# Patient Record
Sex: MALE | Race: WHITE | NOT HISPANIC OR LATINO | Employment: OTHER | URBAN - METROPOLITAN AREA
[De-identification: names, ages, dates, MRNs, and addresses within clinical notes are randomized per-mention and may not be internally consistent; named-entity substitution may affect disease eponyms.]

---

## 2017-01-08 ENCOUNTER — HOSPITAL ENCOUNTER (EMERGENCY)
Facility: HOSPITAL | Age: 62
Discharge: HOME | End: 2017-01-09
Attending: EMERGENCY MEDICINE
Payer: COMMERCIAL

## 2017-01-08 ENCOUNTER — APPOINTMENT (EMERGENCY)
Dept: CT IMAGING | Facility: HOSPITAL | Age: 62
End: 2017-01-08
Payer: COMMERCIAL

## 2017-01-08 DIAGNOSIS — R11.2 NAUSEA & VOMITING: Primary | ICD-10-CM

## 2017-01-08 LAB
ALBUMIN SERPL BCP-MCNC: 4.1 G/DL (ref 3.5–5)
ALP SERPL-CCNC: 83 U/L (ref 46–116)
ALT SERPL W P-5'-P-CCNC: 38 U/L (ref 12–78)
ANION GAP SERPL CALCULATED.3IONS-SCNC: 8 MMOL/L (ref 4–13)
AST SERPL W P-5'-P-CCNC: 23 U/L (ref 5–45)
BASOPHILS # BLD AUTO: 0.02 THOUSANDS/ΜL (ref 0–0.1)
BASOPHILS NFR BLD AUTO: 0 % (ref 0–1)
BILIRUB SERPL-MCNC: 0.6 MG/DL (ref 0.2–1)
BUN SERPL-MCNC: 20 MG/DL (ref 5–25)
CALCIUM SERPL-MCNC: 9.1 MG/DL (ref 8.3–10.1)
CHLORIDE SERPL-SCNC: 99 MMOL/L (ref 100–108)
CO2 SERPL-SCNC: 28 MMOL/L (ref 21–32)
CREAT SERPL-MCNC: 0.92 MG/DL (ref 0.6–1.3)
CRP SERPL QL: 21.8 MG/L
EOSINOPHIL # BLD AUTO: 0.07 THOUSAND/ΜL (ref 0–0.61)
EOSINOPHIL NFR BLD AUTO: 1 % (ref 0–6)
ERYTHROCYTE [DISTWIDTH] IN BLOOD BY AUTOMATED COUNT: 12.8 % (ref 11.6–15.1)
FLUAV AG SPEC QL IA: NEGATIVE
FLUBV AG SPEC QL IA: NEGATIVE
GFR SERPL CREATININE-BSD FRML MDRD: >60 ML/MIN/1.73SQ M
GLUCOSE SERPL-MCNC: 128 MG/DL (ref 65–140)
HCT VFR BLD AUTO: 44.6 % (ref 36.5–49.3)
HGB BLD-MCNC: 15.6 G/DL (ref 12–17)
LIPASE SERPL-CCNC: 174 U/L (ref 73–393)
LYMPHOCYTES # BLD AUTO: 0.73 THOUSANDS/ΜL (ref 0.6–4.47)
LYMPHOCYTES NFR BLD AUTO: 8 % (ref 14–44)
MCH RBC QN AUTO: 28.4 PG (ref 26.8–34.3)
MCHC RBC AUTO-ENTMCNC: 35 G/DL (ref 31.4–37.4)
MCV RBC AUTO: 81 FL (ref 82–98)
MONOCYTES # BLD AUTO: 0.43 THOUSAND/ΜL (ref 0.17–1.22)
MONOCYTES NFR BLD AUTO: 5 % (ref 4–12)
NEUTROPHILS # BLD AUTO: 7.9 THOUSANDS/ΜL (ref 1.85–7.62)
NEUTS SEG NFR BLD AUTO: 86 % (ref 43–75)
NRBC BLD AUTO-RTO: 0 /100 WBCS
PLATELET # BLD AUTO: 195 THOUSANDS/UL (ref 149–390)
PMV BLD AUTO: 9.7 FL (ref 8.9–12.7)
POTASSIUM SERPL-SCNC: 3.8 MMOL/L (ref 3.5–5.3)
PROT SERPL-MCNC: 8.4 G/DL (ref 6.4–8.2)
RBC # BLD AUTO: 5.5 MILLION/UL (ref 3.88–5.62)
SODIUM SERPL-SCNC: 135 MMOL/L (ref 136–145)
TROPONIN I SERPL-MCNC: <0.02 NG/ML
TSH SERPL DL<=0.05 MIU/L-ACNC: 0.61 UIU/ML (ref 0.36–3.74)
WBC # BLD AUTO: 9.18 THOUSAND/UL (ref 4.31–10.16)

## 2017-01-08 PROCEDURE — 74177 CT ABD & PELVIS W/CONTRAST: CPT

## 2017-01-08 PROCEDURE — 84484 ASSAY OF TROPONIN QUANT: CPT | Performed by: EMERGENCY MEDICINE

## 2017-01-08 PROCEDURE — 96374 THER/PROPH/DIAG INJ IV PUSH: CPT

## 2017-01-08 PROCEDURE — 86140 C-REACTIVE PROTEIN: CPT | Performed by: EMERGENCY MEDICINE

## 2017-01-08 PROCEDURE — 87798 DETECT AGENT NOS DNA AMP: CPT | Performed by: EMERGENCY MEDICINE

## 2017-01-08 PROCEDURE — 80053 COMPREHEN METABOLIC PANEL: CPT | Performed by: EMERGENCY MEDICINE

## 2017-01-08 PROCEDURE — 85025 COMPLETE CBC W/AUTO DIFF WBC: CPT | Performed by: EMERGENCY MEDICINE

## 2017-01-08 PROCEDURE — 87400 INFLUENZA A/B EACH AG IA: CPT | Performed by: EMERGENCY MEDICINE

## 2017-01-08 PROCEDURE — 71275 CT ANGIOGRAPHY CHEST: CPT

## 2017-01-08 PROCEDURE — 96375 TX/PRO/DX INJ NEW DRUG ADDON: CPT

## 2017-01-08 PROCEDURE — 84443 ASSAY THYROID STIM HORMONE: CPT | Performed by: EMERGENCY MEDICINE

## 2017-01-08 PROCEDURE — 96361 HYDRATE IV INFUSION ADD-ON: CPT

## 2017-01-08 PROCEDURE — 83690 ASSAY OF LIPASE: CPT | Performed by: EMERGENCY MEDICINE

## 2017-01-08 PROCEDURE — 36415 COLL VENOUS BLD VENIPUNCTURE: CPT | Performed by: EMERGENCY MEDICINE

## 2017-01-08 PROCEDURE — 85652 RBC SED RATE AUTOMATED: CPT | Performed by: EMERGENCY MEDICINE

## 2017-01-08 RX ORDER — KETOROLAC TROMETHAMINE 30 MG/ML
30 INJECTION, SOLUTION INTRAMUSCULAR; INTRAVENOUS ONCE
Status: COMPLETED | OUTPATIENT
Start: 2017-01-08 | End: 2017-01-08

## 2017-01-08 RX ORDER — METFORMIN HYDROCHLORIDE EXTENDED-RELEASE TABLETS 500 MG/1
500 TABLET, FILM COATED, EXTENDED RELEASE ORAL
COMMUNITY
End: 2017-01-21 | Stop reason: HOSPADM

## 2017-01-08 RX ORDER — ATORVASTATIN CALCIUM 10 MG/1
10 TABLET, FILM COATED ORAL DAILY
COMMUNITY
End: 2018-08-03 | Stop reason: SDUPTHER

## 2017-01-08 RX ORDER — AMLODIPINE BESYLATE 2.5 MG/1
2.5 TABLET ORAL DAILY
COMMUNITY
End: 2018-03-16 | Stop reason: ALTCHOICE

## 2017-01-08 RX ORDER — ONDANSETRON 2 MG/ML
INJECTION INTRAMUSCULAR; INTRAVENOUS
Status: DISCONTINUED
Start: 2017-01-08 | End: 2017-01-09 | Stop reason: HOSPADM

## 2017-01-08 RX ORDER — METOPROLOL SUCCINATE 100 MG/1
100 TABLET, EXTENDED RELEASE ORAL DAILY
COMMUNITY
End: 2018-06-20 | Stop reason: SDUPTHER

## 2017-01-08 RX ORDER — ONDANSETRON 2 MG/ML
4 INJECTION INTRAMUSCULAR; INTRAVENOUS ONCE AS NEEDED
Status: COMPLETED | OUTPATIENT
Start: 2017-01-08 | End: 2017-01-08

## 2017-01-08 RX ADMIN — ONDANSETRON 4 MG: 2 INJECTION INTRAMUSCULAR; INTRAVENOUS at 21:05

## 2017-01-08 RX ADMIN — KETOROLAC TROMETHAMINE 30 MG: 30 INJECTION, SOLUTION INTRAMUSCULAR at 22:41

## 2017-01-08 RX ADMIN — SODIUM CHLORIDE 1000 ML: 0.9 INJECTION, SOLUTION INTRAVENOUS at 22:34

## 2017-01-08 RX ADMIN — IOHEXOL 100 ML: 350 INJECTION, SOLUTION INTRAVENOUS at 23:52

## 2017-01-09 VITALS
WEIGHT: 197.4 LBS | TEMPERATURE: 100.2 F | RESPIRATION RATE: 18 BRPM | SYSTOLIC BLOOD PRESSURE: 140 MMHG | OXYGEN SATURATION: 100 % | HEART RATE: 67 BPM | DIASTOLIC BLOOD PRESSURE: 87 MMHG

## 2017-01-09 LAB
ERYTHROCYTE [SEDIMENTATION RATE] IN BLOOD: 8 MM/HOUR (ref 0–10)
FLUAV AG SPEC QL: NORMAL
FLUBV AG SPEC QL: NORMAL
RSV B RNA SPEC QL NAA+PROBE: NORMAL

## 2017-01-09 PROCEDURE — 96376 TX/PRO/DX INJ SAME DRUG ADON: CPT

## 2017-01-09 PROCEDURE — 99284 EMERGENCY DEPT VISIT MOD MDM: CPT

## 2017-01-09 RX ORDER — ONDANSETRON 4 MG/1
4 TABLET, ORALLY DISINTEGRATING ORAL EVERY 8 HOURS PRN
Qty: 20 TABLET | Refills: 0 | Status: SHIPPED | OUTPATIENT
Start: 2017-01-09 | End: 2018-03-16 | Stop reason: ALTCHOICE

## 2017-01-09 RX ORDER — ONDANSETRON 2 MG/ML
4 INJECTION INTRAMUSCULAR; INTRAVENOUS ONCE
Status: COMPLETED | OUTPATIENT
Start: 2017-01-09 | End: 2017-01-09

## 2017-01-09 RX ADMIN — ONDANSETRON 4 MG: 2 INJECTION INTRAMUSCULAR; INTRAVENOUS at 00:40

## 2017-01-16 ENCOUNTER — ALLSCRIPTS OFFICE VISIT (OUTPATIENT)
Dept: OTHER | Facility: OTHER | Age: 62
End: 2017-01-16

## 2017-01-17 ENCOUNTER — APPOINTMENT (EMERGENCY)
Dept: RADIOLOGY | Facility: HOSPITAL | Age: 62
DRG: 872 | End: 2017-01-17
Payer: COMMERCIAL

## 2017-01-17 ENCOUNTER — HOSPITAL ENCOUNTER (INPATIENT)
Facility: HOSPITAL | Age: 62
LOS: 4 days | Discharge: HOME | DRG: 872 | End: 2017-01-21
Attending: INTERNAL MEDICINE | Admitting: INTERNAL MEDICINE
Payer: COMMERCIAL

## 2017-01-17 DIAGNOSIS — R09.02 HYPOXIA: Primary | ICD-10-CM

## 2017-01-17 LAB
ALBUMIN SERPL BCP-MCNC: 3.5 G/DL (ref 3.5–5)
ALP SERPL-CCNC: 81 U/L (ref 46–116)
ALT SERPL W P-5'-P-CCNC: 29 U/L (ref 12–78)
ANION GAP SERPL CALCULATED.3IONS-SCNC: 9 MMOL/L (ref 4–13)
APTT PPP: 33 SECONDS (ref 24–36)
AST SERPL W P-5'-P-CCNC: 20 U/L (ref 5–45)
BASOPHILS # BLD AUTO: 0.02 THOUSANDS/ΜL (ref 0–0.1)
BASOPHILS NFR BLD AUTO: 0 % (ref 0–1)
BILIRUB SERPL-MCNC: 0.4 MG/DL (ref 0.2–1)
BUN SERPL-MCNC: 11 MG/DL (ref 5–25)
CALCIUM SERPL-MCNC: 8.9 MG/DL (ref 8.3–10.1)
CHLORIDE SERPL-SCNC: 98 MMOL/L (ref 100–108)
CO2 SERPL-SCNC: 29 MMOL/L (ref 21–32)
CREAT SERPL-MCNC: 0.95 MG/DL (ref 0.6–1.3)
EOSINOPHIL # BLD AUTO: 0.05 THOUSAND/ΜL (ref 0–0.61)
EOSINOPHIL NFR BLD AUTO: 1 % (ref 0–6)
ERYTHROCYTE [DISTWIDTH] IN BLOOD BY AUTOMATED COUNT: 13 % (ref 11.6–15.1)
GFR SERPL CREATININE-BSD FRML MDRD: >60 ML/MIN/1.73SQ M
GLUCOSE SERPL-MCNC: 117 MG/DL (ref 65–140)
HCT VFR BLD AUTO: 40.7 % (ref 36.5–49.3)
HGB BLD-MCNC: 14.5 G/DL (ref 12–17)
INR PPP: 1.07 (ref 0.86–1.16)
LACTATE SERPL-SCNC: 1.3 MMOL/L (ref 0.5–2)
LIPASE SERPL-CCNC: 120 U/L (ref 73–393)
LYMPHOCYTES # BLD AUTO: 1.6 THOUSANDS/ΜL (ref 0.6–4.47)
LYMPHOCYTES NFR BLD AUTO: 21 % (ref 14–44)
MCH RBC QN AUTO: 28.8 PG (ref 26.8–34.3)
MCHC RBC AUTO-ENTMCNC: 35.6 G/DL (ref 31.4–37.4)
MCV RBC AUTO: 81 FL (ref 82–98)
MONOCYTES # BLD AUTO: 0.86 THOUSAND/ΜL (ref 0.17–1.22)
MONOCYTES NFR BLD AUTO: 11 % (ref 4–12)
NEUTROPHILS # BLD AUTO: 5.17 THOUSANDS/ΜL (ref 1.85–7.62)
NEUTS SEG NFR BLD AUTO: 67 % (ref 43–75)
NRBC BLD AUTO-RTO: 0 /100 WBCS
PLATELET # BLD AUTO: 202 THOUSANDS/UL (ref 149–390)
PMV BLD AUTO: 9.2 FL (ref 8.9–12.7)
POTASSIUM SERPL-SCNC: 3.4 MMOL/L (ref 3.5–5.3)
PROT SERPL-MCNC: 8.1 G/DL (ref 6.4–8.2)
PROTHROMBIN TIME: 13.8 SECONDS (ref 12–14.3)
RBC # BLD AUTO: 5.03 MILLION/UL (ref 3.88–5.62)
SODIUM SERPL-SCNC: 136 MMOL/L (ref 136–145)
WBC # BLD AUTO: 7.73 THOUSAND/UL (ref 4.31–10.16)

## 2017-01-17 PROCEDURE — 71020 HB CHEST X-RAY 2VW FRONTAL&LATL: CPT

## 2017-01-17 PROCEDURE — 85610 PROTHROMBIN TIME: CPT | Performed by: PHYSICIAN ASSISTANT

## 2017-01-17 PROCEDURE — 85730 THROMBOPLASTIN TIME PARTIAL: CPT | Performed by: PHYSICIAN ASSISTANT

## 2017-01-17 PROCEDURE — 80053 COMPREHEN METABOLIC PANEL: CPT | Performed by: PHYSICIAN ASSISTANT

## 2017-01-17 PROCEDURE — 96361 HYDRATE IV INFUSION ADD-ON: CPT

## 2017-01-17 PROCEDURE — 85025 COMPLETE CBC W/AUTO DIFF WBC: CPT | Performed by: PHYSICIAN ASSISTANT

## 2017-01-17 PROCEDURE — 36415 COLL VENOUS BLD VENIPUNCTURE: CPT | Performed by: PHYSICIAN ASSISTANT

## 2017-01-17 PROCEDURE — 87040 BLOOD CULTURE FOR BACTERIA: CPT | Performed by: PHYSICIAN ASSISTANT

## 2017-01-17 PROCEDURE — 83605 ASSAY OF LACTIC ACID: CPT | Performed by: PHYSICIAN ASSISTANT

## 2017-01-17 PROCEDURE — 96360 HYDRATION IV INFUSION INIT: CPT

## 2017-01-17 PROCEDURE — A9270 NON-COVERED ITEM OR SERVICE: HCPCS

## 2017-01-17 PROCEDURE — 83690 ASSAY OF LIPASE: CPT | Performed by: PHYSICIAN ASSISTANT

## 2017-01-17 RX ORDER — ACETAMINOPHEN 325 MG/1
TABLET ORAL
Status: COMPLETED
Start: 2017-01-17 | End: 2017-01-17

## 2017-01-17 RX ORDER — ACETAMINOPHEN 325 MG/1
650 TABLET ORAL ONCE
Status: COMPLETED | OUTPATIENT
Start: 2017-01-17 | End: 2017-01-17

## 2017-01-17 RX ADMIN — SODIUM CHLORIDE 1000 ML: 0.9 INJECTION, SOLUTION INTRAVENOUS at 22:10

## 2017-01-17 RX ADMIN — ACETAMINOPHEN 650 MG: 325 TABLET ORAL at 20:54

## 2017-01-17 RX ADMIN — SODIUM CHLORIDE 1000 ML: 0.9 INJECTION, SOLUTION INTRAVENOUS at 20:53

## 2017-01-18 PROBLEM — R65.10 SIRS (SYSTEMIC INFLAMMATORY RESPONSE SYNDROME) (HCC): Status: ACTIVE | Noted: 2017-01-18

## 2017-01-18 PROBLEM — I10 HTN (HYPERTENSION): Chronic | Status: ACTIVE | Noted: 2017-01-18

## 2017-01-18 PROBLEM — J06.9 URI (UPPER RESPIRATORY INFECTION): Status: ACTIVE | Noted: 2017-01-18

## 2017-01-18 PROBLEM — E87.6 HYPOKALEMIA: Status: ACTIVE | Noted: 2017-01-18

## 2017-01-18 PROBLEM — E11.9 TYPE 2 DIABETES MELLITUS (HCC): Chronic | Status: ACTIVE | Noted: 2017-01-18

## 2017-01-18 LAB
ANION GAP SERPL CALCULATED.3IONS-SCNC: 10 MMOL/L (ref 4–13)
BUN SERPL-MCNC: 10 MG/DL (ref 5–25)
CALCIUM SERPL-MCNC: 8.6 MG/DL (ref 8.3–10.1)
CHLORIDE SERPL-SCNC: 103 MMOL/L (ref 100–108)
CO2 SERPL-SCNC: 27 MMOL/L (ref 21–32)
CREAT SERPL-MCNC: 0.88 MG/DL (ref 0.6–1.3)
ERYTHROCYTE [DISTWIDTH] IN BLOOD BY AUTOMATED COUNT: 12.9 % (ref 11.6–15.1)
FLUAV AG SPEC QL IA: NEGATIVE
FLUAV AG SPEC QL: ABNORMAL
FLUBV AG SPEC QL IA: NEGATIVE
FLUBV AG SPEC QL: ABNORMAL
GFR SERPL CREATININE-BSD FRML MDRD: >60 ML/MIN/1.73SQ M
GLUCOSE SERPL-MCNC: 109 MG/DL (ref 65–140)
GLUCOSE SERPL-MCNC: 115 MG/DL (ref 65–140)
GLUCOSE SERPL-MCNC: 116 MG/DL (ref 65–140)
GLUCOSE SERPL-MCNC: 129 MG/DL (ref 65–140)
GLUCOSE SERPL-MCNC: 147 MG/DL (ref 65–140)
HCT VFR BLD AUTO: 37.2 % (ref 36.5–49.3)
HGB BLD-MCNC: 12.9 G/DL (ref 12–17)
L PNEUMO1 AG UR QL IA.RAPID: NEGATIVE
MCH RBC QN AUTO: 28.4 PG (ref 26.8–34.3)
MCHC RBC AUTO-ENTMCNC: 34.7 G/DL (ref 31.4–37.4)
MCV RBC AUTO: 82 FL (ref 82–98)
PLATELET # BLD AUTO: 171 THOUSANDS/UL (ref 149–390)
PMV BLD AUTO: 9.2 FL (ref 8.9–12.7)
POTASSIUM SERPL-SCNC: 3.4 MMOL/L (ref 3.5–5.3)
RBC # BLD AUTO: 4.54 MILLION/UL (ref 3.88–5.62)
RSV B RNA SPEC QL NAA+PROBE: DETECTED
S PNEUM AG UR QL: NEGATIVE
SODIUM SERPL-SCNC: 140 MMOL/L (ref 136–145)
WBC # BLD AUTO: 7.68 THOUSAND/UL (ref 4.31–10.16)

## 2017-01-18 PROCEDURE — 87798 DETECT AGENT NOS DNA AMP: CPT | Performed by: PHYSICIAN ASSISTANT

## 2017-01-18 PROCEDURE — 82948 REAGENT STRIP/BLOOD GLUCOSE: CPT

## 2017-01-18 PROCEDURE — A9270 NON-COVERED ITEM OR SERVICE: HCPCS | Performed by: PHYSICIAN ASSISTANT

## 2017-01-18 PROCEDURE — 99285 EMERGENCY DEPT VISIT HI MDM: CPT

## 2017-01-18 PROCEDURE — A9270 NON-COVERED ITEM OR SERVICE: HCPCS

## 2017-01-18 PROCEDURE — 36415 COLL VENOUS BLD VENIPUNCTURE: CPT | Performed by: PHYSICIAN ASSISTANT

## 2017-01-18 PROCEDURE — 94760 N-INVAS EAR/PLS OXIMETRY 1: CPT

## 2017-01-18 PROCEDURE — 80048 BASIC METABOLIC PNL TOTAL CA: CPT | Performed by: PHYSICIAN ASSISTANT

## 2017-01-18 PROCEDURE — 87449 NOS EACH ORGANISM AG IA: CPT | Performed by: PHYSICIAN ASSISTANT

## 2017-01-18 PROCEDURE — 87205 SMEAR GRAM STAIN: CPT | Performed by: PHYSICIAN ASSISTANT

## 2017-01-18 PROCEDURE — 85027 COMPLETE CBC AUTOMATED: CPT | Performed by: PHYSICIAN ASSISTANT

## 2017-01-18 PROCEDURE — 87400 INFLUENZA A/B EACH AG IA: CPT | Performed by: PHYSICIAN ASSISTANT

## 2017-01-18 PROCEDURE — 87070 CULTURE OTHR SPECIMN AEROBIC: CPT | Performed by: PHYSICIAN ASSISTANT

## 2017-01-18 PROCEDURE — 87801 DETECT AGNT MULT DNA AMPLI: CPT | Performed by: INTERNAL MEDICINE

## 2017-01-18 PROCEDURE — 87070 CULTURE OTHR SPECIMN AEROBIC: CPT | Performed by: INTERNAL MEDICINE

## 2017-01-18 RX ORDER — ACETAMINOPHEN 325 MG/1
650 TABLET ORAL EVERY 6 HOURS PRN
Status: DISCONTINUED | OUTPATIENT
Start: 2017-01-18 | End: 2017-01-20

## 2017-01-18 RX ORDER — ALBUTEROL SULFATE 2.5 MG/3ML
2.5 SOLUTION RESPIRATORY (INHALATION) EVERY 6 HOURS PRN
Status: DISCONTINUED | OUTPATIENT
Start: 2017-01-18 | End: 2017-01-18

## 2017-01-18 RX ORDER — ECHINACEA PURPUREA EXTRACT 125 MG
2 TABLET ORAL
Status: DISCONTINUED | OUTPATIENT
Start: 2017-01-18 | End: 2017-01-21 | Stop reason: HOSPADM

## 2017-01-18 RX ORDER — SODIUM CHLORIDE 9 MG/ML
125 INJECTION, SOLUTION INTRAVENOUS CONTINUOUS
Status: DISCONTINUED | OUTPATIENT
Start: 2017-01-18 | End: 2017-01-19

## 2017-01-18 RX ORDER — ATORVASTATIN CALCIUM 10 MG/1
10 TABLET, FILM COATED ORAL DAILY
Status: DISCONTINUED | OUTPATIENT
Start: 2017-01-18 | End: 2017-01-21 | Stop reason: HOSPADM

## 2017-01-18 RX ORDER — CEFTRIAXONE 1 G/1
INJECTION, POWDER, FOR SOLUTION INTRAMUSCULAR; INTRAVENOUS
Status: DISPENSED
Start: 2017-01-18 | End: 2017-01-18

## 2017-01-18 RX ORDER — POTASSIUM CHLORIDE 20 MEQ/1
20 TABLET, EXTENDED RELEASE ORAL ONCE
Status: COMPLETED | OUTPATIENT
Start: 2017-01-18 | End: 2017-01-18

## 2017-01-18 RX ORDER — METOPROLOL SUCCINATE 50 MG/1
50 TABLET, EXTENDED RELEASE ORAL DAILY
Status: DISCONTINUED | OUTPATIENT
Start: 2017-01-18 | End: 2017-01-21 | Stop reason: HOSPADM

## 2017-01-18 RX ORDER — CALCIUM CARBONATE 200(500)MG
1000 TABLET,CHEWABLE ORAL DAILY PRN
Status: DISCONTINUED | OUTPATIENT
Start: 2017-01-18 | End: 2017-01-21 | Stop reason: HOSPADM

## 2017-01-18 RX ORDER — ACETAMINOPHEN 325 MG/1
TABLET ORAL
Status: COMPLETED
Start: 2017-01-18 | End: 2017-01-18

## 2017-01-18 RX ORDER — DOCUSATE SODIUM 100 MG/1
100 CAPSULE, LIQUID FILLED ORAL 2 TIMES DAILY
Status: DISCONTINUED | OUTPATIENT
Start: 2017-01-18 | End: 2017-01-19

## 2017-01-18 RX ORDER — ASPIRIN 81 MG/1
81 TABLET, CHEWABLE ORAL DAILY
Status: DISCONTINUED | OUTPATIENT
Start: 2017-01-18 | End: 2017-01-21 | Stop reason: HOSPADM

## 2017-01-18 RX ORDER — LEVALBUTEROL 1.25 MG/.5ML
1.25 SOLUTION, CONCENTRATE RESPIRATORY (INHALATION) EVERY 8 HOURS PRN
Status: DISCONTINUED | OUTPATIENT
Start: 2017-01-18 | End: 2017-01-21 | Stop reason: HOSPADM

## 2017-01-18 RX ORDER — ONDANSETRON 2 MG/ML
INJECTION INTRAMUSCULAR; INTRAVENOUS
Status: COMPLETED
Start: 2017-01-18 | End: 2017-01-18

## 2017-01-18 RX ORDER — AMLODIPINE BESYLATE 2.5 MG/1
2.5 TABLET ORAL DAILY
Status: DISCONTINUED | OUTPATIENT
Start: 2017-01-18 | End: 2017-01-21 | Stop reason: HOSPADM

## 2017-01-18 RX ORDER — ONDANSETRON 2 MG/ML
4 INJECTION INTRAMUSCULAR; INTRAVENOUS EVERY 6 HOURS PRN
Status: DISCONTINUED | OUTPATIENT
Start: 2017-01-18 | End: 2017-01-21 | Stop reason: HOSPADM

## 2017-01-18 RX ORDER — METFORMIN HYDROCHLORIDE 500 MG/1
500 TABLET, EXTENDED RELEASE ORAL
Status: DISCONTINUED | OUTPATIENT
Start: 2017-01-18 | End: 2017-01-18

## 2017-01-18 RX ADMIN — ASPIRIN 81 MG 81 MG: 81 TABLET ORAL at 08:35

## 2017-01-18 RX ADMIN — POTASSIUM CHLORIDE 20 MEQ: 1500 TABLET, EXTENDED RELEASE ORAL at 18:28

## 2017-01-18 RX ADMIN — METFORMIN HYDROCHLORIDE 500 MG: 500 TABLET, EXTENDED RELEASE ORAL at 08:36

## 2017-01-18 RX ADMIN — SODIUM CHLORIDE 125 ML/HR: 0.9 INJECTION, SOLUTION INTRAVENOUS at 20:21

## 2017-01-18 RX ADMIN — SODIUM CHLORIDE 125 ML/HR: 0.9 INJECTION, SOLUTION INTRAVENOUS at 03:05

## 2017-01-18 RX ADMIN — ENOXAPARIN SODIUM 40 MG: 40 INJECTION SUBCUTANEOUS at 09:23

## 2017-01-18 RX ADMIN — DOCUSATE SODIUM 100 MG: 100 CAPSULE, LIQUID FILLED ORAL at 08:39

## 2017-01-18 RX ADMIN — SODIUM CHLORIDE 125 ML/HR: 0.9 INJECTION, SOLUTION INTRAVENOUS at 14:51

## 2017-01-18 RX ADMIN — ACETAMINOPHEN 650 MG: 325 TABLET ORAL at 02:32

## 2017-01-18 RX ADMIN — Medication 1000 MG: at 03:04

## 2017-01-18 RX ADMIN — DOCUSATE SODIUM 100 MG: 100 CAPSULE, LIQUID FILLED ORAL at 18:28

## 2017-01-18 RX ADMIN — METOPROLOL SUCCINATE 50 MG: 50 TABLET, FILM COATED, EXTENDED RELEASE ORAL at 08:38

## 2017-01-18 RX ADMIN — AMLODIPINE BESYLATE 2.5 MG: 2.5 TABLET ORAL at 08:39

## 2017-01-18 RX ADMIN — ATORVASTATIN CALCIUM 10 MG: 10 TABLET, FILM COATED ORAL at 08:36

## 2017-01-18 RX ADMIN — ACETAMINOPHEN 650 MG: 325 TABLET ORAL at 18:28

## 2017-01-18 RX ADMIN — AZITHROMYCIN FOR INJECTION INJECTION, POWDER, LYOPHILIZED, FOR SOLUTION 500 MG: 500 INJECTION INTRAVENOUS at 05:54

## 2017-01-18 RX ADMIN — ONDANSETRON 4 MG: 2 INJECTION INTRAMUSCULAR; INTRAVENOUS at 02:32

## 2017-01-18 RX ADMIN — ACETAMINOPHEN 650 MG: 325 TABLET ORAL at 08:04

## 2017-01-18 RX ADMIN — POTASSIUM CHLORIDE 20 MEQ: 1500 TABLET, EXTENDED RELEASE ORAL at 05:57

## 2017-01-19 PROBLEM — A41.9 SEPSIS (HCC): Status: ACTIVE | Noted: 2017-01-18

## 2017-01-19 PROBLEM — J40 TRACHEOBRONCHITIS: Status: ACTIVE | Noted: 2017-01-19

## 2017-01-19 PROBLEM — J01.90 ACUTE SINUSITIS: Status: ACTIVE | Noted: 2017-01-19

## 2017-01-19 PROBLEM — B33.8 RSV (RESPIRATORY SYNCYTIAL VIRUS INFECTION): Status: ACTIVE | Noted: 2017-01-19

## 2017-01-19 LAB
GLUCOSE SERPL-MCNC: 113 MG/DL (ref 65–140)
GLUCOSE SERPL-MCNC: 202 MG/DL (ref 65–140)
GLUCOSE SERPL-MCNC: 266 MG/DL (ref 65–140)
GLUCOSE SERPL-MCNC: 95 MG/DL (ref 65–140)

## 2017-01-19 PROCEDURE — A9270 NON-COVERED ITEM OR SERVICE: HCPCS | Performed by: PHYSICIAN ASSISTANT

## 2017-01-19 PROCEDURE — 82948 REAGENT STRIP/BLOOD GLUCOSE: CPT

## 2017-01-19 PROCEDURE — 94640 AIRWAY INHALATION TREATMENT: CPT

## 2017-01-19 PROCEDURE — 94760 N-INVAS EAR/PLS OXIMETRY 1: CPT

## 2017-01-19 RX ORDER — DOCUSATE SODIUM 100 MG/1
100 CAPSULE, LIQUID FILLED ORAL 2 TIMES DAILY PRN
Status: DISCONTINUED | OUTPATIENT
Start: 2017-01-19 | End: 2017-01-21 | Stop reason: HOSPADM

## 2017-01-19 RX ORDER — SODIUM CHLORIDE 9 MG/ML
50 INJECTION, SOLUTION INTRAVENOUS CONTINUOUS
Status: DISCONTINUED | OUTPATIENT
Start: 2017-01-19 | End: 2017-01-21 | Stop reason: HOSPADM

## 2017-01-19 RX ORDER — SODIUM CHLORIDE FOR INHALATION 0.9 %
VIAL, NEBULIZER (ML) INHALATION
Status: COMPLETED
Start: 2017-01-19 | End: 2017-01-19

## 2017-01-19 RX ORDER — METHYLPREDNISOLONE SODIUM SUCCINATE 40 MG/ML
40 INJECTION, POWDER, LYOPHILIZED, FOR SOLUTION INTRAMUSCULAR; INTRAVENOUS EVERY 12 HOURS SCHEDULED
Status: DISCONTINUED | OUTPATIENT
Start: 2017-01-19 | End: 2017-01-20

## 2017-01-19 RX ORDER — ECHINACEA PURPUREA EXTRACT 125 MG
1 TABLET ORAL EVERY 4 HOURS
Status: DISCONTINUED | OUTPATIENT
Start: 2017-01-19 | End: 2017-01-21 | Stop reason: HOSPADM

## 2017-01-19 RX ORDER — LEVALBUTEROL 1.25 MG/.5ML
1.25 SOLUTION, CONCENTRATE RESPIRATORY (INHALATION)
Status: DISCONTINUED | OUTPATIENT
Start: 2017-01-19 | End: 2017-01-21 | Stop reason: HOSPADM

## 2017-01-19 RX ORDER — AZITHROMYCIN 250 MG/1
500 TABLET, FILM COATED ORAL EVERY 24 HOURS
Status: DISCONTINUED | OUTPATIENT
Start: 2017-01-20 | End: 2017-01-21 | Stop reason: HOSPADM

## 2017-01-19 RX ADMIN — Medication 1 SPRAY: at 16:14

## 2017-01-19 RX ADMIN — AMLODIPINE BESYLATE 2.5 MG: 2.5 TABLET ORAL at 08:23

## 2017-01-19 RX ADMIN — METOPROLOL SUCCINATE 50 MG: 50 TABLET, FILM COATED, EXTENDED RELEASE ORAL at 08:23

## 2017-01-19 RX ADMIN — LEVALBUTEROL HYDROCHLORIDE 1.25 MG: 1.25 SOLUTION, CONCENTRATE RESPIRATORY (INHALATION) at 16:40

## 2017-01-19 RX ADMIN — ISODIUM CHLORIDE 3 ML: 0.03 SOLUTION RESPIRATORY (INHALATION) at 21:30

## 2017-01-19 RX ADMIN — ONDANSETRON 4 MG: 2 INJECTION INTRAMUSCULAR; INTRAVENOUS at 01:50

## 2017-01-19 RX ADMIN — LEVALBUTEROL HYDROCHLORIDE 1.25 MG: 1.25 SOLUTION, CONCENTRATE RESPIRATORY (INHALATION) at 21:30

## 2017-01-19 RX ADMIN — ATORVASTATIN CALCIUM 10 MG: 10 TABLET, FILM COATED ORAL at 08:23

## 2017-01-19 RX ADMIN — ASPIRIN 81 MG 81 MG: 81 TABLET ORAL at 08:23

## 2017-01-19 RX ADMIN — ACETAMINOPHEN 650 MG: 325 TABLET ORAL at 01:48

## 2017-01-19 RX ADMIN — METHYLPREDNISOLONE SODIUM SUCCINATE 40 MG: 40 INJECTION, POWDER, FOR SOLUTION INTRAMUSCULAR; INTRAVENOUS at 21:09

## 2017-01-19 RX ADMIN — CEFTRIAXONE 1000 MG: 1 INJECTION, POWDER, FOR SOLUTION INTRAMUSCULAR; INTRAVENOUS at 12:45

## 2017-01-19 RX ADMIN — SODIUM CHLORIDE 125 ML/HR: 0.9 INJECTION, SOLUTION INTRAVENOUS at 04:28

## 2017-01-19 RX ADMIN — ISODIUM CHLORIDE 3 ML: 0.03 SOLUTION RESPIRATORY (INHALATION) at 16:40

## 2017-01-19 RX ADMIN — Medication 1 SPRAY: at 11:56

## 2017-01-19 RX ADMIN — AZITHROMYCIN MONOHYDRATE 500 MG: 500 INJECTION, POWDER, LYOPHILIZED, FOR SOLUTION INTRAVENOUS at 13:33

## 2017-01-19 RX ADMIN — SODIUM CHLORIDE 75 ML/HR: 0.9 INJECTION, SOLUTION INTRAVENOUS at 15:26

## 2017-01-19 RX ADMIN — ACETAMINOPHEN 650 MG: 325 TABLET ORAL at 07:53

## 2017-01-19 RX ADMIN — Medication 1 SPRAY: at 20:34

## 2017-01-19 RX ADMIN — ENOXAPARIN SODIUM 40 MG: 40 INJECTION SUBCUTANEOUS at 08:23

## 2017-01-19 RX ADMIN — INSULIN LISPRO 2 UNITS: 100 INJECTION, SOLUTION INTRAVENOUS; SUBCUTANEOUS at 18:06

## 2017-01-19 RX ADMIN — METHYLPREDNISOLONE SODIUM SUCCINATE 40 MG: 40 INJECTION, POWDER, FOR SOLUTION INTRAMUSCULAR; INTRAVENOUS at 12:09

## 2017-01-19 RX ADMIN — SODIUM CHLORIDE 125 ML/HR: 0.9 INJECTION, SOLUTION INTRAVENOUS at 04:31

## 2017-01-20 PROBLEM — E55.9 VITAMIN D DEFICIENCY: Status: ACTIVE | Noted: 2017-01-20

## 2017-01-20 PROBLEM — R74.01 TRANSAMINITIS: Status: ACTIVE | Noted: 2017-01-20

## 2017-01-20 LAB
25(OH)D3 SERPL-MCNC: 18.4 NG/ML (ref 30–100)
ALBUMIN SERPL BCP-MCNC: 2.8 G/DL (ref 3.5–5)
ALP SERPL-CCNC: 67 U/L (ref 46–116)
ALT SERPL W P-5'-P-CCNC: 62 U/L (ref 12–78)
ANION GAP SERPL CALCULATED.3IONS-SCNC: 11 MMOL/L (ref 4–13)
AST SERPL W P-5'-P-CCNC: 55 U/L (ref 5–45)
B PARAPERT DNA SPEC QL NAA+PROBE: NOT DETECTED
B PERT DNA SPEC QL NAA+PROBE: NOT DETECTED
BACTERIA SPT RESP CULT: NORMAL
BASOPHILS # BLD MANUAL: 0 THOUSAND/UL (ref 0–0.1)
BASOPHILS NFR MAR MANUAL: 0 % (ref 0–1)
BILIRUB SERPL-MCNC: 0.3 MG/DL (ref 0.2–1)
BUN SERPL-MCNC: 13 MG/DL (ref 5–25)
CALCIUM SERPL-MCNC: 9.1 MG/DL (ref 8.3–10.1)
CHLORIDE SERPL-SCNC: 105 MMOL/L (ref 100–108)
CO2 SERPL-SCNC: 26 MMOL/L (ref 21–32)
CREAT SERPL-MCNC: 0.76 MG/DL (ref 0.6–1.3)
EOSINOPHIL # BLD MANUAL: 0 THOUSAND/UL (ref 0–0.4)
EOSINOPHIL NFR BLD MANUAL: 0 % (ref 0–6)
ERYTHROCYTE [DISTWIDTH] IN BLOOD BY AUTOMATED COUNT: 12.8 % (ref 11.6–15.1)
EST. AVERAGE GLUCOSE BLD GHB EST-MCNC: 154 MG/DL
GFR SERPL CREATININE-BSD FRML MDRD: >60 ML/MIN/1.73SQ M
GLUCOSE SERPL-MCNC: 158 MG/DL (ref 65–140)
GLUCOSE SERPL-MCNC: 167 MG/DL (ref 65–140)
GLUCOSE SERPL-MCNC: 181 MG/DL (ref 65–140)
GLUCOSE SERPL-MCNC: 233 MG/DL (ref 65–140)
GLUCOSE SERPL-MCNC: 246 MG/DL (ref 65–140)
GRAM STN SPEC: NORMAL
HBA1C MFR BLD: 7 % (ref 4.2–6.3)
HCT VFR BLD AUTO: 35.7 % (ref 36.5–49.3)
HGB BLD-MCNC: 12.5 G/DL (ref 12–17)
LACTATE SERPL-SCNC: 1.1 MMOL/L (ref 0.5–2)
LYMPHOCYTES # BLD AUTO: 0.88 THOUSAND/UL (ref 0.6–4.47)
LYMPHOCYTES # BLD AUTO: 12 % (ref 14–44)
MAGNESIUM SERPL-MCNC: 2 MG/DL (ref 1.6–2.6)
MCH RBC QN AUTO: 28.5 PG (ref 26.8–34.3)
MCHC RBC AUTO-ENTMCNC: 35 G/DL (ref 31.4–37.4)
MCV RBC AUTO: 82 FL (ref 82–98)
MONOCYTES # BLD AUTO: 0.37 THOUSAND/UL (ref 0–1.22)
MONOCYTES NFR BLD: 5 % (ref 4–12)
NEUTROPHILS # BLD MANUAL: 6.09 THOUSAND/UL (ref 1.85–7.62)
NEUTS SEG NFR BLD AUTO: 83 % (ref 43–75)
NRBC BLD AUTO-RTO: 0 /100 WBCS
PHOSPHATE SERPL-MCNC: 4.2 MG/DL (ref 2.7–4.5)
PLATELET # BLD AUTO: 195 THOUSANDS/UL (ref 149–390)
PLATELET BLD QL SMEAR: ADEQUATE
PMV BLD AUTO: 10.2 FL (ref 8.9–12.7)
POTASSIUM SERPL-SCNC: 4.4 MMOL/L (ref 3.5–5.3)
PROT SERPL-MCNC: 7.6 G/DL (ref 6.4–8.2)
RBC # BLD AUTO: 4.38 MILLION/UL (ref 3.88–5.62)
SODIUM SERPL-SCNC: 142 MMOL/L (ref 136–145)
TOTAL CELLS COUNTED SPEC: 100
WBC # BLD AUTO: 7.34 THOUSAND/UL (ref 4.31–10.16)

## 2017-01-20 PROCEDURE — 83605 ASSAY OF LACTIC ACID: CPT | Performed by: INTERNAL MEDICINE

## 2017-01-20 PROCEDURE — A9270 NON-COVERED ITEM OR SERVICE: HCPCS | Performed by: PHYSICIAN ASSISTANT

## 2017-01-20 PROCEDURE — 85007 BL SMEAR W/DIFF WBC COUNT: CPT | Performed by: INTERNAL MEDICINE

## 2017-01-20 PROCEDURE — 80053 COMPREHEN METABOLIC PANEL: CPT | Performed by: INTERNAL MEDICINE

## 2017-01-20 PROCEDURE — 94760 N-INVAS EAR/PLS OXIMETRY 1: CPT

## 2017-01-20 PROCEDURE — 83735 ASSAY OF MAGNESIUM: CPT | Performed by: INTERNAL MEDICINE

## 2017-01-20 PROCEDURE — 94640 AIRWAY INHALATION TREATMENT: CPT

## 2017-01-20 PROCEDURE — A9270 NON-COVERED ITEM OR SERVICE: HCPCS | Performed by: INTERNAL MEDICINE

## 2017-01-20 PROCEDURE — 82948 REAGENT STRIP/BLOOD GLUCOSE: CPT

## 2017-01-20 PROCEDURE — 82306 VITAMIN D 25 HYDROXY: CPT | Performed by: INTERNAL MEDICINE

## 2017-01-20 PROCEDURE — 84100 ASSAY OF PHOSPHORUS: CPT | Performed by: INTERNAL MEDICINE

## 2017-01-20 PROCEDURE — 83036 HEMOGLOBIN GLYCOSYLATED A1C: CPT | Performed by: INTERNAL MEDICINE

## 2017-01-20 PROCEDURE — 85027 COMPLETE CBC AUTOMATED: CPT | Performed by: INTERNAL MEDICINE

## 2017-01-20 RX ORDER — SODIUM CHLORIDE FOR INHALATION 0.9 %
VIAL, NEBULIZER (ML) INHALATION
Status: COMPLETED
Start: 2017-01-20 | End: 2017-01-20

## 2017-01-20 RX ORDER — MELATONIN
2000 DAILY
Status: DISCONTINUED | OUTPATIENT
Start: 2017-01-21 | End: 2017-01-21 | Stop reason: HOSPADM

## 2017-01-20 RX ORDER — PREDNISONE 20 MG/1
40 TABLET ORAL DAILY
Status: DISCONTINUED | OUTPATIENT
Start: 2017-01-21 | End: 2017-01-21 | Stop reason: HOSPADM

## 2017-01-20 RX ADMIN — ATORVASTATIN CALCIUM 10 MG: 10 TABLET, FILM COATED ORAL at 08:04

## 2017-01-20 RX ADMIN — Medication 1 SPRAY: at 20:10

## 2017-01-20 RX ADMIN — ISODIUM CHLORIDE 3 ML: 0.03 SOLUTION RESPIRATORY (INHALATION) at 10:00

## 2017-01-20 RX ADMIN — LEVALBUTEROL HYDROCHLORIDE 1.25 MG: 1.25 SOLUTION, CONCENTRATE RESPIRATORY (INHALATION) at 22:19

## 2017-01-20 RX ADMIN — METHYLPREDNISOLONE SODIUM SUCCINATE 40 MG: 40 INJECTION, POWDER, FOR SOLUTION INTRAMUSCULAR; INTRAVENOUS at 08:04

## 2017-01-20 RX ADMIN — ISODIUM CHLORIDE 3 ML: 0.03 SOLUTION RESPIRATORY (INHALATION) at 15:36

## 2017-01-20 RX ADMIN — INSULIN LISPRO 3 UNITS: 100 INJECTION, SOLUTION INTRAVENOUS; SUBCUTANEOUS at 08:05

## 2017-01-20 RX ADMIN — INSULIN LISPRO 3 UNITS: 100 INJECTION, SOLUTION INTRAVENOUS; SUBCUTANEOUS at 16:57

## 2017-01-20 RX ADMIN — CEFTRIAXONE 1000 MG: 1 INJECTION, POWDER, FOR SOLUTION INTRAMUSCULAR; INTRAVENOUS at 11:44

## 2017-01-20 RX ADMIN — AZITHROMYCIN 500 MG: 250 TABLET, FILM COATED ORAL at 11:49

## 2017-01-20 RX ADMIN — LEVALBUTEROL HYDROCHLORIDE 1.25 MG: 1.25 SOLUTION, CONCENTRATE RESPIRATORY (INHALATION) at 15:35

## 2017-01-20 RX ADMIN — LEVALBUTEROL HYDROCHLORIDE 1.25 MG: 1.25 SOLUTION, CONCENTRATE RESPIRATORY (INHALATION) at 09:59

## 2017-01-20 RX ADMIN — Medication 1 SPRAY: at 15:24

## 2017-01-20 RX ADMIN — ASPIRIN 81 MG 81 MG: 81 TABLET ORAL at 08:04

## 2017-01-20 RX ADMIN — Medication 1 SPRAY: at 00:00

## 2017-01-20 RX ADMIN — AMLODIPINE BESYLATE 2.5 MG: 2.5 TABLET ORAL at 08:04

## 2017-01-20 RX ADMIN — Medication 1 SPRAY: at 11:45

## 2017-01-20 RX ADMIN — Medication 1 SPRAY: at 08:04

## 2017-01-20 RX ADMIN — METOPROLOL SUCCINATE 50 MG: 50 TABLET, FILM COATED, EXTENDED RELEASE ORAL at 08:03

## 2017-01-20 RX ADMIN — INSULIN LISPRO 1 UNITS: 100 INJECTION, SOLUTION INTRAVENOUS; SUBCUTANEOUS at 12:08

## 2017-01-20 RX ADMIN — SODIUM CHLORIDE 75 ML/HR: 0.9 INJECTION, SOLUTION INTRAVENOUS at 00:33

## 2017-01-20 RX ADMIN — ENOXAPARIN SODIUM 40 MG: 40 INJECTION SUBCUTANEOUS at 08:04

## 2017-01-21 VITALS
TEMPERATURE: 97.8 F | SYSTOLIC BLOOD PRESSURE: 130 MMHG | WEIGHT: 190 LBS | HEIGHT: 67 IN | DIASTOLIC BLOOD PRESSURE: 98 MMHG | BODY MASS INDEX: 29.82 KG/M2 | OXYGEN SATURATION: 96 % | RESPIRATION RATE: 18 BRPM | HEART RATE: 75 BPM

## 2017-01-21 LAB
ALBUMIN SERPL BCP-MCNC: 2.9 G/DL (ref 3.5–5)
ALP SERPL-CCNC: 64 U/L (ref 46–116)
ALT SERPL W P-5'-P-CCNC: 109 U/L (ref 12–78)
ANION GAP SERPL CALCULATED.3IONS-SCNC: 11 MMOL/L (ref 4–13)
AST SERPL W P-5'-P-CCNC: 55 U/L (ref 5–45)
BASOPHILS # BLD MANUAL: 0 THOUSAND/UL (ref 0–0.1)
BASOPHILS NFR MAR MANUAL: 0 % (ref 0–1)
BILIRUB SERPL-MCNC: 0.2 MG/DL (ref 0.2–1)
BUN SERPL-MCNC: 13 MG/DL (ref 5–25)
CALCIUM SERPL-MCNC: 8.7 MG/DL (ref 8.3–10.1)
CHLORIDE SERPL-SCNC: 105 MMOL/L (ref 100–108)
CO2 SERPL-SCNC: 26 MMOL/L (ref 21–32)
CREAT SERPL-MCNC: 0.79 MG/DL (ref 0.6–1.3)
EOSINOPHIL # BLD MANUAL: 0 THOUSAND/UL (ref 0–0.4)
EOSINOPHIL NFR BLD MANUAL: 0 % (ref 0–6)
ERYTHROCYTE [DISTWIDTH] IN BLOOD BY AUTOMATED COUNT: 12.9 % (ref 11.6–15.1)
GFR SERPL CREATININE-BSD FRML MDRD: >60 ML/MIN/1.73SQ M
GLUCOSE SERPL-MCNC: 119 MG/DL (ref 65–140)
GLUCOSE SERPL-MCNC: 131 MG/DL (ref 65–140)
GLUCOSE SERPL-MCNC: 146 MG/DL (ref 65–140)
HCT VFR BLD AUTO: 35.2 % (ref 36.5–49.3)
HGB BLD-MCNC: 12.1 G/DL (ref 12–17)
LYMPHOCYTES # BLD AUTO: 2.87 THOUSAND/UL (ref 0.6–4.47)
LYMPHOCYTES # BLD AUTO: 36 % (ref 14–44)
MCH RBC QN AUTO: 28 PG (ref 26.8–34.3)
MCHC RBC AUTO-ENTMCNC: 34.4 G/DL (ref 31.4–37.4)
MCV RBC AUTO: 82 FL (ref 82–98)
MONOCYTES # BLD AUTO: 0.24 THOUSAND/UL (ref 0–1.22)
MONOCYTES NFR BLD: 3 % (ref 4–12)
NEUTROPHILS # BLD MANUAL: 4.86 THOUSAND/UL (ref 1.85–7.62)
NEUTS SEG NFR BLD AUTO: 61 % (ref 43–75)
NRBC BLD AUTO-RTO: 0 /100 WBCS
PLATELET # BLD AUTO: 217 THOUSANDS/UL (ref 149–390)
PLATELET BLD QL SMEAR: ADEQUATE
PMV BLD AUTO: 9.4 FL (ref 8.9–12.7)
POTASSIUM SERPL-SCNC: 3.2 MMOL/L (ref 3.5–5.3)
PROT SERPL-MCNC: 7.4 G/DL (ref 6.4–8.2)
RBC # BLD AUTO: 4.32 MILLION/UL (ref 3.88–5.62)
SODIUM SERPL-SCNC: 142 MMOL/L (ref 136–145)
TOTAL CELLS COUNTED SPEC: 100
WBC # BLD AUTO: 7.97 THOUSAND/UL (ref 4.31–10.16)

## 2017-01-21 PROCEDURE — A9270 NON-COVERED ITEM OR SERVICE: HCPCS | Performed by: INTERNAL MEDICINE

## 2017-01-21 PROCEDURE — A9270 NON-COVERED ITEM OR SERVICE: HCPCS | Performed by: PHYSICIAN ASSISTANT

## 2017-01-21 PROCEDURE — 80074 ACUTE HEPATITIS PANEL: CPT | Performed by: INTERNAL MEDICINE

## 2017-01-21 PROCEDURE — 85027 COMPLETE CBC AUTOMATED: CPT | Performed by: INTERNAL MEDICINE

## 2017-01-21 PROCEDURE — 85007 BL SMEAR W/DIFF WBC COUNT: CPT | Performed by: INTERNAL MEDICINE

## 2017-01-21 PROCEDURE — 82948 REAGENT STRIP/BLOOD GLUCOSE: CPT

## 2017-01-21 PROCEDURE — 80053 COMPREHEN METABOLIC PANEL: CPT | Performed by: INTERNAL MEDICINE

## 2017-01-21 RX ORDER — POTASSIUM CHLORIDE 20 MEQ/1
20 TABLET, EXTENDED RELEASE ORAL DAILY
Qty: 3 TABLET | Refills: 0 | Status: SHIPPED | OUTPATIENT
Start: 2017-01-22 | End: 2018-03-16 | Stop reason: ALTCHOICE

## 2017-01-21 RX ORDER — CEFUROXIME AXETIL 500 MG/1
500 TABLET ORAL EVERY 12 HOURS SCHEDULED
Qty: 14 TABLET | Refills: 0 | Status: SHIPPED | OUTPATIENT
Start: 2017-01-21 | End: 2017-01-28

## 2017-01-21 RX ORDER — TRAMADOL HYDROCHLORIDE 50 MG/1
50 TABLET ORAL EVERY 6 HOURS PRN
Qty: 10 TABLET | Refills: 0 | Status: SHIPPED | OUTPATIENT
Start: 2017-01-21 | End: 2017-01-21 | Stop reason: HOSPADM

## 2017-01-21 RX ORDER — PREDNISONE 10 MG/1
TABLET ORAL
Qty: 13 TABLET | Refills: 0 | Status: SHIPPED | OUTPATIENT
Start: 2017-01-22 | End: 2018-03-16 | Stop reason: ALTCHOICE

## 2017-01-21 RX ORDER — TRAMADOL HYDROCHLORIDE 50 MG/1
50 TABLET ORAL EVERY 6 HOURS PRN
Status: DISCONTINUED | OUTPATIENT
Start: 2017-01-21 | End: 2017-01-21 | Stop reason: HOSPADM

## 2017-01-21 RX ORDER — SODIUM CHLORIDE FOR INHALATION 0.9 %
3 VIAL, NEBULIZER (ML) INHALATION
Status: DISCONTINUED | OUTPATIENT
Start: 2017-01-21 | End: 2017-01-21 | Stop reason: HOSPADM

## 2017-01-21 RX ORDER — LISINOPRIL 2.5 MG/1
2.5 TABLET ORAL DAILY
Qty: 30 TABLET | Refills: 0 | Status: SHIPPED | OUTPATIENT
Start: 2017-01-21 | End: 2018-03-16 | Stop reason: ALTCHOICE

## 2017-01-21 RX ORDER — TRAMADOL HYDROCHLORIDE 50 MG/1
50 TABLET ORAL ONCE
Status: COMPLETED | OUTPATIENT
Start: 2017-01-21 | End: 2017-01-21

## 2017-01-21 RX ORDER — OXYCODONE HYDROCHLORIDE 5 MG/1
5 TABLET ORAL EVERY 6 HOURS PRN
Qty: 5 TABLET | Refills: 0 | Status: SHIPPED | OUTPATIENT
Start: 2017-01-21 | End: 2018-03-16 | Stop reason: ALTCHOICE

## 2017-01-21 RX ORDER — FLUTICASONE PROPIONATE 50 MCG
1 SPRAY, SUSPENSION (ML) NASAL DAILY
Qty: 1 BOTTLE | Refills: 0
Start: 2017-01-21 | End: 2018-03-16 | Stop reason: ALTCHOICE

## 2017-01-21 RX ORDER — SODIUM CHLORIDE FOR INHALATION 0.9 %
VIAL, NEBULIZER (ML) INHALATION
Status: DISCONTINUED
Start: 2017-01-21 | End: 2017-01-21 | Stop reason: WASHOUT

## 2017-01-21 RX ORDER — POTASSIUM CHLORIDE 20 MEQ/1
40 TABLET, EXTENDED RELEASE ORAL
Status: DISCONTINUED | OUTPATIENT
Start: 2017-01-21 | End: 2017-01-21 | Stop reason: HOSPADM

## 2017-01-21 RX ADMIN — AZITHROMYCIN 500 MG: 250 TABLET, FILM COATED ORAL at 10:24

## 2017-01-21 RX ADMIN — VITAMIN D, TAB 1000IU (100/BT) 2000 UNITS: 25 TAB at 10:23

## 2017-01-21 RX ADMIN — TRAMADOL HYDROCHLORIDE 50 MG: 50 TABLET, COATED ORAL at 10:23

## 2017-01-21 RX ADMIN — PREDNISONE 40 MG: 20 TABLET ORAL at 10:23

## 2017-01-21 RX ADMIN — METOPROLOL SUCCINATE 50 MG: 50 TABLET, FILM COATED, EXTENDED RELEASE ORAL at 10:24

## 2017-01-21 RX ADMIN — POTASSIUM CHLORIDE 40 MEQ: 1500 TABLET, EXTENDED RELEASE ORAL at 10:23

## 2017-01-21 RX ADMIN — TRAMADOL HYDROCHLORIDE 50 MG: 50 TABLET ORAL at 10:25

## 2017-01-21 RX ADMIN — AMLODIPINE BESYLATE 2.5 MG: 2.5 TABLET ORAL at 10:23

## 2017-01-21 RX ADMIN — Medication 1 SPRAY: at 10:22

## 2017-01-21 RX ADMIN — ASPIRIN 81 MG 81 MG: 81 TABLET ORAL at 10:24

## 2017-01-21 RX ADMIN — ATORVASTATIN CALCIUM 10 MG: 10 TABLET, FILM COATED ORAL at 10:23

## 2017-01-22 LAB
BACTERIA BLD CULT: NORMAL
BACTERIA BLD CULT: NORMAL
HAV IGM SER QL: NORMAL
HBV CORE IGM SER QL: NORMAL
HBV SURFACE AG SER QL: NORMAL
HCV AB SER QL: NORMAL

## 2017-01-23 ENCOUNTER — GENERIC CONVERSION - ENCOUNTER (OUTPATIENT)
Dept: OTHER | Facility: OTHER | Age: 62
End: 2017-01-23

## 2017-02-03 LAB — B PERT NPH QL CULT: NEGATIVE

## 2017-02-07 ENCOUNTER — ALLSCRIPTS OFFICE VISIT (OUTPATIENT)
Dept: OTHER | Facility: OTHER | Age: 62
End: 2017-02-07

## 2017-02-07 ENCOUNTER — APPOINTMENT (OUTPATIENT)
Dept: LAB | Facility: CLINIC | Age: 62
End: 2017-02-07
Payer: COMMERCIAL

## 2017-02-07 ENCOUNTER — TRANSCRIBE ORDERS (OUTPATIENT)
Dept: MAMMOGRAPHY | Facility: CLINIC | Age: 62
End: 2017-02-07

## 2017-02-07 DIAGNOSIS — J06.9 VIRAL UPPER RESPIRATORY TRACT INFECTION: ICD-10-CM

## 2017-02-07 DIAGNOSIS — J21.0 RSV (ACUTE BRONCHIOLITIS DUE TO RESPIRATORY SYNCYTIAL VIRUS): ICD-10-CM

## 2017-02-07 DIAGNOSIS — R74.01 TRANSAMINITIS: ICD-10-CM

## 2017-02-07 DIAGNOSIS — J01.90 ACUTE SINUSITIS, RECURRENCE NOT SPECIFIED, UNSPECIFIED LOCATION: ICD-10-CM

## 2017-02-07 DIAGNOSIS — R11.0 NAUSEA: ICD-10-CM

## 2017-02-07 DIAGNOSIS — J21.0 RSV (ACUTE BRONCHIOLITIS DUE TO RESPIRATORY SYNCYTIAL VIRUS): Primary | ICD-10-CM

## 2017-02-07 LAB
ALBUMIN SERPL BCP-MCNC: 3.8 G/DL (ref 3.5–5)
ALP SERPL-CCNC: 91 U/L (ref 46–116)
ALT SERPL W P-5'-P-CCNC: 49 U/L (ref 12–78)
ANION GAP SERPL CALCULATED.3IONS-SCNC: 9 MMOL/L (ref 4–13)
AST SERPL W P-5'-P-CCNC: 26 U/L (ref 5–45)
BASOPHILS # BLD AUTO: 0.01 THOUSANDS/ΜL (ref 0–0.1)
BASOPHILS NFR BLD AUTO: 0 % (ref 0–1)
BILIRUB SERPL-MCNC: 0.56 MG/DL (ref 0.2–1)
BUN SERPL-MCNC: 12 MG/DL (ref 5–25)
CALCIUM SERPL-MCNC: 9.5 MG/DL (ref 8.3–10.1)
CHLORIDE SERPL-SCNC: 101 MMOL/L (ref 100–108)
CO2 SERPL-SCNC: 29 MMOL/L (ref 21–32)
CREAT SERPL-MCNC: 0.94 MG/DL (ref 0.6–1.3)
EOSINOPHIL # BLD AUTO: 0.11 THOUSAND/ΜL (ref 0–0.61)
EOSINOPHIL NFR BLD AUTO: 2 % (ref 0–6)
ERYTHROCYTE [DISTWIDTH] IN BLOOD BY AUTOMATED COUNT: 13.3 % (ref 11.6–15.1)
GFR SERPL CREATININE-BSD FRML MDRD: >60 ML/MIN/1.73SQ M
GLUCOSE SERPL-MCNC: 141 MG/DL (ref 65–140)
HCT VFR BLD AUTO: 41.8 % (ref 36.5–49.3)
HGB BLD-MCNC: 14.7 G/DL (ref 12–17)
LYMPHOCYTES # BLD AUTO: 0.81 THOUSANDS/ΜL (ref 0.6–4.47)
LYMPHOCYTES NFR BLD AUTO: 13 % (ref 14–44)
MAGNESIUM SERPL-MCNC: 2.1 MG/DL (ref 1.6–2.6)
MCH RBC QN AUTO: 29.1 PG (ref 26.8–34.3)
MCHC RBC AUTO-ENTMCNC: 35.2 G/DL (ref 31.4–37.4)
MCV RBC AUTO: 83 FL (ref 82–98)
MONOCYTES # BLD AUTO: 0.78 THOUSAND/ΜL (ref 0.17–1.22)
MONOCYTES NFR BLD AUTO: 13 % (ref 4–12)
NEUTROPHILS # BLD AUTO: 4.48 THOUSANDS/ΜL (ref 1.85–7.62)
NEUTS SEG NFR BLD AUTO: 72 % (ref 43–75)
NRBC BLD AUTO-RTO: 0 /100 WBCS
PLATELET # BLD AUTO: 179 THOUSANDS/UL (ref 149–390)
PMV BLD AUTO: 10.4 FL (ref 8.9–12.7)
POTASSIUM SERPL-SCNC: 4 MMOL/L (ref 3.5–5.3)
PROT SERPL-MCNC: 8.3 G/DL (ref 6.4–8.2)
RBC # BLD AUTO: 5.06 MILLION/UL (ref 3.88–5.62)
SODIUM SERPL-SCNC: 139 MMOL/L (ref 136–145)
WBC # BLD AUTO: 6.21 THOUSAND/UL (ref 4.31–10.16)

## 2017-02-07 PROCEDURE — 36415 COLL VENOUS BLD VENIPUNCTURE: CPT

## 2017-02-07 PROCEDURE — 85025 COMPLETE CBC W/AUTO DIFF WBC: CPT

## 2017-02-07 PROCEDURE — 83735 ASSAY OF MAGNESIUM: CPT

## 2017-02-07 PROCEDURE — 80053 COMPREHEN METABOLIC PANEL: CPT

## 2017-02-08 ENCOUNTER — GENERIC CONVERSION - ENCOUNTER (OUTPATIENT)
Dept: OTHER | Facility: OTHER | Age: 62
End: 2017-02-08

## 2017-02-10 ENCOUNTER — ALLSCRIPTS OFFICE VISIT (OUTPATIENT)
Dept: OTHER | Facility: OTHER | Age: 62
End: 2017-02-10

## 2017-03-25 ENCOUNTER — ALLSCRIPTS OFFICE VISIT (OUTPATIENT)
Dept: OTHER | Facility: OTHER | Age: 62
End: 2017-03-25

## 2017-04-13 ENCOUNTER — ALLSCRIPTS OFFICE VISIT (OUTPATIENT)
Dept: OTHER | Facility: OTHER | Age: 62
End: 2017-04-13

## 2017-04-13 DIAGNOSIS — R06.02 SHORTNESS OF BREATH: ICD-10-CM

## 2017-04-13 DIAGNOSIS — R05.9 COUGH: ICD-10-CM

## 2017-04-13 DIAGNOSIS — I25.10 ATHEROSCLEROTIC HEART DISEASE OF NATIVE CORONARY ARTERY WITHOUT ANGINA PECTORIS: ICD-10-CM

## 2017-04-20 ENCOUNTER — ALLSCRIPTS OFFICE VISIT (OUTPATIENT)
Dept: OTHER | Facility: OTHER | Age: 62
End: 2017-04-20

## 2017-04-20 ENCOUNTER — TRANSCRIBE ORDERS (OUTPATIENT)
Dept: ADMINISTRATIVE | Facility: HOSPITAL | Age: 62
End: 2017-04-20

## 2017-04-20 ENCOUNTER — APPOINTMENT (OUTPATIENT)
Dept: LAB | Facility: CLINIC | Age: 62
End: 2017-04-20
Payer: COMMERCIAL

## 2017-04-20 DIAGNOSIS — R05.9 COUGH: Primary | ICD-10-CM

## 2017-04-20 DIAGNOSIS — R05.9 COUGH: ICD-10-CM

## 2017-04-20 PROCEDURE — 36415 COLL VENOUS BLD VENIPUNCTURE: CPT

## 2017-04-20 PROCEDURE — 86003 ALLG SPEC IGE CRUDE XTRC EA: CPT

## 2017-04-20 PROCEDURE — 82785 ASSAY OF IGE: CPT

## 2017-04-25 LAB
A ALTERNATA IGE QN: 1.25 KUA/I
A FUMIGATUS IGE QN: <0.1 KUA/I
ALLERGEN COMMENT: ABNORMAL
BERMUDA GRASS IGE QN: <0.1 KUA/I
BOXELDER IGE QN: 0.43 KUA/I
C HERBARUM IGE QN: <0.1 KUA/I
CAT DANDER IGE QN: <0.1 KUA/I
CMN PIGWEED IGE QN: <0.1 KUA/I
COMMON RAGWEED IGE QN: <0.1 KUA/I
COTTONWOOD IGE QN: <0.1 KUA/I
D FARINAE IGE QN: <0.1 KUA/I
D PTERONYSS IGE QN: <0.1 KUA/I
DOG DANDER IGE QN: <0.1 KUA/I
LONDON PLANE IGE QN: <0.1 KUA/I
MOUSE URINE PROT IGE QN: <0.1 KUA/I
MT JUNIPER IGE QN: <0.1 KUA/I
MUGWORT IGE QN: <0.1 KUA/I
P NOTATUM IGE QN: <0.1 KUA/I
ROACH IGE QN: <0.1 KUA/I
SHEEP SORREL IGE QN: <0.1 KUA/I
SILVER BIRCH IGE QN: <0.1 KUA/I
TIMOTHY IGE QN: <0.1 KUA/I
TOTAL IGE SMQN RAST: 67.8 KU/L (ref 0–113)
WALNUT IGE QN: <0.1 KUA/I
WHITE ASH IGE QN: 1.93 KUA/I
WHITE ELM IGE QN: <0.1 KUA/I
WHITE MULBERRY IGE QN: <0.1 KUA/I
WHITE OAK IGE QN: <0.1 KUA/I

## 2017-05-02 ENCOUNTER — HOSPITAL ENCOUNTER (OUTPATIENT)
Dept: NON INVASIVE DIAGNOSTICS | Facility: CLINIC | Age: 62
Discharge: HOME | End: 2017-05-02
Payer: COMMERCIAL

## 2017-05-02 DIAGNOSIS — R06.02 SHORTNESS OF BREATH: ICD-10-CM

## 2017-05-02 DIAGNOSIS — I25.10 ATHEROSCLEROTIC HEART DISEASE OF NATIVE CORONARY ARTERY WITHOUT ANGINA PECTORIS: ICD-10-CM

## 2017-05-02 LAB
ARRHY DURING EX: NORMAL
CHEST PAIN STATEMENT: NORMAL
MAX DIASTOLIC BP: 78 MMHG
MAX HEART RATE: 144 BPM
MAX PREDICTED HEART RATE: 161 BPM
MAX. SYSTOLIC BP: 160 MMHG
PROTOCOL NAME: NORMAL
REASON FOR TERMINATION: NORMAL
TARGET HR FORMULA: NORMAL
TIME IN EXERCISE PHASE: 481 S

## 2017-05-02 PROCEDURE — 78452 HT MUSCLE IMAGE SPECT MULT: CPT

## 2017-05-02 PROCEDURE — 93017 CV STRESS TEST TRACING ONLY: CPT

## 2017-05-02 PROCEDURE — A9502 TC99M TETROFOSMIN: HCPCS

## 2017-05-08 ENCOUNTER — HOSPITAL ENCOUNTER (OUTPATIENT)
Dept: NON INVASIVE DIAGNOSTICS | Facility: CLINIC | Age: 62
Discharge: HOME | End: 2017-05-08
Payer: COMMERCIAL

## 2017-05-08 DIAGNOSIS — R06.02 SHORTNESS OF BREATH: ICD-10-CM

## 2017-05-08 DIAGNOSIS — I25.10 ATHEROSCLEROTIC HEART DISEASE OF NATIVE CORONARY ARTERY WITHOUT ANGINA PECTORIS: ICD-10-CM

## 2017-05-08 PROCEDURE — 93306 TTE W/DOPPLER COMPLETE: CPT

## 2017-05-15 ENCOUNTER — GENERIC CONVERSION - ENCOUNTER (OUTPATIENT)
Dept: OTHER | Facility: OTHER | Age: 62
End: 2017-05-15

## 2017-05-15 LAB
LEFT EYE DIABETIC RETINOPATHY: NORMAL
RIGHT EYE DIABETIC RETINOPATHY: NORMAL

## 2017-05-16 ENCOUNTER — ALLSCRIPTS OFFICE VISIT (OUTPATIENT)
Dept: OTHER | Facility: OTHER | Age: 62
End: 2017-05-16

## 2017-05-16 DIAGNOSIS — R94.39 OTHER NONSPECIFIC ABNORMAL CARDIOVASCULAR SYSTEM FUNCTION STUDY: ICD-10-CM

## 2017-05-17 ENCOUNTER — GENERIC CONVERSION - ENCOUNTER (OUTPATIENT)
Dept: OTHER | Facility: OTHER | Age: 62
End: 2017-05-17

## 2017-05-17 ENCOUNTER — HOSPITAL ENCOUNTER (OUTPATIENT)
Dept: PULMONOLOGY | Facility: HOSPITAL | Age: 62
Discharge: HOME | End: 2017-05-17
Attending: INTERNAL MEDICINE
Payer: COMMERCIAL

## 2017-05-17 DIAGNOSIS — R05.9 COUGH: ICD-10-CM

## 2017-05-17 PROCEDURE — 94060 EVALUATION OF WHEEZING: CPT

## 2017-05-17 PROCEDURE — 94760 N-INVAS EAR/PLS OXIMETRY 1: CPT

## 2017-05-17 PROCEDURE — 94726 PLETHYSMOGRAPHY LUNG VOLUMES: CPT

## 2017-05-17 PROCEDURE — 94729 DIFFUSING CAPACITY: CPT

## 2017-05-17 RX ORDER — ALBUTEROL SULFATE 2.5 MG/3ML
2.5 SOLUTION RESPIRATORY (INHALATION) ONCE
Status: COMPLETED | OUTPATIENT
Start: 2017-05-17 | End: 2017-05-17

## 2017-05-17 RX ADMIN — ALBUTEROL SULFATE 2.5 MG: 2.5 SOLUTION RESPIRATORY (INHALATION) at 12:05

## 2017-05-30 ENCOUNTER — ALLSCRIPTS OFFICE VISIT (OUTPATIENT)
Dept: OTHER | Facility: OTHER | Age: 62
End: 2017-05-30

## 2017-06-05 ENCOUNTER — HOSPITAL ENCOUNTER (OUTPATIENT)
Dept: INTERVENTIONAL RADIOLOGY/VASCULAR | Facility: HOSPITAL | Age: 62
Discharge: HOME | End: 2017-06-05
Attending: INTERNAL MEDICINE
Payer: COMMERCIAL

## 2017-06-05 VITALS
RESPIRATION RATE: 16 BRPM | BODY MASS INDEX: 31.18 KG/M2 | DIASTOLIC BLOOD PRESSURE: 83 MMHG | HEART RATE: 98 BPM | WEIGHT: 198.63 LBS | SYSTOLIC BLOOD PRESSURE: 133 MMHG | OXYGEN SATURATION: 96 % | HEIGHT: 67 IN | TEMPERATURE: 98.4 F

## 2017-06-05 DIAGNOSIS — R94.39 OTHER NONSPECIFIC ABNORMAL CARDIOVASCULAR SYSTEM FUNCTION STUDY: ICD-10-CM

## 2017-06-05 LAB
ANION GAP SERPL CALCULATED.3IONS-SCNC: 11 MMOL/L (ref 4–13)
ATRIAL RATE: 96 BPM
ATRIAL RATE: 98 BPM
BASOPHILS # BLD AUTO: 0.04 THOUSANDS/ΜL (ref 0–0.1)
BASOPHILS NFR BLD AUTO: 1 % (ref 0–1)
BUN SERPL-MCNC: 16 MG/DL (ref 5–25)
CALCIUM SERPL-MCNC: 9.5 MG/DL (ref 8.3–10.1)
CHLORIDE SERPL-SCNC: 102 MMOL/L (ref 100–108)
CO2 SERPL-SCNC: 25 MMOL/L (ref 21–32)
CREAT SERPL-MCNC: 0.72 MG/DL (ref 0.6–1.3)
EOSINOPHIL # BLD AUTO: 0.16 THOUSAND/ΜL (ref 0–0.61)
EOSINOPHIL NFR BLD AUTO: 3 % (ref 0–6)
ERYTHROCYTE [DISTWIDTH] IN BLOOD BY AUTOMATED COUNT: 13.3 % (ref 11.6–15.1)
GFR SERPL CREATININE-BSD FRML MDRD: >60 ML/MIN/1.73SQ M
GLUCOSE P FAST SERPL-MCNC: 187 MG/DL (ref 65–99)
GLUCOSE SERPL-MCNC: 178 MG/DL (ref 65–140)
GLUCOSE SERPL-MCNC: 187 MG/DL (ref 65–140)
HCT VFR BLD AUTO: 41.4 % (ref 36.5–49.3)
HGB BLD-MCNC: 14.8 G/DL (ref 12–17)
INR PPP: 0.97 (ref 0.86–1.16)
LYMPHOCYTES # BLD AUTO: 1.76 THOUSANDS/ΜL (ref 0.6–4.47)
LYMPHOCYTES NFR BLD AUTO: 32 % (ref 14–44)
MCH RBC QN AUTO: 29.2 PG (ref 26.8–34.3)
MCHC RBC AUTO-ENTMCNC: 35.7 G/DL (ref 31.4–37.4)
MCV RBC AUTO: 82 FL (ref 82–98)
MONOCYTES # BLD AUTO: 0.52 THOUSAND/ΜL (ref 0.17–1.22)
MONOCYTES NFR BLD AUTO: 10 % (ref 4–12)
NEUTROPHILS # BLD AUTO: 3.02 THOUSANDS/ΜL (ref 1.85–7.62)
NEUTS SEG NFR BLD AUTO: 55 % (ref 43–75)
NRBC BLD AUTO-RTO: 0 /100 WBCS
P AXIS: 61 DEGREES
P AXIS: 64 DEGREES
PLATELET # BLD AUTO: 216 THOUSANDS/UL (ref 149–390)
PMV BLD AUTO: 9.7 FL (ref 8.9–12.7)
POTASSIUM SERPL-SCNC: 3.9 MMOL/L (ref 3.5–5.3)
PR INTERVAL: 160 MS
PR INTERVAL: 164 MS
PROTHROMBIN TIME: 13 SECONDS (ref 12.1–14.4)
QRS AXIS: -66 DEGREES
QRS AXIS: -67 DEGREES
QRSD INTERVAL: 106 MS
QRSD INTERVAL: 96 MS
QT INTERVAL: 354 MS
QT INTERVAL: 368 MS
QTC INTERVAL: 451 MS
QTC INTERVAL: 464 MS
RBC # BLD AUTO: 5.07 MILLION/UL (ref 3.88–5.62)
SODIUM SERPL-SCNC: 138 MMOL/L (ref 136–145)
T WAVE AXIS: 65 DEGREES
T WAVE AXIS: 66 DEGREES
VENTRICULAR RATE: 96 BPM
VENTRICULAR RATE: 98 BPM
WBC # BLD AUTO: 5.5 THOUSAND/UL (ref 4.31–10.16)

## 2017-06-05 PROCEDURE — C1887 CATHETER, GUIDING: HCPCS | Performed by: INTERNAL MEDICINE

## 2017-06-05 PROCEDURE — 80048 BASIC METABOLIC PNL TOTAL CA: CPT | Performed by: INTERNAL MEDICINE

## 2017-06-05 PROCEDURE — C1769 GUIDE WIRE: HCPCS | Performed by: INTERNAL MEDICINE

## 2017-06-05 PROCEDURE — 99152 MOD SED SAME PHYS/QHP 5/>YRS: CPT | Performed by: INTERNAL MEDICINE

## 2017-06-05 PROCEDURE — 85610 PROTHROMBIN TIME: CPT | Performed by: INTERNAL MEDICINE

## 2017-06-05 PROCEDURE — 93458 L HRT ARTERY/VENTRICLE ANGIO: CPT | Performed by: INTERNAL MEDICINE

## 2017-06-05 PROCEDURE — C1894 INTRO/SHEATH, NON-LASER: HCPCS | Performed by: INTERNAL MEDICINE

## 2017-06-05 PROCEDURE — 82948 REAGENT STRIP/BLOOD GLUCOSE: CPT

## 2017-06-05 PROCEDURE — 99153 MOD SED SAME PHYS/QHP EA: CPT | Performed by: INTERNAL MEDICINE

## 2017-06-05 PROCEDURE — A9270 NON-COVERED ITEM OR SERVICE: HCPCS | Performed by: INTERNAL MEDICINE

## 2017-06-05 PROCEDURE — 93005 ELECTROCARDIOGRAM TRACING: CPT

## 2017-06-05 PROCEDURE — 93571 IV DOP VEL&/PRESS C FLO 1ST: CPT | Performed by: INTERNAL MEDICINE

## 2017-06-05 PROCEDURE — 85025 COMPLETE CBC W/AUTO DIFF WBC: CPT | Performed by: INTERNAL MEDICINE

## 2017-06-05 RX ORDER — LIDOCAINE HYDROCHLORIDE 10 MG/ML
INJECTION, SOLUTION INFILTRATION; PERINEURAL CODE/TRAUMA/SEDATION MEDICATION
Status: COMPLETED | OUTPATIENT
Start: 2017-06-05 | End: 2017-06-05

## 2017-06-05 RX ORDER — VERAPAMIL HCL 2.5 MG/ML
AMPUL (ML) INTRAVENOUS CODE/TRAUMA/SEDATION MEDICATION
Status: COMPLETED | OUTPATIENT
Start: 2017-06-05 | End: 2017-06-05

## 2017-06-05 RX ORDER — AMLODIPINE BESYLATE 10 MG/1
10 TABLET ORAL DAILY
Status: DISCONTINUED | OUTPATIENT
Start: 2017-06-05 | End: 2017-06-09 | Stop reason: HOSPADM

## 2017-06-05 RX ORDER — DIPHENOXYLATE HYDROCHLORIDE AND ATROPINE SULFATE 2.5; .025 MG/1; MG/1
1 TABLET ORAL DAILY
Status: ON HOLD | COMMUNITY
End: 2018-12-11 | Stop reason: ALTCHOICE

## 2017-06-05 RX ORDER — HYDROCHLOROTHIAZIDE 25 MG/1
25 TABLET ORAL DAILY
Status: DISCONTINUED | OUTPATIENT
Start: 2017-06-05 | End: 2017-06-09 | Stop reason: HOSPADM

## 2017-06-05 RX ORDER — LISINOPRIL 20 MG/1
20 TABLET ORAL DAILY
Status: DISCONTINUED | OUTPATIENT
Start: 2017-06-05 | End: 2017-06-09 | Stop reason: HOSPADM

## 2017-06-05 RX ORDER — SODIUM CHLORIDE 9 MG/ML
75 INJECTION, SOLUTION INTRAVENOUS CONTINUOUS
Status: DISPENSED | OUTPATIENT
Start: 2017-06-05 | End: 2017-06-05

## 2017-06-05 RX ORDER — SODIUM CHLORIDE 9 MG/ML
75 INJECTION, SOLUTION INTRAVENOUS CONTINUOUS
Status: DISCONTINUED | OUTPATIENT
Start: 2017-06-05 | End: 2017-06-09 | Stop reason: HOSPADM

## 2017-06-05 RX ORDER — NITROGLYCERIN 20 MG/100ML
INJECTION INTRAVENOUS CODE/TRAUMA/SEDATION MEDICATION
Status: COMPLETED | OUTPATIENT
Start: 2017-06-05 | End: 2017-06-05

## 2017-06-05 RX ORDER — MIDAZOLAM HYDROCHLORIDE 1 MG/ML
INJECTION INTRAMUSCULAR; INTRAVENOUS CODE/TRAUMA/SEDATION MEDICATION
Status: COMPLETED | OUTPATIENT
Start: 2017-06-05 | End: 2017-06-05

## 2017-06-05 RX ORDER — FENTANYL CITRATE 50 UG/ML
INJECTION, SOLUTION INTRAMUSCULAR; INTRAVENOUS CODE/TRAUMA/SEDATION MEDICATION
Status: COMPLETED | OUTPATIENT
Start: 2017-06-05 | End: 2017-06-05

## 2017-06-05 RX ORDER — RIBOFLAVIN (VITAMIN B2) 100 MG
100 TABLET ORAL DAILY
COMMUNITY
End: 2018-03-16 | Stop reason: ALTCHOICE

## 2017-06-05 RX ORDER — UBIDECARENONE 75 MG
CAPSULE ORAL DAILY
COMMUNITY
End: 2018-03-16 | Stop reason: ALTCHOICE

## 2017-06-05 RX ADMIN — LISINOPRIL 20 MG: 20 TABLET ORAL at 10:05

## 2017-06-05 RX ADMIN — NITROGLYCERIN 200 MCG: 20 INJECTION INTRAVENOUS at 08:50

## 2017-06-05 RX ADMIN — VERAPAMIL HYDROCHLORIDE 2.5 MCG: 2.5 INJECTION, SOLUTION INTRAVENOUS at 08:50

## 2017-06-05 RX ADMIN — MIDAZOLAM HYDROCHLORIDE 2 MG: 1 INJECTION, SOLUTION INTRAMUSCULAR; INTRAVENOUS at 08:46

## 2017-06-05 RX ADMIN — LIDOCAINE HYDROCHLORIDE 1 ML: 10 INJECTION, SOLUTION INFILTRATION; PERINEURAL at 08:50

## 2017-06-05 RX ADMIN — FENTANYL CITRATE 50 MCG: 50 INJECTION, SOLUTION INTRAMUSCULAR; INTRAVENOUS at 08:45

## 2017-06-05 RX ADMIN — AMLODIPINE BESYLATE 10 MG: 10 TABLET ORAL at 10:05

## 2017-06-05 RX ADMIN — BIVALIRUDIN 1.75 MG/KG/HR: 250 INJECTION, POWDER, LYOPHILIZED, FOR SOLUTION INTRAVENOUS at 09:11

## 2017-06-05 RX ADMIN — HYDROCHLOROTHIAZIDE 25 MG: 25 TABLET ORAL at 10:05

## 2017-06-05 RX ADMIN — IOHEXOL 130 ML: 350 INJECTION, SOLUTION INTRAVENOUS at 09:29

## 2017-07-18 ENCOUNTER — GENERIC CONVERSION - ENCOUNTER (OUTPATIENT)
Dept: OTHER | Facility: OTHER | Age: 62
End: 2017-07-18

## 2017-07-19 ENCOUNTER — GENERIC CONVERSION - ENCOUNTER (OUTPATIENT)
Dept: OTHER | Facility: OTHER | Age: 62
End: 2017-07-19

## 2017-08-08 ENCOUNTER — ALLSCRIPTS OFFICE VISIT (OUTPATIENT)
Dept: OTHER | Facility: OTHER | Age: 62
End: 2017-08-08

## 2017-08-22 ENCOUNTER — ALLSCRIPTS OFFICE VISIT (OUTPATIENT)
Dept: OTHER | Facility: OTHER | Age: 62
End: 2017-08-22

## 2017-08-28 ENCOUNTER — GENERIC CONVERSION - ENCOUNTER (OUTPATIENT)
Dept: OTHER | Facility: OTHER | Age: 62
End: 2017-08-28

## 2017-09-05 ENCOUNTER — ALLSCRIPTS OFFICE VISIT (OUTPATIENT)
Dept: OTHER | Facility: OTHER | Age: 62
End: 2017-09-05

## 2017-09-05 DIAGNOSIS — E11.9 TYPE 2 DIABETES MELLITUS WITHOUT COMPLICATIONS (HCC): ICD-10-CM

## 2017-09-05 DIAGNOSIS — S76.012A STRAIN OF MUSCLE, FASCIA AND TENDON OF LEFT HIP, INITIAL ENCOUNTER: ICD-10-CM

## 2017-09-05 DIAGNOSIS — E78.5 HYPERLIPIDEMIA: ICD-10-CM

## 2017-09-05 DIAGNOSIS — I10 ESSENTIAL (PRIMARY) HYPERTENSION: ICD-10-CM

## 2017-09-12 ENCOUNTER — ALLSCRIPTS OFFICE VISIT (OUTPATIENT)
Dept: OTHER | Facility: OTHER | Age: 62
End: 2017-09-12

## 2017-09-21 ENCOUNTER — GENERIC CONVERSION - ENCOUNTER (OUTPATIENT)
Dept: OTHER | Facility: OTHER | Age: 62
End: 2017-09-21

## 2017-09-21 LAB — AMBIG ABBREV LP DEFAULT (HISTORICAL): NORMAL

## 2017-09-22 ENCOUNTER — GENERIC CONVERSION - ENCOUNTER (OUTPATIENT)
Dept: OTHER | Facility: OTHER | Age: 62
End: 2017-09-22

## 2017-09-22 LAB
ALBUMIN SERPL BCP-MCNC: 4.3 G/DL (ref 3.5–5.5)
ALP SERPL-CCNC: 80 IU/L (ref 39–117)
ALT SERPL W P-5'-P-CCNC: 24 IU/L (ref 0–44)
AST SERPL W P-5'-P-CCNC: 18 IU/L (ref 0–40)
BASOPHILS # BLD AUTO: 0 X10E3/UL (ref 0–0.2)
BASOPHILS # BLD AUTO: 1 %
BILIRUB SERPL-MCNC: 0.2 MG/DL (ref 0–1.2)
BILIRUBIN DIRECT (HISTORICAL): 0.08 MG/DL (ref 0–0.4)
BUN SERPL-MCNC: 16 MG/DL (ref 6–24)
BUN/CREA RATIO (HISTORICAL): 20 (ref 9–20)
CALCIUM SERPL-MCNC: 9.4 MG/DL (ref 8.7–10.2)
CHLORIDE SERPL-SCNC: 98 MMOL/L (ref 96–106)
CHOLEST SERPL-MCNC: 128 MG/DL (ref 100–199)
CO2 SERPL-SCNC: 23 MMOL/L (ref 18–29)
CREAT SERPL-MCNC: 0.8 MG/DL (ref 0.76–1.27)
CREATININE, URINE (HISTORICAL): 115.5 MG/DL
DEPRECATED RDW RBC AUTO: 14.7 % (ref 12.3–15.4)
EGFR AFRICAN AMERICAN (HISTORICAL): 113 ML/MIN/1.73
EGFR-AMERICAN CALC (HISTORICAL): 98 ML/MIN/1.73
EOSINOPHIL # BLD AUTO: 0.2 X10E3/UL (ref 0–0.4)
EOSINOPHIL # BLD AUTO: 2 %
GLUCOSE SERPL-MCNC: 119 MG/DL (ref 65–99)
HBA1C MFR BLD HPLC: 6.4 % (ref 4.8–5.6)
HCT VFR BLD AUTO: 42.4 % (ref 37.5–51)
HDLC SERPL-MCNC: 32 MG/DL
HGB BLD-MCNC: 14.2 G/DL (ref 12.6–17.7)
IMM.GRANULOCYTES (CD4/8) (HISTORICAL): 0 %
IMM.GRANULOCYTES (CD4/8) (HISTORICAL): 0 X10E3/UL (ref 0–0.1)
LDLC SERPL CALC-MCNC: 53 MG/DL (ref 0–99)
LYMPHOCYTES # BLD AUTO: 2.3 X10E3/UL (ref 0.7–3.1)
LYMPHOCYTES # BLD AUTO: 37 %
MCH RBC QN AUTO: 28.7 PG (ref 26.6–33)
MCHC RBC AUTO-ENTMCNC: 33.5 G/DL (ref 31.5–35.7)
MCV RBC AUTO: 86 FL (ref 79–97)
MICROALBUM.,U,RANDOM (HISTORICAL): 39.2 UG/ML
MICROALBUMIN/CREATININE RATIO (HISTORICAL): 33.9 MG/G CREAT (ref 0–30)
MONOCYTES # BLD AUTO: 0.5 X10E3/UL (ref 0.1–0.9)
MONOCYTES (HISTORICAL): 9 %
NEUTROPHILS # BLD AUTO: 3.2 X10E3/UL (ref 1.4–7)
NEUTROPHILS # BLD AUTO: 51 %
PLATELET # BLD AUTO: 200 X10E3/UL (ref 150–379)
POTASSIUM SERPL-SCNC: 4.4 MMOL/L (ref 3.5–5.2)
RBC (HISTORICAL): 4.94 X10E6/UL (ref 4.14–5.8)
SODIUM SERPL-SCNC: 139 MMOL/L (ref 134–144)
TOTAL PROTEIN (HISTORICAL): 7 G/DL (ref 6–8.5)
TRIGL SERPL-MCNC: 216 MG/DL (ref 0–149)
VLDLC SERPL CALC-MCNC: 43 MG/DL (ref 5–40)
WBC # BLD AUTO: 6.2 X10E3/UL (ref 3.4–10.8)

## 2017-09-28 ENCOUNTER — GENERIC CONVERSION - ENCOUNTER (OUTPATIENT)
Dept: OTHER | Facility: OTHER | Age: 62
End: 2017-09-28

## 2017-10-12 ENCOUNTER — GENERIC CONVERSION - ENCOUNTER (OUTPATIENT)
Dept: OTHER | Facility: OTHER | Age: 62
End: 2017-10-12

## 2017-10-24 ENCOUNTER — GENERIC CONVERSION - ENCOUNTER (OUTPATIENT)
Dept: OTHER | Facility: OTHER | Age: 62
End: 2017-10-24

## 2017-10-26 NOTE — PROGRESS NOTES
Assessment  Assessed    1  Coronary artery disease (414 00) (I25 10)   2  S/P CABG (coronary artery bypass graft) (V45 81) (Z95 1)   3  Hypertension (401 9) (I10)   4  Hyperlipidemia (272 4) (E78 5)   5  Obesity (278 00) (E66 9)    Plan  Coronary artery disease    · EKG/ECG- POC; Status:Complete;   Done: 87ZNT0440   Perform: In Office; Due:46Vjx2757; Ordered; For:Coronary artery disease; Ordered By:Triny Holloway; Hypertension    · DilTIAZem HCl ER Coated Beads 180 MG Oral Capsule Extended Release 24  Hour (Cardizem CD)   Rx By: Jerry Villareal; Dispense: 30 Days ; #:30 Capsule Extended Release 24 Hour; Refill: 3;For: Hypertension; PEPIOT = N; Transmitted To: [id number]  · From  Valsartan 80 MG Oral Tablet TAKE 1 TABLET DAILY To Valsartan 160 MG  Oral Tablet TAKE 1 TABLET DAILY   Rx By: Jerry Villareal; Dispense: 30 Days ; #:30 Tablet; Refill: 5;For: Hypertension; PEPITO = N; Record    Discussion/Summary  Cardiology Discussion Summary Free Text Note Form [facility]:   EKG 4/13/17 - sinus rhythm, LAFB, T-wave inversions in inferior leads, early repolarization changesnuclear stress test May 2017 - small sized, mild intensity reversible inferior defect which could represent ischemiaechocardiogram May 2017 - normal LVEF, grade 2 diastolic dysfunction  s/p 3V CABG June 2017 [facility] - patient recovering well - getting cardiac rehab  Continue aspirin, statin, beta-blocker  - BP stable today  Diltiazem d/c and increase Valsartan to 160 mg daily  op A fib - recovered back to SR  Amiodarone course completed  Diltiazem d/fito  statin for HLP  His PCP closely monitors his blood work  to lose weight  aggressive risk factor modification and therapeutic lifestyle changes  Low salt, low calorie, low fat, low cholesterol diet and to optimize weight  I will defer the ordering and monitoring of necessary lab studies to you, but I am available and happy to review and manage any of that data at your request in the future  concepts of atherosclerosis, including signs and symptoms of cardiac disease  studies were reviewed  measures were reviewed  were entertained and answered  was advised to report any problem requiring medical attention  up with appropriate specialists and lab work as discussed  for follow up visit as scheduled or earlier, if needed  you for allowing me to participate in the care and evaluation of your patient  Should you have any questions, please feel free to contact me  Goals and Barriers: The patient has the current Goals: Cardiac rehab  None  Patient's Capacity to Self-Care: Patient is able to Self-Care  Patient Education: Educational resources provided: Low fat diet  Medication SE Review and Pt Understands Tx: Possible side effects of new medications were reviewed with the patient/guardian today  The treatment plan was reviewed with the patient/guardian  The patient/guardian understands and agrees with the treatment plan       Counseling Documentation With Imm: The patient was counseled regarding diagnostic results,-- instructions for management,-- risk factor reductions,-- patient and family education,-- importance of compliance with treatment  Chief Complaint  Chief Complaint Chronic Condition [facility]: Patient is here today for follow up of chronic conditions described in HPI  History of Present Illness  Cardiology HPI Free Text Note Form [facility]: Patient is here for follow-up  Had CABG surgery at [facility] on 06/30/2017 by Dr Jorge Rosas  Post surgery he needed a left-sided thoracentesis for hemothorax  Other than that he states he has had a good postop recovery  Patient currently is getting cardiac rehabilitation and doing well with it  Patient denies chest pain/pressure/tightness, palpitations, SOB, lightheadedness, syncope, swelling feet, orthopnea, PND  - postop his BP has been high  His medications were recently adjusted by Dr Aparicio Star  He takes his antihypertensive medications regularly   Denies lightheadedness, headache, medication side effects   - on statin which he takes regularly but his dose of atorvastatin was decreased hansel-operatively  FLP ordered by Dr Mehran Rowe - report pending  - is on diet control to loose wt      Review of Systems  Cardiology Male ROS:     Cardiac: as noted in HPI  Skin: No complaints of nonhealing sores or skin rash  Genitourinary: No complaints of recurrent urinary tract infections, frequent urination at night, difficult urination, blood in urine, kidney stones, loss of bladder control, no kidney or prostate problems, no erectile dysfunction  Psychological: No complaints of feeling depressed, anxiety, panic attacks, or difficulty concentrating  General: No complaints of trouble sleeping, lack of energy, fatigue, appetite changes, weight changes, fever, frequent infections, or night sweats  Respiratory: as noted in HPI  HEENT: No complaints of serious problems, hearing problems, nose problems, throat problems, or snoring  Gastrointestinal: No complaints of liver problems, nausea, vomiting, heartburn, constipation, bloody stools, diarrhea, problems swallowing, adbominal pain, or rectal bleeding  Hematologic: No complaints of bleeding disorders, anemia, blood clots, or excessive brusing  Neurological: No complaints of numbness, tingling, dizziness, weakness, seizures, headaches, syncope or fainting, AM fatigue, daytime sleepiness, no witnessed apnea episodes  Musculoskeletal: No complaints of arthritis, back pain, or painfull swelling  ROS Reviewed:   ROS reviewed  Active Problems  Problems    1  Abnormal stress test (794 39) (R94 39)   2  Acid reflux (530 81) (K21 9)   3  Acute sinusitis (461 9) (J01 90)   4  Allergic rhinitis (477 9) (J30 9)   5  Bug bite without infection (919 4,E906 4) (W57 XXXA)   6  Coronary artery disease (414 00) (I25 10)   7  Cough (786 2) (R05)   8  Diabetes (250 00) (E11 9)   9  Diastolic dysfunction (932 0) (I51 9)   10  Flu vaccine need (V04 81) (Z23)   11  Hyperlipidemia (272 4) (E78 5)   12  Hypertension (401 9) (I10)   13  Laceration of head (873 8) (S01 91XA)   14  Malignant melanoma of skin (172 9) (C43 9)   15  Microalbuminuria (791 0) (R80 9)   16  Morbid obesity due to excess calories (278 01) (E66 01)   17  Multiple nevi (216 9) (D22 9)   18  Obesity (278 00) (E66 9)   19  Pharyngitis, streptococcal, acute (034 0) (J02 0)   20  Pneumonia (486) (J18 9)   21  Post-nasal drip (784 91) (R09 82)   22  S/P CABG (coronary artery bypass graft) (V45 81) (Z95 1)   23  Screening for skin condition (V82 0) (Z13 89)   24  Seborrheic keratosis (702 19) (L82 1)   25  Shortness of breath on exertion (786 05) (R06 02)   26  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)   27  Transaminitis (790 4) (R74 0)    Past Medical History  Problems    1  History of obesity (V13 89) (Z86 39)   2  History of Prediabetes (790 29) (R73 09)  Active Problems And Past Medical History Reviewed: The active problems and past medical history were reviewed and updated today  Surgical History  Problems    1  History of Biopsy Skin   2  History of CABG   3  History of Hand Surgery   4  History of Hemicolectomy   5  History of Hernia Repair  Surgical History Reviewed: The surgical history was reviewed and updated today  Family History  Mother    1  Family history of CAD (coronary artery disease)   2  Family history of hyperlipidemia (V18 19) (Z83 49)   3  Family history of hypertension (V17 49) (Z82 49)   4  Family history of Type 2 diabetes, controlled, with neuropathy  Father    5  Family history of CAD (coronary artery disease)   6  Family history of hyperlipidemia (V18 19) (Z83 49)   7  Family history of hypertension (V17 49) (Z82 49)   8  Family history of Type 2 diabetes, controlled, with neuropathy  Sister    5  Family history of hypertension (V17 49) (Z82 49)  Family History Reviewed: The family history was reviewed and updated today         Social History  Problems    · Consumes alcohol (V49 89) (Z78 9)   · Never smoked   · Never smoked tobacco (V49 89) (Z78 9)   · No drug use   · Occupation  Social History Reviewed: The social history was reviewed and updated today  The social history was reviewed and is unchanged  Current Meds   1  Aspirin 81 MG TABS; Therapy: (Recorded:83Jfp0253) to Recorded   2  Atorvastatin Calcium 10 MG Oral Tablet; TAKE 1 TABLET AT BEDTIME  Requested for:   30Sjn2238; Last Rx:08Aug2017 Ordered   3  DilTIAZem HCl ER Coated Beads 180 MG Oral Capsule Extended Release 24 Hour   (Cardizem CD); TAKE ONE CAPSULE BY MOUTH EVERY DAY; Therapy: 95Ftf3443 to (Evaluate:66Ukb7353)  Requested for: 08Aug2017; Last   Rx:08Aug2017 Ordered   4  Fluticasone Propionate 50 MCG/ACT Nasal Suspension; USE 2 SPRAYS IN EACH   NOSTRIL ONCE DAILY; Therapy: 83TCJ4086 to (Last Rx:16Jan2017)  Requested for: 70MYO3252 Ordered   5  Glimepiride 1 MG Oral Tablet; 1 po bid for sugar >200; Therapy: 69UHF0333 to (Last Rx:07Apr2017)  Requested for: 14Wtq1639 Ordered   6  HydroCHLOROthiazide 25 MG Oral Tablet; TAKE 1 TABLET DAILY AS DIRECTED; Therapy: 09Doj8888 to (Evaluate:88Rqf8269)  Requested for: 62Qsx2670; Last   Rx:29Hqq8529 Ordered   7  MetFORMIN HCl  MG Oral Tablet Extended Release 24 Hour; take 4 tablet pm   with dinner as directed; Therapy: 56QAG5040 to (Last Rx:89Exb2184)  Requested for: 05Sep2017 Ordered   8  Metoprolol Succinate  MG Oral Tablet Extended Release 24 Hour; TAKE 1 TABLET   DAILY; Therapy: 39Jcc4700 to (Evaluate:10Bec7750)  Requested for: 88Sdj4853; Last   Rx:62Yfz6807 Ordered   9  Nitroglycerin 0 4 MG Sublingual Tablet Sublingual; PLACE 1 TABLET UNDER THE   TONGUE EVERY 5 MINUTES FOR UP TO 3 DOSES AS NEEDED FOR CHEST   PAIN  CALL 911 IF PAIN PERSISTS; Therapy: 75WZO7428 to (Evaluate:24Qgv9306)  Requested for: 60JFU2473; Last   Rx:15Jun2017 Ordered   10  One Touch Test Strips TEST;     Therapy: (Recorded:93Iwn8292) to Recorded   11  ProAir  (90 Base) MCG/ACT Inhalation Aerosol Solution; INHALE 2 PUFFS    EVERY 4 HOURS AS NEEDED; Therapy: 87KYK8533 to (Last Rx:54Whh1348)  Requested for: 18NOQ3012 Ordered   12  Valsartan 80 MG Oral Tablet; TAKE 1 TABLET DAILY; Therapy: 78ZBU9550 to (Vincent Villalba)  Requested for: 53Slh4874; Last    Rx:87Llm7127 Ordered  Medication List Reviewed: The medication list was reviewed and updated today  Medication List Reviewed: The medication list was reviewed and updated today  Allergies  Medication    1  No Known Drug Allergies    Vitals  Vital Signs    Recorded: 12Sep2017 08:45AM   Heart Rate 80   Systolic 718   Diastolic 84   Height 5 ft 6 in   Weight 196 lb    BMI Calculated 31 63   BSA Calculated 1 99   O2 Saturation 97     Physical Exam    Constitutional   General appearance: No acute distress, well appearing and well nourished  Eyes   Conjunctiva and Sclera examination: Conjunctiva pink, sclera anicteric  Ears, Nose, Mouth, and Throat - External inspection of ears and nose: Normal without deformities or discharge  -- Oropharynx: Clear, nares are clear, mucous membranes are moist    Neck   Neck and thyroid: Normal, supple, trachea midline, no thyromegaly  Pulmonary   Respiratory effort: No increased work of breathing or signs of respiratory distress  Auscultation of lungs: Clear to auscultation, no rales, no rhonchi, no wheezing, good air movement  Cardiovascular   Palpation of heart: Normal PMI, no thrills  Auscultation of heart: Normal rate and rhythm, normal S1 and S2, no murmurs  Carotid pulses: Normal, 2+ bilaterally  Pedal pulses: Normal, 2+ bilaterally  Examination of extremities for edema and/or varicosities: Normal     Chest -   Abnormal  -- Well healed sternal wound  Abdomen   Abdomen: Non-tender and no distention  Liver and spleen: No hepatomegaly or splenomegaly  Musculoskeletal Gait and station: Normal gait  -- Digits and nails: Normal without clubbing or cyanosis  Skin - Skin and subcutaneous tissue: Normal without rashes or lesions  Skin is warm and well perfused, normal turgor  Neurologic - Speech: Normal     Psychiatric - Orientation to person, place, and time: Normal -- Mood and affect: Normal       Results/Data  ECG Report: 09/12/2017-sinus rhythm, LAFB, nonspecific inferior T-wave changes- sinus rhythm, LAFB, T-wave inversions in inferior leads, early repolarization changes      Future Appointments    Date/Time Provider Specialty Site   02/22/2018 08:00 AM Kina Perez [facility] Pul[facility] Myrna Neighbor   09/28/2017 03:15 PM TONI Restrepo   Internal [address] [place] [address] PC     Signatures   Electronically signed by : TONI Timmons ; Sep 12 2017  9:26AM EST                       (Author)

## 2017-11-17 ENCOUNTER — GENERIC CONVERSION - ENCOUNTER (OUTPATIENT)
Dept: OTHER | Facility: OTHER | Age: 62
End: 2017-11-17

## 2017-11-24 ENCOUNTER — GENERIC CONVERSION - ENCOUNTER (OUTPATIENT)
Dept: OTHER | Facility: OTHER | Age: 62
End: 2017-11-24

## 2017-11-30 ENCOUNTER — ALLSCRIPTS OFFICE VISIT (OUTPATIENT)
Dept: OTHER | Facility: OTHER | Age: 62
End: 2017-11-30

## 2017-12-05 NOTE — PROGRESS NOTES
Assessment    1  Cough (786 2) (R05)    Discussion/Summary  Discussion Summary:   Advised to start nasal rinses and add Claritin continue flonase    Given sample of Breo and advised to call within 3 days if no better and can add steroids/antibiotics           Chief Complaint  Chief Complaint Free Text Note Form: cough and congestion      History of Present Illness  HPI: Scratchy throat for a few days prior to initiation of cough and nasal congestion  Family history of similar symptoms  Cough has increased with low grade fever but throat no longer scratchy  no sob or wheeze    Tried Flonase with some relief  Review of Systems  Complete-Male:   Constitutional: fever, feeling poorly and low grade fever, but no chills  Eyes: No complaints of eye pain, no red eyes, no discharge from eyes, no itchy eyes  ENT: nasal discharge and hoarseness  Cardiovascular: No complaints of slow heart rate, no fast heart rate, no chest pain, no palpitations, no leg claudication, no lower extremity  Respiratory: cough  Gastrointestinal: No complaints of abdominal pain, no constipation, no nausea or vomiting, no diarrhea or bloody stools  Musculoskeletal: No complaints of arthralgia, no myalgias, no joint swelling or stiffness, no limb pain or swelling  Integumentary: No complaints of skin rash or skin lesions, no itching, no skin wound, no dry skin  Neurological: No compliants of headache, no confusion, no convulsions, no numbness or tingling, no dizziness or fainting, no limb weakness, no difficulty walking  Psychiatric: Is not suicidal, no sleep disturbances, no anxiety or depression, no change in personality, no emotional problems  ROS Reviewed:   ROS reviewed  Active Problems    1  Abnormal stress test (794 39) (R94 39)   2  Acid reflux (530 81) (K21 9)   3  Acute sinusitis (461 9) (J01 90)   4  Allergic rhinitis (477 9) (J30 9)   5  Bug bite without infection (919 4,E906 4) (W57 XXXA)   6   Colon cancer screening (V76 51) (Z12 11)   7  Coronary artery disease (414 00) (I25 10)   8  Cough (786 2) (R05)   9  Diabetes (250 00) (E11 9)   10  Diastolic dysfunction (631 8) (I51 9)   11  Flu vaccine need (V04 81) (Z23)   12  Hyperlipidemia (272 4) (E78 5)   13  Hypertension (401 9) (I10)   14  Laceration of head (873 8) (S01 91XA)   15  Malignant melanoma of skin (172 9) (C43 9)   16  Microalbuminuria (791 0) (R80 9)   17  Morbid obesity due to excess calories (278 01) (E66 01)   18  Multiple nevi (216 9) (D22 9)   19  Muscle strain of left gluteal region, initial encounter (843 8) (S76 012A)   20  Obesity (278 00) (E66 9)   21  Pharyngitis, streptococcal, acute (034 0) (J02 0)   22  Pneumonia (486) (J18 9)   23  Post-nasal drip (784 91) (R09 82)   24  S/P CABG (coronary artery bypass graft) (V45 81) (Z95 1)   25  Screening for skin condition (V82 0) (Z13 89)   26  Seborrheic keratosis (702 19) (L82 1)   27  Shortness of breath on exertion (786 05) (R06 02)   28  Skin lesions (709 9) (L98 9)   29  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)   30  Transaminitis (790 4) (R74 0)    Past Medical History    1  History of obesity (V13 89) (Z86 39)   2  History of Prediabetes (790 29) (R73 09)  Active Problems And Past Medical History Reviewed: The active problems and past medical history were reviewed and updated today  Surgical History    1  History of Biopsy Skin   2  History of CABG   3  History of Hand Surgery   4  History of Hemicolectomy   5  History of Hernia Repair  Surgical History Reviewed: The surgical history was reviewed and updated today  Family History  Mother    1  Family history of CAD (coronary artery disease)   2  Family history of hyperlipidemia (V18 19) (Z83 49)   3  Family history of hypertension (V17 49) (Z82 49)   4  Family history of Type 2 diabetes, controlled, with neuropathy  Father    5  Family history of CAD (coronary artery disease)   6   Family history of hyperlipidemia (V18 19) (Z83 49)   7  Family history of hypertension (V17 49) (Z82 49)   8  Family history of Type 2 diabetes, controlled, with neuropathy  Sister    5  Family history of hypertension (V17 49) (Z82 49)  Family History Reviewed: The family history was reviewed and updated today  Social History    · Consumes alcohol (V49 89) (Z78 9)   · Never smoked   · Never smoked tobacco (V49 89) (Z78 9)   · No drug use   · Occupation  Social History Reviewed: The social history was reviewed and updated today  Current Meds   1  Aspirin 81 MG TABS; Therapy: (Recorded:07Gbv9627) to Recorded   2  Atorvastatin Calcium 10 MG Oral Tablet; TAKE 1 TABLET AT BEDTIME  Requested for: 15Kst0039;   Last Rx:67Xyd9526 Ordered   3  Fluticasone Propionate 50 MCG/ACT Nasal Suspension; use 2 sprays in each nostril once daily; Therapy: 08HDV5366 to (Evaluate:50Zqv0706)  Requested for: 15OMH4055; Last Rx:21Nov2017   Ordered   4  Glimepiride 1 MG Oral Tablet; 1 po bid for sugar >200; Therapy: 91EBL1698 to (Last Rx:07Apr2017)  Requested for: 86Eii0227 Ordered   5  HydroCHLOROthiazide 25 MG Oral Tablet; TAKE 1 TABLET DAILY AS DIRECTED; Therapy: 80Ips9585 to (Kylee Espinal)  Requested for: 91KQF3018; Last Rx:27Nov2017   Ordered   6  MetFORMIN HCl  MG Oral Tablet Extended Release 24 Hour; START WITH 1 TAB DAILY W/   DINNER, INCREASE BY 1 TABLET EACH WEEK AS TOLERATED UNTIL TAKING 4 TABS; Therapy: 03HDV8740 to (Evaluate:09Apr2018)  Requested for: 95IHI0011; Last Rx:11Oct2017   Ordered   7  Metoprolol Succinate  MG Oral Tablet Extended Release 24 Hour; TAKE 1 TABLET DAILY; Therapy: 38Ods6128 to (Evaluate:73Jfo5380)  Requested for: 57Dmd3712; Last Rx:71Aey8702   Ordered   8  Nitroglycerin 0 4 MG Sublingual Tablet Sublingual; PLACE 1 TABLET UNDER THE TONGUE EVERY 5   MINUTES FOR UP TO 3 DOSES AS NEEDED FOR CHEST PAIN  CALL 911 IF PAIN PERSISTS;    Therapy: 84NII7627 to (Evaluate:20Zic3452)  Requested for: 70VKQ6931; Last Rx: 23CJU1797   Ordered   9  One Touch Test Strips TEST; Therapy: (Recorded:90Iza1601) to Recorded   10  OneTouch Ultra Blue In [employer]; test sugar 2 times daily; Therapy: 91SJS7650 to (Last [facility])  Requested for: 86HZU3194 Ordered   11  ProAir  (90 Base) MCG/ACT Inhalation Aerosol Solution; INHALE 2 PUFFS EVERY 4 HOURS    AS NEEDED; Therapy: 12JRB3859 to (Last Rx:60Kag1284)  Requested for: 23SUO3450 Ordered   12  Valsartan 80 MG Oral Tablet; 3 po qd; Therapy: 57GDS6891 to (Last [facility])  Requested for: 22CJY4056 Ordered  Medication List Reviewed: The medication list was reviewed and updated today  Allergies    1  No Known Drug Allergies    Vitals  Vital Signs    Recorded: 08GMJ4110 11:12AM   Heart Rate 91   Systolic 674   Diastolic 78   Height 5 ft 6 in   Weight 204 lb    BMI Calculated 32 93   BSA Calculated 2 02   O2 Saturation 98     Physical Exam    Constitutional   General appearance: No acute distress, well appearing and well nourished  Eyes   Conjunctiva and lids: No swelling, erythema, or discharge  Pupils and irises: Equal, round and reactive to light  Ears, Nose, Mouth, and Throat   External inspection of ears and nose: Normal     Otoscopic examination: Tympanic membrance translucent with normal light reflex  Canals patent without erythema  Nasal mucosa, septum, and turbinates: Abnormal   swelling  Oropharynx: Abnormal   cobble stoning  Pulmonary   Respiratory effort: No increased work of breathing or signs of respiratory distress  Auscultation of lungs: Clear to auscultation, equal breath sounds bilaterally, no wheezes, no rales, no rhonci  Cardiovascular   Auscultation of heart: Normal rate and rhythm, normal S1 and S2, without murmurs  Lymphatic   Palpation of lymph nodes in neck: No lymphadenopathy  Musculoskeletal   Gait and station: Normal     Digits and nails: Normal without clubbing or cyanosis      Inspection/palpation of joints, bones, and muscles: Normal     Psychiatric   Orientation to person, place and time: Normal          Future Appointments    Date/Time Provider Specialty Site   [facility]2018 08[facility]   02/05/2018 09:45 AM TONI Vincent   Internal Medicine [facility]     Signatures   Electronically signed by : Mona Wild, [address] [place] [address]; Nov 30 2017 12:35PM EST                       (Author)    Electronically signed by : TONI Lara ; Nov 30 2017 12:37PM EST

## 2017-12-15 ENCOUNTER — GENERIC CONVERSION - ENCOUNTER (OUTPATIENT)
Dept: INTERNAL MEDICINE CLINIC | Facility: CLINIC | Age: 62
End: 2017-12-15

## 2018-01-12 VITALS
DIASTOLIC BLOOD PRESSURE: 96 MMHG | SYSTOLIC BLOOD PRESSURE: 148 MMHG | BODY MASS INDEX: 29.99 KG/M2 | RESPIRATION RATE: 18 BRPM | HEART RATE: 104 BPM | WEIGHT: 191.5 LBS | TEMPERATURE: 99 F

## 2018-01-12 VITALS
HEART RATE: 91 BPM | WEIGHT: 204 LBS | DIASTOLIC BLOOD PRESSURE: 78 MMHG | OXYGEN SATURATION: 98 % | BODY MASS INDEX: 32.78 KG/M2 | HEIGHT: 66 IN | SYSTOLIC BLOOD PRESSURE: 124 MMHG

## 2018-01-12 VITALS
BODY MASS INDEX: 31.34 KG/M2 | HEIGHT: 66 IN | SYSTOLIC BLOOD PRESSURE: 122 MMHG | WEIGHT: 195 LBS | OXYGEN SATURATION: 98 % | RESPIRATION RATE: 18 BRPM | DIASTOLIC BLOOD PRESSURE: 80 MMHG | HEART RATE: 88 BPM

## 2018-01-12 VITALS
OXYGEN SATURATION: 97 % | DIASTOLIC BLOOD PRESSURE: 84 MMHG | HEART RATE: 80 BPM | SYSTOLIC BLOOD PRESSURE: 126 MMHG | WEIGHT: 196 LBS | HEIGHT: 66 IN | BODY MASS INDEX: 31.5 KG/M2

## 2018-01-13 VITALS
BODY MASS INDEX: 30.91 KG/M2 | HEART RATE: 100 BPM | WEIGHT: 191.5 LBS | TEMPERATURE: 98.4 F | RESPIRATION RATE: 18 BRPM | SYSTOLIC BLOOD PRESSURE: 154 MMHG | DIASTOLIC BLOOD PRESSURE: 100 MMHG

## 2018-01-13 VITALS
HEART RATE: 75 BPM | OXYGEN SATURATION: 97 % | SYSTOLIC BLOOD PRESSURE: 150 MMHG | WEIGHT: 203 LBS | BODY MASS INDEX: 32.62 KG/M2 | HEIGHT: 66 IN | DIASTOLIC BLOOD PRESSURE: 80 MMHG

## 2018-01-13 VITALS
SYSTOLIC BLOOD PRESSURE: 162 MMHG | HEART RATE: 82 BPM | DIASTOLIC BLOOD PRESSURE: 94 MMHG | WEIGHT: 195 LBS | BODY MASS INDEX: 31.34 KG/M2 | HEIGHT: 66 IN

## 2018-01-13 NOTE — MISCELLANEOUS
Plan   Diabetes    · Tradjenta 5 MG Oral Tablet; TAKE 1 TABLET DAILY   Rx By: Chepe Proctor; Dispense: 90 Days ; #:90 Tablet; Refill: 3; For: Diabetes; PEPITO = N; Record    Cefuroxime Axetil 500 MG Oral Tablet; TAKE 1 TABLET EVERY 12 HOURS DAILY; Therapy: 96TND7766 to (Evaluate:30Jan2017); Last Rx:23Jan2017; Status: ACTIVE Ordered  Rx By: Chepe Garciaa; Dispense: 7 Days ; #:14 Tablet; Refill: 0;   For: Cough; PEPITO = N; Record  PredniSONE 10 MG Oral Tablet; take 3 tabs daily x 2 days then 2 tabs daily x 2 days then 1 tab x 1 day; Therapy: 70QLC8400 to (Last Rx:23Jan2017); Status: ACTIVE Ordered  Rx By: Chepe Garciaa; Dispense: 0 Days ; #:11 Tablet; Refill: 1;   For: Cough; PEPITO = N; Record  Diabetes (250 00) (E11 9)          Chief Complaint  Chief Complaint Free Text Note Form: missed tcm      History of Present Illness  TCM Communication [facility]: The patient is being contacted for follow-up after hospitalization and spoke to pt  He was hospitalized at [facility]  The dates of hospitalization: 1/17, date of discharge: 1/21  Diagnosis: pneumonia  He was discharged to home  Medications reviewed and updated today  He did not schedule a follow up appointment  Follow-up appointments with other specialists: will make appt  Symptoms: cough  Counseling was provided to the patient  Communication performed and completed by gabe      Active Problems     1  Acid reflux (530 81) (K21 9)   2  Acute sinusitis (461 9) (J01 90)   3  Bug bite without infection (919 4,E906 4) (W57 XXXA)   4  Flu vaccine need (V04 81) (Z23)   5  Hypertension (401 9) (I10)   6  Laceration of head (873 8) (S01 91XA)   7  Malignant melanoma of skin (172 9) (C43 9)   8  Microalbuminuria (791 0) (R80 9)   9  Morbid obesity due to excess calories (278 01) (E66 01)   10  Multiple nevi (216 9) (D22 9)   11  Pharyngitis, streptococcal, acute (034 0) (J02 0)   12  Screening for skin condition (V82 0) (Z13 89)   13   Seborrheic keratosis (702 19) (L82 1)   14  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)    Hyperlipidemia (272 4) (E78 5)       Diabetes (250 00) (E11 9)       Cough (786 2) (R05)          Past Medical History    1  History of Prediabetes (790 29) (R73 09)    Surgical History    1  History of Biopsy Skin   2  History of Hand Surgery   3  History of Hemicolectomy   4  History of Hernia Repair    Family History  Mother    1  Family history of CAD (coronary artery disease)   2  Family history of hyperlipidemia (V18 19) (Z83 49)   3  Family history of hypertension (V17 49) (Z82 49)   4  Family history of Type 2 diabetes, controlled, with neuropathy  Father    5  Family history of CAD (coronary artery disease)   6  Family history of hyperlipidemia (V18 19) (Z83 49)   7  Family history of hypertension (V17 49) (Z82 49)   8  Family history of Type 2 diabetes, controlled, with neuropathy  Sister    5  Family history of hypertension (V17 49) (Z82 49)    Social History    · Consumes alcohol (V49 89) (Z78 9)   · Never smoked   · No drug use   · Occupation    Current Meds   1  AmLODIPine Besylate 10 MG Oral Tablet; take 1 tablet by mouth daily; Therapy: 12UYE8943 to (Evaluate:16Jan2017)  Requested for: 71VWW5370; Last   Rx:18Oct2016 Ordered   2  Aspirin 81 MG TABS; Therapy: (Recorded:99Enc1440) to Recorded   3  Atorvastatin Calcium 40 MG Oral Tablet; TAKE 1 TABLET DAILY  Requested for:   99Qof3036; Last Rx:43Cky8523 Ordered   4  Azithromycin 250 MG Oral Tablet; TAKE 2 TABLETS ON DAY 1 THEN TAKE 1 TABLET A   DAY FOR 4 DAYS; Therapy: 36MML4789 to (Last Rx:16Jan2017)  Requested for: 73SYC5283 Ordered   5  Benzonatate 200 MG Oral Capsule; TAKE 1 CAPSULE 3 TIMES DAILY AS NEEDED; Therapy: 82RMH0452 to (Evaluate:30Jan2017)  Requested for: 99SXP5119; Last   Rx:16Jan2017 Ordered   6  Breo Ellipta 100-25 MCG/INH Inhalation Aerosol Powder Breath Activated; USE AS   DIRECTED ON PACKAGE; Therapy: 49XNF3056 to (Last Rx:16Jan2017) Ordered   7   Fluticasone Propionate 50 MCG/ACT Nasal Suspension; USE 2 SPRAYS IN EACH   NOSTRIL ONCE DAILY; Therapy: 89AIJ8299 to (Last Rx:16Jan2017)  Requested for: 34ASC3482 Ordered   8  Lisinopril-Hydrochlorothiazide 20-25 MG Oral Tablet; take 1 tablet by mouth every day; Therapy: 10WHR6779 to (Evaluate:16Jan2017)  Requested for: 86OIT2132; Last   Rx:03Iua5055 Ordered   9  Metoprolol Succinate ER 50 MG Oral Tablet Extended Release 24 Hour; TAKE 1 AND 1/2   TABLETS DAILY  Requested for: 23Uow3986; Last Rx:04Apr2016 Ordered   10  One Touch Test Strips TEST; Therapy: ([phone]) to Recorded   11  Promethazine-Codeine 6 25-10 MG/5ML Oral Syrup; TAKE 1 TO 2 TEASPOONSFUL BY    MOUTH EVERY 6 HOURS AS NEEDED; Therapy: 87HJA7018 to (Last Rx:29Nov2016) Ordered   12  Promethazine-Codeine 6 25-10 MG/5ML Oral Syrup; TAKE ONE (1) OR TWO (2)    TEASPOONFULEVERY 6 HOURS AS NEEDED; Therapy: 48LCA4191 to (Last Rx:16Jan2017) Ordered    Allergies    1   No Known Drug Allergies    Signatures   Electronically signed by : Ryan Hargrove [address] [place] [address]; Feb 13 2017 10:41AM EST                       (Author)    Electronically signed by : TONI Weaver ; Feb 13 2017  6:10PM EST

## 2018-01-14 VITALS
SYSTOLIC BLOOD PRESSURE: 124 MMHG | WEIGHT: 194.25 LBS | HEART RATE: 94 BPM | HEIGHT: 66 IN | DIASTOLIC BLOOD PRESSURE: 82 MMHG | OXYGEN SATURATION: 97 % | BODY MASS INDEX: 31.22 KG/M2

## 2018-01-14 VITALS
HEART RATE: 88 BPM | HEIGHT: 66 IN | SYSTOLIC BLOOD PRESSURE: 130 MMHG | WEIGHT: 197.25 LBS | DIASTOLIC BLOOD PRESSURE: 86 MMHG | TEMPERATURE: 97.8 F | BODY MASS INDEX: 31.7 KG/M2 | OXYGEN SATURATION: 96 %

## 2018-01-14 VITALS
SYSTOLIC BLOOD PRESSURE: 138 MMHG | HEIGHT: 66 IN | RESPIRATION RATE: 20 BRPM | DIASTOLIC BLOOD PRESSURE: 88 MMHG | WEIGHT: 190 LBS | HEART RATE: 111 BPM | OXYGEN SATURATION: 98 % | BODY MASS INDEX: 30.53 KG/M2

## 2018-01-14 VITALS
HEART RATE: 71 BPM | WEIGHT: 198 LBS | SYSTOLIC BLOOD PRESSURE: 126 MMHG | BODY MASS INDEX: 31.82 KG/M2 | DIASTOLIC BLOOD PRESSURE: 82 MMHG | OXYGEN SATURATION: 98 % | HEIGHT: 66 IN

## 2018-01-14 VITALS
OXYGEN SATURATION: 97 % | SYSTOLIC BLOOD PRESSURE: 126 MMHG | WEIGHT: 198.38 LBS | HEIGHT: 66 IN | DIASTOLIC BLOOD PRESSURE: 100 MMHG | BODY MASS INDEX: 31.88 KG/M2 | HEART RATE: 77 BPM

## 2018-01-14 VITALS
OXYGEN SATURATION: 96 % | BODY MASS INDEX: 32.2 KG/M2 | HEIGHT: 66 IN | WEIGHT: 200.38 LBS | SYSTOLIC BLOOD PRESSURE: 126 MMHG | HEART RATE: 65 BPM | DIASTOLIC BLOOD PRESSURE: 84 MMHG

## 2018-01-17 NOTE — MISCELLANEOUS
Message   Date: 08 Feb 2017 7:40 PM EST, Recorded By: Jessica Mcmillan For: Macey Agrawal: An[place]ell, To   Phone: [phone]   Reason: Medical Complaint   Answering service 2/8/2017 7:40 pm   Ida Wheat was seen yesterday and is actually getting worse  He has high fever, chills, and cough  Pt  called the office at 4:30 pm and was told they would get a call back then they called at 5:30 pm  and received the same information  Discussion/Summary  Discussion Summary:   I never got a page on this pt        Signatures   Electronically signed by : Mona Wild, [facility]; Feb 9 2017 11:31AM EST                       (Author)

## 2018-01-21 DIAGNOSIS — I10 ESSENTIAL (PRIMARY) HYPERTENSION: ICD-10-CM

## 2018-01-21 DIAGNOSIS — E78.5 HYPERLIPIDEMIA: ICD-10-CM

## 2018-01-21 DIAGNOSIS — E11.9 TYPE 2 DIABETES MELLITUS WITHOUT COMPLICATIONS (HCC): ICD-10-CM

## 2018-01-22 VITALS
HEIGHT: 66 IN | WEIGHT: 195 LBS | HEART RATE: 91 BPM | BODY MASS INDEX: 31.34 KG/M2 | SYSTOLIC BLOOD PRESSURE: 128 MMHG | OXYGEN SATURATION: 97 % | DIASTOLIC BLOOD PRESSURE: 80 MMHG

## 2018-01-22 VITALS
HEIGHT: 66 IN | SYSTOLIC BLOOD PRESSURE: 118 MMHG | HEART RATE: 95 BPM | DIASTOLIC BLOOD PRESSURE: 82 MMHG | BODY MASS INDEX: 31.58 KG/M2 | OXYGEN SATURATION: 96 % | WEIGHT: 196.5 LBS

## 2018-01-23 NOTE — MISCELLANEOUS
History of Present Illness  TCM Communication [facility]: The patient is being contacted for follow-up after hospitalization and 1st try- ans mach 2nd try; pt never made appt for nghia  He was hospitalized at [facility]  The date of admission: 11/21, date of discharge: 11/22  Diagnosis: cp, s/p recent CABG  He was discharged to home  Communication performed and completed by pal russell      Active Problems    1  Abnormal stress test (794 39) (R94 39)   2  Acid reflux (530 81) (K21 9)   3  Acute sinusitis (461 9) (J01 90)   4  Allergic rhinitis (477 9) (J30 9)   5  Bug bite without infection (919 4,E906 4) (W57 XXXA)   6  Colon cancer screening (V76 51) (Z12 11)   7  Coronary artery disease (414 00) (I25 10)   8  Diabetes (250 00) (E11 9)   9  Diastolic dysfunction (839 9) (I51 9)   10  Flu vaccine need (V04 81) (Z23)   11  Hyperlipidemia (272 4) (E78 5)   12  Hypertension (401 9) (I10)   13  Laceration of head (873 8) (S01 91XA)   14  Malignant melanoma of skin (172 9) (C43 9)   15  Microalbuminuria (791 0) (R80 9)   16  Morbid obesity due to excess calories (278 01) (E66 01)   17  Multiple nevi (216 9) (D22 9)   18  Muscle strain of left gluteal region, initial encounter (843 8) (S76 012A)   19  Obesity (278 00) (E66 9)   20  Pharyngitis, streptococcal, acute (034 0) (J02 0)   21  Pneumonia (486) (J18 9)   22  Post-nasal drip (784 91) (R09 82)   23  S/P CABG (coronary artery bypass graft) (V45 81) (Z95 1)   24  Screening for skin condition (V82 0) (Z13 89)   25  Seborrheic keratosis (702 19) (L82 1)   26  Shortness of breath on exertion (786 05) (R06 02)   27  Skin lesions (709 9) (L98 9)   28  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)   29  Transaminitis (790 4) (R74 0)    Past Medical History    1  History of obesity (V12 29) (Z86 39)   2  History of Prediabetes (790 29) (R73 09)    Surgical History    1  History of Biopsy Skin   2  History of CABG   3  History of Hand Surgery   4  History of Hemicolectomy   5   History of Hernia Repair    Family History  Mother    1  Family history of CAD (coronary artery disease)   2  Family history of hyperlipidemia (V18 19) (Z83 49)   3  Family history of hypertension (V17 49) (Z82 49)   4  Family history of Type 2 diabetes, controlled, with neuropathy  Father    5  Family history of CAD (coronary artery disease)   6  Family history of hyperlipidemia (V18 19) (Z83 49)   7  Family history of hypertension (V17 49) (Z82 49)   8  Family history of Type 2 diabetes, controlled, with neuropathy  Sister    5  Family history of hypertension (V17 49) (Z82 49)    Social History    · Consumes alcohol (V49 89) (Z78 9)   · Never smoked   · Never smoked tobacco (V49 89) (Z78 9)   · No drug use   · Occupation    Current Meds   1  Aspirin 81 MG TABS; Therapy: (Recorded:65Vkd2298) to Recorded   2  Atorvastatin Calcium 10 MG Oral Tablet; TAKE 1 TABLET AT BEDTIME  Requested for:   26Bvk2771; Last Rx:57Zvv6698 Ordered   3  Fluticasone Propionate 50 MCG/ACT Nasal Suspension; use 2 sprays in each nostril   once daily; Therapy: 73LUR7795 to (Evaluate:78Buo6418)  Requested for: 90TVM8651; Last   Rx:21Nov2017 Ordered   4  Glimepiride 1 MG Oral Tablet; 1 po bid for sugar >200; Therapy: 76XPP3380 to (Last Rx:07Apr2017)  Requested for: 07Apr2017 Ordered   5  HydroCHLOROthiazide 25 MG Oral Tablet; TAKE 1 TABLET DAILY AS DIRECTED; Therapy: 41Jms4525 to (Evaluate:49Dci7764)  Requested for: 55Emr7853; Last   Rx:77Hfa9162 Ordered   6  MetFORMIN HCl  MG Oral Tablet Extended Release 24 Hour; START WITH 1 TAB   DAILY W/ DINNER, INCREASE BY 1 TABLET EACH WEEK AS TOLERATED UNTIL   TAKING 4 TABS; Therapy: 55KAN8659 to (Evaluate:09Apr2018)  Requested for: 55VAS9133; Last   Rx:11Oct2017 Ordered   7  Metoprolol Succinate  MG Oral Tablet Extended Release 24 Hour; TAKE 1 TABLET   DAILY; Therapy: 23Apr2017 to (Evaluate:38Axp6583)  Requested for: 84Syo5062; Last   Rx:62Kdc4462 Ordered   8   Nitroglycerin 0 4 MG Sublingual Tablet Sublingual; PLACE 1 TABLET UNDER THE   TONGUE EVERY 5 MINUTES FOR UP TO 3 DOSES AS NEEDED FOR CHEST   PAIN  CALL 911 IF PAIN PERSISTS; Therapy: 04IXO3007 to (Evaluate:33Lor1196)  Requested for: 63ZLR2985; Last   Rx:10Lqw2611 Ordered   9  One Touch Test Strips TEST; Therapy: (Recorded:25Lpp4649) to Recorded   10  OneTouch Ultra Blue In [employer]; test sugar 2 times daily; Therapy: 43XZT2268 to (Last [facility] Tristen)  Requested for: 40XPC4062 Ordered   11  ProAir  (90 Base) MCG/ACT Inhalation Aerosol Solution; INHALE 2 PUFFS    EVERY 4 HOURS AS NEEDED; Therapy: 66CBR6817 to (Last Rx:61Ikh8957)  Requested for: 72XPN9262 Ordered   12  Valsartan 80 MG Oral Tablet; 3 po qd; Therapy: 19BGN7347 to (Last [facility] Tristen)  Requested for: 35GHE6721 Ordered    Allergies    1  No Known Drug Allergies    Future Appointments    Date/Time Provider Specialty Site   02/22/2018 08:00 AM Braden Kowalski [facility] Pulmonary Medicine [address] [facility] [place]   02/05/2018 09:45 AM TONI Garcia   Internal Medicine [facility]     Signatures   Electronically signed by : Saúl Ayesr [facility]; Dec 12 2017 11:24AM EST                       (Author)    Electronically signed by : TONI Carr ; Dec 12 2017 12:08PM EST

## 2018-03-16 ENCOUNTER — OFFICE VISIT (OUTPATIENT)
Dept: CARDIOLOGY CLINIC | Facility: CLINIC | Age: 63
End: 2018-03-16
Payer: COMMERCIAL

## 2018-03-16 VITALS
WEIGHT: 206 LBS | DIASTOLIC BLOOD PRESSURE: 64 MMHG | BODY MASS INDEX: 32.33 KG/M2 | SYSTOLIC BLOOD PRESSURE: 128 MMHG | OXYGEN SATURATION: 97 % | HEART RATE: 86 BPM | HEIGHT: 67 IN

## 2018-03-16 DIAGNOSIS — I25.10 CORONARY ARTERY DISEASE INVOLVING NATIVE HEART WITHOUT ANGINA PECTORIS, UNSPECIFIED VESSEL OR LESION TYPE: Primary | ICD-10-CM

## 2018-03-16 DIAGNOSIS — E78.2 MIXED HYPERLIPIDEMIA: ICD-10-CM

## 2018-03-16 DIAGNOSIS — E66.9 OBESITY (BMI 30-39.9): ICD-10-CM

## 2018-03-16 DIAGNOSIS — I51.89 DIASTOLIC DYSFUNCTION: ICD-10-CM

## 2018-03-16 DIAGNOSIS — I10 ESSENTIAL HYPERTENSION: ICD-10-CM

## 2018-03-16 DIAGNOSIS — Z95.1 HX OF CABG: ICD-10-CM

## 2018-03-16 PROCEDURE — 99214 OFFICE O/P EST MOD 30 MIN: CPT | Performed by: INTERNAL MEDICINE

## 2018-03-16 RX ORDER — GLIMEPIRIDE 1 MG/1
1 TABLET ORAL
COMMUNITY
End: 2018-06-24 | Stop reason: SDUPTHER

## 2018-03-16 RX ORDER — HYDROCHLOROTHIAZIDE 25 MG/1
25 TABLET ORAL DAILY
COMMUNITY
End: 2018-12-02 | Stop reason: SDUPTHER

## 2018-03-16 RX ORDER — METFORMIN HYDROCHLORIDE EXTENDED-RELEASE TABLETS 1000 MG/1
1000 TABLET, FILM COATED, EXTENDED RELEASE ORAL
COMMUNITY
End: 2018-12-18

## 2018-03-16 NOTE — PROGRESS NOTES
CARDIOLOGY OFFICE VISIT  [facility]  [address], [address] [place] [address], [facility] Mickey Agee  Tel: [phone]      NAME: Codi Jerry  AGE: 63 y o  SEX: male  : 1955   MRN: [id number]      Chief Complaint:  Chief Complaint   Patient presents with    Coronary Artery Disease    Hypertension         History of Present Illness:   Patient comes for follow up  States he is doing well from cardiac stand point and denies chest pain / pressure, SOB, palpitations, lightheadedness, syncope, swelling feet, orthopnea, PND, claudication  CAD s/p CABG surgery at [facility] on 2017 by Dr Jason Carter  Post surgery he needed a left-sided thoracentesis for hemothorax  Other than that he had a good postop recovery  Patient completed cardiac rehabilitation as well  Current on aspirin, beta-blocker, statin     HTN - Today BP is normal  He takes his antihypertensive medications regularly  Denies lightheadedness, headache, medication side effects  HLP - on statin which he takes regularly  LDL 53 (Sep 2017)    Due for his lipid panel from PCP    Obesity - is on diet control to loose wt       Past Medical History:  Past Medical History:   Diagnosis Date    Coronary artery disease     Diabetes mellitus ([address] )     Diastolic dysfunction     Diverticulitis     Hyperlipidemia     Hypertension     Obesity     Prediabetes     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         Past Surgical History:  Past Surgical History:   Procedure Laterality Date    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    BOWEL RESECTION      Secondary to diverticulitis    CORONARY ARTERY BYPASS GRAFT      HAND SURGERY      HERNIA REPAIR      MALIGNANT SKIN LESION EXCISION      Abdominal wall surgical resection of melanoma    SKIN BIOPSY           Family History:  Family History   Problem Relation Age of Onset    Emphysema Maternal Grandfather     Coronary artery disease Mother     Hyperlipidemia Mother     Hypertension Mother     Diabetes type II Mother     Diabetes type II Father     Hypertension Sister          Social History:  Social History     Social History    Marital status: /Civil Union     Spouse name: N/A    Number of children: N/A    Years of education: N/A     Occupational History          Social History Main Topics    Smoking status: Never Smoker    Smokeless tobacco: Not on file    Alcohol use No    Drug use: No    Sexual activity: Yes     Other Topics Concern    Not on file     Social History Narrative    No narrative on file         Active Problems:  Patient Active Problem List   Diagnosis    Sepsis ([place] [address] )    URI (upper respiratory infection)    Type 2 diabetes mellitus ([place] [address] )    HTN (hypertension)    Hypokalemia    RSV (respiratory syncytial virus infection)    Tracheobronchitis    Acute sinusitis    Vitamin D deficiency    Transaminitis         The following portions of the patient's history were reviewed and updated as appropriate: past medical history, past surgical history, past family history,  past social history, current medications, allergies and problem list       Review of Systems:  Constitutional: Denies fever, chills, fatigue  Eyes: Denies eye redness, eye discharge, double vision  ENT: Denies hearing loss, tinnitus, sneezing, nasal discharge, sore throat   Respiratory: Denies cough, expectoration, hemoptysis, shortness of breath  Cardiovascular: Denies chest pain, palpitations, orthopnea, PND, lower extremity swelling  Gastrointestinal: Denies abdominal pain, nausea, vomiting, hematemesis, diarrhea, bloody stools  Genito-Urinary: Denies dysuria, incontinence  Musculoskeletal: Denies back pain, joint pain, muscle pain  Neurologic: Denies confusion, lightheadedness, syncope, headache, focal weakness, sensory changes, seizures  Endocrine: Denies polyuria, polydipsia, temperature intolerance  Allergy and Immunology: Denies hives, insect bite sensitivity  Hematological and Lymphatic: Denies bleeding problems, swollen glands   Psychological: Denies depression, suicidal ideation, anxiety, panic  Dermatological: Denies pruritus, rash, skin lesion changes      Vitals:  Vitals:    03/16/18 0841   BP: 128/64   Pulse: 86   SpO2: 97%       Body mass index is 32 26 kg/m²  Weight (last 2 days)     Date/Time   Weight    03/16/18 0841  93 4 (206)                Physical Examination:  General: Obese  Patient is not in acute distress  Awake, alert, oriented in time, place and person  Responding to commands  Head: Normocephalic  Atraumatic  Eyes: Both pupils normal sized, round and reactive to light  Nonicteric  ENT: Normal external ear canals  Nares normal, no drainage  Lips and oral mucosa normal  Neck: Supple  JVP not raised  Trachea central  No thyromegaly  Lungs: Bilateral bronchovascular breath sounds with no crackles or rhonchi  Chest wall: Sternal scar +  No tenderness  Cardiovascular: RRR  S1 and S2 normal  No murmur, rub or gallop  Gastrointestinal: Abdomen soft, nontender  No guarding or rigidity  Liver and spleen not palpable  Bowel sounds present  Neurologic: Patient is awake, alert, oriented in time, place and person  Responding to command  Moving all extremities  Integumentary:  No skin rash  Lymphatic: No cervical lymphadenopathy  Back: Symmetric   No CVA tenderness  Extremities: No clubbing, cyanosis or edema      Laboratory Results:  CBC with diff:   Lab Results   Component Value Date    WBC 6 2 09/21/2017    RBC 5 07 06/05/2017    RBC 5 29 12/02/2015    HGB 14 2 09/21/2017    HCT 42 4 09/21/2017    MCV 86 09/21/2017    MCH 28 7 09/21/2017    RDW 14 7 09/21/2017     09/21/2017       CMP:  Lab Results   Component Value Date    CREATININE 0 80 09/21/2017    BUN 16 09/21/2017     09/21/2017    K 4 4 09/21/2017    CL 98 09/21/2017    CO2 23 09/21/2017    GLUCOSE 119 (H) 2017    PROT 7 0 2017    ALKPHOS 80 2017    ALT 24 2017    AST 18 2017    BILIDIR 0 08 2017       Lab Results   Component Value Date    HGBA1C 6 4 (H) 2017    MG 2 1 2017    PHOS 4 2 2017       Lab Results   Component Value Date    TROPONINI <0 02 2017       Lipid Profile:   Lab Results   Component Value Date    CHOL 128 2017    CHOL 116 2016    CHOL 151 2016     Lab Results   Component Value Date    HDL 32 (L) 2017    HDL 34 (L) 2016    HDL 38 (L) 2016     Lab Results   Component Value Date    LDLCALC 53 2017    LDLCALC 60 2016    LDLCALC 81 2016     Lab Results   Component Value Date    TRIG 216 (H) 2017    TRIG 109 2016    TRIG 159 (H) 2016       Cardiac testing:   Results for orders placed during the hospital encounter of 17   Echo complete with contrast if indicated    Narrative [facility], [address] [place] [address]  [phone]    Transthoracic Echocardiogram  2D, M-mode, Doppler, and Color Doppler    Study date:  08-May-2017    Patient: Alka Morgan  MR number: [id number]  Account number: [de-identified]  : [id number]  Age: 63 years  Gender: Male  Status: Outpatient  Location: [facility]  Height: 67 in  Weight: 190 lb  BP: 126/ 100 mmHg    Indications: CAD  Diagnoses: I25 10 - Atherosclerotic heart disease of native coronary artery without angina pectoris    Sonographer:  Phyliss Hammans,, MARY ANN  Interpreting Physician:  Charles Marie MD  Primary Physician:  Irma Jones MD  Referring Physician:  Charles Marie MD  Group:  [facility]    SUMMARY    LEFT VENTRICLE:  Systolic function was normal  Ejection fraction was estimated to be 60 %  There were no regional wall motion abnormalities    Features were consistent with a pseudonormal left ventricular filling pattern, with concomitant abnormal relaxation and increased filling pressure (grade 2 diastolic dysfunction)  RIGHT VENTRICLE:  The size was normal   Systolic function was normal     AORTIC VALVE:  There was trace regurgitation  TRICUSPID VALVE:  There was trace regurgitation  HISTORY: PRIOR HISTORY: Risk factors: hypertension, oral hypoglycemic-treated diabetes, medication-treated hypercholesterolemia, and a family history of coronary artery disease  PROCEDURE: The study was performed in the [address] [place] [address]  This was a routine study  The transthoracic approach was used  The study included complete 2D imaging, M-mode, complete spectral Doppler, and color Doppler  Image  quality was adequate  LEFT VENTRICLE: Size was normal  Systolic function was normal  Ejection fraction was estimated to be 60 %  There were no regional wall motion abnormalities  Wall thickness was normal  No evidence of apical thrombus  DOPPLER: Features were  consistent with a pseudonormal left ventricular filling pattern, with concomitant abnormal relaxation and increased filling pressure (grade 2 diastolic dysfunction)  RIGHT VENTRICLE: The size was normal  Systolic function was normal  Wall thickness was normal     LEFT ATRIUM: Size was normal     RIGHT ATRIUM: Size was normal     MITRAL VALVE: Valve structure was normal  There was normal leaflet separation  DOPPLER: The transmitral velocity was within the normal range  There was no evidence for stenosis  There was no significant regurgitation  AORTIC VALVE: The valve was trileaflet  Leaflets exhibited normal thickness and normal cuspal separation  DOPPLER: Transaortic velocity was within the normal range  There was no evidence for stenosis  There was trace regurgitation  TRICUSPID VALVE: The valve structure was normal  There was normal leaflet separation  DOPPLER: The transtricuspid velocity was within the normal range  There was no evidence for stenosis   There was trace regurgitation  PULMONIC VALVE: Leaflets exhibited normal thickness, no calcification, and normal cuspal separation  DOPPLER: The transpulmonic velocity was within the normal range  There was no significant regurgitation  PERICARDIUM: There was no pericardial effusion  The pericardium was normal in appearance  AORTA: The root exhibited normal size  SYSTEMIC VEINS: IVC: The inferior vena cava was not well visualized  SYSTEM MEASUREMENT TABLES    Apical four chamber  4 chamber Left Atrium Volume Index; Planimetry; End Systole; Apical four chamber;: 20 64 cm2  Left Ventricular End Diastolic Volume; Method of Disks, Single Plane; Apical four chamber;: 83 2 ml  Left Ventricular End Systolic Volume; Method of Disks, Single Plane; Apical four chamber;: 38 3 ml  Right Atrium Systolic Area; Planimetry; End Systole; Apical four chamber;: 19 42 cm2  Right Ventricular Internal Diastolic Dimension; End Diastole; Apical four chamber;: 34 2 mm  TAPSE: 19 mm    Unspecified Scan Mode  Aortic Root Diameter; End Systole;: 28 9 mm  Aortic valve Area; Continuity Equation by Velocity Time Integral; Systole;: 1 9 cm2  Gradient Pressure, Peak; Simplified Bernoulli; Antegrade Flow; Systole;: 12 9 mm[Hg]  Gradient pressure, average; Simplified Bernoulli; Antegrade Flow; Systole;: 7 5 mm[Hg]  HR: 66 /min  Left atrial diameter; End Diastole;: 37 9 mm  Cardiac Output; Systole;: 4 52 L/min  Cardiac Output; Teichholz; Systole;: 3 19 L/min  HR: 64 /min  Heart rate; Teichholz;: 66 /min  Interventricular Septum Diastolic Thickness; Teichholz; End Diastole;: 9 mm  Left Ventricle Internal End Diastolic Dimension; Teichholz;: 47 5 mm  Left Ventricle Internal Systolic Dimension; Teichholz; End Systole;: 36 6 mm  Left Ventricle Mass; Mass AVCube with Teichholz; End Diastole;: 164 g  Left Ventricle Posterior Wall Diastolic Thickness; Teichholz; End Diastole;: 10 7 mm  Left Ventricular Ejection Fraction;  Teichholz;: 46 %  Left Ventricular End Diastolic Volume; Teichholz;: 104 9 ml  Left Ventricular End Systolic Volume; Elenholnikki;: 56 6 ml  Left Ventricular Fractional Shortening;: 22 9 %  Stroke volume; Systole;: 68 5 ml  Stroke volume; Shayy Bradshaw;: 48 3 ml  Mitral Valve Area; Area by Pressure Half-Time; Systole;: 3 24 cm2  Mitral Valve E to A Ratio; Systole;: 1 34    [facility]    Prepared and electronically signed by    Salbador Tejada MD  Signed [id number] 10:53:52       No results found for this or any previous visit  No results found for this or any previous visit  No procedure found  Results for orders placed during the hospital encounter of 17   NM myocardial perfusion spect (stress and/or rest)    Narrative  [address] [place] [address]  [phone]    Rest/Stress Gated SPECT Myocardial Perfusion Imaging After Exercise    Patient: Tomas Rodriguez  MR number: [id number]  Account number: [de-identified]  : 1955  Age: 63 years  Gender: Male  Status: Outpatient  [facility]: [facility]  Height: 66 in  Weight: 198 lb  BP: 120/ 78 mmHg    Allergies: NO KNOWN ALLERGIES    Diagnosis: I25 10 - Atherosclerotic heart disease of native coronary artery without angina pectoris, R06 02 - Shortness of breath    Primary Physician:  Bernard Moss MD  Interpreting Physician:  Oswaldo Mejia MD  Referring Physician:  Salbador Tejada MD  Technician:  Debra VIZCAINO, BA, AART(N)  Group:  [facility]  Other:  Stephany Pederson MS, CCT    INDICATIONS: CAD, SOB    HISTORY: The patient is a 63year old  male  Chest pain status: no chest pain  Other symptoms: dyspnea  Coronary artery disease risk factors: dyslipidemia, hypertension, family history of premature coronary artery disease, and  diabetes mellitus  REST ECG: Normal sinus rhythm  Nonspecific T wave abnormalities were present      PROCEDURE: The study was performed at [address] [place] [address]  Treadmill exercise testing was performed, using the Mitchel protocol  Gated SPECT myocardial perfusion imaging was performed after stress and at rest  Systolic blood  pressure was 120 mmHg, at the start of the study  Diastolic blood pressure was 78 mmHg, at the start of the study  The heart rate was 75 bpm, at the start of the study  MITCHEL PROTOCOL:  HR bpm SBP mmHg DBP mmHg Symptoms Rhythm/conduct  Baseline 75 120 78 none rare PVC's  Stage 1 109 128 60 -- same as above  Stage 2 123 148 78 -- same as above  Stage 3 144 -- -- -- rare PVC's  Immediate 144 160 78 -- no ventricular ectopy  Recovery 1 127 -- -- -- no ventricular ectopy  Recovery 2 110 -- -- -- no ventricular ectopy  Recovery 3 100 -- -- -- rare PVC's  [id number] 0 59 229 24 -- no ventricular ectopy  No medications or fluids given  STRESS SUMMARY: Duration of exercise was 8 min  The patient exercised to protocol stage 3  Maximal work rate was 9 1 METs  Functional capacity was normal  Maximal heart rate during stress was 144 bpm ( 89 % of maximal predicted heart  rate)  The heart rate response to stress was normal  There was normal resting blood pressure with an appropriate response to stress  The rate-pressure product for the peak heart rate and blood pressure was 15401  There was no chest pain  during stress  The stress test was terminated due to achievement of target heart rate and mild fatigue  The stress ECG was negative for ischemia  Arrhythmia during stress: isolated premature ventricular beats  ISOTOPE ADMINISTRATION:  Resting isotope administration Stress isotope administration  Agent Tetrofosmin Tetrofosmin  Dose 9 76 mCi 31 1 mCi  Date 05/02/2017 05/02/2017    MYOCARDIAL PERFUSION IMAGING:  The image quality was good  Left ventricular size was normal  The TID ratio was 1 14  GATED SPECT:  The calculated left ventricular ejection fraction was 51 %   There was no left ventricular regional abnormality  SUMMARY:  -  Stress results: Duration of exercise was 8 min  Target heart rate was achieved  There was no chest pain during stress  -  ECG conclusions: The stress ECG was negative for ischemia   -  Gated SPECT: The calculated left ventricular ejection fraction was 51 %  There was no left ventricular regional abnormality  IMPRESSIONS: Small sized, mild intensity reversible inferior defect which could represent ischemia  Left ventricular systolic function was low normal  LVEF 51%  Prepared and signed by    Leia Joseph MD  Signed 05/02/2017 13:12:41         Medications:    Current Outpatient Prescriptions:     aspirin 81 MG tablet, Take 81 mg by mouth daily, Disp: , Rfl:     atorvastatin (LIPITOR) 10 mg tablet, Take 10 mg by mouth daily, Disp: , Rfl:     cholecalciferol 2000 UNITS TABS, Take 1 tablet by mouth daily, Disp: 30 tablet, Rfl: 0    glimepiride (AMARYL) 1 mg tablet, Take 1 mg by mouth every morning before breakfast, Disp: , Rfl:     hydrochlorothiazide (HYDRODIURIL) 25 mg tablet, Take 25 mg by mouth daily, Disp: , Rfl:     metFORMIN (FORTAMET) 1000 MG (OSM) 24 hr tablet, Take 1,000 mg by mouth daily with breakfast, Disp: , Rfl:     metoprolol succinate (TOPROL-XL) 100 mg 24 hr tablet, Take 100 mg by mouth daily  , Disp: , Rfl:     multivitamin (THERAGRAN) TABS, Take 1 tablet by mouth daily, Disp: , Rfl:     VALSARTAN PO, Take by mouth, Disp: , Rfl:       Allergies:  No Known Allergies      Assessment and Plan:  1  Coronary artery disease involving native heart without angina pectoris, unspecified vessel or lesion type S/P CABG   doing well  Continue aspirin, statin, beta-blocker    2  Diastolic dysfunction   serial follow-up with echocardiograms  Counseled to try to lose weight    3  Essential hypertension   BP stable  Continue current medications    4  Mixed hyperlipidemia   continue statin    Last LDL was at goal   He is due for repeat blood work from his PCP    5  Obesity (BMI 30-39 9)   strongly counseled to start exercising and try to lose weight    Recommend aggressive risk factor modification and therapeutic lifestyle changes  Low-salt, low-calorie, low-fat, low-cholesterol diet with regular exercise and to optimize weight  I will defer the ordering and monitoring of necessity lab studies to you, but I am available and happy to review and manage any of the data at your request in the future  Discussed concepts of atherosclerosis, including signs and symptoms of cardiac disease  Previous studies were reviewed  Safety measures were reviewed  Questions were entertained and answered  Patient was advised to report any problems requiring medical attention  Follow-up with PCP and appropriate specialist and lab work as discussed  Return for follow up visit as scheduled or earlier, if needed  Thank you for allowing me to participate in the care and evaluation of your patient  Should you have any questions, please feel free to contact me        Linda Monge MD  3/16/2018,9:12 AM

## 2018-04-24 DIAGNOSIS — I10 ESSENTIAL HYPERTENSION: Primary | ICD-10-CM

## 2018-04-24 RX ORDER — VALSARTAN 80 MG/1
TABLET ORAL
Qty: 90 TABLET | Refills: 5 | Status: SHIPPED | OUTPATIENT
Start: 2018-04-24 | End: 2018-10-22 | Stop reason: SDUPTHER

## 2018-05-16 DIAGNOSIS — E11.9 TYPE 2 DIABETES MELLITUS WITHOUT COMPLICATION, WITHOUT LONG-TERM CURRENT USE OF INSULIN (HCC): Primary | ICD-10-CM

## 2018-05-16 RX ORDER — METFORMIN HYDROCHLORIDE 500 MG/1
TABLET, EXTENDED RELEASE ORAL
Qty: 120 TABLET | Refills: 5 | Status: SHIPPED | OUTPATIENT
Start: 2018-05-16 | End: 2018-05-17 | Stop reason: SDUPTHER

## 2018-05-17 DIAGNOSIS — E11.9 TYPE 2 DIABETES MELLITUS WITHOUT COMPLICATION, WITHOUT LONG-TERM CURRENT USE OF INSULIN (HCC): ICD-10-CM

## 2018-05-17 RX ORDER — METFORMIN HYDROCHLORIDE 500 MG/1
TABLET, EXTENDED RELEASE ORAL
Qty: 120 TABLET | Refills: 5 | Status: SHIPPED | OUTPATIENT
Start: 2018-05-17 | End: 2018-11-21 | Stop reason: SDUPTHER

## 2018-05-23 LAB
LEFT EYE DIABETIC RETINOPATHY: NORMAL
RIGHT EYE DIABETIC RETINOPATHY: NORMAL

## 2018-06-20 DIAGNOSIS — I10 HYPERTENSION, UNSPECIFIED TYPE: Primary | ICD-10-CM

## 2018-06-20 RX ORDER — METOPROLOL SUCCINATE 100 MG/1
TABLET, EXTENDED RELEASE ORAL
Qty: 30 TABLET | Refills: 5 | Status: SHIPPED | OUTPATIENT
Start: 2018-06-20 | End: 2019-01-10 | Stop reason: SDUPTHER

## 2018-06-24 DIAGNOSIS — E11.8 TYPE 2 DIABETES MELLITUS WITH COMPLICATION, WITHOUT LONG-TERM CURRENT USE OF INSULIN (HCC): Primary | ICD-10-CM

## 2018-06-25 RX ORDER — GLIMEPIRIDE 1 MG/1
TABLET ORAL
Qty: 30 TABLET | Refills: 3 | Status: SHIPPED | OUTPATIENT
Start: 2018-06-25 | End: 2019-08-01 | Stop reason: SDUPTHER

## 2018-08-03 DIAGNOSIS — E78.2 MIXED HYPERLIPIDEMIA: Primary | ICD-10-CM

## 2018-08-03 RX ORDER — ATORVASTATIN CALCIUM 10 MG/1
TABLET, FILM COATED ORAL
Qty: 30 TABLET | Refills: 11 | Status: SHIPPED | OUTPATIENT
Start: 2018-08-03 | End: 2018-12-18 | Stop reason: SDUPTHER

## 2018-09-24 ENCOUNTER — OFFICE VISIT (OUTPATIENT)
Dept: CARDIOLOGY CLINIC | Facility: CLINIC | Age: 63
End: 2018-09-24
Payer: COMMERCIAL

## 2018-09-24 VITALS
WEIGHT: 204 LBS | SYSTOLIC BLOOD PRESSURE: 122 MMHG | BODY MASS INDEX: 32.02 KG/M2 | OXYGEN SATURATION: 96 % | HEIGHT: 67 IN | DIASTOLIC BLOOD PRESSURE: 88 MMHG | HEART RATE: 80 BPM

## 2018-09-24 DIAGNOSIS — E78.2 MIXED HYPERLIPIDEMIA: ICD-10-CM

## 2018-09-24 DIAGNOSIS — I25.10 CORONARY ARTERY DISEASE INVOLVING NATIVE HEART WITHOUT ANGINA PECTORIS, UNSPECIFIED VESSEL OR LESION TYPE: Primary | ICD-10-CM

## 2018-09-24 DIAGNOSIS — I51.89 DIASTOLIC DYSFUNCTION: ICD-10-CM

## 2018-09-24 DIAGNOSIS — I10 ESSENTIAL HYPERTENSION: ICD-10-CM

## 2018-09-24 DIAGNOSIS — E66.9 OBESITY (BMI 30-39.9): ICD-10-CM

## 2018-09-24 DIAGNOSIS — Z95.1 HX OF CABG: ICD-10-CM

## 2018-09-24 PROCEDURE — 99214 OFFICE O/P EST MOD 30 MIN: CPT | Performed by: INTERNAL MEDICINE

## 2018-09-24 RX ORDER — ALBUTEROL SULFATE 90 UG/1
2 AEROSOL, METERED RESPIRATORY (INHALATION) EVERY 4 HOURS PRN
COMMUNITY
Start: 2017-02-08 | End: 2022-03-10 | Stop reason: ALTCHOICE

## 2018-09-24 RX ORDER — FLUTICASONE FUROATE AND VILANTEROL 100; 25 UG/1; UG/1
POWDER RESPIRATORY (INHALATION) DAILY
COMMUNITY
Start: 2017-11-30 | End: 2022-03-10 | Stop reason: ALTCHOICE

## 2018-09-24 NOTE — PROGRESS NOTES
CARDIOLOGY OFFICE VISIT  [facility]  [place] , [place], [address] [place] [address], [place] [address] Mickey Agee  Tel: [phone]      NAME: Eugenio Gosselin Moloughney  AGE: 63 y o  SEX: male  : 1955   MRN: [id number]      Chief Complaint:  Chief Complaint   Patient presents with    Follow-up     6 months         History of Present Illness:   Patient comes for follow up  States he is doing well from cardiac stand point and denies chest pain / pressure, SOB, palpitations, lightheadedness, syncope, swelling feet, orthopnea, PND, claudication  CAD s/p CABG surgery at [facility] on 2017 by Dr Judah Kee  Post surgery he needed a left-sided thoracentesis for hemothorax  Other than that he had a good postop recovery  Patient completed cardiac rehabilitation as well  Currently on aspirin, beta-blocker, statin  States is doing well cardiac wise with no cardiac symptoms  Taking all medications regularly  HTN -  Has been hypertensive for many years  Taking medications regularly  Denies lightheadedness, headache, medication side effects  HLP -  Has had hyperlipidemia for many years  Taking statin regularly along with diet control  Denies myalgia    Patient is due for his lipid panel -  Script given today    Obesity -  Trying to lose weight with diet control      Past Medical History:  Past Medical History:   Diagnosis Date    Coronary artery disease     Diabetes mellitus ([address] )     Diastolic dysfunction     Diverticulitis     Hyperlipidemia     Hypertension     Obesity     Prediabetes     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         Past Surgical History:  Past Surgical History:   Procedure Laterality Date    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    BOWEL RESECTION      Secondary to diverticulitis    CORONARY ARTERY BYPASS GRAFT      HAND SURGERY      HERNIA REPAIR      MALIGNANT SKIN LESION EXCISION  1963    Abdominal wall surgical resection of melanoma    SKIN BIOPSY           Family History:  Family History   Problem Relation Age of Onset    Emphysema Maternal Grandfather     Coronary artery disease Mother     Hyperlipidemia Mother     Hypertension Mother     Diabetes type II Mother     Diabetes type II Father     Hypertension Sister          Social History:  Social History     Social History    Marital status: /Civil Union     Spouse name: N/A    Number of children: N/A    Years of education: N/A     Occupational History          Social History Main Topics    Smoking status: Never Smoker    Smokeless tobacco: Never Used    Alcohol use No    Drug use: No    Sexual activity: Yes     Other Topics Concern    None     Social History Narrative    None         Active Problems:  Patient Active Problem List   Diagnosis    Sepsis ([address] )    URI (upper respiratory infection)    Type 2 diabetes mellitus [facility]    Essential hypertension    Hypokalemia    RSV (respiratory syncytial virus infection)    Tracheobronchitis    Acute sinusitis    Vitamin D deficiency    Transaminitis    Coronary artery disease involving native heart without angina pectoris    Hx of CABG    Mixed hyperlipidemia    Obesity (BMI 30-39  9)    Diastolic dysfunction         The following portions of the patient's history were reviewed and updated as appropriate: past medical history, past surgical history, past family history,  past social history, current medications, allergies and problem list       Review of Systems:  Constitutional: Denies fever, chills, fatigue  Eyes: Denies eye redness, eye discharge, double vision  ENT: Denies hearing loss, tinnitus, sneezing, nasal discharge, sore throat   Respiratory: Denies cough, expectoration, hemoptysis, shortness of breath  Cardiovascular: Denies chest pain, palpitations, orthopnea, PND, lower extremity swelling  Gastrointestinal: Denies abdominal pain, nausea, vomiting, hematemesis, diarrhea, bloody stools  Genito-Urinary: Denies dysuria, incontinence  Musculoskeletal: Denies back pain, joint pain, muscle pain  Neurologic: Denies confusion, lightheadedness, syncope, headache, focal weakness, sensory changes, seizures  Endocrine: Denies polyuria, polydipsia, temperature intolerance  Allergy and Immunology: Denies hives, insect bite sensitivity  Hematological and Lymphatic: Denies bleeding problems, swollen glands   Psychological: Denies depression, suicidal ideation, anxiety, panic  Dermatological: Denies pruritus, rash, skin lesion changes      Vitals:  Vitals:    09/24/18 0930   BP: 122/88   Pulse: 80   SpO2: 96%       Body mass index is 31 95 kg/m²  Weight (last 2 days)     Date/Time   Weight    09/24/18 0930  92 5 (204)                Physical Examination:  General: Obese  Patient is not in acute distress  Awake, alert, oriented in time, place and person  Responding to commands  Head: Normocephalic  Atraumatic  Eyes: Both pupils normal sized, round and reactive to light  Nonicteric  ENT: Normal external ear canals  Nares normal, no drainage  Lips and oral mucosa normal  Neck: Supple  JVP not raised  Trachea central  No thyromegaly  Lungs: Bilateral bronchovascular breath sounds with no crackles or rhonchi  Chest wall: Sternal scar+  No tenderness  Cardiovascular: RRR  S1 and S2 normal  No murmur, rub or gallop  Gastrointestinal: Abdomen soft, nontender  No guarding or rigidity  Liver and spleen not palpable  Bowel sounds present  Neurologic: Patient is awake, alert, oriented in time, place and person  Responding to command  Moving all extremities  Integumentary:  No skin rash  Lymphatic: No cervical lymphadenopathy  Back: Symmetric   No CVA tenderness  Extremities: No clubbing, cyanosis or edema      Laboratory Results:  CBC with diff:   Lab Results   Component Value Date    WBC 6 2 09/21/2017    RBC 5 07 06/05/2017    RBC 5 29 12/02/2015    HGB 14 2 2017    HCT 42 4 2017    MCV 86 2017    MCH 28 7 2017    RDW 14 7 2017     2017       CMP:  Lab Results   Component Value Date    CREATININE 0 80 2017    BUN 16 2017     2017    K 4 4 2017    CL 98 2017    CO2 23 2017    GLUCOSE 119 (H) 2017    PROT 7 0 2017    ALKPHOS 80 2017    ALT 24 2017    AST 18 2017    BILIDIR 0 08 2017       Lab Results   Component Value Date    HGBA1C 6 4 (H) 2017    MG 2 1 2017    PHOS 4 2 2017       Lab Results   Component Value Date    TROPONINI <0 02 2017       Lipid Profile:   Lab Results   Component Value Date    CHOL 128 2017    CHOL 116 2016    CHOL 151 2016     Lab Results   Component Value Date    HDL 32 (L) 2017    HDL 34 (L) 2016    HDL 38 (L) 2016     Lab Results   Component Value Date    LDLCALC 53 2017    LDLCALC 60 2016    LDLCALC 81 2016     Lab Results   Component Value Date    TRIG 216 (H) 2017    TRIG 109 2016    TRIG 159 (H) 2016       Cardiac testing:   Results for orders placed during the hospital encounter of 17   Echo complete with contrast if indicated    Narrative [place] [address], [facility]  [phone]    Transthoracic Echocardiogram  2D, M-mode, Doppler, and Color Doppler    Study date:  08-May-2017    Patient: Saintclair Gay  MR number: [id number]  Account number: [de-identified]  : [id number]  Age: 63 years  Gender: Male  Status: Outpatient  Location: [facility]  Height: 67 in  Weight: 190 lb  BP: 126/ 100 mmHg    Indications: CAD      Diagnoses: I25 10 - Atherosclerotic heart disease of native coronary artery without angina pectoris    Sonographer:  Jarret Sanchez,, RCS  Interpreting Physician:  Tessie Ariza MD  Primary Physician:  Surjit Hernandez MD  Referring Physician: Vinicio Bolden MD  Group:  [facility]    LEFT VENTRICLE:  Systolic function was normal  Ejection fraction was estimated to be 60 %  There were no regional wall motion abnormalities  Features were consistent with a pseudonormal left ventricular filling pattern, with concomitant abnormal relaxation and increased filling pressure (grade 2 diastolic dysfunction)  RIGHT VENTRICLE:  The size was normal   Systolic function was normal     AORTIC VALVE:  There was trace regurgitation  TRICUSPID VALVE:  There was trace regurgitation  HISTORY: PRIOR HISTORY: Risk factors: hypertension, oral hypoglycemic-treated diabetes, medication-treated hypercholesterolemia, and a family history of coronary artery disease  PROCEDURE: The study was performed in the [address] [place] [address]  This was a routine study  The transthoracic approach was used  The study included complete 2D imaging, M-mode, complete spectral Doppler, and color Doppler  Image  quality was adequate  LEFT VENTRICLE: Size was normal  Systolic function was normal  Ejection fraction was estimated to be 60 %  There were no regional wall motion abnormalities  Wall thickness was normal  No evidence of apical thrombus  DOPPLER: Features were  consistent with a pseudonormal left ventricular filling pattern, with concomitant abnormal relaxation and increased filling pressure (grade 2 diastolic dysfunction)  RIGHT VENTRICLE: The size was normal  Systolic function was normal  Wall thickness was normal     LEFT ATRIUM: Size was normal     RIGHT ATRIUM: Size was normal     MITRAL VALVE: Valve structure was normal  There was normal leaflet separation  DOPPLER: The transmitral velocity was within the normal range  There was no evidence for stenosis  There was no significant regurgitation  AORTIC VALVE: The valve was trileaflet  Leaflets exhibited normal thickness and normal cuspal separation   DOPPLER: Transaortic velocity was within the normal range  There was no evidence for stenosis  There was trace regurgitation  TRICUSPID VALVE: The valve structure was normal  There was normal leaflet separation  DOPPLER: The transtricuspid velocity was within the normal range  There was no evidence for stenosis  There was trace regurgitation  PULMONIC VALVE: Leaflets exhibited normal thickness, no calcification, and normal cuspal separation  DOPPLER: The transpulmonic velocity was within the normal range  There was no significant regurgitation  PERICARDIUM: There was no pericardial effusion  The pericardium was normal in appearance  AORTA: The root exhibited normal size  SYSTEMIC VEINS: IVC: The inferior vena cava was not well visualized  SYSTEM MEASUREMENT TABLES    Apical four chamber  4 chamber Left Atrium Volume Index; Planimetry; End Systole; Apical four chamber;: 20 64 cm2  Left Ventricular End Diastolic Volume; Method of Disks, Single Plane; Apical four chamber;: 83 2 ml  Left Ventricular End Systolic Volume; Method of Disks, Single Plane; Apical four chamber;: 38 3 ml  Right Atrium Systolic Area; Planimetry; End Systole; Apical four chamber;: 19 42 cm2  Right Ventricular Internal Diastolic Dimension; End Diastole; Apical four chamber;: 34 2 mm  TAPSE: 19 mm    Unspecified Scan Mode  Aortic Root Diameter; End Systole;: 28 9 mm  Aortic valve Area; Continuity Equation by Velocity Time Integral; Systole;: 1 9 cm2  Gradient Pressure, Peak; Simplified Bernoulli; Antegrade Flow; Systole;: 12 9 mm[Hg]  Gradient pressure, average; Simplified Bernoulli; Antegrade Flow; Systole;: 7 5 mm[Hg]  HR: 66 /min  Left atrial diameter; End Diastole;: 37 9 mm  Cardiac Output; Systole;: 4 52 L/min  Cardiac Output; Teichholz; Systole;: 3 19 L/min  HR: 64 /min  Heart rate; Teichholz;: 66 /min  Interventricular Septum Diastolic Thickness; Teichholz; End Diastole;: 9 mm  Left Ventricle Internal End Diastolic Dimension;  Teichholz;: 47 5 mm  Left Ventricle Internal Systolic Dimension; Teichholz; End Systole;: 36 6 mm  Left Ventricle Mass; Mass AVCube with Teichholz; End Diastole;: 164 g  Left Ventricle Posterior Wall Diastolic Thickness; Teichholz; End Diastole;: 10 7 mm  Left Ventricular Ejection Fraction; Teichholz;: 46 %  Left Ventricular End Diastolic Volume; Teichholz;: 104 9 ml  Left Ventricular End Systolic Volume; Teichholz;: 56 6 ml  Left Ventricular Fractional Shortening;: 22 9 %  Stroke volume; Systole;: 68 5 ml  Stroke volume; Duana Crank;: 48 3 ml  Mitral Valve Area; Area by Pressure Half-Time; Systole;: 3 24 cm2  Mitral Valve E to A Ratio; Systole;: 1 34    [facility] Accredited Echocardi[facility] Laboratory    Prepared and electronically signed by    Janis Lane MD  Signed [id number] 10:53:52         Results for orders placed during the hospital encounter of 17   NM myocardial perfusion spect (stress and/or rest)    Narrative  [address] [place] [address]  [phone]    Rest/Stress Gated SPECT Myocardial Perfusion Imaging After Exercise    Patient: Faviola Ward  MR number: [id number]  Account number: [de-identified]  : 1955  Age: 63 years  Gender: Male  Status: Outpatient  Location: [facility]  Height: 66 in  Weight: 198 lb  BP: 120/ 78 mmHg    Allergies: NO KNOWN ALLERGIES    Diagnosis: I25 10 - Atherosclerotic heart disease of native coronary artery without angina pectoris, R06 02 - Shortness of breath    Primary Physician:  Dora Morris MD  Interpreting Physician:  Kenny Vazquez MD  Referring Physician:  Janis Lane MD  Technician:  Yoseph Ortiz BS, BA, AART(N)  Group:  [facility]  Other:  Holli Napier MS, CCT    INDICATIONS: CAD, SOB    HISTORY: The patient is a 63year old  male  Chest pain status: no chest pain  Other symptoms: dyspnea   Coronary artery disease risk factors: dyslipidemia, hypertension, family history of premature coronary artery disease, and  diabetes mellitus  REST ECG: Normal sinus rhythm  Nonspecific T wave abnormalities were present  PROCEDURE: The study was performed at [address] [place] [address]  Treadmill exercise testing was performed, using the Mitchel protocol  Gated SPECT myocardial perfusion imaging was performed after stress and at rest  Systolic blood  pressure was 120 mmHg, at the start of the study  Diastolic blood pressure was 78 mmHg, at the start of the study  The heart rate was 75 bpm, at the start of the study  MITCHEL PROTOCOL:  HR bpm SBP mmHg DBP mmHg Symptoms Rhythm/conduct  Baseline 75 120 78 none rare PVC's  Stage 1 109 128 60 -- same as above  Stage 2 123 148 78 -- same as above  Stage 3 144 -- -- -- rare PVC's  Immediate 144 160 78 -- no ventricular ectopy  Recovery 1 127 -- -- -- no ventricular ectopy  Recovery 2 110 -- -- -- no ventricular ectopy  Recovery 3 100 -- -- -- rare PVC's  RIPATBLV 4 05 600 09 -- no ventricular ectopy  No medications or fluids given  STRESS SUMMARY: Duration of exercise was 8 min  The patient exercised to protocol stage 3  Maximal work rate was 9 1 METs  Functional capacity was normal  Maximal heart rate during stress was 144 bpm ( 89 % of maximal predicted heart  rate)  The heart rate response to stress was normal  There was normal resting blood pressure with an appropriate response to stress  The rate-pressure product for the peak heart rate and blood pressure was 94697  There was no chest pain  during stress  The stress test was terminated due to achievement of target heart rate and mild fatigue  The stress ECG was negative for ischemia  Arrhythmia during stress: isolated premature ventricular beats      ISOTOPE ADMINISTRATION:  Resting isotope administration Stress isotope administration  Agent Tetrofosmin Tetrofosmin  Dose 9 76 mCi 31 1 mCi  Date 05/02/2017 05/02/2017    MYOCARDIAL PERFUSION IMAGING:  The image quality was good  Left ventricular size was normal  The TID ratio was 1 14  GATED SPECT:  The calculated left ventricular ejection fraction was 51 %  There was no left ventricular regional abnormality  SUMMARY:  -  Stress results: Duration of exercise was 8 min  Target heart rate was achieved  There was no chest pain during stress  -  ECG conclusions: The stress ECG was negative for ischemia   -  Gated SPECT: The calculated left ventricular ejection fraction was 51 %  There was no left ventricular regional abnormality  IMPRESSIONS: Small sized, mild intensity reversible inferior defect which could represent ischemia  Left ventricular systolic function was low normal  LVEF 51%  Prepared and signed by    Mahendra Rodrigez MD  Signed 05/02/2017 13:12:41         Medications:    Current Outpatient Prescriptions:     albuterol (PROAIR HFA) 90 mcg/act inhaler, Inhale 2 puffs every 4 (four) hours as needed, Disp: , Rfl:     aspirin 81 MG tablet, Take 81 mg by mouth daily, Disp: , Rfl:     atorvastatin (LIPITOR) 10 mg tablet, TAKE 1 TABLET AT BEDTIME , Disp: 30 tablet, Rfl: 11    glimepiride (AMARYL) 1 mg tablet, TAKE 1 TABLET BY MOUTH TWICE A DAY FOR SUGAR >200, Disp: 30 tablet, Rfl: 3    hydrochlorothiazide (HYDRODIURIL) 25 mg tablet, Take 25 mg by mouth daily, Disp: , Rfl:     metFORMIN (FORTAMET) 1000 MG (OSM) 24 hr tablet, Take 1,000 mg by mouth daily with breakfast, Disp: , Rfl:     metFORMIN (GLUCOPHAGE-XR) 500 mg 24 hr tablet, START WITH 1 TAB DAILY W/ DINNER, INCREASE BY 1 TABLET EACH WEEK AS TOLERATED UNTIL TAKING 4 TABS, Disp: 120 tablet, Rfl: 5    metoprolol succinate (TOPROL-XL) 100 mg 24 hr tablet, TAKE 1 TABLET DAILY  , Disp: 30 tablet, Rfl: 5    multivitamin (THERAGRAN) TABS, Take 1 tablet by mouth daily, Disp: , Rfl:     valsartan (DIOVAN) 80 mg tablet, TAKE 3 TABLETS BY MOUTH EVERY DAY, Disp: 90 tablet, Rfl: 5    cholecalciferol 2000 UNITS TABS, Take 1 tablet by mouth daily, Disp: 30 tablet, Rfl: 0    fluticasone-vilanterol (BREO ELLIPTA) 100-25 mcg/inh inhaler, Inhale daily, Disp: , Rfl:       Allergies:  No Known Allergies      Assessment and Plan:  1  Coronary artery disease involving native heart without angina pectoris, unspecified vessel or lesion type S/P CABG   doing well  Continue aspirin, statin, beta-blocker     2  Diastolic dysfunction   serial follow-up with echocardiograms  Counseled to try to lose weight and tight BP control     3  Essential hypertension   BP stable  Continue current medications     4  Mixed hyperlipidemia   continue statin  Last LDL was at goal   He is due for repeat blood work - script given     5  Obesity (BMI 30-39 9)   strongly counseled to start exercising and try to lose weight    Recommend aggressive risk factor modification and therapeutic lifestyle changes  Low-salt, low-calorie, low-fat, low-cholesterol diet with regular exercise and to optimize weight  I will defer the ordering and monitoring of necessity lab studies to you, but I am available and happy to review and manage any of the data at your request in the future  Discussed concepts of atherosclerosis, including signs and symptoms of cardiac disease  Previous studies were reviewed  Safety measures were reviewed  Questions were entertained and answered  Patient was advised to report any problems requiring medical attention  Follow-up with PCP and appropriate specialist and lab work as discussed  Return for follow up visit as scheduled or earlier, if needed  Thank you for allowing me to participate in the care and evaluation of your patient  Should you have any questions, please feel free to contact me        Fabiola Roe MD  9/24/2018,9:58 AM

## 2018-10-19 DIAGNOSIS — I10 ESSENTIAL HYPERTENSION: ICD-10-CM

## 2018-10-19 RX ORDER — VALSARTAN 80 MG/1
TABLET ORAL
Qty: 90 TABLET | Refills: 5 | Status: CANCELLED | OUTPATIENT
Start: 2018-10-19

## 2018-10-22 RX ORDER — IRBESARTAN 150 MG/1
150 TABLET ORAL DAILY
Qty: 90 TABLET | Refills: 3 | Status: SHIPPED | OUTPATIENT
Start: 2018-10-22 | End: 2019-11-12 | Stop reason: SDUPTHER

## 2018-11-21 DIAGNOSIS — E78.5 HYPERLIPIDEMIA, UNSPECIFIED HYPERLIPIDEMIA TYPE: Primary | ICD-10-CM

## 2018-11-21 DIAGNOSIS — E11.9 TYPE 2 DIABETES MELLITUS WITHOUT COMPLICATION, WITHOUT LONG-TERM CURRENT USE OF INSULIN (HCC): ICD-10-CM

## 2018-11-23 RX ORDER — METFORMIN HYDROCHLORIDE 500 MG/1
TABLET, EXTENDED RELEASE ORAL
Qty: 120 TABLET | Refills: 5 | Status: SHIPPED | OUTPATIENT
Start: 2018-11-23 | End: 2019-04-07 | Stop reason: SDUPTHER

## 2018-11-26 ENCOUNTER — TELEPHONE (OUTPATIENT)
Dept: INTERNAL MEDICINE CLINIC | Facility: CLINIC | Age: 63
End: 2018-11-26

## 2018-11-26 DIAGNOSIS — Z12.11 COLON CANCER SCREENING: Primary | ICD-10-CM

## 2018-11-26 NOTE — TELEPHONE ENCOUNTER
Patient would like a referral to go see a gastro dr  He needs a colonoscopy  Please call pt when new order is in [phone]

## 2018-11-27 ENCOUNTER — TELEPHONE (OUTPATIENT)
Dept: GASTROENTEROLOGY | Facility: CLINIC | Age: 63
End: 2018-11-27

## 2018-11-28 PROBLEM — Z12.11 SPECIAL SCREENING FOR MALIGNANT NEOPLASMS, COLON: Status: ACTIVE | Noted: 2018-11-28

## 2018-11-28 NOTE — TELEPHONE ENCOUNTER
11/28/18  Screened by: Sherman Umanzor    Referring Provider     Pre- Screening: There is no height or weight on file to calculate BMI  Has patient been referred for a routine screening Colonoscopy? yes  Is the patient between 39-70 years old? yes    SCHEDULING STAFF   If the patient is between 45yrs-49yrs, please advise patient to confirm benefits/coverage with their insurance company for a routine screening colonoscopy, some insurance carriers will only cover at [address] or older   If the patient is over 66years old, please schedule an office visit  Do you have any of the following symptoms? NONE    Have you had a coronary or vascular stent within the last year? no    Have you had a heart attack or stroke in the last 6 months? no    Have you had intestinal surgery in the last 3 months? no    Do you have problems with: NONE    Do you use: NONE    Have you been hospitalized in the last Month? no    Have you been diagnosed with a bleeding disorder or anemia? no    Have you had chest pain (angina) or breathing problems  (COPD) in the last 3 months? no    Do you have any difficulty walking up a flight of stairs? no    Have you had Kidney failure or insufficiency? no    Have you had heart valve surgery? TRIPLE BYPASS, 6/2017    Are you confined to a wheelchair? no    Do you take Other blood thinning medications no    Have you been diagnosed with Diabetes or are you taking any   Diabetic medications? yes, PILL    : If patient answers NO to medical questions, then schedule procedure  If patient answers YES to medical questions, then schedule office appointment  Previous Colonoscopy yes  Date and Facility of last colonoscopy?      Comments: 12/11/18

## 2018-11-29 ENCOUNTER — LAB (OUTPATIENT)
Dept: LAB | Facility: HOSPITAL | Age: 63
End: 2018-11-29
Attending: INTERNAL MEDICINE
Payer: COMMERCIAL

## 2018-11-29 ENCOUNTER — TELEPHONE (OUTPATIENT)
Dept: INTERNAL MEDICINE CLINIC | Facility: CLINIC | Age: 63
End: 2018-11-29

## 2018-11-29 DIAGNOSIS — I25.10 CORONARY ARTERY DISEASE INVOLVING NATIVE HEART WITHOUT ANGINA PECTORIS, UNSPECIFIED VESSEL OR LESION TYPE: ICD-10-CM

## 2018-11-29 DIAGNOSIS — E78.5 HYPERLIPIDEMIA, UNSPECIFIED HYPERLIPIDEMIA TYPE: ICD-10-CM

## 2018-11-29 DIAGNOSIS — E11.9 TYPE 2 DIABETES MELLITUS WITHOUT COMPLICATION, WITHOUT LONG-TERM CURRENT USE OF INSULIN (HCC): ICD-10-CM

## 2018-11-29 LAB
ALBUMIN SERPL BCP-MCNC: 3.7 G/DL (ref 3.5–5)
ALP SERPL-CCNC: 67 U/L (ref 46–116)
ALT SERPL W P-5'-P-CCNC: 40 U/L (ref 12–78)
ANION GAP SERPL CALCULATED.3IONS-SCNC: 6 MMOL/L (ref 4–13)
AST SERPL W P-5'-P-CCNC: 25 U/L (ref 5–45)
BASOPHILS # BLD AUTO: 0.05 THOUSANDS/ΜL (ref 0–0.1)
BASOPHILS NFR BLD AUTO: 1 % (ref 0–1)
BILIRUB SERPL-MCNC: 0.4 MG/DL (ref 0.2–1)
BUN SERPL-MCNC: 19 MG/DL (ref 5–25)
CALCIUM SERPL-MCNC: 9.4 MG/DL (ref 8.3–10.1)
CHLORIDE SERPL-SCNC: 100 MMOL/L (ref 100–108)
CHOLEST SERPL-MCNC: 147 MG/DL (ref 50–200)
CO2 SERPL-SCNC: 31 MMOL/L (ref 21–32)
CREAT SERPL-MCNC: 1 MG/DL (ref 0.6–1.3)
CREAT UR-MCNC: 85.3 MG/DL
EOSINOPHIL # BLD AUTO: 0.13 THOUSAND/ΜL (ref 0–0.61)
EOSINOPHIL NFR BLD AUTO: 2 % (ref 0–6)
ERYTHROCYTE [DISTWIDTH] IN BLOOD BY AUTOMATED COUNT: 12.4 % (ref 11.6–15.1)
EST. AVERAGE GLUCOSE BLD GHB EST-MCNC: 183 MG/DL
GFR SERPL CREATININE-BSD FRML MDRD: 81 ML/MIN/1.73SQ M
GLUCOSE P FAST SERPL-MCNC: 161 MG/DL (ref 65–99)
HBA1C MFR BLD: 8 % (ref 4.2–6.3)
HCT VFR BLD AUTO: 45.7 % (ref 36.5–49.3)
HDLC SERPL-MCNC: 33 MG/DL (ref 40–60)
HGB BLD-MCNC: 15.9 G/DL (ref 12–17)
IMM GRANULOCYTES # BLD AUTO: 0.02 THOUSAND/UL (ref 0–0.2)
IMM GRANULOCYTES NFR BLD AUTO: 0 % (ref 0–2)
LDLC SERPL CALC-MCNC: 71 MG/DL (ref 0–100)
LYMPHOCYTES # BLD AUTO: 2.22 THOUSANDS/ΜL (ref 0.6–4.47)
LYMPHOCYTES NFR BLD AUTO: 35 % (ref 14–44)
MCH RBC QN AUTO: 29.5 PG (ref 26.8–34.3)
MCHC RBC AUTO-ENTMCNC: 34.8 G/DL (ref 31.4–37.4)
MCV RBC AUTO: 85 FL (ref 82–98)
MICROALBUMIN UR-MCNC: 24.3 MG/L (ref 0–20)
MICROALBUMIN/CREAT 24H UR: 28 MG/G CREATININE (ref 0–30)
MONOCYTES # BLD AUTO: 0.46 THOUSAND/ΜL (ref 0.17–1.22)
MONOCYTES NFR BLD AUTO: 7 % (ref 4–12)
NEUTROPHILS # BLD AUTO: 3.39 THOUSANDS/ΜL (ref 1.85–7.62)
NEUTS SEG NFR BLD AUTO: 55 % (ref 43–75)
NRBC BLD AUTO-RTO: 0 /100 WBCS
PLATELET # BLD AUTO: 202 THOUSANDS/UL (ref 149–390)
PMV BLD AUTO: 10 FL (ref 8.9–12.7)
POTASSIUM SERPL-SCNC: 4.4 MMOL/L (ref 3.5–5.3)
PROT SERPL-MCNC: 7.7 G/DL (ref 6.4–8.2)
RBC # BLD AUTO: 5.39 MILLION/UL (ref 3.88–5.62)
SODIUM SERPL-SCNC: 137 MMOL/L (ref 136–145)
TRIGL SERPL-MCNC: 213 MG/DL
TSH SERPL DL<=0.05 MIU/L-ACNC: 2.1 UIU/ML (ref 0.36–3.74)
WBC # BLD AUTO: 6.27 THOUSAND/UL (ref 4.31–10.16)

## 2018-11-29 PROCEDURE — 82570 ASSAY OF URINE CREATININE: CPT | Performed by: NURSE PRACTITIONER

## 2018-11-29 PROCEDURE — 80053 COMPREHEN METABOLIC PANEL: CPT

## 2018-11-29 PROCEDURE — 85025 COMPLETE CBC W/AUTO DIFF WBC: CPT

## 2018-11-29 PROCEDURE — 82043 UR ALBUMIN QUANTITATIVE: CPT | Performed by: NURSE PRACTITIONER

## 2018-11-29 PROCEDURE — 83036 HEMOGLOBIN GLYCOSYLATED A1C: CPT

## 2018-11-29 PROCEDURE — 80061 LIPID PANEL: CPT

## 2018-11-29 PROCEDURE — 84443 ASSAY THYROID STIM HORMONE: CPT

## 2018-11-29 PROCEDURE — 36415 COLL VENOUS BLD VENIPUNCTURE: CPT

## 2018-11-29 NOTE — TELEPHONE ENCOUNTER
----- Message from Bertha Rodriguez sent at 11/29/2018  3:19 PM EST -----  Needs non urgent follow up

## 2018-11-29 NOTE — TELEPHONE ENCOUNTER
----- Message from Ty Vogel, [address] sent at 11/29/2018  3:19 PM EST -----  Needs non urgent follow up

## 2018-12-02 DIAGNOSIS — I10 ESSENTIAL HYPERTENSION: Primary | ICD-10-CM

## 2018-12-03 RX ORDER — HYDROCHLOROTHIAZIDE 25 MG/1
TABLET ORAL
Qty: 30 TABLET | Refills: 11 | Status: SHIPPED | OUTPATIENT
Start: 2018-12-03 | End: 2019-09-27 | Stop reason: SDUPTHER

## 2018-12-10 ENCOUNTER — ANESTHESIA EVENT (OUTPATIENT)
Dept: PERIOP | Facility: HOSPITAL | Age: 63
End: 2018-12-10
Payer: COMMERCIAL

## 2018-12-11 ENCOUNTER — ANESTHESIA (OUTPATIENT)
Dept: PERIOP | Facility: HOSPITAL | Age: 63
End: 2018-12-11
Payer: COMMERCIAL

## 2018-12-11 ENCOUNTER — HOSPITAL ENCOUNTER (OUTPATIENT)
Facility: HOSPITAL | Age: 63
Setting detail: OUTPATIENT SURGERY
Discharge: HOME | End: 2018-12-11
Attending: INTERNAL MEDICINE | Admitting: INTERNAL MEDICINE
Payer: COMMERCIAL

## 2018-12-11 VITALS
WEIGHT: 197.75 LBS | OXYGEN SATURATION: 96 % | TEMPERATURE: 97.9 F | SYSTOLIC BLOOD PRESSURE: 119 MMHG | HEART RATE: 69 BPM | RESPIRATION RATE: 18 BRPM | HEIGHT: 67 IN | DIASTOLIC BLOOD PRESSURE: 86 MMHG | BODY MASS INDEX: 31.04 KG/M2

## 2018-12-11 DIAGNOSIS — Z12.11 SPECIAL SCREENING FOR MALIGNANT NEOPLASMS, COLON: ICD-10-CM

## 2018-12-11 LAB — GLUCOSE SERPL-MCNC: 163 MG/DL (ref 65–140)

## 2018-12-11 PROCEDURE — 88305 TISSUE EXAM BY PATHOLOGIST: CPT | Performed by: PATHOLOGY

## 2018-12-11 PROCEDURE — 45384 COLONOSCOPY W/LESION REMOVAL: CPT | Performed by: INTERNAL MEDICINE

## 2018-12-11 PROCEDURE — 82948 REAGENT STRIP/BLOOD GLUCOSE: CPT

## 2018-12-11 RX ORDER — CHLORAL HYDRATE 500 MG
1000 CAPSULE ORAL DAILY
COMMUNITY
End: 2018-12-18

## 2018-12-11 RX ORDER — SODIUM CHLORIDE, SODIUM LACTATE, POTASSIUM CHLORIDE, CALCIUM CHLORIDE 600; 310; 30; 20 MG/100ML; MG/100ML; MG/100ML; MG/100ML
INJECTION, SOLUTION INTRAVENOUS CONTINUOUS PRN
Status: DISCONTINUED | OUTPATIENT
Start: 2018-12-11 | End: 2018-12-11 | Stop reason: SURG

## 2018-12-11 RX ORDER — SODIUM CHLORIDE, SODIUM LACTATE, POTASSIUM CHLORIDE, CALCIUM CHLORIDE 600; 310; 30; 20 MG/100ML; MG/100ML; MG/100ML; MG/100ML
125 INJECTION, SOLUTION INTRAVENOUS CONTINUOUS
Status: DISCONTINUED | OUTPATIENT
Start: 2018-12-11 | End: 2018-12-11 | Stop reason: HOSPADM

## 2018-12-11 RX ORDER — PROPOFOL 10 MG/ML
INJECTION, EMULSION INTRAVENOUS AS NEEDED
Status: DISCONTINUED | OUTPATIENT
Start: 2018-12-11 | End: 2018-12-11 | Stop reason: SURG

## 2018-12-11 RX ADMIN — PROPOFOL 150 MG: 10 INJECTION, EMULSION INTRAVENOUS at 08:52

## 2018-12-11 RX ADMIN — SODIUM CHLORIDE, SODIUM LACTATE, POTASSIUM CHLORIDE, AND CALCIUM CHLORIDE: .6; .31; .03; .02 INJECTION, SOLUTION INTRAVENOUS at 08:42

## 2018-12-11 RX ADMIN — PROPOFOL 50 MG: 10 INJECTION, EMULSION INTRAVENOUS at 08:58

## 2018-12-11 NOTE — DISCHARGE INSTRUCTIONS
Colonoscopy   WHAT YOU NEED TO KNOW:   A colonoscopy is a procedure to examine the inside of your colon (intestine) with a scope  Polyps or tissue growths may have been removed during your colonoscopy  It is normal to feel bloated and to have some abdominal discomfort  You should be passing gas  If you have hemorrhoids or you had polyps removed, you may have a small amount of bleeding  DISCHARGE INSTRUCTIONS:   Seek care immediately if:   · You have a large amount of bright red blood in your bowel movements  · Your abdomen is hard and firm and you have severe pain  · You have sudden trouble breathing  Contact your healthcare provider if:   · You develop a rash or hives  · You have a fever within 24 hours of your procedure       · You have not had a bowel movement for 3 days after your procedure  · You have questions or concerns about your condition or care  Activity:   · Do not lift, strain, or run  for 3 days after your procedure  · Rest after your procedure  You have been given medicine to relax you  Do not  drive or make important decisions until the day after your procedure  Return to your normal activity as directed  · Relieve gas and discomfort from bloating  by lying on your right side with a heating pad on your abdomen  You may need to take short walks to help the gas move out  Eat small meals until bloating is relieved  If you had polyps removed: For 7 days after your procedure:  · Do not  take aspirin  · Do not  go on long car rides  Follow up with your healthcare provider as directed:  Write down your questions so you remember to ask them during your visits  © 2017 [address] [employer] is for End User's use only and may not be sold, redistributed or otherwise used for commercial purposes  All illustrations and images included in CareNotes® are the copyrighted property of [employer]  or Wilfrid Donahue    The above information is an  only  It is not intended as medical advice for individual conditions or treatments  Talk to your doctor, nurse or pharmacist before following any medical regimen to see if it is safe and effective for you

## 2018-12-11 NOTE — OP NOTE
Postoperative Note  PATIENT NAME: Savana Hawthorne  : 1955  MRN: [id number]  [facility] 01    Surgery Date: 2018    POST-OP DIAGNOSIS: See the impression below    SEDATION: Monitored anesthesia care, check anesthesia records    PHYSICAL EXAM:  Vitals:    18 0758   BP: (!) 156/103   Pulse: 89   Resp: 16   Temp: 97 9 °F (36 6 °C)   SpO2: 96%     Body mass index is 30 97 kg/m²  General: NAD  Heart: S1 & S2 normal, RRR  Lungs: CTA, No rales or rhonchi  Abdomen: Soft, nontender, nondistended, good bowel sounds    CONSENT:  Informed consent was obtained for the procedure, including sedation after explaining the risks and benefits of the procedure  Risks including but not limited to bleeding, perforation, infection, aspiration were discussed in detail  Also explained about less than 100%$ sensitivity with the exam and other alternatives  DESCRIPTION:   Procedure:  Fiberoptic colonoscopy with polyp removal by hot biopsy forceps    Indications:  71-year-old male with average risk screening  Last colonoscopy 11 years ago    ASA 2    Estimated blood loss:  None    Premedicated with mac anesthesia    Patient is identified by me  He is examined prior to the procedure and found to be in stable condition  He is attached to the cardiac monitor and pulse oximeter and placed in left lateral position  After adequate intravenous sedation the Olympus video colonoscope was inserted and passed easily to the to the cecum  The ileocecal valve and the appendiceal orifice are identified and the scope slowly withdrawn with excellent circumferential visualization of the mucosa  Preparation is excellent  There are numerous small diverticuli remaining in the left colon and in the ascending colon  None have impacted fecaliths and none her inflamed or bleeding  A single diminutive polyp was seen at 40 cm from the verge  This was removed with hot biopsy technique with excellent dean    The polyp was retrieved and sent to pathology  Normal anastomosis is seen at 15 cm from the verge  Retroversion is normal   Tolerated the procedure well and was stable throughout  My impression:  1  Diminutive polyp at 40 cm, status post hot biopsy polypectomy  2  Minor ascending and left colon diverticulosis  RECOMMENDATIONS:  Follow up biopsy results in 1 week  Follow-up colonoscopy 5 years  COMPLICATIONS:  None; patient tolerated the procedure well  DISPOSITION: PACU  CONDITION: Stable    Kimmie Mahajan MD  12/11/2018,9:00 AM        Portions of the record may have been created with voice recognition software   Occasional wrong word or "sound a like" substitutions may have occurred due to the inherent limitations of voice recognition software   Read the chart carefully and recognize, using context, where substitutions have occurred

## 2018-12-11 NOTE — ANESTHESIA PREPROCEDURE EVALUATION
Review of Systems/Medical History  Patient summary reviewed        Cardiovascular  EKG reviewed, Hyperlipidemia, Hypertension , CAD , History of CABG,   Comment: LEFT VENTRICLE:  Systolic function was normal  Ejection fraction was estimated to be 60 %  There were no regional wall motion abnormalities  Features were consistent with a pseudonormal left ventricular filling pattern, with concomitant abnormal relaxation and increased filling pressure (grade 2 diastolic dysfunction)      RIGHT VENTRICLE:  The size was normal   Systolic function was normal      AORTIC VALVE:  There was trace regurgitation      TRICUSPID VALVE:  There was trace regurgitation  ,  Pulmonary  COPD ,   Comment: A restrictive ventilatory defect of extra-parenchymal type with normal diffusing capacity is present  Obesity, chest wall disorders, neuro-muscular disorders and pleural disorders are to be considered     GI/Hepatic  Negative GI/hepatic ROS          Negative  ROS        Endo/Other  Diabetes type 2 ,      GYN  Negative gynecology ROS          Hematology  Negative hematology ROS      Musculoskeletal  Negative musculoskeletal ROS        Neurology  Negative neurology ROS      Psychology   Negative psychology ROS              Physical Exam    Airway    Mallampati score: III  TM Distance: >3 FB  Neck ROM: full     Dental       Cardiovascular  Cardiovascular exam normal    Pulmonary  Pulmonary exam normal     Other Findings        Anesthesia Plan  ASA Score- 3     Anesthesia Type- IV sedation with anesthesia with ASA Monitors  Additional Monitors:   Airway Plan:         Plan Factors-    Induction- intravenous  Postoperative Plan-     Informed Consent- Anesthetic plan and risks discussed with patient  I personally reviewed this patient with the CRNA  Discussed and agreed on the Anesthesia Plan with the CRNA  Shahriar Isidro

## 2018-12-11 NOTE — ANESTHESIA POSTPROCEDURE EVALUATION
Post-Op Assessment Note      CV Status:  Stable    Mental Status:  Somnolent    Hydration Status:  Stable    PONV Controlled:  Controlled    Airway Patency:  Patent    Post Op Vitals Reviewed: Yes          Staff: CRNA           BP  117/78   Temp   98 6   Pulse  88   Resp   20   SpO2   97

## 2018-12-11 NOTE — DISCHARGE INSTR - AVS FIRST PAGE
Postoperative Note  PATIENT NAME: Simona Gannon  : 1955  MRN: [id number]  [facility]    Surgery Date: 2018    POST-OP DIAGNOSIS: See the impression below    SEDATION: Monitored anesthesia care, check anesthesia records    PHYSICAL EXAM:  Vitals:    18 0758   BP: (!) 156/103   Pulse: 89   Resp: 16   Temp: 97 9 °F (36 6 °C)   SpO2: 96%     Body mass index is 30 97 kg/m²  General: NAD  Heart: S1 & S2 normal, RRR  Lungs: CTA, No rales or rhonchi  Abdomen: Soft, nontender, nondistended, good bowel sounds    CONSENT:  Informed consent was obtained for the procedure, including sedation after explaining the risks and benefits of the procedure  Risks including but not limited to bleeding, perforation, infection, aspiration were discussed in detail  Also explained about less than 100%$ sensitivity with the exam and other alternatives  DESCRIPTION:   Procedure:  Fiberoptic colonoscopy with polyp removal by hot biopsy forceps    Indications:  74-year-old male with average risk screening  Last colonoscopy 11 years ago    ASA 2    Estimated blood loss:  None    Premedicated with mac anesthesia    Patient is identified by me  He is examined prior to the procedure and found to be in stable condition  He is attached to the cardiac monitor and pulse oximeter and placed in left lateral position  After adequate intravenous sedation the Olympus video colonoscope was inserted and passed easily to the to the cecum  The ileocecal valve and the appendiceal orifice are identified and the scope slowly withdrawn with excellent circumferential visualization of the mucosa  Preparation is excellent  There are numerous small diverticuli remaining in the left colon and in the ascending colon  None have impacted fecaliths and none her inflamed or bleeding  A single diminutive polyp was seen at 40 cm from the verge  This was removed with hot biopsy technique with excellent dean    The polyp was retrieved and sent to pathology  Normal anastomosis is seen at 15 cm from the verge  Retroversion is normal   Tolerated the procedure well and was stable throughout  My impression:  1  Diminutive polyp at 40 cm, status post hot biopsy polypectomy  2  Minor ascending and left colon diverticulosis  RECOMMENDATIONS:  Follow up biopsy results in 1 week  Follow-up colonoscopy 5 years  COMPLICATIONS:  None; patient tolerated the procedure well  DISPOSITION: PACU  CONDITION: Stable    Saige Adam MD  12/11/2018,9:00 AM        Portions of the record may have been created with voice recognition software   Occasional wrong word or "sound a like" substitutions may have occurred due to the inherent limitations of voice recognition software   Read the chart carefully and recognize, using context, where substitutions have occurred

## 2018-12-18 ENCOUNTER — OFFICE VISIT (OUTPATIENT)
Dept: INTERNAL MEDICINE CLINIC | Facility: CLINIC | Age: 63
End: 2018-12-18
Payer: COMMERCIAL

## 2018-12-18 VITALS
WEIGHT: 204 LBS | BODY MASS INDEX: 32.02 KG/M2 | HEIGHT: 67 IN | SYSTOLIC BLOOD PRESSURE: 142 MMHG | RESPIRATION RATE: 12 BRPM | HEART RATE: 64 BPM | DIASTOLIC BLOOD PRESSURE: 84 MMHG

## 2018-12-18 DIAGNOSIS — I25.10 CORONARY ARTERY DISEASE INVOLVING NATIVE HEART WITHOUT ANGINA PECTORIS, UNSPECIFIED VESSEL OR LESION TYPE: ICD-10-CM

## 2018-12-18 DIAGNOSIS — N52.9 ERECTILE DYSFUNCTION, UNSPECIFIED ERECTILE DYSFUNCTION TYPE: ICD-10-CM

## 2018-12-18 DIAGNOSIS — M62.838 TRAPEZIUS MUSCLE SPASM: ICD-10-CM

## 2018-12-18 DIAGNOSIS — Z12.5 SCREENING FOR PROSTATE CANCER: ICD-10-CM

## 2018-12-18 DIAGNOSIS — E11.9 TYPE 2 DIABETES MELLITUS WITHOUT COMPLICATION, WITHOUT LONG-TERM CURRENT USE OF INSULIN (HCC): Primary | Chronic | ICD-10-CM

## 2018-12-18 DIAGNOSIS — I10 ESSENTIAL HYPERTENSION: Chronic | ICD-10-CM

## 2018-12-18 DIAGNOSIS — E66.9 OBESITY (BMI 30-39.9): ICD-10-CM

## 2018-12-18 DIAGNOSIS — E78.2 MIXED HYPERLIPIDEMIA: ICD-10-CM

## 2018-12-18 PROBLEM — J01.90 ACUTE SINUSITIS: Status: RESOLVED | Noted: 2017-01-19 | Resolved: 2018-12-18

## 2018-12-18 PROBLEM — J06.9 URI (UPPER RESPIRATORY INFECTION): Status: RESOLVED | Noted: 2017-01-18 | Resolved: 2018-12-18

## 2018-12-18 PROBLEM — A41.9 SEPSIS (HCC): Status: RESOLVED | Noted: 2017-01-18 | Resolved: 2018-12-18

## 2018-12-18 PROBLEM — J40 TRACHEOBRONCHITIS: Status: RESOLVED | Noted: 2017-01-19 | Resolved: 2018-12-18

## 2018-12-18 PROBLEM — B33.8 RSV (RESPIRATORY SYNCYTIAL VIRUS INFECTION): Status: RESOLVED | Noted: 2017-01-19 | Resolved: 2018-12-18

## 2018-12-18 PROBLEM — E87.6 HYPOKALEMIA: Status: RESOLVED | Noted: 2017-01-18 | Resolved: 2018-12-18

## 2018-12-18 PROCEDURE — 1036F TOBACCO NON-USER: CPT | Performed by: INTERNAL MEDICINE

## 2018-12-18 PROCEDURE — 3008F BODY MASS INDEX DOCD: CPT | Performed by: INTERNAL MEDICINE

## 2018-12-18 PROCEDURE — 99215 OFFICE O/P EST HI 40 MIN: CPT | Performed by: INTERNAL MEDICINE

## 2018-12-18 RX ORDER — ATORVASTATIN CALCIUM 20 MG/1
20 TABLET, FILM COATED ORAL
Qty: 90 TABLET | Refills: 3 | Status: SHIPPED | OUTPATIENT
Start: 2018-12-18 | End: 2020-01-13

## 2018-12-18 NOTE — PATIENT INSTRUCTIONS
Lab data reviewed in detail and compared prior    Type 2 diabetes mellitus is under poor control with A1c up to 8 0 likely attributable to recent decrease in metformin, now back up to 2000 mg per day, continue with same    Patient encouraged to stay active with at least 30 min of aerobic exercise daily, weight loss as able, watch the carbs in the diet, consider adding Victoza or Jardiance at follow-up if A1c is not approaching 7    Hypertension stable on present regimen    Hyperlipidemia-LDL 71, atorvastatin increase to 20 mg daily    Coronary artery disease is stable without angina, continue to modify risk factors as above    Spasm of left trapezius-refer to physical therapy, consider imaging of cervical spine if symptoms persist    Screening for prostate cancer as well as ED in addition to symptoms concerning for early BPH-we discussed pathophysiology as well as pros and cons of screening PSA, will check PSA prior to follow-up    Routine follow-up after labs in 4 months, sooner as needed

## 2018-12-18 NOTE — PROGRESS NOTES
Assessment/Plan:    Patient Instructions   Lab data reviewed in detail and compared prior    Type 2 diabetes mellitus is under poor control with A1c up to 8 0 likely attributable to recent decrease in metformin, now back up to 2000 mg per day, continue with same  Patient encouraged to stay active with at least 30 min of aerobic exercise daily, weight loss as able, watch the carbs in the diet, consider adding Victoza or Jardiance at follow-up if A1c is not approaching 7    Hypertension stable on present regimen    Hyperlipidemia-LDL 71, atorvastatin increase to 20 mg daily    Coronary artery disease is stable without angina, continue to modify risk factors as above    Spasm of left trapezius-refer to physical therapy, consider imaging of cervical spine if symptoms persist    Screening for prostate cancer as well as ED in addition to symptoms concerning for early BPH-we discussed pathophysiology as well as pros and cons of screening PSA, will check PSA prior to follow-up    Routine follow-up after labs in 4 months, sooner as needed         Diagnoses and all orders for this visit:    Type 2 diabetes mellitus without complication, without long-term current use of insulin (HCC)  -     Hemoglobin A1C; Future  -     Microalbumin / creatinine urine ratio; Future    Essential hypertension  -     CBC and differential; Future  -     Comprehensive metabolic panel; Future  -     TSH, 3rd generation with Free T4 reflex; Future    Coronary artery disease involving native heart without angina pectoris, unspecified vessel or lesion type    Mixed hyperlipidemia  -     Lipid panel; Future  -     atorvastatin (LIPITOR) 20 mg tablet; Take 1 tablet (20 mg total) by mouth daily at bedtime    Obesity (BMI 30-39  9)    Screening for prostate cancer  -     PSA, Total Screen; Future    Trapezius muscle spasm  -     Ambulatory referral to Physical Therapy;  Future    Erectile dysfunction, unspecified erectile dysfunction type Subjective:      Patient ID: Guera Corey is a 66 y o  male    Feeling generally well  DM-heard report from [facility] that metformin caused dementia, so he went down to 1500, but now back to 2000 and tolerating  Testing a few x per week, 140  HTN-taking rx as directed, no home bp's  HPL-increased atorvastatin w/ Dr Ambreen Nice after recent visit  Had Tubular adenoma last week  Not exercising per se, but walking 7500-15260 steps per day  CAD- no angina  Notes tight trapezius on L since heart surgery, thinks he is sleeping on it weird  Notes some ED, weak urinary stream, and concerns w/ psa  Current Outpatient Prescriptions:     albuterol (PROAIR HFA) 90 mcg/act inhaler, Inhale 2 puffs every 4 (four) hours as needed, Disp: , Rfl:     aspirin 81 MG tablet, Take 81 mg by mouth daily, Disp: , Rfl:     atorvastatin (LIPITOR) 20 mg tablet, Take 1 tablet (20 mg total) by mouth daily at bedtime, Disp: 90 tablet, Rfl: 3    fluticasone-vilanterol (BREO ELLIPTA) 100-25 mcg/inh inhaler, Inhale daily, Disp: , Rfl:     glimepiride (AMARYL) 1 mg tablet, TAKE 1 TABLET BY MOUTH TWICE A DAY FOR SUGAR >200, Disp: 30 tablet, Rfl: 3    hydrochlorothiazide (HYDRODIURIL) 25 mg tablet, TAKE 1 TABLET DAILY AS DIRECTED , Disp: 30 tablet, Rfl: 11    irbesartan (AVAPRO) 150 mg tablet, Take 1 tablet (150 mg total) by mouth daily, Disp: 90 tablet, Rfl: 3    metFORMIN (GLUCOPHAGE-XR) 500 mg 24 hr tablet, START WITH 1 TAB DAILY W/ DINNER, INCREASE BY 1 TABLET EACH WEEK AS TOLERATED UNTIL TAKING 4 TABS, Disp: 120 tablet, Rfl: 5    metoprolol succinate (TOPROL-XL) 100 mg 24 hr tablet, TAKE 1 TABLET DAILY  , Disp: 30 tablet, Rfl: 5    Recent Results (from the past 1008 hour(s))   Microalbumin / creatinine urine ratio    Collection Time: 11/29/18  9:58 AM   Result Value Ref Range    Creatinine, Ur 85 3 mg/dL    Microalbum  ,U,Random 24 3 (H) 0 0 - 20 0 mg/L    Microalb Creat Ratio 28 0 - 30 mg/g creatinine   Lipid Panel with Direct LDL reflex    Collection Time: 11/29/18  9:58 AM   Result Value Ref Range    Cholesterol 147 50 - 200 mg/dL    Triglycerides 213 (H) <=150 mg/dL    HDL, Direct 33 (L) 40 - 60 mg/dL    LDL Calculated 71 0 - 100 mg/dL   CBC and differential    Collection Time: 11/29/18  9:58 AM   Result Value Ref Range    WBC 6 27 4 31 - 10 16 Thousand/uL    RBC 5 39 3 88 - 5 62 Million/uL    Hemoglobin 15 9 12 0 - 17 0 g/dL    Hematocrit 45 7 36 5 - 49 3 %    MCV 85 82 - 98 fL    MCH 29 5 26 8 - 34 3 pg    MCHC 34 8 31 4 - 37 4 g/dL    RDW 12 4 11 6 - 15 1 %    MPV 10 0 8 9 - 12 7 fL    Platelets 403 059 - 474 Thousands/uL    nRBC 0 /100 WBCs    Neutrophils Relative 55 43 - 75 %    Immat GRANS % 0 0 - 2 %    Lymphocytes Relative 35 14 - 44 %    Monocytes Relative 7 4 - 12 %    Eosinophils Relative 2 0 - 6 %    Basophils Relative 1 0 - 1 %    Neutrophils Absolute 3 39 1 85 - 7 62 Thousands/µL    Immature Grans Absolute 0 02 0 00 - 0 20 Thousand/uL    Lymphocytes Absolute 2 22 0 60 - 4 47 Thousands/µL    Monocytes Absolute 0 46 0 17 - 1 22 Thousand/µL    Eosinophils Absolute 0 13 0 00 - 0 61 Thousand/µL    Basophils Absolute 0 05 0 00 - 0 10 Thousands/µL   Comprehensive metabolic panel    Collection Time: 11/29/18  9:58 AM   Result Value Ref Range    Sodium 137 136 - 145 mmol/L    Potassium 4 4 3 5 - 5 3 mmol/L    Chloride 100 100 - 108 mmol/L    CO2 31 21 - 32 mmol/L    ANION GAP 6 4 - 13 mmol/L    BUN 19 5 - 25 mg/dL    Creatinine 1 00 0 60 - 1 30 mg/dL    Glucose, Fasting 161 (H) 65 - 99 mg/dL    Calcium 9 4 8 3 - 10 1 mg/dL    AST 25 5 - 45 U/L    ALT 40 12 - 78 U/L    Alkaline Phosphatase 67 46 - 116 U/L    Total Protein 7 7 6 4 - 8 2 g/dL    Albumin 3 7 3 5 - 5 0 g/dL    Total Bilirubin 0 40 0 20 - 1 00 mg/dL    eGFR 81 ml/min/1 73sq m   Hemoglobin A1C    Collection Time: 11/29/18  9:58 AM   Result Value Ref Range    Hemoglobin A1C 8 0 (H) 4 2 - 6 3 %     mg/dl   TSH, 3rd generation    Collection Time: 11/29/18 9:58 AM   Result Value Ref Range    TSH 3RD GENERATON 2 097 0 358 - 3 740 uIU/mL   Fingerstick Glucose (POCT)    Collection Time: 12/11/18  7:56 AM   Result Value Ref Range    POC Glucose 163 (H) 65 - 140 mg/dl   Tissue Exam    Collection Time: 12/11/18  8:57 AM   Result Value Ref Range    Case Report       Surgical Pathology Report                         Case: [id number]                                   Authorizing Provider:  Bo Emery MD      Collected:           12/11/2018 0857              Ordering Location:     [address] [place] [address] Received:            12/11/2018 1232                                     Operating Room                                                               Pathologist:           Abimael Kaba DO                                                            Specimen:    Polyp, Colorectal, 40 cm colon                                                             Final Diagnosis       A  Colon, polyp at 40 cm, biopsy:  - Tubular adenoma, negative for high-grade dysplasia  Interpretation performed at [facility] [address]  [facility] [address], [facility]        Additional Information       All controls performed with the immunohistochemical stains reported above reacted appropriately  These tests were developed and their performance characteristics determined by [facility] or [facility]  They may not be cleared or approved by the [place]  Food and Drug Administration  The FDA has determined that such clearance or approval is not necessary  These tests are used for clinical purposes  They should not be regarded as investigational or for research  This laboratory has been approved by CLIA 88, designated as a high-complexity laboratory and is qualified to perform these tests  Gross Description       A  The specimen is received in formalin, labeled with the patient's name and hospital number, and is designated "40 cm colorectal polyp    The specimen consists of a single tan-pink, focally rubbery polypoid portion of tissue measuring 0 2 cm in greatest dimension  The specimen is entirely submitted in 1 cassette  Note: The estimated total formalin fixation time based upon information provided by the submitting clinician and the standard processing schedule is under 72 hours  Joann      Clinical Information Hot biopsy polypectomy        The following portions of the patient's history were reviewed and updated as appropriate: allergies, current medications, past family history, past medical history, past social history, past surgical history and problem list      Review of Systems   Constitutional: Negative for appetite change, chills, diaphoresis, fatigue, fever and unexpected weight change  HENT: Negative for congestion, hearing loss and rhinorrhea  Eyes: Negative for visual disturbance  Respiratory: Negative for cough, chest tightness, shortness of breath and wheezing  Cardiovascular: Negative for chest pain, palpitations and leg swelling  Gastrointestinal: Negative for abdominal pain and blood in stool  Endocrine: Negative for cold intolerance, heat intolerance, polydipsia and polyuria  Genitourinary: Negative for difficulty urinating, dysuria, frequency and urgency  Musculoskeletal: Negative for arthralgias and myalgias  Skin: Negative for rash  Neurological: Negative for dizziness, weakness, light-headedness and headaches  Hematological: Does not bruise/bleed easily  Psychiatric/Behavioral: Negative for dysphoric mood and sleep disturbance  Objective:      Vitals:    12/18/18 1401   BP: 142/84   Pulse: 64   Resp: 12          Physical Exam   Constitutional: He is oriented to person, place, and time  He appears well-developed and well-nourished  HENT:   Head: Normocephalic and atraumatic     Nose: Nose normal    Mouth/Throat: Oropharynx is clear and moist    Eyes: Pupils are equal, round, and reactive to light  Conjunctivae and EOM are normal  No scleral icterus  Neck: Normal range of motion  Neck supple  No JVD present  No tracheal deviation present  No thyromegaly present  Cardiovascular: Normal rate, regular rhythm and intact distal pulses  Exam reveals no gallop and no friction rub  No murmur heard  Pulses:       Dorsalis pedis pulses are 1+ on the right side, and 1+ on the left side  Posterior tibial pulses are 1+ on the right side, and 1+ on the left side  Pulmonary/Chest: Effort normal and breath sounds normal  No respiratory distress  He has no wheezes  He has no rales  Musculoskeletal: He exhibits no edema or deformity  Feet:   Right Foot:   Skin Integrity: Negative for ulcer, skin breakdown, erythema, warmth, callus or dry skin  Left Foot:   Skin Integrity: Negative for ulcer, skin breakdown, erythema, warmth, callus or dry skin  Lymphadenopathy:     He has no cervical adenopathy  Neurological: He is alert and oriented to person, place, and time  No cranial nerve deficit  Skin: Skin is warm and dry  No rash noted  No erythema  No pallor  Psychiatric: He has a normal mood and affect  His behavior is normal  Judgment and thought content normal    Patient's shoes and socks removed  Right Foot/Ankle   Right Foot Inspection  Skin Exam: skin normal and skin intact no dry skin, no warmth, no callus, no erythema, no maceration, no abnormal color, no pre-ulcer, no ulcer and no callus                          Toe Exam: ROM and strength within normal limits  Sensory       Monofilament testing: intact  Vascular  Capillary refills: < 3 seconds  The right DP pulse is 1+  The right PT pulse is 1+       Left Foot/Ankle  Left Foot Inspection  Skin Exam: skin normal and skin intactno dry skin, no warmth, no erythema, no maceration, normal color, no pre-ulcer, no ulcer and no callus                         Toe Exam: ROM and strength within normal limits                   Sensory Monofilament: intact  Vascular  Capillary refills: < 3 seconds  The left DP pulse is 1+  The left PT pulse is 1+  Assign Risk Category:  No deformity present;  No loss of protective sensation;        Risk: 0

## 2019-01-10 DIAGNOSIS — I10 HYPERTENSION, UNSPECIFIED TYPE: ICD-10-CM

## 2019-01-10 RX ORDER — METOPROLOL SUCCINATE 100 MG/1
TABLET, EXTENDED RELEASE ORAL
Qty: 30 TABLET | Refills: 5 | Status: SHIPPED | OUTPATIENT
Start: 2019-01-10 | End: 2019-05-07 | Stop reason: SDUPTHER

## 2019-03-22 ENCOUNTER — OFFICE VISIT (OUTPATIENT)
Dept: CARDIOLOGY CLINIC | Facility: CLINIC | Age: 64
End: 2019-03-22
Payer: COMMERCIAL

## 2019-03-22 VITALS
SYSTOLIC BLOOD PRESSURE: 124 MMHG | DIASTOLIC BLOOD PRESSURE: 80 MMHG | HEART RATE: 68 BPM | OXYGEN SATURATION: 97 % | WEIGHT: 199 LBS | HEIGHT: 67 IN | BODY MASS INDEX: 31.23 KG/M2

## 2019-03-22 DIAGNOSIS — I25.10 CORONARY ARTERY DISEASE INVOLVING NATIVE HEART WITHOUT ANGINA PECTORIS, UNSPECIFIED VESSEL OR LESION TYPE: Primary | ICD-10-CM

## 2019-03-22 DIAGNOSIS — E66.9 OBESITY (BMI 30-39.9): ICD-10-CM

## 2019-03-22 DIAGNOSIS — I51.89 DIASTOLIC DYSFUNCTION: ICD-10-CM

## 2019-03-22 DIAGNOSIS — E78.2 MIXED HYPERLIPIDEMIA: ICD-10-CM

## 2019-03-22 DIAGNOSIS — Z95.1 HX OF CABG: ICD-10-CM

## 2019-03-22 DIAGNOSIS — I10 ESSENTIAL HYPERTENSION: ICD-10-CM

## 2019-03-22 PROCEDURE — 99214 OFFICE O/P EST MOD 30 MIN: CPT | Performed by: INTERNAL MEDICINE

## 2019-03-22 NOTE — PROGRESS NOTES
CARDIOLOGY OFFICE VISIT  [facility]  [address], [place], Formerly named [facility] Mickey Agee  Tel: [phone]      NAME: Radha Jerry  AGE: 66 y o  SEX: male  : 1955   MRN: [id number]       Chief Complaint:  Chief Complaint   Patient presents with    Follow-up     6 month -- CAD/CABG         History of Present Illness:   Patient comes for follow up  States he is doing well from cardiac stand point and denies chest pain / pressure, SOB, palpitations, lightheadedness, syncope, swelling feet, orthopnea, PND, claudication  CAD s/p CABG surgery at [facility] on 2017 by Dr Octavio Watts  Post surgery he needed a left-sided thoracentesis for hemothorax  Other than that he had a good postop recovery  Patient completed cardiac rehabilitation as well  Currently on aspirin, beta-blocker, statin  States is doing well cardiac wise with no cardiac symptoms  Taking all medications regularly  HTN -  Has been hypertensive for many years  Taking medications regularly  Denies lightheadedness, headache, medication side effects  HLP -  Has had hyperlipidemia for many years  Taking statin regularly along with diet control  Denies myalgia  PCP closely monitoring the blood work   Last lipid panel reviewed with pt    Obesity -  Trying to lose weight by cutting carbs      Past Medical History:  Past Medical History:   Diagnosis Date    Coronary artery disease     Diabetes mellitus ([address] )     Diastolic dysfunction     Diverticulitis     Hyperlipidemia     Hypertension     Obesity     Prediabetes     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         Past Surgical History:  Past Surgical History:   Procedure Laterality Date    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    BOWEL RESECTION      Secondary to diverticulitis    COLONOSCOPY      CORONARY ARTERY BYPASS GRAFT      HAND SURGERY      triple bypass    HERNIA REPAIR      MALIGNANT SKIN LESION EXCISION  1963    Abdominal wall surgical resection of melanoma    UT COLONOSCOPY FLX DX W/COLLJ SPEC WHEN PFRMD N/A 12/11/2018    Procedure: COLONOSCOPY;  Surgeon: Atiya Almaguer MD;  Location: [facility];   Service: Gastroenterology    SKIN BIOPSY           Family History:  Family History   Problem Relation Age of Onset    Emphysema Maternal Grandfather     Coronary artery disease Mother    Power Hyperlipidemia Mother     Hypertension Mother     Diabetes type II Mother     Diabetes type II Father     Hypertension Sister          Social History:  Social History     Socioeconomic History    Marital status: /Civil Union     Spouse name: Not on file    Number of children: Not on file    Years of education: Not on file    Highest education level: Not on file   Occupational History    Occupation:    Social Needs    Financial resource strain: Not on file    Food insecurity:     Worry: Not on file     Inability: Not on file   [employer] needs:     Medical: Not on file     Non-medical: Not on file   Tobacco Use    Smoking status: Never Smoker    Smokeless tobacco: Never Used   Substance and Sexual Activity    Alcohol use: No    Drug use: No    Sexual activity: Yes   Lifestyle    Physical activity:     Days per week: Not on file     Minutes per session: Not on file    Stress: Not on file   Relationships    Social connections:     Talks on phone: Not on file     Gets together: Not on file     Attends Hoahaoism service: Not on file     Active member of club or organization: Not on file     Attends meetings of clubs or organizations: Not on file     Relationship status: Not on file    Intimate partner violence:     Fear of current or ex partner: Not on file     Emotionally abused: Not on file     Physically abused: Not on file     Forced sexual activity: Not on file   Other Topics Concern    Not on file   Social History Narrative    Not on file         Active Problems:  Patient Active Problem List   Diagnosis    Type 2 diabetes mellitus [address] [address]    Essential hypertension    Vitamin D deficiency    Transaminitis    Coronary artery disease involving native heart without angina pectoris    Hx of CABG    Mixed hyperlipidemia    Obesity (BMI 30-39  9)    Diastolic dysfunction    Special screening for malignant neoplasms, colon         The following portions of the patient's history were reviewed and updated as appropriate: past medical history, past surgical history, past family history,  past social history, current medications, allergies and problem list       Review of Systems:  Constitutional: Denies fever, chills, fatigue  Eyes: Denies eye redness, eye discharge, double vision  ENT: Denies hearing loss, tinnitus, sneezing, nasal discharge, sore throat   Respiratory: Denies cough, expectoration, hemoptysis, shortness of breath  Cardiovascular: Denies chest pain, palpitations, orthopnea, PND, lower extremity swelling  Gastrointestinal: Denies abdominal pain, nausea, vomiting, hematemesis, diarrhea, bloody stools  Genito-Urinary: Denies dysuria, incontinence  Musculoskeletal: Denies back pain, joint pain, muscle pain  Neurologic: Denies confusion, lightheadedness, syncope, headache, focal weakness, sensory changes, seizures  Endocrine: Denies polyuria, polydipsia, temperature intolerance  Allergy and Immunology: Denies hives, insect bite sensitivity  Hematological and Lymphatic: Denies bleeding problems, swollen glands   Psychological: Denies depression, suicidal ideation, anxiety, panic  Dermatological: Denies pruritus, rash, skin lesion changes      Vitals:  Vitals:    03/22/19 0930   BP: 124/80   Pulse: 68   SpO2: 97%       Body mass index is 31 17 kg/m²  Weight (last 2 days)     Date/Time   Weight    03/22/19 0930   90 3 (199)                Physical Examination:  General: Patient is not in acute distress   Awake, alert, oriented in time, place and person  Responding to commands  Head: Normocephalic  Atraumatic  Eyes: Both pupils normal sized, round and reactive to light  Nonicteric  ENT: Normal external ear canals  Nares normal, no drainage  Lips and oral mucosa normal  Neck: Supple  JVP not raised  Trachea central  No thyromegaly  Lungs: Bilateral bronchovascular breath sounds with no crackles or rhonchi  Chest wall: No tenderness  Cardiovascular: RRR  S1 and S2 normal  No murmur, rub or gallop  Gastrointestinal: Abdomen soft, nontender  No guarding or rigidity  Liver and spleen not palpable  Bowel sounds present  Neurologic: Patient is awake, alert, oriented in time, place and person  Responding to command  Moving all extremities  Integumentary:  No skin rash  Lymphatic: No cervical lymphadenopathy  Back: Symmetric   No CVA tenderness  Extremities: No clubbing, cyanosis or edema      Laboratory Results:  CBC with diff:   Lab Results   Component Value Date    WBC 6 27 11/29/2018    WBC 6 2 09/21/2017    RBC 5 39 11/29/2018    RBC 5 29 12/02/2015    HGB 15 9 11/29/2018    HGB 14 2 09/21/2017    HCT 45 7 11/29/2018    HCT 42 4 09/21/2017    MCV 85 11/29/2018    MCV 86 09/21/2017    MCH 29 5 11/29/2018    MCH 28 7 09/21/2017    RDW 12 4 11/29/2018    RDW 14 7 09/21/2017     11/29/2018     09/21/2017       CMP:  Lab Results   Component Value Date    CREATININE 1 00 11/29/2018    CREATININE 0 80 09/21/2017    BUN 19 11/29/2018    BUN 16 09/21/2017     09/21/2017    K 4 4 11/29/2018    K 4 4 09/21/2017     11/29/2018    CL 98 09/21/2017    CO2 31 11/29/2018    CO2 23 09/21/2017    GLUCOSE 119 (H) 09/21/2017    PROT 7 0 09/21/2017    ALKPHOS 67 11/29/2018    ALKPHOS 80 09/21/2017    ALT 40 11/29/2018    ALT 24 09/21/2017    AST 25 11/29/2018    AST 18 09/21/2017    BILIDIR 0 08 09/21/2017       Lab Results   Component Value Date    HGBA1C 8 0 (H) 11/29/2018    HGBA1C 6 4 (H) 09/21/2017    MG 2 1 02/07/2017    PHOS 4 2 2017       Lab Results   Component Value Date    TROPONINI <0 02 2017       Lipid Profile:   Lab Results   Component Value Date    CHOL 128 2017    CHOL 116 2016    CHOL 151 2016     Lab Results   Component Value Date    HDL 33 (L) 2018    HDL 32 (L) 2017    HDL 34 (L) 2016     Lab Results   Component Value Date    LDLCALC 71 2018    LDLCALC 53 2017    LDLCALC 60 2016     Lab Results   Component Value Date    TRIG 213 (H) 2018    TRIG 216 (H) 2017    TRIG 109 2016       Cardiac testing:   Results for orders placed during the hospital encounter of 17   Echo complete with contrast if indicated    Narrative [place] [address], [address] [facility]  [phone]    Transthoracic Echocardiogram  2D, M-mode, Doppler, and Color Doppler    Study date:  08-May-2017    Patient: Milena Carrington  MR number: [id number]  Account number: [de-identified]  : [id number]  Age: 63 years  Gender: Male  Status: Outpatient  Location: [facility]  Height: 67 in  Weight: 190 lb  BP: 126/ 100 mmHg    Indications: CAD  Diagnoses: I25 10 - Atherosclerotic heart disease of native coronary artery without angina pectoris    Sonographer:  Redmond RCS  Interpreting Physician:  Aguilar Saha MD  Primary Physician:  Bennye Halsted, MD  Referring Physician:  Aguilar Saha MD  Group:  [facility]    SUMMARY    LEFT VENTRICLE:  Systolic function was normal  Ejection fraction was estimated to be 60 %  There were no regional wall motion abnormalities  Features were consistent with a pseudonormal left ventricular filling pattern, with concomitant abnormal relaxation and increased filling pressure (grade 2 diastolic dysfunction)  RIGHT VENTRICLE:  The size was normal   Systolic function was normal     AORTIC VALVE:  There was trace regurgitation      TRICUSPID VALVE:  There was trace regurgitation  HISTORY: PRIOR HISTORY: Risk factors: hypertension, oral hypoglycemic-treated diabetes, medication-treated hypercholesterolemia, and a family history of coronary artery disease  PROCEDURE: The study was performed in the [address] [place] [address]  This was a routine study  The transthoracic approach was used  The study included complete 2D imaging, M-mode, complete spectral Doppler, and color Doppler  Image  quality was adequate  LEFT VENTRICLE: Size was normal  Systolic function was normal  Ejection fraction was estimated to be 60 %  There were no regional wall motion abnormalities  Wall thickness was normal  No evidence of apical thrombus  DOPPLER: Features were  consistent with a pseudonormal left ventricular filling pattern, with concomitant abnormal relaxation and increased filling pressure (grade 2 diastolic dysfunction)  RIGHT VENTRICLE: The size was normal  Systolic function was normal  Wall thickness was normal     LEFT ATRIUM: Size was normal     RIGHT ATRIUM: Size was normal     MITRAL VALVE: Valve structure was normal  There was normal leaflet separation  DOPPLER: The transmitral velocity was within the normal range  There was no evidence for stenosis  There was no significant regurgitation  AORTIC VALVE: The valve was trileaflet  Leaflets exhibited normal thickness and normal cuspal separation  DOPPLER: Transaortic velocity was within the normal range  There was no evidence for stenosis  There was trace regurgitation  TRICUSPID VALVE: The valve structure was normal  There was normal leaflet separation  DOPPLER: The transtricuspid velocity was within the normal range  There was no evidence for stenosis  There was trace regurgitation  PULMONIC VALVE: Leaflets exhibited normal thickness, no calcification, and normal cuspal separation  DOPPLER: The transpulmonic velocity was within the normal range  There was no significant regurgitation      PERICARDIUM: There was no pericardial effusion  The pericardium was normal in appearance  AORTA: The root exhibited normal size  SYSTEMIC VEINS: IVC: The inferior vena cava was not well visualized  SYSTEM MEASUREMENT TABLES    Apical four chamber  4 chamber Left Atrium Volume Index; Planimetry; End Systole; Apical four chamber;: 20 64 cm2  Left Ventricular End Diastolic Volume; Method of Disks, Single Plane; Apical four chamber;: 83 2 ml  Left Ventricular End Systolic Volume; Method of Disks, Single Plane; Apical four chamber;: 38 3 ml  Right Atrium Systolic Area; Planimetry; End Systole; Apical four chamber;: 19 42 cm2  Right Ventricular Internal Diastolic Dimension; End Diastole; Apical four chamber;: 34 2 mm  TAPSE: 19 mm    Unspecified Scan Mode  Aortic Root Diameter; End Systole;: 28 9 mm  Aortic valve Area; Continuity Equation by Velocity Time Integral; Systole;: 1 9 cm2  Gradient Pressure, Peak; Simplified Bernoulli; Antegrade Flow; Systole;: 12 9 mm[Hg]  Gradient pressure, average; Simplified Bernoulli; Antegrade Flow; Systole;: 7 5 mm[Hg]  HR: 66 /min  Left atrial diameter; End Diastole;: 37 9 mm  Cardiac Output; Systole;: 4 52 L/min  Cardiac Output; Teichholz; Systole;: 3 19 L/min  HR: 64 /min  Heart rate; Teichholz;: 66 /min  Interventricular Septum Diastolic Thickness; Teichholz; End Diastole;: 9 mm  Left Ventricle Internal End Diastolic Dimension; Teichholz;: 47 5 mm  Left Ventricle Internal Systolic Dimension; Teichholz; End Systole;: 36 6 mm  Left Ventricle Mass; Mass AVCube with Teichholz; End Diastole;: 164 g  Left Ventricle Posterior Wall Diastolic Thickness; Teichholz; End Diastole;: 10 7 mm  Left Ventricular Ejection Fraction; Teichholz;: 46 %  Left Ventricular End Diastolic Volume; Teichholz;: 104 9 ml  Left Ventricular End Systolic Volume; Teichholz;: 56 6 ml  Left Ventricular Fractional Shortening;: 22 9 %  Stroke volume; Systole;: 68 5 ml  Stroke volume;  Teichholz; Systole;: 48 3 ml  Mitral Valve Area; Area by Pressure Half-Time; Systole;: 3 24 cm2  Mitral Valve E to A Ratio; Systole;: 1 34    [facility] Accredited Echocardiography Laboratory    Prepared and electronically signed by    Theresa Marin MD  Signed [id number] 10:53:52         Results for orders placed during the hospital encounter of 17   NM myocardial perfusion spect (stress and/or rest)    Narrative  [address] [place], [address] [place] [address]  [phone]    Rest/Stress Gated SPECT Myocardial Perfusion Imaging After Exercise    Patient: Flaco Marin  MR number: [id number]  Account number: [de-identified]  : 1955  Age: 63 years  Gender: Male  Status: Outpatient  Location: [facility]  Height: 66 in  Weight: 198 lb  BP: 120/ 78 mmHg    Allergies: NO KNOWN ALLERGIES    Diagnosis: I25 10 - Atherosclerotic heart disease of native coronary artery without angina pectoris, R06 02 - Shortness of breath    Primary Physician:  Billy Welch MD  Interpreting Physician:  Jillian Miguel MD  Referring Physician:  Theresa Marin MD  Technician:  Dusty Pedraza BS, BA, AART(N)  Group:  [facility]  Other:  Dave Kee MS, CCT    INDICATIONS: CAD, SOB    HISTORY: The patient is a 63year old  male  Chest pain status: no chest pain  Other symptoms: dyspnea  Coronary artery disease risk factors: dyslipidemia, hypertension, family history of premature coronary artery disease, and  diabetes mellitus  REST ECG: Normal sinus rhythm  Nonspecific T wave abnormalities were present  PROCEDURE: The study was performed at [address] [place] [address]  Treadmill exercise testing was performed, using the [place] protocol  Gated SPECT myocardial perfusion imaging was performed after stress and at rest  Systolic blood  pressure was 120 mmHg, at the start of the study  Diastolic blood pressure was 78 mmHg, at the start of the study   The heart rate was 75 bpm, at the start of the study  MITCHEL PROTOCOL:  HR bpm SBP mmHg DBP mmHg Symptoms Rhythm/conduct  Baseline 75 120 78 none rare PVC's  Stage 1 109 128 60 -- same as above  Stage 2 123 148 78 -- same as above  Stage 3 144 -- -- -- rare PVC's  Immediate 144 160 78 -- no ventricular ectopy  Recovery 1 127 -- -- -- no ventricular ectopy  Recovery 2 110 -- -- -- no ventricular ectopy  Recovery 3 100 -- -- -- rare PVC's  PKIXEWCO 6 87 813 24 -- no ventricular ectopy  No medications or fluids given  STRESS SUMMARY: Duration of exercise was 8 min  The patient exercised to protocol stage 3  Maximal work rate was 9 1 METs  Functional capacity was normal  Maximal heart rate during stress was 144 bpm ( 89 % of maximal predicted heart  rate)  The heart rate response to stress was normal  There was normal resting blood pressure with an appropriate response to stress  The rate-pressure product for the peak heart rate and blood pressure was 16024  There was no chest pain  during stress  The stress test was terminated due to achievement of target heart rate and mild fatigue  The stress ECG was negative for ischemia  Arrhythmia during stress: isolated premature ventricular beats  ISOTOPE ADMINISTRATION:  Resting isotope administration Stress isotope administration  Agent Tetrofosmin Tetrofosmin  Dose 9 76 mCi 31 1 mCi  Date 05/02/2017 05/02/2017    MYOCARDIAL PERFUSION IMAGING:  The image quality was good  Left ventricular size was normal  The TID ratio was 1 14  GATED SPECT:  The calculated left ventricular ejection fraction was 51 %  There was no left ventricular regional abnormality  SUMMARY:  -  Stress results: Duration of exercise was 8 min  Target heart rate was achieved  There was no chest pain during stress  -  ECG conclusions: The stress ECG was negative for ischemia   -  Gated SPECT: The calculated left ventricular ejection fraction was 51 %  There was no left ventricular regional abnormality      IMPRESSIONS: Small sized, mild intensity reversible inferior defect which could represent ischemia  Left ventricular systolic function was low normal  LVEF 51%  Prepared and signed by    Lyndon Brar MD  Signed 05/02/2017 13:12:41           Medications:    Current Outpatient Medications:     albuterol (PROAIR HFA) 90 mcg/act inhaler, Inhale 2 puffs every 4 (four) hours as needed, Disp: , Rfl:     aspirin 81 MG tablet, Take 81 mg by mouth daily, Disp: , Rfl:     atorvastatin (LIPITOR) 20 mg tablet, Take 1 tablet (20 mg total) by mouth daily at bedtime, Disp: 90 tablet, Rfl: 3    fluticasone-vilanterol (BREO ELLIPTA) 100-25 mcg/inh inhaler, Inhale daily, Disp: , Rfl:     glimepiride (AMARYL) 1 mg tablet, TAKE 1 TABLET BY MOUTH TWICE A DAY FOR SUGAR >200, Disp: 30 tablet, Rfl: 3    hydrochlorothiazide (HYDRODIURIL) 25 mg tablet, TAKE 1 TABLET DAILY AS DIRECTED , Disp: 30 tablet, Rfl: 11    irbesartan (AVAPRO) 150 mg tablet, Take 1 tablet (150 mg total) by mouth daily, Disp: 90 tablet, Rfl: 3    metoprolol succinate (TOPROL-XL) 100 mg 24 hr tablet, TAKE 1 TABLET BY MOUTH EVERY DAY, Disp: 30 tablet, Rfl: 5    metFORMIN (GLUCOPHAGE-XR) 500 mg 24 hr tablet, START WITH 1 TAB DAILY W/ DINNER, INCREASE BY 1 TABLET EACH WEEK AS TOLERATED UNTIL TAKING 4 TABS, Disp: 120 tablet, Rfl: 5      Allergies:  No Known Allergies      Assessment and Plan:  1  Coronary artery disease s/p CABG   doing well  Continue aspirin, statin, beta-blocker, ARB    2  Essential hypertension   BP at goal   Continue current medications    3  Mixed hyperlipidemia   continue statin and diet control  Last lipid panel reviewed with the patient    4  Obesity (BMI 30-39 9)   counseled to continue to lose weight    5  Diastolic dysfunction   tight BP control    Recommend aggressive risk factor modification and therapeutic lifestyle changes  Low-salt, low-calorie, low-fat, low-cholesterol diet with regular exercise and to optimize weight    I will defer the ordering and monitoring of necessity lab studies to you, but I am available and happy to review and manage any of the data at your request in the future  Discussed concepts of atherosclerosis, including signs and symptoms of cardiac disease  Previous studies were reviewed  Safety measures were reviewed  Questions were entertained and answered  Patient was advised to report any problems requiring medical attention  Follow-up with PCP and appropriate specialist and lab work as discussed  Return for follow up visit as scheduled or earlier, if needed  Thank you for allowing me to participate in the care and evaluation of your patient  Should you have any questions, please feel free to contact me        Mercy Guerrero MD  3/22/2019,9:57 AM

## 2019-04-02 ENCOUNTER — APPOINTMENT (OUTPATIENT)
Dept: LAB | Facility: CLINIC | Age: 64
End: 2019-04-02
Payer: COMMERCIAL

## 2019-04-02 DIAGNOSIS — E11.9 TYPE 2 DIABETES MELLITUS WITHOUT COMPLICATION, WITHOUT LONG-TERM CURRENT USE OF INSULIN (HCC): Chronic | ICD-10-CM

## 2019-04-02 DIAGNOSIS — I10 ESSENTIAL HYPERTENSION: Chronic | ICD-10-CM

## 2019-04-02 DIAGNOSIS — Z12.5 SCREENING FOR PROSTATE CANCER: ICD-10-CM

## 2019-04-02 DIAGNOSIS — E78.2 MIXED HYPERLIPIDEMIA: ICD-10-CM

## 2019-04-02 LAB
ALBUMIN SERPL BCP-MCNC: 4 G/DL (ref 3.5–5)
ALP SERPL-CCNC: 72 U/L (ref 46–116)
ALT SERPL W P-5'-P-CCNC: 29 U/L (ref 12–78)
ANION GAP SERPL CALCULATED.3IONS-SCNC: 6 MMOL/L (ref 4–13)
AST SERPL W P-5'-P-CCNC: 19 U/L (ref 5–45)
BASOPHILS # BLD AUTO: 0.05 THOUSANDS/ΜL (ref 0–0.1)
BASOPHILS NFR BLD AUTO: 1 % (ref 0–1)
BILIRUB SERPL-MCNC: 0.48 MG/DL (ref 0.2–1)
BUN SERPL-MCNC: 15 MG/DL (ref 5–25)
CALCIUM SERPL-MCNC: 9.2 MG/DL (ref 8.3–10.1)
CHLORIDE SERPL-SCNC: 102 MMOL/L (ref 100–108)
CHOLEST SERPL-MCNC: 111 MG/DL (ref 50–200)
CO2 SERPL-SCNC: 27 MMOL/L (ref 21–32)
CREAT SERPL-MCNC: 0.96 MG/DL (ref 0.6–1.3)
CREAT UR-MCNC: 79.6 MG/DL
EOSINOPHIL # BLD AUTO: 0.18 THOUSAND/ΜL (ref 0–0.61)
EOSINOPHIL NFR BLD AUTO: 4 % (ref 0–6)
ERYTHROCYTE [DISTWIDTH] IN BLOOD BY AUTOMATED COUNT: 12.8 % (ref 11.6–15.1)
EST. AVERAGE GLUCOSE BLD GHB EST-MCNC: 157 MG/DL
GFR SERPL CREATININE-BSD FRML MDRD: 85 ML/MIN/1.73SQ M
GLUCOSE P FAST SERPL-MCNC: 90 MG/DL (ref 65–99)
HBA1C MFR BLD: 7.1 % (ref 4.2–6.3)
HCT VFR BLD AUTO: 42.8 % (ref 36.5–49.3)
HDLC SERPL-MCNC: 31 MG/DL (ref 40–60)
HGB BLD-MCNC: 14.6 G/DL (ref 12–17)
IMM GRANULOCYTES # BLD AUTO: 0.01 THOUSAND/UL (ref 0–0.2)
IMM GRANULOCYTES NFR BLD AUTO: 0 % (ref 0–2)
LDLC SERPL CALC-MCNC: 63 MG/DL (ref 0–100)
LYMPHOCYTES # BLD AUTO: 1.71 THOUSANDS/ΜL (ref 0.6–4.47)
LYMPHOCYTES NFR BLD AUTO: 33 % (ref 14–44)
MCH RBC QN AUTO: 29 PG (ref 26.8–34.3)
MCHC RBC AUTO-ENTMCNC: 34.1 G/DL (ref 31.4–37.4)
MCV RBC AUTO: 85 FL (ref 82–98)
MICROALBUMIN UR-MCNC: 25.3 MG/L (ref 0–20)
MICROALBUMIN/CREAT 24H UR: 32 MG/G CREATININE (ref 0–30)
MONOCYTES # BLD AUTO: 0.47 THOUSAND/ΜL (ref 0.17–1.22)
MONOCYTES NFR BLD AUTO: 9 % (ref 4–12)
NEUTROPHILS # BLD AUTO: 2.71 THOUSANDS/ΜL (ref 1.85–7.62)
NEUTS SEG NFR BLD AUTO: 53 % (ref 43–75)
NONHDLC SERPL-MCNC: 80 MG/DL
NRBC BLD AUTO-RTO: 0 /100 WBCS
PLATELET # BLD AUTO: 175 THOUSANDS/UL (ref 149–390)
PMV BLD AUTO: 10.3 FL (ref 8.9–12.7)
POTASSIUM SERPL-SCNC: 4.5 MMOL/L (ref 3.5–5.3)
PROT SERPL-MCNC: 7.8 G/DL (ref 6.4–8.2)
PSA SERPL-MCNC: 0.2 NG/ML (ref 0–4)
RBC # BLD AUTO: 5.03 MILLION/UL (ref 3.88–5.62)
SODIUM SERPL-SCNC: 135 MMOL/L (ref 136–145)
TRIGL SERPL-MCNC: 85 MG/DL
TSH SERPL DL<=0.05 MIU/L-ACNC: 1.8 UIU/ML (ref 0.36–3.74)
WBC # BLD AUTO: 5.13 THOUSAND/UL (ref 4.31–10.16)

## 2019-04-02 PROCEDURE — 80061 LIPID PANEL: CPT

## 2019-04-02 PROCEDURE — 36415 COLL VENOUS BLD VENIPUNCTURE: CPT

## 2019-04-02 PROCEDURE — 84443 ASSAY THYROID STIM HORMONE: CPT

## 2019-04-02 PROCEDURE — G0103 PSA SCREENING: HCPCS

## 2019-04-02 PROCEDURE — 80053 COMPREHEN METABOLIC PANEL: CPT

## 2019-04-02 PROCEDURE — 85025 COMPLETE CBC W/AUTO DIFF WBC: CPT

## 2019-04-02 PROCEDURE — 82043 UR ALBUMIN QUANTITATIVE: CPT

## 2019-04-02 PROCEDURE — 82570 ASSAY OF URINE CREATININE: CPT

## 2019-04-02 PROCEDURE — 83036 HEMOGLOBIN GLYCOSYLATED A1C: CPT

## 2019-04-03 ENCOUNTER — APPOINTMENT (EMERGENCY)
Dept: RADIOLOGY | Facility: HOSPITAL | Age: 64
End: 2019-04-03
Payer: COMMERCIAL

## 2019-04-03 ENCOUNTER — HOSPITAL ENCOUNTER (EMERGENCY)
Facility: HOSPITAL | Age: 64
Discharge: HOME | End: 2019-04-03
Attending: EMERGENCY MEDICINE | Admitting: EMERGENCY MEDICINE
Payer: COMMERCIAL

## 2019-04-03 VITALS
TEMPERATURE: 97.9 F | SYSTOLIC BLOOD PRESSURE: 125 MMHG | WEIGHT: 199.08 LBS | HEIGHT: 67 IN | RESPIRATION RATE: 16 BRPM | HEART RATE: 106 BPM | DIASTOLIC BLOOD PRESSURE: 91 MMHG | BODY MASS INDEX: 31.25 KG/M2 | OXYGEN SATURATION: 96 %

## 2019-04-03 DIAGNOSIS — S61.419A HAND LACERATION: Primary | ICD-10-CM

## 2019-04-03 PROCEDURE — 99283 EMERGENCY DEPT VISIT LOW MDM: CPT | Performed by: EMERGENCY MEDICINE

## 2019-04-03 PROCEDURE — 73130 X-RAY EXAM OF HAND: CPT

## 2019-04-03 PROCEDURE — 99283 EMERGENCY DEPT VISIT LOW MDM: CPT

## 2019-04-03 PROCEDURE — 12001 RPR S/N/AX/GEN/TRNK 2.5CM/<: CPT | Performed by: EMERGENCY MEDICINE

## 2019-04-03 RX ORDER — AMOXICILLIN AND CLAVULANATE POTASSIUM 875; 125 MG/1; MG/1
1 TABLET, FILM COATED ORAL EVERY 12 HOURS
Qty: 14 TABLET | Refills: 0 | Status: SHIPPED | OUTPATIENT
Start: 2019-04-03 | End: 2019-04-10

## 2019-04-03 RX ADMIN — Medication 1 APPLICATION: at 19:33

## 2019-04-07 DIAGNOSIS — E11.9 TYPE 2 DIABETES MELLITUS WITHOUT COMPLICATION, WITHOUT LONG-TERM CURRENT USE OF INSULIN (HCC): ICD-10-CM

## 2019-04-08 RX ORDER — METFORMIN HYDROCHLORIDE 500 MG/1
TABLET, EXTENDED RELEASE ORAL
Qty: 120 TABLET | Refills: 4 | Status: SHIPPED | OUTPATIENT
Start: 2019-04-08 | End: 2019-08-01 | Stop reason: SDUPTHER

## 2019-04-16 ENCOUNTER — OFFICE VISIT (OUTPATIENT)
Dept: INTERNAL MEDICINE CLINIC | Facility: CLINIC | Age: 64
End: 2019-04-16
Payer: COMMERCIAL

## 2019-04-16 VITALS
RESPIRATION RATE: 12 BRPM | BODY MASS INDEX: 29.51 KG/M2 | WEIGHT: 188 LBS | SYSTOLIC BLOOD PRESSURE: 118 MMHG | HEIGHT: 67 IN | HEART RATE: 68 BPM | DIASTOLIC BLOOD PRESSURE: 80 MMHG

## 2019-04-16 DIAGNOSIS — Z95.1 HX OF CABG: ICD-10-CM

## 2019-04-16 DIAGNOSIS — E66.9 OBESITY (BMI 30-39.9): ICD-10-CM

## 2019-04-16 DIAGNOSIS — E78.2 MIXED HYPERLIPIDEMIA: ICD-10-CM

## 2019-04-16 DIAGNOSIS — I25.10 CORONARY ARTERY DISEASE INVOLVING NATIVE HEART WITHOUT ANGINA PECTORIS, UNSPECIFIED VESSEL OR LESION TYPE: ICD-10-CM

## 2019-04-16 DIAGNOSIS — E11.9 TYPE 2 DIABETES MELLITUS WITHOUT COMPLICATION, WITHOUT LONG-TERM CURRENT USE OF INSULIN (HCC): Primary | Chronic | ICD-10-CM

## 2019-04-16 DIAGNOSIS — I10 ESSENTIAL HYPERTENSION: Chronic | ICD-10-CM

## 2019-04-16 PROCEDURE — 3008F BODY MASS INDEX DOCD: CPT | Performed by: INTERNAL MEDICINE

## 2019-04-16 PROCEDURE — 1036F TOBACCO NON-USER: CPT | Performed by: INTERNAL MEDICINE

## 2019-04-16 PROCEDURE — 3079F DIAST BP 80-89 MM HG: CPT | Performed by: INTERNAL MEDICINE

## 2019-04-16 PROCEDURE — 3074F SYST BP LT 130 MM HG: CPT | Performed by: INTERNAL MEDICINE

## 2019-04-16 PROCEDURE — 99214 OFFICE O/P EST MOD 30 MIN: CPT | Performed by: INTERNAL MEDICINE

## 2019-04-16 RX ORDER — MELATONIN
1000 DAILY
COMMUNITY
End: 2019-08-06

## 2019-05-07 DIAGNOSIS — I10 HYPERTENSION, UNSPECIFIED TYPE: ICD-10-CM

## 2019-05-07 RX ORDER — METOPROLOL SUCCINATE 100 MG/1
TABLET, EXTENDED RELEASE ORAL
Qty: 30 TABLET | Refills: 5 | Status: SHIPPED | OUTPATIENT
Start: 2019-05-07 | End: 2020-01-17

## 2019-06-06 LAB
LEFT EYE DIABETIC RETINOPATHY: NORMAL
LEFT EYE DIABETIC RETINOPATHY: NORMAL
RIGHT EYE DIABETIC RETINOPATHY: NORMAL
RIGHT EYE DIABETIC RETINOPATHY: NORMAL

## 2019-06-06 PROCEDURE — 3072F LOW RISK FOR RETINOPATHY: CPT | Performed by: INTERNAL MEDICINE

## 2019-08-01 ENCOUNTER — OFFICE VISIT (OUTPATIENT)
Dept: INTERNAL MEDICINE CLINIC | Facility: CLINIC | Age: 64
End: 2019-08-01
Payer: COMMERCIAL

## 2019-08-01 ENCOUNTER — APPOINTMENT (OUTPATIENT)
Dept: LAB | Facility: CLINIC | Age: 64
End: 2019-08-01
Payer: COMMERCIAL

## 2019-08-01 VITALS
HEART RATE: 78 BPM | SYSTOLIC BLOOD PRESSURE: 94 MMHG | BODY MASS INDEX: 29.38 KG/M2 | HEIGHT: 67 IN | DIASTOLIC BLOOD PRESSURE: 68 MMHG | WEIGHT: 187.2 LBS | RESPIRATION RATE: 16 BRPM

## 2019-08-01 DIAGNOSIS — R07.89 CHEST WALL PAIN: ICD-10-CM

## 2019-08-01 DIAGNOSIS — I10 ESSENTIAL HYPERTENSION: ICD-10-CM

## 2019-08-01 DIAGNOSIS — E11.8 TYPE 2 DIABETES MELLITUS WITH COMPLICATION, WITHOUT LONG-TERM CURRENT USE OF INSULIN (HCC): ICD-10-CM

## 2019-08-01 DIAGNOSIS — R07.89 CHEST WALL PAIN: Primary | ICD-10-CM

## 2019-08-01 DIAGNOSIS — E11.9 TYPE 2 DIABETES MELLITUS WITHOUT COMPLICATION, WITHOUT LONG-TERM CURRENT USE OF INSULIN (HCC): ICD-10-CM

## 2019-08-01 DIAGNOSIS — Z95.1 HX OF CABG: ICD-10-CM

## 2019-08-01 DIAGNOSIS — E66.9 OBESITY (BMI 30-39.9): ICD-10-CM

## 2019-08-01 LAB
CK SERPL-CCNC: 73 U/L (ref 39–308)
ERYTHROCYTE [SEDIMENTATION RATE] IN BLOOD: 14 MM/HOUR (ref 0–10)
TROPONIN I SERPL-MCNC: <0.02 NG/ML

## 2019-08-01 PROCEDURE — 3074F SYST BP LT 130 MM HG: CPT | Performed by: NURSE PRACTITIONER

## 2019-08-01 PROCEDURE — 93000 ELECTROCARDIOGRAM COMPLETE: CPT | Performed by: NURSE PRACTITIONER

## 2019-08-01 PROCEDURE — 85652 RBC SED RATE AUTOMATED: CPT

## 2019-08-01 PROCEDURE — 99214 OFFICE O/P EST MOD 30 MIN: CPT | Performed by: NURSE PRACTITIONER

## 2019-08-01 PROCEDURE — 84484 ASSAY OF TROPONIN QUANT: CPT

## 2019-08-01 PROCEDURE — 36415 COLL VENOUS BLD VENIPUNCTURE: CPT

## 2019-08-01 PROCEDURE — 82550 ASSAY OF CK (CPK): CPT

## 2019-08-01 RX ORDER — METFORMIN HYDROCHLORIDE 500 MG/1
TABLET, EXTENDED RELEASE ORAL
Qty: 360 TABLET | Refills: 1 | Status: SHIPPED | OUTPATIENT
Start: 2019-08-01 | End: 2020-02-03

## 2019-08-01 RX ORDER — IRBESARTAN 150 MG/1
150 TABLET ORAL DAILY
Qty: 90 TABLET | Refills: 3 | Status: CANCELLED | OUTPATIENT
Start: 2019-08-01

## 2019-08-01 RX ORDER — GLIMEPIRIDE 1 MG/1
1 TABLET ORAL
Qty: 30 TABLET | Refills: 3 | Status: SHIPPED | OUTPATIENT
Start: 2019-08-01 | End: 2019-10-29 | Stop reason: SDUPTHER

## 2019-08-01 NOTE — PROGRESS NOTES
Assessment/Plan:    Patient Instructions    Patient with left-sided chest wall pain for about 2 days we are able to reproduce the symptoms and there is a small bruise area so it is likely musculoskeletal in nature patient does have cardiac history of triple bypass  There is nothing exertional about his symptoms I indicated that if any time he has worsening symptoms he should go to the emergency room  EKG in the office was stable without ischemic changes, we will check troponin if there is any evidence of elevation I would sent to the ER or expedite appointment with Cardiology  Of note he has not had any cardiac testing since his bypass in 2017 I will contact Cardiology after troponin is reviewed to see if they want me to order anything prior to the next follow-up  Diagnoses and all orders for this visit:    Chest wall pain  -     POCT ECG  -     Troponin I; Future  -     CK; Future  -     Sedimentation rate, automated; Future    Type 2 diabetes mellitus with complication, without long-term current use of insulin (HCC)  -     glimepiride (AMARYL) 1 mg tablet; Take 1 tablet (1 mg total) by mouth daily with breakfast    Essential hypertension    Hx of CABG    Obesity (BMI 30-39  9)    Other orders  -     Cancel: irbesartan (AVAPRO) 150 mg tablet; Take 1 tablet (150 mg total) by mouth daily         Subjective:      Patient ID: Vonda Lee is a 66 y o  male    Pt has been having left sided chest wall discomfort for about 3 dayss no known trauma no new exercise, does hurt w/ deep breath, can isolate the pain w/ deep touch  ? Small bruise to the area  Exertion does not worsen sx  No other associated symptoms, no nausea vomiting jaw pain no pain radiating to the arm no nausea vomiting no cough no fevers chills no rash      patient does have history of CABG about 2 years ago what is of concern was that he was asymptomatic at that time        Current Outpatient Medications:     aspirin 81 MG tablet, Take 81 mg by mouth daily, Disp: , Rfl:     atorvastatin (LIPITOR) 20 mg tablet, Take 1 tablet (20 mg total) by mouth daily at bedtime, Disp: 90 tablet, Rfl: 3    glimepiride (AMARYL) 1 mg tablet, Take 1 tablet (1 mg total) by mouth daily with breakfast, Disp: 30 tablet, Rfl: 3    hydrochlorothiazide (HYDRODIURIL) 25 mg tablet, TAKE 1 TABLET DAILY AS DIRECTED , Disp: 30 tablet, Rfl: 11    irbesartan (AVAPRO) 150 mg tablet, Take 1 tablet (150 mg total) by mouth daily, Disp: 90 tablet, Rfl: 3    metFORMIN (GLUCOPHAGE-XR) 500 mg 24 hr tablet, START WITH 1 TAB DAILY W/ DINNER, INCREASE BY 1 TABLET EACH WEEK AS TOLERATED UNTIL TAKING 4 TABS, Disp: 360 tablet, Rfl: 1    metoprolol succinate (TOPROL-XL) 100 mg 24 hr tablet, TAKE 1 TABLET BY MOUTH EVERY DAY, Disp: 30 tablet, Rfl: 5    albuterol (PROAIR HFA) 90 mcg/act inhaler, Inhale 2 puffs every 4 (four) hours as needed, Disp: , Rfl:     cholecalciferol (VITAMIN D3) 1,000 units tablet, Take 1,000 Units by mouth daily, Disp: , Rfl:     fluticasone-vilanterol (BREO ELLIPTA) 100-25 mcg/inh inhaler, Inhale daily, Disp: , Rfl:     No results found for this or any previous visit (from the past 1008 hour(s))  The following portions of the patient's history were reviewed and updated as appropriate: allergies, current medications, past family history, past medical history, past social history, past surgical history and problem list      Review of Systems   Constitutional: Negative for appetite change, chills, diaphoresis, fatigue, fever and unexpected weight change  HENT: Negative for postnasal drip and sneezing  Eyes: Negative for visual disturbance  Respiratory: Negative for chest tightness and shortness of breath  Cardiovascular: Negative for chest pain, palpitations and leg swelling  Gastrointestinal: Negative for abdominal pain and blood in stool  Endocrine: Negative for cold intolerance, heat intolerance, polydipsia, polyphagia and polyuria  Genitourinary: Negative for difficulty urinating, dysuria, frequency and urgency  Musculoskeletal: Negative for arthralgias and myalgias  Left sided chest wall pain   Skin: Negative for rash and wound  Neurological: Negative for dizziness, weakness, light-headedness and headaches  Hematological: Negative for adenopathy  Psychiatric/Behavioral: Negative for confusion, dysphoric mood and sleep disturbance  The patient is not nervous/anxious  Objective:      BP 94/68 (BP Location: Left arm, Patient Position: Sitting, Cuff Size: Standard)   Pulse 78   Resp 16   Ht 5' 6 93" (1 7 m)   Wt 84 9 kg (187 lb 3 2 oz)   BMI 29 38 kg/m²        Physical Exam   Constitutional: He is oriented to person, place, and time  He appears well-developed  No distress  HENT:   Head: Normocephalic and atraumatic  Nose: Nose normal    Mouth/Throat: Oropharynx is clear and moist    Eyes: Pupils are equal, round, and reactive to light  Conjunctivae and EOM are normal    Neck: Normal range of motion  Neck supple  No JVD present  No tracheal deviation present  No thyromegaly present  Cardiovascular: Normal rate, regular rhythm and intact distal pulses  Exam reveals no gallop and no friction rub  Murmur heard  Pulmonary/Chest: Effort normal and breath sounds normal  No respiratory distress  He has no wheezes  He has no rales  Abdominal: Soft  Bowel sounds are normal  He exhibits no distension  There is no tenderness  Musculoskeletal: Normal range of motion  He exhibits no edema  Tenderness noted to left chest wall and axillary region- small old echhymotic area noted to the left chest wall   Lymphadenopathy:     He has no cervical adenopathy  Neurological: He is alert and oriented to person, place, and time  No cranial nerve deficit  Skin: Skin is warm and dry  No rash noted  He is not diaphoretic  Psychiatric: He has a normal mood and affect   His behavior is normal  Judgment and thought content normal    BMI Counseling: Body mass index is 29 38 kg/m²  Discussed the patient's BMI with him  The BMI is above average  BMI counseling and education was provided to the patient  Nutrition recommendations include reducing portion sizes  Exercise recommendations include moderate aerobic physical activity for 150 minutes/week

## 2019-08-01 NOTE — PATIENT INSTRUCTIONS
Patient with left-sided chest wall pain for about 2 days we are able to reproduce the symptoms and there is a small bruise area so it is likely musculoskeletal in nature patient does have cardiac history of triple bypass  There is nothing exertional about his symptoms I indicated that if any time he has worsening symptoms he should go to the emergency room  EKG in the office was stable without ischemic changes, we will check troponin if there is any evidence of elevation I would sent to the ER or expedite appointment with Cardiology  Of note he has not had any cardiac testing since his bypass in 2017 I will contact Cardiology after troponin is reviewed to see if they want me to order anything prior to the next follow-up

## 2019-08-05 ENCOUNTER — TELEPHONE (OUTPATIENT)
Dept: INTERNAL MEDICINE CLINIC | Facility: CLINIC | Age: 64
End: 2019-08-05

## 2019-08-05 NOTE — TELEPHONE ENCOUNTER
----- Message from Kenny Braxton, [address] sent at 8/4/2019  8:28 AM EDT -----  His labs were all normal- how is he feeling?

## 2019-08-06 ENCOUNTER — APPOINTMENT (OUTPATIENT)
Dept: LAB | Facility: CLINIC | Age: 64
End: 2019-08-06
Payer: COMMERCIAL

## 2019-08-06 ENCOUNTER — TELEPHONE (OUTPATIENT)
Dept: INTERNAL MEDICINE CLINIC | Facility: CLINIC | Age: 64
End: 2019-08-06

## 2019-08-06 ENCOUNTER — OFFICE VISIT (OUTPATIENT)
Dept: INTERNAL MEDICINE CLINIC | Facility: CLINIC | Age: 64
End: 2019-08-06
Payer: COMMERCIAL

## 2019-08-06 VITALS
SYSTOLIC BLOOD PRESSURE: 122 MMHG | HEIGHT: 67 IN | HEART RATE: 64 BPM | WEIGHT: 185 LBS | RESPIRATION RATE: 12 BRPM | DIASTOLIC BLOOD PRESSURE: 80 MMHG | BODY MASS INDEX: 29.03 KG/M2

## 2019-08-06 DIAGNOSIS — R07.81 PLEURITIC CHEST PAIN: ICD-10-CM

## 2019-08-06 DIAGNOSIS — E11.9 TYPE 2 DIABETES MELLITUS WITHOUT COMPLICATION, WITHOUT LONG-TERM CURRENT USE OF INSULIN (HCC): Chronic | ICD-10-CM

## 2019-08-06 DIAGNOSIS — L02.219 CELLULITIS AND ABSCESS OF TRUNK: Primary | ICD-10-CM

## 2019-08-06 DIAGNOSIS — L03.319 CELLULITIS AND ABSCESS OF TRUNK: Primary | ICD-10-CM

## 2019-08-06 DIAGNOSIS — E78.2 MIXED HYPERLIPIDEMIA: ICD-10-CM

## 2019-08-06 DIAGNOSIS — I10 ESSENTIAL HYPERTENSION: Chronic | ICD-10-CM

## 2019-08-06 LAB
ALBUMIN SERPL BCP-MCNC: 4.3 G/DL (ref 3.5–5)
ALP SERPL-CCNC: 70 U/L (ref 46–116)
ALT SERPL W P-5'-P-CCNC: 25 U/L (ref 12–78)
ANION GAP SERPL CALCULATED.3IONS-SCNC: 6 MMOL/L (ref 4–13)
AST SERPL W P-5'-P-CCNC: 16 U/L (ref 5–45)
BASOPHILS # BLD AUTO: 0.06 THOUSANDS/ΜL (ref 0–0.1)
BASOPHILS NFR BLD AUTO: 1 % (ref 0–1)
BILIRUB SERPL-MCNC: 0.52 MG/DL (ref 0.2–1)
BUN SERPL-MCNC: 16 MG/DL (ref 5–25)
CALCIUM SERPL-MCNC: 9.9 MG/DL (ref 8.3–10.1)
CHLORIDE SERPL-SCNC: 103 MMOL/L (ref 100–108)
CHOLEST SERPL-MCNC: 129 MG/DL (ref 50–200)
CO2 SERPL-SCNC: 28 MMOL/L (ref 21–32)
CREAT SERPL-MCNC: 1.01 MG/DL (ref 0.6–1.3)
DEPRECATED D DIMER PPP: <220 NG/ML (FEU)
EOSINOPHIL # BLD AUTO: 0.19 THOUSAND/ΜL (ref 0–0.61)
EOSINOPHIL NFR BLD AUTO: 3 % (ref 0–6)
ERYTHROCYTE [DISTWIDTH] IN BLOOD BY AUTOMATED COUNT: 12.6 % (ref 11.6–15.1)
EST. AVERAGE GLUCOSE BLD GHB EST-MCNC: 151 MG/DL
GFR SERPL CREATININE-BSD FRML MDRD: 80 ML/MIN/1.73SQ M
GLUCOSE P FAST SERPL-MCNC: 113 MG/DL (ref 65–99)
HBA1C MFR BLD: 6.9 % (ref 4.2–6.3)
HCT VFR BLD AUTO: 44.4 % (ref 36.5–49.3)
HDLC SERPL-MCNC: 34 MG/DL (ref 40–60)
HGB BLD-MCNC: 15.3 G/DL (ref 12–17)
IMM GRANULOCYTES # BLD AUTO: 0.01 THOUSAND/UL (ref 0–0.2)
IMM GRANULOCYTES NFR BLD AUTO: 0 % (ref 0–2)
LDLC SERPL CALC-MCNC: 74 MG/DL (ref 0–100)
LYMPHOCYTES # BLD AUTO: 2.4 THOUSANDS/ΜL (ref 0.6–4.47)
LYMPHOCYTES NFR BLD AUTO: 38 % (ref 14–44)
MCH RBC QN AUTO: 29.3 PG (ref 26.8–34.3)
MCHC RBC AUTO-ENTMCNC: 34.5 G/DL (ref 31.4–37.4)
MCV RBC AUTO: 85 FL (ref 82–98)
MONOCYTES # BLD AUTO: 0.49 THOUSAND/ΜL (ref 0.17–1.22)
MONOCYTES NFR BLD AUTO: 8 % (ref 4–12)
NEUTROPHILS # BLD AUTO: 3.2 THOUSANDS/ΜL (ref 1.85–7.62)
NEUTS SEG NFR BLD AUTO: 50 % (ref 43–75)
NONHDLC SERPL-MCNC: 95 MG/DL
NRBC BLD AUTO-RTO: 0 /100 WBCS
PLATELET # BLD AUTO: 183 THOUSANDS/UL (ref 149–390)
PMV BLD AUTO: 10.7 FL (ref 8.9–12.7)
POTASSIUM SERPL-SCNC: 4.9 MMOL/L (ref 3.5–5.3)
PROT SERPL-MCNC: 8.3 G/DL (ref 6.4–8.2)
RBC # BLD AUTO: 5.22 MILLION/UL (ref 3.88–5.62)
SODIUM SERPL-SCNC: 137 MMOL/L (ref 136–145)
TRIGL SERPL-MCNC: 105 MG/DL
TSH SERPL DL<=0.05 MIU/L-ACNC: 1.78 UIU/ML (ref 0.36–3.74)
WBC # BLD AUTO: 6.35 THOUSAND/UL (ref 4.31–10.16)

## 2019-08-06 PROCEDURE — 83036 HEMOGLOBIN GLYCOSYLATED A1C: CPT

## 2019-08-06 PROCEDURE — 3008F BODY MASS INDEX DOCD: CPT | Performed by: INTERNAL MEDICINE

## 2019-08-06 PROCEDURE — 99213 OFFICE O/P EST LOW 20 MIN: CPT | Performed by: INTERNAL MEDICINE

## 2019-08-06 PROCEDURE — 84443 ASSAY THYROID STIM HORMONE: CPT

## 2019-08-06 PROCEDURE — 36415 COLL VENOUS BLD VENIPUNCTURE: CPT

## 2019-08-06 PROCEDURE — 85379 FIBRIN DEGRADATION QUANT: CPT

## 2019-08-06 PROCEDURE — 80061 LIPID PANEL: CPT

## 2019-08-06 PROCEDURE — 85025 COMPLETE CBC W/AUTO DIFF WBC: CPT

## 2019-08-06 PROCEDURE — 80053 COMPREHEN METABOLIC PANEL: CPT

## 2019-08-06 RX ORDER — CEPHALEXIN 500 MG/1
500 CAPSULE ORAL 4 TIMES DAILY
Qty: 28 CAPSULE | Refills: 0 | Status: SHIPPED | OUTPATIENT
Start: 2019-08-06 | End: 2019-08-13

## 2019-08-06 NOTE — PATIENT INSTRUCTIONS
Pleuritic chest pain is improving however I do have some concern for venous thromboembolism especially in light of upcoming travel  Check D-dimer, if positive check stat CTA PE protocol  If D-dimer is normal then we are reassured there is no venous thromboembolism and we will monitor symptoms  Let me know if chest pain worsens or persists  Cellulitis and abscess consistent with infected epidermal inclusion cyst   Start Keflex 500 mg 4 times daily for the next week  Use warm compresses 4-5 times daily  Notify me if erythema spreads beyond the skin marking  Return for follow-up if lesion becomes fluctuant and appears to need incision and drainage

## 2019-08-06 NOTE — PROGRESS NOTES
Assessment/Plan:    Diagnoses and all orders for this visit:    Cellulitis and abscess of trunk  -     cephalexin (KEFLEX) 500 mg capsule; Take 1 capsule (500 mg total) by mouth 4 (four) times a day for 7 days    Pleuritic chest pain  -     D-dimer, quantitative; Future         Patient Instructions   Pleuritic chest pain is improving however I do have some concern for venous thromboembolism especially in light of upcoming travel  Check D-dimer, if positive check stat CTA PE protocol  If D-dimer is normal then we are reassured there is no venous thromboembolism and we will monitor symptoms  Let me know if chest pain worsens or persists  Cellulitis and abscess consistent with infected epidermal inclusion cyst   Start Keflex 500 mg 4 times daily for the next week  Use warm compresses 4-5 times daily  Notify me if erythema spreads beyond the skin marking  Return for follow-up if lesion becomes fluctuant and appears to need incision and drainage  Subjective:      Patient ID: Soraida Andrade is a 66 y o  male    Patient presents for evaluation of a boil on the left mid axillary line  He was seen here on August 1st for a pleuritic chest pain and was noted to have a bruise in that area  EKG, cardiac enzymes were all normal, sed rate was 14  Since that time the pleuritic chest pain has improved but is still noticeable  He has not had cough, fevers, chills, shortness of breath, calf tenderness  He has no history of venous thromboembolism  He has no prior history of similar pains  Last night he noted a tender boil in his left axilla  When he woke this morning he thought it was a little bit better but his wife encouraged him to be checked out because he will be flying to [facility] within the week          Current Outpatient Medications:     albuterol (PROAIR HFA) 90 mcg/act inhaler, Inhale 2 puffs every 4 (four) hours as needed, Disp: , Rfl:     aspirin 81 MG tablet, Take 81 mg by mouth daily, Disp: , Rfl:     atorvastatin (LIPITOR) 20 mg tablet, Take 1 tablet (20 mg total) by mouth daily at bedtime, Disp: 90 tablet, Rfl: 3    fluticasone-vilanterol (BREO ELLIPTA) 100-25 mcg/inh inhaler, Inhale daily, Disp: , Rfl:     glimepiride (AMARYL) 1 mg tablet, Take 1 tablet (1 mg total) by mouth daily with breakfast, Disp: 30 tablet, Rfl: 3    hydrochlorothiazide (HYDRODIURIL) 25 mg tablet, TAKE 1 TABLET DAILY AS DIRECTED , Disp: 30 tablet, Rfl: 11    irbesartan (AVAPRO) 150 mg tablet, Take 1 tablet (150 mg total) by mouth daily, Disp: 90 tablet, Rfl: 3    metFORMIN (GLUCOPHAGE-XR) 500 mg 24 hr tablet, START WITH 1 TAB DAILY W/ DINNER, INCREASE BY 1 TABLET EACH WEEK AS TOLERATED UNTIL TAKING 4 TABS, Disp: 360 tablet, Rfl: 1    metoprolol succinate (TOPROL-XL) 100 mg 24 hr tablet, TAKE 1 TABLET BY MOUTH EVERY DAY, Disp: 30 tablet, Rfl: 5    cephalexin (KEFLEX) 500 mg capsule, Take 1 capsule (500 mg total) by mouth 4 (four) times a day for 7 days, Disp: 28 capsule, Rfl: 0    Recent Results (from the past 1008 hour(s))   Troponin I    Collection Time: 08/01/19  3:00 PM   Result Value Ref Range    Troponin I <0 02 <=0 04 ng/mL   CK    Collection Time: 08/01/19  3:00 PM   Result Value Ref Range    Total CK 73 39 - 308 U/L   Sedimentation rate, automated    Collection Time: 08/01/19  3:00 PM   Result Value Ref Range    Sed Rate 14 (H) 0 - 10 mm/hour       The following portions of the patient's history were reviewed and updated as appropriate: allergies, current medications, past family history, past medical history, past social history, past surgical history and problem list      Review of Systems   Constitutional: Negative for appetite change, chills, diaphoresis, fatigue, fever and unexpected weight change  HENT: Negative for congestion, hearing loss and rhinorrhea  Eyes: Negative for visual disturbance  Respiratory: Negative for cough, chest tightness, shortness of breath and wheezing      Cardiovascular: Positive for chest pain  Negative for palpitations and leg swelling  Gastrointestinal: Negative for abdominal pain and blood in stool  Endocrine: Negative for cold intolerance, heat intolerance, polydipsia and polyuria  Genitourinary: Negative for difficulty urinating, dysuria, frequency and urgency  Musculoskeletal: Negative for arthralgias and myalgias  Skin: Positive for wound  Negative for rash  Neurological: Negative for dizziness, weakness, light-headedness and headaches  Hematological: Does not bruise/bleed easily  Psychiatric/Behavioral: Negative for dysphoric mood and sleep disturbance  Objective:      Vitals:    08/06/19 0928   BP: 122/80   Pulse: 64   Resp: 12          Physical Exam   Constitutional: He is oriented to person, place, and time  He appears well-developed and well-nourished  HENT:   Head: Normocephalic and atraumatic  Nose: Nose normal    Mouth/Throat: Oropharynx is clear and moist    Eyes: Pupils are equal, round, and reactive to light  Conjunctivae and EOM are normal  No scleral icterus  Neck: Normal range of motion  Neck supple  No JVD present  No tracheal deviation present  No thyromegaly present  Cardiovascular: Normal rate, regular rhythm and intact distal pulses  Exam reveals no gallop and no friction rub  No murmur heard  Pulmonary/Chest: Effort normal and breath sounds normal  No respiratory distress  He has no wheezes  He has no rales  Musculoskeletal: He exhibits no edema or deformity  Lymphadenopathy:     He has no cervical adenopathy  Neurological: He is alert and oriented to person, place, and time  No cranial nerve deficit  Skin: Skin is warm and dry  No rash noted  No erythema  No pallor  2 small ecchymosis in the left lateral pectoral region superior to that just inferior to the left axilla is an approximately 8 x 12 mm subcutaneous cystic lesion with surrounding erythema  There is minimal fluctuance    This is exquisitely tender to palpate  This has the impression of infected epidermal inclusion cyst   Psychiatric: He has a normal mood and affect   His behavior is normal  Judgment and thought content normal

## 2019-08-06 NOTE — TELEPHONE ENCOUNTER
----- Message from Yeyo Carmen MD sent at 8/6/2019  2:15 PM EDT -----  Notify -D-dimer is negative, no need for CT scan

## 2019-08-27 ENCOUNTER — OFFICE VISIT (OUTPATIENT)
Dept: INTERNAL MEDICINE CLINIC | Facility: CLINIC | Age: 64
End: 2019-08-27
Payer: COMMERCIAL

## 2019-08-27 VITALS
RESPIRATION RATE: 14 BRPM | HEIGHT: 67 IN | DIASTOLIC BLOOD PRESSURE: 74 MMHG | WEIGHT: 189 LBS | HEART RATE: 72 BPM | SYSTOLIC BLOOD PRESSURE: 110 MMHG | BODY MASS INDEX: 29.66 KG/M2

## 2019-08-27 DIAGNOSIS — Z95.1 HX OF CABG: ICD-10-CM

## 2019-08-27 DIAGNOSIS — E78.2 MIXED HYPERLIPIDEMIA: ICD-10-CM

## 2019-08-27 DIAGNOSIS — E11.9 TYPE 2 DIABETES MELLITUS WITHOUT COMPLICATION, WITHOUT LONG-TERM CURRENT USE OF INSULIN (HCC): Primary | Chronic | ICD-10-CM

## 2019-08-27 DIAGNOSIS — N52.9 ERECTILE DYSFUNCTION, UNSPECIFIED ERECTILE DYSFUNCTION TYPE: ICD-10-CM

## 2019-08-27 DIAGNOSIS — I10 ESSENTIAL HYPERTENSION: Chronic | ICD-10-CM

## 2019-08-27 DIAGNOSIS — I25.10 CORONARY ARTERY DISEASE INVOLVING NATIVE HEART WITHOUT ANGINA PECTORIS, UNSPECIFIED VESSEL OR LESION TYPE: ICD-10-CM

## 2019-08-27 DIAGNOSIS — E66.9 OBESITY (BMI 30-39.9): ICD-10-CM

## 2019-08-27 PROCEDURE — 1036F TOBACCO NON-USER: CPT | Performed by: INTERNAL MEDICINE

## 2019-08-27 PROCEDURE — 99214 OFFICE O/P EST MOD 30 MIN: CPT | Performed by: INTERNAL MEDICINE

## 2019-08-27 RX ORDER — TADALAFIL 20 MG/1
20 TABLET ORAL DAILY PRN
Qty: 3 TABLET | Refills: 5 | Status: SHIPPED | OUTPATIENT
Start: 2019-08-27 | End: 2020-04-27

## 2019-08-27 NOTE — PATIENT INSTRUCTIONS
Lab data reviewed in detail and compared prior    Coronary artery disease status post CABG stable without angina, continue to modify risk factors as below    Hypertension stable on present regimen    Hyperlipidemia-LDL at goal on atorvastatin, HDL remains low, increased aerobic exercise may be beneficial     Type 2 diabetes mellitus is under optimal control with metformin, dietary modification and weight loss, continue with same  ED-trial of Cialis, 20 mg tablet, cut in half or even a quarter and increase as needed  Routine follow-up after labs in 4 months, sooner as needed

## 2019-08-27 NOTE — PROGRESS NOTES
Assessment/Plan:    Diagnoses and all orders for this visit:    Type 2 diabetes mellitus without complication, without long-term current use of insulin (HCC)  -     Hemoglobin A1C; Future    Essential hypertension  -     CBC and differential; Future  -     Comprehensive metabolic panel; Future    Coronary artery disease involving native heart without angina pectoris, unspecified vessel or lesion type    Hx of CABG    Mixed hyperlipidemia  -     Lipid panel; Future    Obesity (BMI 30-39  9)    Erectile dysfunction, unspecified erectile dysfunction type  -     tadalafil (CIALIS) 20 MG tablet; Take 1 tablet (20 mg total) by mouth daily as needed for erectile dysfunction         Patient Instructions   Lab data reviewed in detail and compared prior    Coronary artery disease status post CABG stable without angina, continue to modify risk factors as below    Hypertension stable on present regimen    Hyperlipidemia-LDL at goal on atorvastatin, HDL remains low, increased aerobic exercise may be beneficial     Type 2 diabetes mellitus is under optimal control with metformin, dietary modification and weight loss, continue with same  ED-trial of Cialis, 20 mg tablet, cut in half or even a quarter and increase as needed  Routine follow-up after labs in 4 months, sooner as needed  Subjective:      Patient ID: Sylvania Cockayne is a 66 y o  male    Feeling generally well  Boil under L arm nearly resolved  Cut carbs and did keto diet until Easter, gained back 6 lbs on vacation  Plans to go back on low carb but not Keto diet  DM-taknig metformin 2000 and tolerating  Testing rarely b/c always less than 120    No glimeperide  HTN-taking rx as directed, no home bp's  HPL-tolerating atorvastain  Not exercising per se, but very active w/ projects  CAD-no angina          Current Outpatient Medications:     aspirin 81 MG tablet, Take 81 mg by mouth daily, Disp: , Rfl:     atorvastatin (LIPITOR) 20 mg tablet, Take 1 tablet (20 mg total) by mouth daily at bedtime, Disp: 90 tablet, Rfl: 3    hydrochlorothiazide (HYDRODIURIL) 25 mg tablet, TAKE 1 TABLET DAILY AS DIRECTED , Disp: 30 tablet, Rfl: 11    irbesartan (AVAPRO) 150 mg tablet, Take 1 tablet (150 mg total) by mouth daily, Disp: 90 tablet, Rfl: 3    metFORMIN (GLUCOPHAGE-XR) 500 mg 24 hr tablet, START WITH 1 TAB DAILY W/ DINNER, INCREASE BY 1 TABLET EACH WEEK AS TOLERATED UNTIL TAKING 4 TABS (Patient taking differently: Take 2,000 mg by mouth daily with dinner ), Disp: 360 tablet, Rfl: 1    metoprolol succinate (TOPROL-XL) 100 mg 24 hr tablet, TAKE 1 TABLET BY MOUTH EVERY DAY, Disp: 30 tablet, Rfl: 5    albuterol (PROAIR HFA) 90 mcg/act inhaler, Inhale 2 puffs every 4 (four) hours as needed, Disp: , Rfl:     fluticasone-vilanterol (BREO ELLIPTA) 100-25 mcg/inh inhaler, Inhale daily, Disp: , Rfl:     glimepiride (AMARYL) 1 mg tablet, Take 1 tablet (1 mg total) by mouth daily with breakfast (Patient not taking: Reported on 8/27/2019), Disp: 30 tablet, Rfl: 3    tadalafil (CIALIS) 20 MG tablet, Take 1 tablet (20 mg total) by mouth daily as needed for erectile dysfunction, Disp: 3 tablet, Rfl: 5    Recent Results (from the past 1008 hour(s))   Troponin I    Collection Time: 08/01/19  3:00 PM   Result Value Ref Range    Troponin I <0 02 <=0 04 ng/mL   CK    Collection Time: 08/01/19  3:00 PM   Result Value Ref Range    Total CK 73 39 - 308 U/L   Sedimentation rate, automated    Collection Time: 08/01/19  3:00 PM   Result Value Ref Range    Sed Rate 14 (H) 0 - 10 mm/hour   CBC and differential    Collection Time: 08/06/19  9:57 AM   Result Value Ref Range    WBC 6 35 4 31 - 10 16 Thousand/uL    RBC 5 22 3 88 - 5 62 Million/uL    Hemoglobin 15 3 12 0 - 17 0 g/dL    Hematocrit 44 4 36 5 - 49 3 %    MCV 85 82 - 98 fL    MCH 29 3 26 8 - 34 3 pg    MCHC 34 5 31 4 - 37 4 g/dL    RDW 12 6 11 6 - 15 1 %    MPV 10 7 8 9 - 12 7 fL    Platelets 432 163 - 828 Thousands/uL    nRBC 0 /100 WBCs Neutrophils Relative 50 43 - 75 %    Immat GRANS % 0 0 - 2 %    Lymphocytes Relative 38 14 - 44 %    Monocytes Relative 8 4 - 12 %    Eosinophils Relative 3 0 - 6 %    Basophils Relative 1 0 - 1 %    Neutrophils Absolute 3 20 1 85 - 7 62 Thousands/µL    Immature Grans Absolute 0 01 0 00 - 0 20 Thousand/uL    Lymphocytes Absolute 2 40 0 60 - 4 47 Thousands/µL    Monocytes Absolute 0 49 0 17 - 1 22 Thousand/µL    Eosinophils Absolute 0 19 0 00 - 0 61 Thousand/µL    Basophils Absolute 0 06 0 00 - 0 10 Thousands/µL   Comprehensive metabolic panel    Collection Time: 08/06/19  9:57 AM   Result Value Ref Range    Sodium 137 136 - 145 mmol/L    Potassium 4 9 3 5 - 5 3 mmol/L    Chloride 103 100 - 108 mmol/L    CO2 28 21 - 32 mmol/L    ANION GAP 6 4 - 13 mmol/L    BUN 16 5 - 25 mg/dL    Creatinine 1 01 0 60 - 1 30 mg/dL    Glucose, Fasting 113 (H) 65 - 99 mg/dL    Calcium 9 9 8 3 - 10 1 mg/dL    AST 16 5 - 45 U/L    ALT 25 12 - 78 U/L    Alkaline Phosphatase 70 46 - 116 U/L    Total Protein 8 3 (H) 6 4 - 8 2 g/dL    Albumin 4 3 3 5 - 5 0 g/dL    Total Bilirubin 0 52 0 20 - 1 00 mg/dL    eGFR 80 ml/min/1 73sq m   Lipid panel    Collection Time: 08/06/19  9:57 AM   Result Value Ref Range    Cholesterol 129 50 - 200 mg/dL    Triglycerides 105 <=150 mg/dL    HDL, Direct 34 (L) 40 - 60 mg/dL    LDL Calculated 74 0 - 100 mg/dL    Non-HDL-Chol (CHOL-HDL) 95 mg/dl   Hemoglobin A1C    Collection Time: 08/06/19  9:57 AM   Result Value Ref Range    Hemoglobin A1C 6 9 (H) 4 2 - 6 3 %     mg/dl   TSH, 3rd generation with Free T4 reflex    Collection Time: 08/06/19  9:57 AM   Result Value Ref Range    TSH 3RD GENERATON 1 780 0 358 - 3 740 uIU/mL   D-dimer, quantitative    Collection Time: 08/06/19  9:57 AM   Result Value Ref Range    D-Dimer, Quant <220 <500 ng/ml (FEU)       The following portions of the patient's history were reviewed and updated as appropriate: allergies, current medications, past family history, past medical history, past social history, past surgical history and problem list      Review of Systems   Constitutional: Negative for appetite change, chills, diaphoresis, fatigue, fever and unexpected weight change  HENT: Negative for congestion, hearing loss and rhinorrhea  Eyes: Negative for visual disturbance  Respiratory: Negative for cough, chest tightness, shortness of breath and wheezing  Cardiovascular: Negative for chest pain, palpitations and leg swelling  Gastrointestinal: Negative for abdominal pain and blood in stool  Endocrine: Negative for cold intolerance, heat intolerance, polydipsia and polyuria  Genitourinary: Negative for difficulty urinating, dysuria, frequency and urgency  Musculoskeletal: Negative for arthralgias and myalgias  Skin: Negative for rash  Neurological: Negative for dizziness, weakness, light-headedness and headaches  Hematological: Does not bruise/bleed easily  Psychiatric/Behavioral: Negative for dysphoric mood and sleep disturbance  Objective:      Vitals:    08/27/19 1239   BP: 110/74   Pulse: 72   Resp: 14          Physical Exam   Constitutional: He is oriented to person, place, and time  He appears well-developed and well-nourished  HENT:   Head: Normocephalic and atraumatic  Nose: Nose normal    Mouth/Throat: Oropharynx is clear and moist    Eyes: Pupils are equal, round, and reactive to light  Conjunctivae and EOM are normal  No scleral icterus  Neck: Normal range of motion  Neck supple  No JVD present  No tracheal deviation present  No thyromegaly present  Cardiovascular: Normal rate, regular rhythm and intact distal pulses  Exam reveals no gallop and no friction rub  No murmur heard  Pulmonary/Chest: Effort normal and breath sounds normal  No respiratory distress  He has no wheezes  He has no rales  Musculoskeletal: He exhibits no edema or deformity  Lymphadenopathy:     He has no cervical adenopathy     Neurological: He is alert and oriented to person, place, and time  No cranial nerve deficit  Skin: Skin is warm and dry  No rash noted  No erythema  No pallor  Psychiatric: He has a normal mood and affect   His behavior is normal  Judgment and thought content normal

## 2019-09-27 DIAGNOSIS — I10 ESSENTIAL HYPERTENSION: ICD-10-CM

## 2019-09-27 RX ORDER — HYDROCHLOROTHIAZIDE 25 MG/1
TABLET ORAL
Qty: 90 TABLET | Refills: 3 | Status: SHIPPED | OUTPATIENT
Start: 2019-09-27 | End: 2020-12-10 | Stop reason: SDUPTHER

## 2019-10-29 DIAGNOSIS — E11.8 TYPE 2 DIABETES MELLITUS WITH COMPLICATION, WITHOUT LONG-TERM CURRENT USE OF INSULIN (HCC): ICD-10-CM

## 2019-10-29 RX ORDER — GLIMEPIRIDE 1 MG/1
1 TABLET ORAL
Qty: 90 TABLET | Refills: 1 | Status: SHIPPED | OUTPATIENT
Start: 2019-10-29 | End: 2020-08-13 | Stop reason: ALTCHOICE

## 2019-11-12 DIAGNOSIS — I10 ESSENTIAL HYPERTENSION: ICD-10-CM

## 2019-11-12 PROCEDURE — 4010F ACE/ARB THERAPY RXD/TAKEN: CPT | Performed by: INTERNAL MEDICINE

## 2019-11-12 RX ORDER — IRBESARTAN 150 MG/1
TABLET ORAL
Qty: 90 TABLET | Refills: 1 | Status: SHIPPED | OUTPATIENT
Start: 2019-11-12 | End: 2020-04-27 | Stop reason: SDUPTHER

## 2019-12-19 ENCOUNTER — APPOINTMENT (OUTPATIENT)
Dept: LAB | Facility: HOSPITAL | Age: 64
End: 2019-12-19
Attending: INTERNAL MEDICINE
Payer: COMMERCIAL

## 2019-12-19 ENCOUNTER — TELEPHONE (OUTPATIENT)
Dept: INTERNAL MEDICINE CLINIC | Facility: CLINIC | Age: 64
End: 2019-12-19

## 2019-12-19 DIAGNOSIS — E11.9 TYPE 2 DIABETES MELLITUS WITHOUT COMPLICATION, WITHOUT LONG-TERM CURRENT USE OF INSULIN (HCC): Chronic | ICD-10-CM

## 2019-12-19 DIAGNOSIS — I10 ESSENTIAL HYPERTENSION: Chronic | ICD-10-CM

## 2019-12-19 DIAGNOSIS — E78.2 MIXED HYPERLIPIDEMIA: ICD-10-CM

## 2019-12-19 LAB
ALBUMIN SERPL BCP-MCNC: 3.7 G/DL (ref 3.5–5)
ALP SERPL-CCNC: 78 U/L (ref 46–116)
ALT SERPL W P-5'-P-CCNC: 29 U/L (ref 12–78)
ANION GAP SERPL CALCULATED.3IONS-SCNC: 10 MMOL/L (ref 4–13)
AST SERPL W P-5'-P-CCNC: 19 U/L (ref 5–45)
BASOPHILS # BLD AUTO: 0.05 THOUSANDS/ΜL (ref 0–0.1)
BASOPHILS NFR BLD AUTO: 1 % (ref 0–1)
BILIRUB SERPL-MCNC: 0.3 MG/DL (ref 0.2–1)
BUN SERPL-MCNC: 16 MG/DL (ref 5–25)
CALCIUM SERPL-MCNC: 9.4 MG/DL (ref 8.3–10.1)
CHLORIDE SERPL-SCNC: 102 MMOL/L (ref 100–108)
CHOLEST SERPL-MCNC: 113 MG/DL (ref 50–200)
CO2 SERPL-SCNC: 28 MMOL/L (ref 21–32)
CREAT SERPL-MCNC: 0.87 MG/DL (ref 0.6–1.3)
EOSINOPHIL # BLD AUTO: 0.14 THOUSAND/ΜL (ref 0–0.61)
EOSINOPHIL NFR BLD AUTO: 2 % (ref 0–6)
ERYTHROCYTE [DISTWIDTH] IN BLOOD BY AUTOMATED COUNT: 12.3 % (ref 11.6–15.1)
EST. AVERAGE GLUCOSE BLD GHB EST-MCNC: 180 MG/DL
GFR SERPL CREATININE-BSD FRML MDRD: 92 ML/MIN/1.73SQ M
GLUCOSE P FAST SERPL-MCNC: 153 MG/DL (ref 65–99)
HBA1C MFR BLD: 7.9 % (ref 4.2–6.3)
HCT VFR BLD AUTO: 43.8 % (ref 36.5–49.3)
HDLC SERPL-MCNC: 33 MG/DL
HGB BLD-MCNC: 14.9 G/DL (ref 12–17)
IMM GRANULOCYTES # BLD AUTO: 0.03 THOUSAND/UL (ref 0–0.2)
IMM GRANULOCYTES NFR BLD AUTO: 0 % (ref 0–2)
LDLC SERPL CALC-MCNC: 36 MG/DL (ref 0–100)
LYMPHOCYTES # BLD AUTO: 2.54 THOUSANDS/ΜL (ref 0.6–4.47)
LYMPHOCYTES NFR BLD AUTO: 35 % (ref 14–44)
MCH RBC QN AUTO: 29 PG (ref 26.8–34.3)
MCHC RBC AUTO-ENTMCNC: 34 G/DL (ref 31.4–37.4)
MCV RBC AUTO: 85 FL (ref 82–98)
MONOCYTES # BLD AUTO: 0.62 THOUSAND/ΜL (ref 0.17–1.22)
MONOCYTES NFR BLD AUTO: 9 % (ref 4–12)
NEUTROPHILS # BLD AUTO: 3.85 THOUSANDS/ΜL (ref 1.85–7.62)
NEUTS SEG NFR BLD AUTO: 53 % (ref 43–75)
NONHDLC SERPL-MCNC: 80 MG/DL
NRBC BLD AUTO-RTO: 0 /100 WBCS
PLATELET # BLD AUTO: 189 THOUSANDS/UL (ref 149–390)
PMV BLD AUTO: 9.9 FL (ref 8.9–12.7)
POTASSIUM SERPL-SCNC: 4.5 MMOL/L (ref 3.5–5.3)
PROT SERPL-MCNC: 7.8 G/DL (ref 6.4–8.2)
RBC # BLD AUTO: 5.13 MILLION/UL (ref 3.88–5.62)
SODIUM SERPL-SCNC: 140 MMOL/L (ref 136–145)
TRIGL SERPL-MCNC: 221 MG/DL
WBC # BLD AUTO: 7.23 THOUSAND/UL (ref 4.31–10.16)

## 2019-12-19 PROCEDURE — 80053 COMPREHEN METABOLIC PANEL: CPT

## 2019-12-19 PROCEDURE — 85025 COMPLETE CBC W/AUTO DIFF WBC: CPT

## 2019-12-19 PROCEDURE — 36415 COLL VENOUS BLD VENIPUNCTURE: CPT

## 2019-12-19 PROCEDURE — 80061 LIPID PANEL: CPT

## 2019-12-19 PROCEDURE — 83036 HEMOGLOBIN GLYCOSYLATED A1C: CPT

## 2020-01-05 DIAGNOSIS — T78.40XA ALLERGIC STATE, INITIAL ENCOUNTER: ICD-10-CM

## 2020-01-07 RX ORDER — FLUTICASONE PROPIONATE 50 MCG
SPRAY, SUSPENSION (ML) NASAL
Qty: 16 ML | Refills: 0 | Status: SHIPPED | OUTPATIENT
Start: 2020-01-07 | End: 2020-01-31

## 2020-01-12 DIAGNOSIS — E78.2 MIXED HYPERLIPIDEMIA: ICD-10-CM

## 2020-01-13 RX ORDER — ATORVASTATIN CALCIUM 20 MG/1
20 TABLET, FILM COATED ORAL
Qty: 90 TABLET | Refills: 0 | Status: SHIPPED | OUTPATIENT
Start: 2020-01-13 | End: 2020-04-12

## 2020-01-17 DIAGNOSIS — I10 HYPERTENSION, UNSPECIFIED TYPE: ICD-10-CM

## 2020-01-17 RX ORDER — METOPROLOL SUCCINATE 100 MG/1
TABLET, EXTENDED RELEASE ORAL
Qty: 90 TABLET | Refills: 0 | Status: SHIPPED | OUTPATIENT
Start: 2020-01-17 | End: 2020-05-01 | Stop reason: SDUPTHER

## 2020-01-31 DIAGNOSIS — T78.40XA ALLERGIC STATE, INITIAL ENCOUNTER: ICD-10-CM

## 2020-01-31 RX ORDER — FLUTICASONE PROPIONATE 50 MCG
SPRAY, SUSPENSION (ML) NASAL
Qty: 16 ML | Refills: 0 | Status: SHIPPED | OUTPATIENT
Start: 2020-01-31 | End: 2020-02-25 | Stop reason: SDUPTHER

## 2020-02-03 DIAGNOSIS — E11.9 TYPE 2 DIABETES MELLITUS WITHOUT COMPLICATION, WITHOUT LONG-TERM CURRENT USE OF INSULIN (HCC): ICD-10-CM

## 2020-02-03 RX ORDER — METFORMIN HYDROCHLORIDE 500 MG/1
2000 TABLET, EXTENDED RELEASE ORAL
Qty: 360 TABLET | Refills: 3 | Status: SHIPPED | OUTPATIENT
Start: 2020-02-03 | End: 2020-12-10 | Stop reason: SDUPTHER

## 2020-02-21 ENCOUNTER — TELEPHONE (OUTPATIENT)
Dept: DERMATOLOGY | Facility: CLINIC | Age: 65
End: 2020-02-21

## 2020-02-21 NOTE — TELEPHONE ENCOUNTER
Called patient to schedule an appointment in our [facility] with his wife  Left a voicemail to give us a call back if still interested

## 2020-02-25 DIAGNOSIS — T78.40XA ALLERGIC STATE, INITIAL ENCOUNTER: ICD-10-CM

## 2020-02-25 RX ORDER — FLUTICASONE PROPIONATE 50 MCG
SPRAY, SUSPENSION (ML) NASAL
Qty: 16 ML | Refills: 0 | Status: SHIPPED | OUTPATIENT
Start: 2020-02-25 | End: 2020-03-24 | Stop reason: SDUPTHER

## 2020-03-04 ENCOUNTER — OFFICE VISIT (OUTPATIENT)
Dept: DERMATOLOGY | Facility: CLINIC | Age: 65
End: 2020-03-04
Payer: COMMERCIAL

## 2020-03-04 VITALS — BODY MASS INDEX: 31.1 KG/M2 | HEIGHT: 67 IN | TEMPERATURE: 97.9 F | WEIGHT: 198.13 LBS

## 2020-03-04 DIAGNOSIS — D22.9 MULTIPLE BENIGN MELANOCYTIC NEVI: ICD-10-CM

## 2020-03-04 DIAGNOSIS — Z85.820 HISTORY OF MELANOMA: ICD-10-CM

## 2020-03-04 DIAGNOSIS — L81.4 LENTIGO: Primary | ICD-10-CM

## 2020-03-04 DIAGNOSIS — L57.0 ACTINIC KERATOSIS: ICD-10-CM

## 2020-03-04 DIAGNOSIS — L82.1 SEBORRHEIC KERATOSES: ICD-10-CM

## 2020-03-04 PROCEDURE — 99204 OFFICE O/P NEW MOD 45 MIN: CPT | Performed by: DERMATOLOGY

## 2020-03-04 PROCEDURE — 17000 DESTRUCT PREMALG LESION: CPT | Performed by: DERMATOLOGY

## 2020-03-04 NOTE — PATIENT INSTRUCTIONS
Assessment and Plan:  Based on a thorough discussion of this condition and the management approach to it (including a comprehensive discussion of the known risks, side effects and potential benefits of treatment), the patient (family) agrees to implement the following specific plan:   Reassure benign    What is a lentigo? A lentigo is a pigmented flat or slightly raised lesion with a clearly defined edge  Unlike an ephelis (freckle), it does not fade in the winter months  There are several kinds of lentigo  The name lentigo originally referred to its appearance resembling a small lentil  The plural of lentigo is lentigines, although lentigos is also in common use  Who gets lentigines? Lentigines can affect males and females of all ages and races  Solar lentigines are especially prevalent in fair skinned adults  Lentigines associated with syndromes are present at birth or arise during childhood  What causes lentigines? Common forms of lentigo are due to exposure to ultraviolet radiation:   Sun damage including sunburn    Indoor tanning    Phototherapy, especially photochemotherapy (PUVA)    Ionizing radiation, eg radiation therapy, can also cause lentigines  Several familial syndromes associated with widespread lentigines originate from mutations in Triston-MAP kinase, mTOR signaling and PTEN pathways  What are the clinical features of lentigines? Lentigines have been classified into several different types depending on what they look like, where they appear on the body, causative factors, and whether they are associated to other diseases or conditions  Lentigines may be solitary or more often, multiple  Most lentigines are smaller than 5 mm in diameter      Lentigo simplex   A precursor to junctional naevus    Arises during childhood and early adult life    Found on trunk and limbs    Small brown round or oval macule or thin plaque    Jagged or smooth edge    May have a dry surface    May disappear in time  Solar lentigo   A precursor to seborrhoeic keratosis    Found on chronically sun exposed sites such as hands, face, lower legs    May also follow sunburn to shoulders    Yellow, light or dark brown regular or irregular macule or thin plaque    May have a dry surface    Often has moth-eaten outline    Can slowly enlarge to several centimeters in diameter    May disappear, often through the process known as lichenoid keratosis    When atypical in appearance, may be difficult to distinguish from melanoma in situ  Ink spot lentigo   Also known as reticulated lentigo    Few in number compared to solar lentigines    Follows sunburn in very fair skinned individuals    Dark brown to black irregular ink spot-like macule  PUVA lentigo   Similar to ink spot lentigo but follows photochemotherapy (PUVA)    Location anywhere exposed to PUVA  Tanning bed lentigo   Similar to ink spot lentigo but follows indoor tanning    Location anywhere exposed to tanning bed  Radiation lentigo   Occurs in site of irradiation (accidental or therapeutic)    Associated with late-stage radiation dermatitis: epidermal atrophy, subcutaneous fibrosis, keratosis, telangiectasias  Melanotic macule   Mucosal surfaces or adjacent glabrous skin eg lip, vulva, penis, anus    Light to dark brown    Also called mucosal melanosis  Generalised lentigines   Found on any exposed or covered site from early childhood    Small macules may merge to form larger patches    Not associated with a syndrome    Also called lentigines profusa, multiple lentigines  Agminated lentigines   Naevoid eruption of lentigos confined to a single segmental area    Sharp demarcation in midline    May have associated neurological and developmental abnormalities  Patterned lentigines   Inherited tendency to lentigines on face, lips, buttocks, palms, soles    Recognised mainly in people of  ethnicity  Centrofacial neurodysraphic lentiginosis  Associated with mental retardation  Lentiginosis syndromes   Syndromes include LEOPARD/Camilla, Peutz-Jeghers, Laugier-Hunziker, Moynahan, Xeroderma pigmentosum, myxoma syndromes (MOLINA, NAME, Miranda), Ruvalcaba-Myhre-Estrella, Bannayan-Zonnana syndrome, Cowden disease (multiple hamartoma syndrome )    Inheritance is autosomal dominant; sporadic cases common    Widespread lentigines present at birth or arise in early childhood    Associated with neural, endocrine, and mesenchymal tumors    How is the diagnosis made? Lentigines are usually diagnosed clinically by their typical appearance  Concern regarding possibility of melanoma may lead to:   Dermatoscopy    Diagnostic excision biopsy    Histopathology of a lentigo shows:   Thickened epidermis    An increased number of melanocytes along the basal layer of epidermis    Unlike junctional melanocytic naevus, there are no nests of melanocytes    Increased melanin pigment within the keratinocytes    Additional features depending on type of lentigo    In contrast, an ephelis (freckle) shows sun-induced increased melanin within the keratinocytes, without an increase in number of cells  What is the treatment for lentigines? Most lentigines are left alone  Attempts to lighten them may not be successful  The following approaches are used:   SPF 50+ broad-spectrum sunscreen    Hydroquinone bleaching cream    Alpha hydroxy acids    Vitamin C    Retinoids    Azelaic acid    Chemical peels  Individual lesions can be permanently removed using:   Cryotherapy    Intense pulsed light    Pigment lasers    How can lentigines be prevented? Lentigines associated with exposure ultraviolet radiation can be prevented by very careful sun protection  Clothing is more successful at preventing new lentigines than are sunscreens  What is the outlook for lentigines? Lentigines usually persist  They may increase in number with age and sun exposure   Some in sun-protected sites may fade and disappear  Assessment and Plan:  Based on a thorough discussion of this condition and the management approach to it (including a comprehensive discussion of the known risks, side effects and potential benefits of treatment), the patient (family) agrees to implement the following specific plan:     Liquid nitrogen was applied for 10-12 seconds to the skin lesion and the expected blistering or scabbing reaction explained  Do not pick at the area  Patient reminded to expect hypopigmented scars from the procedure  Return if lesion fails to fully resolve  Actinic keratoses are very common on sites repeatedly exposed to the sun, especially the backs of the hands and the face, most often affecting the ears, nose, cheeks, upper lip, vermilion of the lower lip, temples, forehead and balding scalp  In severely chronically sun-damaged individuals, they may also be found on the upper trunk, upper and lower limbs, and dorsum of feet  We discussed the theoretical premalignant (pre-cancerous) nature and etiology of these growths  We discussed the prevailing notion that actinic keratoses are a reflection of abnormal skin cell development due to DNA damage by short wavelength UVB  They are more likely to appear if the immune function is poor, due to aging, recent sun exposure, predisposing disease or certain drugs  We discussed that the main concern is that actinic keratoses may predispose to squamous cell carcinoma  It is rare for a solitary actinic keratosis to evolve to squamous cell carcinoma (SCC), but the risk of SCC occurring at some stage in a patient with more than 10 actinic keratoses is thought to be about 10 to 15%  A tender, thickened, ulcerated or enlarging actinic keratosis is suspicious of SCC  Actinic keratoses may be prevented by strict sun protection   If already present, keratoses may improve with a very high sun protection factor (50+) broad-spectrum sunscreen applied at least daily to affected areas, year-round  We recommend that UPF-rated clothing and hats and sunglasses be worn whenever possible and that a sunscreen-moisturizer combination product such as Neutrogena Daily Defense be applied at least three times a day  We performed a thorough discussion of treatment options and specific risk/benefits/alternatives including but not limited to medical field treatment with medications such as the following:       Cryotherapy (specifically, local pain, scarring, dyspigmentation, blistering, possible superinfection, and treats only what we see versus directed treatment today)  Assessment and Plan:  Based on a thorough discussion of this condition and the management approach to it (including a comprehensive discussion of the known risks, side effects and potential benefits of treatment), the patient (family) agrees to implement the following specific plan:   When outside we recommend using a wide brim hat, sunglasses, long sleeve and pants, sunscreen with SPF 00+ with reapplication every 2 hours, or SPF specific clothing        Melanocytic Nevi  Melanocytic nevi ("moles") are tan or brown, raised or flat areas of the skin which have an increased number of melanocytes  Melanocytes are the cells in our body which make pigment and account for skin color  Some moles are present at birth (I e , "congenital nevi"), while others come up later in life (i e , "acquired nevi")  The sun can stimulate the body to make more moles  Sunburns are not the only thing that triggers more moles  Chronic sun exposure can do it too  Clinically distinguishing a healthy mole from melanoma may be difficult, even for experienced dermatologists  The "ABCDE's" of moles have been suggested as a means of helping to alert a person to a suspicious mole and the possible increased risk of melanoma    The suggestions for raising alert are as follows:    Asymmetry: Healthy moles tend to be symmetric, while melanomas are often asymmetric  Asymmetry means if you draw a line through the mole, the two halves do not match in color, size, shape, or surface texture  Asymmetry can be a result of rapid enlargement of a mole, the development of a raised area on a previously flat lesion, scaling, ulceration, bleeding or scabbing within the mole  Any mole that starts to demonstrate "asymmetry" should be examined promptly by a board certified dermatologist      Border: Healthy moles tend to have discrete, even borders  The border of a melanoma often blends into the normal skin and does not sharply delineate the mole from normal skin  Any mole that starts to demonstrate "uneven borders" should be examined promptly by a board certified dermatologist      Color: Healthy moles tend to be one color throughout  Melanomas tend to be made up of different colors ranging from dark black, blue, white, or red  Any mole that demonstrates a color change should be examined promptly by a board certified dermatologist      Diameter: Healthy moles tend to be smaller than 0 6 cm in size; an exception are "congenital nevi" that can be larger  Melanomas tend to grow and can often be greater than 0 6 cm (1/4 of an inch, or the size of a pencil eraser)  This is only a guideline, and many normal moles may be larger than 0 6 cm without being unhealthy  Any mole that starts to change in size (small to bigger or bigger to smaller) should be examined promptly by a board certified dermatologist      Evolving: Healthy moles tend to "stay the same "  Melanomas may often show signs of change or evolution such as a change in size, shape, color, or elevation    Any mole that starts to itch, bleed, crust, burn, hurt, or ulcerate or demonstrate a change or evolution should be examined promptly by a board certified dermatologist       Dysplastic Nevi  Dysplastic moles are moles that fit the ABCDE rules of melanoma but are not identified as melanomas when examined under the microscope  They may indicate an increased risk of melanoma in that person  If there is a family history of melanoma, most experts agree that the person may be at an increased risk for developing a melanoma  Experts still do not agree on what dysplastic moles mean in patients without a personal or family history of melanoma  Dysplastic moles are usually larger than common moles and have different colors within it with irregular borders  The appearance can be very similar to a melanoma  Biopsies of dysplastic moles may show abnormalities which are different from a regular mole  Melanoma  Malignant melanoma is a type of skin cancer that can be deadly if it spreads throughout the body  The incidence of melanoma in the [place] is growing faster than any other cancer  Melanoma usually grows near the surface of the skin for a period of time, and then begins to grow deeper into the skin  Once it grows deeper into the skin, the risk of spread to other organs greatly increases  Therefore, early detection and removal of a malignant melanoma may result in a better chance at a complete cure; removal after the tumor has spread may not be as effective, leading to worse clinical outcomes such as death  The true rate of nevus transformation into a melanoma is unknown  It has been estimated that the lifetime risk for any acquired melanocytic nevus on any 21year-old individual transforming into melanoma by age [de-identified] is 0 03% (1 in 3,164) for men and 0 009% (1 in 10,800) for women  The appearance of a "new mole" remains one of the most reliable methods for identifying a malignant melanoma  Occasionally, melanomas appear as rapidly growing, blue-black, dome-shaped bumps within a previous mole or previous area of normal skin  Other times, melanomas are suspected when a mole suddenly appears or changes   Itching, burning, or pain in a pigmented lesion should increase suspicion, but most patients with early melanoma have no skin discomfort whatsoever  Melanoma can occur anywhere on the skin, including areas that are difficult for self-examination  Many melanomas are first noticed by other family members  Suspicious-looking moles may be removed for microscopic examination  You may be able to prevent death from melanoma by doing two simple things:    1  Try to avoid unnecessary sun exposure and protect your skin when it is exposed to the sun  People who live near the equator, people who have intermittent exposures to large amounts of sun, and people who have had sunburns in childhood or adolescence have an increased risk for melanoma  Sun sense and vigilant sun protection may be keys to helping to prevent melanoma  We recommend wearing UPF-rated sun protective clothing and sunglasses whenever possible and applying a moisturizer-sunscreen combination product (SPF 50+) such as Neutrogena Daily Defense to sun exposed areas of skin at least three times a day  2  Have your moles regularly examined by a board certified dermatologist AND by yourself or a family member/friend at home  We recommend that you have your moles examined at least once a year by a board certified dermatologist   Use your birthday as an annual reminder to have your "Birthday Suit" (I e , your skin) examined; it is a nice birthday gift to yourself to know that your skin is healthy appearing! Additionally, at-home self examinations may be helpful for detecting a possible melanoma  Use the ABCDEs we discussed and check your moles once a month at home        Assessment and Plan:  Based on a thorough discussion of this condition and the management approach to it (including a comprehensive discussion of the known risks, side effects and potential benefits of treatment), the patient (family) agrees to implement the following specific plan:   Reassure benign     Seborrheic Keratosis  A seborrheic keratosis is a harmless warty spot that appears during adult life as a common sign of skin aging  Seborrheic keratoses can arise on any area of skin, covered or uncovered, with the usual exception of the palms and soles  They do not arise from mucous membranes  Seborrheic keratoses can have highly variable appearance  Seborrheic keratoses are extremely common  It has been estimated that over 90% of adults over the age of 61 years have one or more of them  They occur in males and females of all races, typically beginning to erupt in the 35s or 45s  They are uncommon under the age of 21 years  The precise cause of seborrhoeic keratoses is not known  Seborrhoeic keratoses are considered degenerative in nature  As time goes by, seborrheic keratoses tend to become more numerous  Some people inherit a tendency to develop a very large number of them; some people may have hundreds of them  The name "seborrheic keratosis" is misleading, because these lesions are not limited to a seborrhoeic distribution (scalp, mid-face, chest, upper back), nor are they formed from sebaceous glands, nor are they associated with sebum -- which is greasy  Seborrheic keratosis may also be called "SK," "Seb K," "basal cell papilloma," "senile wart," or "barnacle "      Researchers have noted:   Eruptive seborrhoeic keratoses can follow sunburn or dermatitis   Skin friction may be the reason they appear in body folds   Viral cause (e g , human papillomavirus) seems unlikely   Stable and clonal mutations or activation of FRFR3, PIK3CA, SANTIAGO, AKT1 and EGFR genes are found in seborrhoeic keratoses   Seborrhoeic keratosis can arise from solar lentigo   FRFR3 mutations also arise in solar lentigines   These mutations are associated with increased age and location on the head and neck, suggesting a role of ultraviolet radiation in these lesions   Seborrheic keratoses do not harbour tumour suppressor gene mutations   Epidermal growth factor receptor inhibitors, which are used to treat some cancers, often result in an increase in verrucal (warty) keratoses  There is no easy way to remove multiple lesions on a single occasion  Unless a specific lesion is "inflamed" and is causing pain or stinging/burning or is bleeding, most insurance companies do not authorize treatment  Assessment and Plan:  Based on a thorough discussion of this condition and the management approach to it (including a comprehensive discussion of the known risks, side effects and potential benefits of treatment), the patient (family) agrees to implement the following specific plan:   Continue yearly exams     What happens at follow-up? The main purpose of follow-up is to detect recurrences early (metastatic melanoma), but it also offers an opportunity to diagnose a new primary melanoma at the first possible opportunity  A second invasive melanoma occurs in 5-10% of melanoma patients and a new melanoma in situ is diagnosed in more than 20% of melanoma patients  Our practice makes the following recommendations for follow-up for patients with invasive melanoma     At-least "monthly" self-skin examinations    Routine skin checks by a board certified dermatologist   Follow-up intervals are "every 3 months" within 2 years of a new melanoma diagnosis; "every 6 months" between 2-4 years of a new melanoma diagnosis; and "annually" after 4 years of a new melanoma diagnosis   Individual patient's needs should be considered before an appropriate follow-up is offered   Provide education and support to help the patient adjust to their illness    Follow-up appointments should include:   A check of the scar where the primary melanoma was removed   Checking the regional lymph nodes   A general skin examination   A full physical examination at least annually by your primary care physician    In those with more advanced primary disease, follow-up may include:   Blood tests   Imaging: ultrasound, X-ray, CT, MRI and PET scan  Most tests are not worthwhile for patients with stage 1 or 2 melanoma unless there are signs or symptoms of disease recurrence or metastasis  No tests are necessary for healthy patients who have remained well for five years or longer after removal of their melanoma  What is the outlook for patients with melanoma?  Melanoma in situ is cured by excision because it has no potential to spread around the body   The risk of spread and ultimate death from invasive melanoma depends on several factors, but the main one is the Breslow thickness of the melanoma at the time it was surgically removed   Metastases are rare for melanomas < 0 75 mm and the risk for tumours 0 75-1 mm thick is about 5%  The risk steadily increases with thickness so that melanomas > 4 mm have a risk of metastasis of about 40%  Melanoma is a potentially serious type of skin cancer, in which there is uncontrolled growth of melanocytes (pigment cells)  Melanoma is sometimes called malignant melanoma  Normal melanocytes are found in the basal layer of the epidermis (the outer layer of skin)  Melanocytes produce a protein called melanin, which protects skin cells by absorbing ultraviolet (UV) radiation  Melanocytes are found in equal numbers in black and white skin, but melanocytes in black skin produce much more melanin  People with dark brown or black skin are very much less likely to be damaged by UV radiation than those with white skin

## 2020-03-04 NOTE — PROGRESS NOTES
[address] [facility] Note     Patient Name: Flaquito Eid  Encounter Date: 3/4/20    Today's Chief Concerns:  Dena Monterroso Concern #1:  Upper body exam    Past Medical History:  Have you ever had or currently have any of the following medical conditions or treatments? · HIV/AIDS: No  · Hepatitis B: No  · Hepatitis C: No   · Diabetes: YES  · Tuberculosis: No  · Biologic Therapy/Chemotherapy: No  · Organ or Bone Marrow Transplantation: No  · Radiation Treatment: No  · Cancer (If Yes, which types)- YES, melanoma      Have you ever had any of the following skin conditions? · Melanoma? (If Yes, please provide more detail)- YES, stomach area   · Basal Cell Carcinoma: No  · Squamous Cell Carcinoma: No  · Sebaceous Cell Carcinoma: No  · Merkel Cell Carcinoma: No  · Angiosarcoma: No  · Blistering Sunburns: No  · Eczema: No  · Psoriasis: No    Social History:    What is your current Smoking Status? Never smoker    What is/was your primary occupation? Retired     What are your hobbies/past-times? Denies     Family history:  Do any of your "first degree relatives" (parent, brother, sister, or child) have any of the following conditions? · Melanoma? (If Yes, which relatives?) No  · Eczema: No  · Asthma: No  · Hay Fever/Seasonal Allergies: YES  · Psoriasis: No  · Arthritis: YES  · Thyroid Problems: YES  · Lupus/Connective Tissue Disease: No  · Diabetes: YES  · Stroke: No  · Blood Clots: No  · IBD/Crohn's/Ulcerative Colitis: No  · Vitiligo: No  · Scarring/Keloids: No  · Severe Acne: No  · Pancreatic Cancer: No  · Other known Skin Condition? If Yes, what condition and which relatives?   No    Current Medications:    Current Outpatient Medications:     aspirin 81 MG tablet, Take 81 mg by mouth daily, Disp: , Rfl:     glimepiride (AMARYL) 1 mg tablet, TAKE 1 TABLET (1 MG TOTAL) BY MOUTH DAILY WITH BREAKFAST, Disp: 90 tablet, Rfl: 1    hydrochlorothiazide (HYDRODIURIL) 25 mg tablet, TAKE 1 TABLET DAILY AS DIRECTED , Disp: 90 tablet, Rfl: 3    metFORMIN (GLUCOPHAGE-XR) 500 mg 24 hr tablet, Take 4 tablets (2,000 mg total) by mouth daily with dinner, Disp: 360 tablet, Rfl: 3    metoprolol succinate (TOPROL-XL) 100 mg 24 hr tablet, TAKE 1 TABLET BY MOUTH EVERY DAY, Disp: 90 tablet, Rfl: 0    albuterol (PROAIR HFA) 90 mcg/act inhaler, Inhale 2 puffs every 4 (four) hours as needed, Disp: , Rfl:     atorvastatin (LIPITOR) 20 mg tablet, TAKE 1 TABLET (20 MG TOTAL) BY MOUTH DAILY AT BEDTIME (Patient not taking: Reported on 3/4/2020), Disp: 90 tablet, Rfl: 0    fluticasone (FLONASE) 50 mcg/act nasal spray, SPRAY 2 SPRAYS INTO EACH NOSTRIL EVERY DAY (Patient not taking: Reported on 3/4/2020), Disp: 16 mL, Rfl: 0    fluticasone-vilanterol (BREO ELLIPTA) 100-25 mcg/inh inhaler, Inhale daily, Disp: , Rfl:     irbesartan (AVAPRO) 150 mg tablet, take 1 tablet by mouth once daily (Patient not taking: Reported on 3/4/2020), Disp: 90 tablet, Rfl: 1    tadalafil (CIALIS) 20 MG tablet, Take 1 tablet (20 mg total) by mouth daily as needed for erectile dysfunction, Disp: 3 tablet, Rfl: 5    Specific Alerts:    Have you been seen by a [facility]atologist in the last 3 years? No    Are you pregnant or planning to become pregnant? No    Are you currently or planning to be nursing or breast feeding? No    No Known Allergies    May we call your Preferred Phone number to discuss your specific medical information? YES    May we leave a detailed message that includes your specific medical information? YES    Have you traveled outside of the [facility] in the past 3 months? No    Do you currently have a pacemaker or defibrillator? No    Do you have any artificial heart valves, joints, plates, screws, rods, stents, pins, etc? No   - If Yes, were any placed within the last 2 years? Do you require any medications prior to a surgical procedure? No   - If Yes, for which procedure? n/a   - If Yes, what medications to you require? n/a    Are you taking any medications that cause you to bleed more easily ("blood thinners") YES    Have you ever experienced a rapid heartbeat with epinephrine? No    Have you ever been treated with "gold" (gold sodium thiomalate) therapy? No    [facility] can help with wrinkles, "laugh lines," facial volume loss, "double chin," "love handles," age spots, and more  Are you interested in learning today about some of the skin enhancement procedures that we offer? (If Yes, please provide more detail) No    Review of Systems:  Have you recently had or currently have any of the following? · Fever or chills: No  · Night Sweats: No  · Headaches: No  · Weight Gain: No  · Weight Loss: No  · Blurry Vision: No  · Nausea: No  · Vomiting: No  · Diarrhea: No  · Blood in Stool: No  · Abdominal Pain: No  · Itchy Skin: No  · Painful Joints: No  · Swollen Joints: No  · Muscle Pain: No  · Irregular Mole: No  · Sun Burn: No  · Dry Skin: YES  · Skin Color Changes: No  · Scar or Keloid: No  · Cold Sores/Fever Blisters: No  · Bacterial Infections/MRSA: No  · Anxiety: No  · Depression: No  · Suicidal or Homicidal Thoughts: No      PHYSICAL EXAM:      Was a chaperone (Derm Clinical Assistant) present for the entirety of the Physical Exam? YES    Did the Dermatology Team specifically ask and  the patient on the importance of a Full Skin Exam to be sure that nothing is missed clinically?  YES    Did the patient request or accept a Full Skin Exam?  YES    Did the patient specifically refuse to have the areas "under-the-bra" examined by the Dermatologist? No    Did the patient specifically refuse to have the areas "under-the-underwear" examined by the Dermatologist? No      CONSTITUTIONAL:   Vitals:    03/04/20 0904   Temp: 97 9 °F (36 6 °C)   TempSrc: Tympanic   Weight: 89 9 kg (198 lb 2 oz)   Height: 5' 7" (1 702 m)       PSYCH: Normal mood and affect  EYES: Normal conjunctiva  ENT: Normal lips and oral mucosa  CARDIOVASCULAR: No edema  RESPIRATORY: Normal respirations  HEME/LYMPH/IMMUNO:  No regional lymphadenopathy except as noted below in ASSESSMENT AND PLAN BY DIAGNOSIS    FULL ORGAN SYSTEM SKIN EXAM (SKIN)  Hair, Scalp, Ears, Face Normal except as noted below in Assessment   Neck, Cervical Chain Nodes Normal except as noted below in Assessment   Right Arm/Hand/Fingers Normal except as noted below in Assessment   Left Arm/Hand/Fingers Normal except as noted below in Assessment   Chest/Breasts/Axillae Viewed areas Normal except as noted below in Assessment   Abdomen, Umbilicus Normal except as noted below in Assessment   Back/Spine Normal except as noted below in Assessment   Groin/Genitalia/Buttocks Viewed areas Normal except as noted below in Assessment   Right Leg, Foot, Toes Normal except as noted below in Assessment   Left Leg, Foot, Toes Normal except as noted below in Assessment        ASSESSMENT AND PLAN BY DIAGNOSIS:    History of Present Condition:     Duration:  How long has this been an issue for you?    o  Years    Location Affected:  Where on the body is this affecting you? o  trunk and extremities    Quality:  Is there any bleeding, pain, itch, burning/irritation, or redness associated with the skin lesion? o  denies    Severity:  Describe any bleeding, pain, itch, burning/irritation, or redness on a scale of 1 to 10 (with 10 being the worst)    o  denies    Timing:  Does this condition seem to be there pretty constantly or do you notice it more at specific times throughout the day?    o  denies    Context:  Have you ever noticed that this condition seems to be associated with specific activities you do?    o  denies    Modifying Factors:    o Anything that seems to make the condition worse?    -  denies   o What have you tried to do to make the condition better?    -  denies    Associated Signs and Symptoms:  Does this skin lesion seem to be associated with any of the following:  o denies     1  LENTIGO    Physical Exam:   Anatomic Location Affected:  face   Morphological Description:  Light brown macule   Pertinent Positives:   Pertinent Negatives: no lymphadenopathy     Assessment and Plan:  Based on a thorough discussion of this condition and the management approach to it (including a comprehensive discussion of the known risks, side effects and potential benefits of treatment), the patient (family) agrees to implement the following specific plan:   Reassure benign    What is a lentigo? A lentigo is a pigmented flat or slightly raised lesion with a clearly defined edge  Unlike an ephelis (freckle), it does not fade in the winter months  There are several kinds of lentigo  The name lentigo originally referred to its appearance resembling a small lentil  The plural of lentigo is lentigines, although lentigos is also in common use  Who gets lentigines? Lentigines can affect males and females of all ages and races  Solar lentigines are especially prevalent in fair skinned adults  Lentigines associated with syndromes are present at birth or arise during childhood  What causes lentigines? Common forms of lentigo are due to exposure to ultraviolet radiation:   Sun damage including sunburn    Indoor tanning    Phototherapy, especially photochemotherapy (PUVA)    Ionizing radiation, eg radiation therapy, can also cause lentigines  Several familial syndromes associated with widespread lentigines originate from mutations in Triston-MAP kinase, mTOR signaling and PTEN pathways  What are the clinical features of lentigines? Lentigines have been classified into several different types depending on what they look like, where they appear on the body, causative factors, and whether they are associated to other diseases or conditions  Lentigines may be solitary or more often, multiple  Most lentigines are smaller than 5 mm in diameter      Lentigo simplex   A precursor to junctional naevus    Arises during childhood and early adult life    Found on trunk and limbs    Small brown round or oval macule or thin plaque    Jagged or smooth edge    May have a dry surface    May disappear in time  Solar lentigo   A precursor to seborrhoeic keratosis    Found on chronically sun exposed sites such as hands, face, lower legs    May also follow sunburn to shoulders    Yellow, light or dark brown regular or irregular macule or thin plaque    May have a dry surface    Often has moth-eaten outline    Can slowly enlarge to several centimeters in diameter    May disappear, often through the process known as lichenoid keratosis    When atypical in appearance, may be difficult to distinguish from melanoma in situ  Ink spot lentigo   Also known as reticulated lentigo    Few in number compared to solar lentigines    Follows sunburn in very fair skinned individuals    Dark brown to black irregular ink spot-like macule  PUVA lentigo   Similar to ink spot lentigo but follows photochemotherapy (PUVA)    Location anywhere exposed to PUVA  Tanning bed lentigo   Similar to ink spot lentigo but follows indoor tanning    Location anywhere exposed to tanning bed  Radiation lentigo   Occurs in site of irradiation (accidental or therapeutic)    Associated with late-stage radiation dermatitis: epidermal atrophy, subcutaneous fibrosis, keratosis, telangiectasias  Melanotic macule   Mucosal surfaces or adjacent glabrous skin eg lip, vulva, penis, anus    Light to dark brown    Also called mucosal melanosis  Generalised lentigines   Found on any exposed or covered site from early childhood    Small macules may merge to form larger patches    Not associated with a syndrome    Also called lentigines profusa, multiple lentigines  Agminated lentigines   Naevoid eruption of lentigos confined to a single segmental area    Sharp demarcation in midline    May have associated neurological and developmental abnormalities  Patterned lentigines   Inherited tendency to lentigines on face, lips, buttocks, palms, soles    Recognised mainly in people of  ethnicity  Centrofacial neurodysraphic lentiginosis  Associated with mental retardation  Lentiginosis syndromes   Syndromes include LEOPARD/[place], Peutz-Jeghers, Laugier-Hunziker, Moynahan, Xeroderma pigmentosum, myxoma syndromes (MOLINA, NAME, Miranda), Ruvalcaba-Myhre-Estrella, Bannayan-Zonnana syndrome, Cowden disease (multiple hamartoma syndrome )    Inheritance is autosomal dominant; sporadic cases common    Widespread lentigines present at birth or arise in early childhood    Associated with neural, endocrine, and mesenchymal tumors    How is the diagnosis made? Lentigines are usually diagnosed clinically by their typical appearance  Concern regarding possibility of melanoma may lead to:   Dermatoscopy    Diagnostic excision biopsy    Histopathology of a lentigo shows:   Thickened epidermis    An increased number of melanocytes along the basal layer of epidermis    Unlike junctional melanocytic naevus, there are no nests of melanocytes    Increased melanin pigment within the keratinocytes    Additional features depending on type of lentigo    In contrast, an ephelis (freckle) shows sun-induced increased melanin within the keratinocytes, without an increase in number of cells  What is the treatment for lentigines? Most lentigines are left alone  Attempts to lighten them may not be successful  The following approaches are used:   SPF 50+ broad-spectrum sunscreen    Hydroquinone bleaching cream    Alpha hydroxy acids    Vitamin C    Retinoids    Azelaic acid    Chemical peels  Individual lesions can be permanently removed using:   Cryotherapy    Intense pulsed light    Pigment lasers    How can lentigines be prevented? Lentigines associated with exposure ultraviolet radiation can be prevented by very careful sun protection  Clothing is more successful at preventing new lentigines than are sunscreens  What is the outlook for lentigines? Lentigines usually persist  They may increase in number with age and sun exposure  Some in sun-protected sites may fade and disappear  2  ACTINIC KERATOSIS    Physical Exam:   Anatomic Location Affected:  glabella   Morphological Description:  Red scaly macule     Assessment and Plan:  Based on a thorough discussion of this condition and the management approach to it (including a comprehensive discussion of the known risks, side effects and potential benefits of treatment), the patient (family) agrees to implement the following specific plan:     Liquid nitrogen was applied for 10-12 seconds to the skin lesion and the expected blistering or scabbing reaction explained  Do not pick at the area  Patient reminded to expect hypopigmented scars from the procedure  Return if lesion fails to fully resolve  Actinic keratoses are very common on sites repeatedly exposed to the sun, especially the backs of the hands and the face, most often affecting the ears, nose, cheeks, upper lip, vermilion of the lower lip, temples, forehead and balding scalp  In severely chronically sun-damaged individuals, they may also be found on the upper trunk, upper and lower limbs, and dorsum of feet  We discussed the theoretical premalignant (pre-cancerous) nature and etiology of these growths  We discussed the prevailing notion that actinic keratoses are a reflection of abnormal skin cell development due to DNA damage by short wavelength UVB  They are more likely to appear if the immune function is poor, due to aging, recent sun exposure, predisposing disease or certain drugs  We discussed that the main concern is that actinic keratoses may predispose to squamous cell carcinoma   It is rare for a solitary actinic keratosis to evolve to squamous cell carcinoma (SCC), but the risk of SCC occurring at some stage in a patient with more than 10 actinic keratoses is thought to be about 10 to 15%  A tender, thickened, ulcerated or enlarging actinic keratosis is suspicious of SCC  Actinic keratoses may be prevented by strict sun protection  If already present, keratoses may improve with a very high sun protection factor (50+) broad-spectrum sunscreen applied at least daily to affected areas, year-round  We recommend that UPF-rated clothing and hats and sunglasses be worn whenever possible and that a sunscreen-moisturizer combination product such as Neutrogena Daily Defense be applied at least three times a day  We performed a thorough discussion of treatment options and specific risk/benefits/alternatives including but not limited to medical field treatment with medications such as the following:       Cryotherapy (specifically, local pain, scarring, dyspigmentation, blistering, possible superinfection, and treats only what we see versus directed treatment today)  PROCEDURE:  DESTRUCTION OF PRE-MALIGNANT LESIONS  After a thorough discussion of treatment options and risk/benefits/alternatives (including but not limited to local pain, scarring, dyspigmentation, blistering, and possible superinfection), verbal and written consent were obtained and the aforementioned lesions were treated on with cryotherapy using liquid nitrogen x 1 cycle for 5-10 seconds   TOTAL NUMBER of 1 pre-malignant lesions were treated today on the ANATOMIC LOCATION: glabella  The patient tolerated the procedure well, and after-care instructions were provided        3  MELANOCYTIC NEVI ("Moles")    Physical Exam:   Anatomic Location Affected:   Mostly on sun-exposed areas of the trunk, abdomen, and extremities    Morphological Description:  Scattered, 1-4mm round to ovoid, symmetrical-appearing, even bordered, skin colored to dark brown macules/papules, mostly in sun-exposed areas   Pertinent Positives:   Pertinent Negatives: no lymphadenopathy     Assessment and Plan:  Based on a thorough discussion of this condition and the management approach to it (including a comprehensive discussion of the known risks, side effects and potential benefits of treatment), the patient (family) agrees to implement the following specific plan:   When outside we recommend using a wide brim hat, sunglasses, long sleeve and pants, sunscreen with SPF 01+ with reapplication every 2 hours, or SPF specific clothing        Melanocytic Nevi  Melanocytic nevi ("moles") are tan or brown, raised or flat areas of the skin which have an increased number of melanocytes  Melanocytes are the cells in our body which make pigment and account for skin color  Some moles are present at birth (I e , "congenital nevi"), while others come up later in life (i e , "acquired nevi")  The sun can stimulate the body to make more moles  Sunburns are not the only thing that triggers more moles  Chronic sun exposure can do it too  Clinically distinguishing a healthy mole from melanoma may be difficult, even for experienced dermatologists  The "ABCDE's" of moles have been suggested as a means of helping to alert a person to a suspicious mole and the possible increased risk of melanoma  The suggestions for raising alert are as follows:    Asymmetry: Healthy moles tend to be symmetric, while melanomas are often asymmetric  Asymmetry means if you draw a line through the mole, the two halves do not match in color, size, shape, or surface texture  Asymmetry can be a result of rapid enlargement of a mole, the development of a raised area on a previously flat lesion, scaling, ulceration, bleeding or scabbing within the mole  Any mole that starts to demonstrate "asymmetry" should be examined promptly by a board certified dermatologist      Border: Healthy moles tend to have discrete, even borders    The border of a melanoma often blends into the normal skin and does not sharply delineate the mole from normal skin  Any mole that starts to demonstrate "uneven borders" should be examined promptly by a board certified dermatologist      Color: Healthy moles tend to be one color throughout  Melanomas tend to be made up of different colors ranging from dark black, blue, white, or red  Any mole that demonstrates a color change should be examined promptly by a board certified dermatologist      Diameter: Healthy moles tend to be smaller than 0 6 cm in size; an exception are "congenital nevi" that can be larger  Melanomas tend to grow and can often be greater than 0 6 cm (1/4 of an inch, or the size of a pencil eraser)  This is only a guideline, and many normal moles may be larger than 0 6 cm without being unhealthy  Any mole that starts to change in size (small to bigger or bigger to smaller) should be examined promptly by a board certified dermatologist      Evolving: Healthy moles tend to "stay the same "  Melanomas may often show signs of change or evolution such as a change in size, shape, color, or elevation  Any mole that starts to itch, bleed, crust, burn, hurt, or ulcerate or demonstrate a change or evolution should be examined promptly by a board certified dermatologist       Dysplastic Nevi  Dysplastic moles are moles that fit the ABCDE rules of melanoma but are not identified as melanomas when examined under the microscope  They may indicate an increased risk of melanoma in that person  If there is a family history of melanoma, most experts agree that the person may be at an increased risk for developing a melanoma  Experts still do not agree on what dysplastic moles mean in patients without a personal or family history of melanoma  Dysplastic moles are usually larger than common moles and have different colors within it with irregular borders  The appearance can be very similar to a melanoma   Biopsies of dysplastic moles may show abnormalities which are different from a regular mole  Melanoma  Malignant melanoma is a type of skin cancer that can be deadly if it spreads throughout the body  The incidence of melanoma in the [place] is growing faster than any other cancer  Melanoma usually grows near the surface of the skin for a period of time, and then begins to grow deeper into the skin  Once it grows deeper into the skin, the risk of spread to other organs greatly increases  Therefore, early detection and removal of a malignant melanoma may result in a better chance at a complete cure; removal after the tumor has spread may not be as effective, leading to worse clinical outcomes such as death  The true rate of nevus transformation into a melanoma is unknown  It has been estimated that the lifetime risk for any acquired melanocytic nevus on any 21year-old individual transforming into melanoma by age [de-identified] is 0 03% (1 in 3,164) for men and 0 009% (1 in 10,800) for women  The appearance of a "new mole" remains one of the most reliable methods for identifying a malignant melanoma  Occasionally, melanomas appear as rapidly growing, blue-black, dome-shaped bumps within a previous mole or previous area of normal skin  Other times, melanomas are suspected when a mole suddenly appears or changes  Itching, burning, or pain in a pigmented lesion should increase suspicion, but most patients with early melanoma have no skin discomfort whatsoever  Melanoma can occur anywhere on the skin, including areas that are difficult for self-examination  Many melanomas are first noticed by other family members  Suspicious-looking moles may be removed for microscopic examination  You may be able to prevent death from melanoma by doing two simple things:    1  Try to avoid unnecessary sun exposure and protect your skin when it is exposed to the sun    People who live near the equator, people who have intermittent exposures to large amounts of sun, and people who have had sunburns in childhood or adolescence have an increased risk for melanoma  Sun sense and vigilant sun protection may be keys to helping to prevent melanoma  We recommend wearing UPF-rated sun protective clothing and sunglasses whenever possible and applying a moisturizer-sunscreen combination product (SPF 50+) such as Neutrogena Daily Defense to sun exposed areas of skin at least three times a day  2  Have your moles regularly examined by a board certified dermatologist AND by yourself or a family member/friend at home  We recommend that you have your moles examined at least once a year by a board certified dermatologist   Use your birthday as an annual reminder to have your "Birthday Suit" (I e , your skin) examined; it is a nice birthday gift to yourself to know that your skin is healthy appearing! Additionally, at-home self examinations may be helpful for detecting a possible melanoma  Use the ABCDEs we discussed and check your moles once a month at home  4 SEBORRHEIC KERATOSIS; NON-INFLAMED    Physical Exam:   Anatomic Location Affected:  back   Morphological Description:  Flat and raised, waxy, smooth to warty textured, yellow to brownish-grey to dark brown to blackish, discrete, "stuck-on" appearing papules   Pertinent Positives:   Pertinent Negatives: no lymphadenopathy     Additional History of Present Condition:  Patient reports new bumps on the skin  Denies itch, burn, pain, bleeding or ulceration  Present constantly; nothing seems to make it worse or better  No prior treatment        Assessment and Plan:  Based on a thorough discussion of this condition and the management approach to it (including a comprehensive discussion of the known risks, side effects and potential benefits of treatment), the patient (family) agrees to implement the following specific plan:   Reassure benign     Seborrheic Keratosis  A seborrheic keratosis is a harmless warty spot that appears during adult life as a common sign of skin aging  Seborrheic keratoses can arise on any area of skin, covered or uncovered, with the usual exception of the palms and soles  They do not arise from mucous membranes  Seborrheic keratoses can have highly variable appearance  Seborrheic keratoses are extremely common  It has been estimated that over 90% of adults over the age of 61 years have one or more of them  They occur in males and females of all races, typically beginning to erupt in the 35s or 45s  They are uncommon under the age of 21 years  The precise cause of seborrhoeic keratoses is not known  Seborrhoeic keratoses are considered degenerative in nature  As time goes by, seborrheic keratoses tend to become more numerous  Some people inherit a tendency to develop a very large number of them; some people may have hundreds of them  The name "seborrheic keratosis" is misleading, because these lesions are not limited to a seborrhoeic distribution (scalp, mid-face, chest, upper back), nor are they formed from sebaceous glands, nor are they associated with sebum -- which is greasy  Seborrheic keratosis may also be called "SK," "Seb K," "basal cell papilloma," "senile wart," or "barnacle "      Researchers have noted:   Eruptive seborrhoeic keratoses can follow sunburn or dermatitis   Skin friction may be the reason they appear in body folds   Viral cause (e g , human papillomavirus) seems unlikely   Stable and clonal mutations or activation of FRFR3, PIK3CA, SANTIAGO, AKT1 and EGFR genes are found in seborrhoeic keratoses   Seborrhoeic keratosis can arise from solar lentigo   FRFR3 mutations also arise in solar lentigines   These mutations are associated with increased age and location on the head and neck, suggesting a role of ultraviolet radiation in these lesions   Seborrheic keratoses do not harbour tumour suppressor gene mutations   Epidermal growth factor receptor inhibitors, which are used to treat some cancers, often result in an increase in verrucal (warty) keratoses  There is no easy way to remove multiple lesions on a single occasion  Unless a specific lesion is "inflamed" and is causing pain or stinging/burning or is bleeding, most insurance companies do not authorize treatment  5  HISTORY OF MELANOMA    Physical Exam:   Anatomic Location Affected:  Right lower abdomen   Morphological Description of Scar:  Well healed    Suspected Recurrence: no   Regional adenopathy: no    Assessment and Plan:  Based on a thorough discussion of this condition and the management approach to it (including a comprehensive discussion of the known risks, side effects and potential benefits of treatment), the patient (family) agrees to implement the following specific plan:   Continue yearly exams     What happens at follow-up? The main purpose of follow-up is to detect recurrences early (metastatic melanoma), but it also offers an opportunity to diagnose a new primary melanoma at the first possible opportunity  A second invasive melanoma occurs in 5-10% of melanoma patients and a new melanoma in situ is diagnosed in more than 20% of melanoma patients  Our practice makes the following recommendations for follow-up for patients with invasive melanoma     At-least "monthly" self-skin examinations    Routine skin checks by a board certified dermatologist   Follow-up intervals are "every 3 months" within 2 years of a new melanoma diagnosis; "every 6 months" between 2-4 years of a new melanoma diagnosis; and "annually" after 4 years of a new melanoma diagnosis   Individual patient's needs should be considered before an appropriate follow-up is offered  Carlos Gongora Provide education and support to help the patient adjust to their illness    Follow-up appointments should include:   A check of the scar where the primary melanoma was removed   Checking the regional lymph nodes   A general skin examination   A full physical examination at least annually by your primary care physician    In those with more advanced primary disease, follow-up may include:   Blood tests   Imaging: ultrasound, X-ray, CT, MRI and PET scan  Most tests are not worthwhile for patients with stage 1 or 2 melanoma unless there are signs or symptoms of disease recurrence or metastasis  No tests are necessary for healthy patients who have remained well for five years or longer after removal of their melanoma  What is the outlook for patients with melanoma?  Melanoma in situ is cured by excision because it has no potential to spread around the body   The risk of spread and ultimate death from invasive melanoma depends on several factors, but the main one is the Breslow thickness of the melanoma at the time it was surgically removed   Metastases are rare for melanomas < 0 75 mm and the risk for tumours 0 75-1 mm thick is about 5%  The risk steadily increases with thickness so that melanomas > 4 mm have a risk of metastasis of about 40%  Melanoma is a potentially serious type of skin cancer, in which there is uncontrolled growth of melanocytes (pigment cells)  Melanoma is sometimes called malignant melanoma  Normal melanocytes are found in the basal layer of the epidermis (the outer layer of skin)  Melanocytes produce a protein called melanin, which protects skin cells by absorbing ultraviolet (UV) radiation  Melanocytes are found in equal numbers in black and white skin, but melanocytes in black skin produce much more melanin  People with dark brown or black skin are very much less likely to be damaged by UV radiation than those with white skin  Physician synopsis:  1  H/O melanoma, age 13, RLQ abdomen - uncertain if it was denovo or arose in a CMN  2  Mult nevi - all benign  3    AK glabella - tx with 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

## 2020-03-06 ENCOUNTER — TELEPHONE (OUTPATIENT)
Dept: DERMATOLOGY | Facility: CLINIC | Age: 65
End: 2020-03-06

## 2020-03-24 DIAGNOSIS — T78.40XA ALLERGIC STATE, INITIAL ENCOUNTER: ICD-10-CM

## 2020-03-24 RX ORDER — FLUTICASONE PROPIONATE 50 MCG
SPRAY, SUSPENSION (ML) NASAL
Qty: 16 ML | Refills: 0 | Status: SHIPPED | OUTPATIENT
Start: 2020-03-24 | End: 2020-04-20

## 2020-04-12 DIAGNOSIS — E78.2 MIXED HYPERLIPIDEMIA: ICD-10-CM

## 2020-04-12 RX ORDER — ATORVASTATIN CALCIUM 20 MG/1
TABLET, FILM COATED ORAL
Qty: 90 TABLET | Refills: 0 | Status: SHIPPED | OUTPATIENT
Start: 2020-04-12 | End: 2020-07-09

## 2020-04-20 DIAGNOSIS — T78.40XA ALLERGIC STATE, INITIAL ENCOUNTER: ICD-10-CM

## 2020-04-20 RX ORDER — FLUTICASONE PROPIONATE 50 MCG
SPRAY, SUSPENSION (ML) NASAL
Qty: 16 ML | Refills: 0 | Status: SHIPPED | OUTPATIENT
Start: 2020-04-20 | End: 2020-05-15

## 2020-04-27 ENCOUNTER — OFFICE VISIT (OUTPATIENT)
Dept: CARDIOLOGY CLINIC | Facility: CLINIC | Age: 65
End: 2020-04-27
Payer: COMMERCIAL

## 2020-04-27 VITALS
OXYGEN SATURATION: 97 % | WEIGHT: 198 LBS | SYSTOLIC BLOOD PRESSURE: 138 MMHG | BODY MASS INDEX: 31.08 KG/M2 | HEART RATE: 79 BPM | DIASTOLIC BLOOD PRESSURE: 82 MMHG | HEIGHT: 67 IN

## 2020-04-27 DIAGNOSIS — E78.2 MIXED HYPERLIPIDEMIA: ICD-10-CM

## 2020-04-27 DIAGNOSIS — I51.89 DIASTOLIC DYSFUNCTION: ICD-10-CM

## 2020-04-27 DIAGNOSIS — Z95.1 HX OF CABG: ICD-10-CM

## 2020-04-27 DIAGNOSIS — I10 ESSENTIAL HYPERTENSION: Primary | ICD-10-CM

## 2020-04-27 DIAGNOSIS — I25.10 CORONARY ARTERY DISEASE INVOLVING NATIVE HEART WITHOUT ANGINA PECTORIS, UNSPECIFIED VESSEL OR LESION TYPE: ICD-10-CM

## 2020-04-27 DIAGNOSIS — E66.9 OBESITY (BMI 30-39.9): ICD-10-CM

## 2020-04-27 PROCEDURE — 4010F ACE/ARB THERAPY RXD/TAKEN: CPT | Performed by: INTERNAL MEDICINE

## 2020-04-27 PROCEDURE — 3075F SYST BP GE 130 - 139MM HG: CPT | Performed by: INTERNAL MEDICINE

## 2020-04-27 PROCEDURE — 3079F DIAST BP 80-89 MM HG: CPT | Performed by: INTERNAL MEDICINE

## 2020-04-27 PROCEDURE — 1036F TOBACCO NON-USER: CPT | Performed by: INTERNAL MEDICINE

## 2020-04-27 PROCEDURE — 99214 OFFICE O/P EST MOD 30 MIN: CPT | Performed by: INTERNAL MEDICINE

## 2020-04-27 PROCEDURE — 3008F BODY MASS INDEX DOCD: CPT | Performed by: INTERNAL MEDICINE

## 2020-04-27 PROCEDURE — 2022F DILAT RTA XM EVC RTNOPTHY: CPT | Performed by: INTERNAL MEDICINE

## 2020-04-27 RX ORDER — IRBESARTAN 150 MG/1
150 TABLET ORAL DAILY
Qty: 90 TABLET | Refills: 3 | Status: SHIPPED | OUTPATIENT
Start: 2020-04-27 | End: 2020-10-09 | Stop reason: SDUPTHER

## 2020-05-01 DIAGNOSIS — I10 HYPERTENSION, UNSPECIFIED TYPE: ICD-10-CM

## 2020-05-04 RX ORDER — METOPROLOL SUCCINATE 100 MG/1
100 TABLET, EXTENDED RELEASE ORAL DAILY
Qty: 90 TABLET | Refills: 3 | Status: SHIPPED | OUTPATIENT
Start: 2020-05-04 | End: 2021-02-11 | Stop reason: SDUPTHER

## 2020-05-15 DIAGNOSIS — T78.40XA ALLERGIC STATE, INITIAL ENCOUNTER: ICD-10-CM

## 2020-05-15 RX ORDER — FLUTICASONE PROPIONATE 50 MCG
SPRAY, SUSPENSION (ML) NASAL
Qty: 16 ML | Refills: 0 | Status: SHIPPED | OUTPATIENT
Start: 2020-05-15 | End: 2020-06-12

## 2020-06-12 DIAGNOSIS — T78.40XA ALLERGIC STATE, INITIAL ENCOUNTER: ICD-10-CM

## 2020-06-12 RX ORDER — FLUTICASONE PROPIONATE 50 MCG
SPRAY, SUSPENSION (ML) NASAL
Qty: 16 ML | Refills: 0 | Status: SHIPPED | OUTPATIENT
Start: 2020-06-12 | End: 2020-07-09 | Stop reason: SDUPTHER

## 2020-07-08 DIAGNOSIS — T78.40XA ALLERGIC STATE, INITIAL ENCOUNTER: ICD-10-CM

## 2020-07-09 DIAGNOSIS — E78.2 MIXED HYPERLIPIDEMIA: ICD-10-CM

## 2020-07-09 RX ORDER — ATORVASTATIN CALCIUM 20 MG/1
TABLET, FILM COATED ORAL
Qty: 90 TABLET | Refills: 0 | Status: SHIPPED | OUTPATIENT
Start: 2020-07-09 | End: 2020-08-12 | Stop reason: SDUPTHER

## 2020-07-09 RX ORDER — FLUTICASONE PROPIONATE 50 MCG
SPRAY, SUSPENSION (ML) NASAL
Qty: 16 ML | Refills: 0 | Status: SHIPPED | OUTPATIENT
Start: 2020-07-09 | End: 2020-08-14

## 2020-07-20 ENCOUNTER — TELEPHONE (OUTPATIENT)
Dept: INTERNAL MEDICINE CLINIC | Facility: CLINIC | Age: 65
End: 2020-07-20

## 2020-07-20 DIAGNOSIS — Z12.5 SCREENING FOR PROSTATE CANCER: ICD-10-CM

## 2020-07-20 DIAGNOSIS — M79.644 PAIN OF FINGER OF RIGHT HAND: ICD-10-CM

## 2020-07-20 DIAGNOSIS — E11.9 TYPE 2 DIABETES MELLITUS WITHOUT COMPLICATION, WITHOUT LONG-TERM CURRENT USE OF INSULIN (HCC): Primary | ICD-10-CM

## 2020-07-20 DIAGNOSIS — Z11.4 SCREENING FOR HIV (HUMAN IMMUNODEFICIENCY VIRUS): ICD-10-CM

## 2020-07-20 NOTE — TELEPHONE ENCOUNTER
----- Message from Edward Miller MD sent at 7/20/2020  9:50 AM EDT -----  Regarding: FW: Referral Request  Contact: [phone]  Please call to schedule f/u    ----- Message -----  From: Socorro Vaz  Sent: 7/20/2020   8:36 AM EDT  To: Edward Miller MD  Subject: FW: Referral Request                                 ----- Message -----  From: Ekaterina Jerry  Sent: 7/19/2020   1:02 PM EDT  To: [facility]  Subject: Referral Emi Fabry Dr Avel Snider  Hope you and family are well  I have two things to ask you about  I am having issues with right hand, mostly index finger, which I attribute to arthritis  Pretty painful and used to be top joint only but now seems like all joints on index finger  Cannot open a bottle with that hand  Wondering if I should get X-rays or mri or see orthopedic dr for hands  I have Dr Daryle Camper in [place] [facility] but not sure this is something that needs his attention yet  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

## 2020-07-23 ENCOUNTER — HOSPITAL ENCOUNTER (OUTPATIENT)
Dept: RADIOLOGY | Facility: HOSPITAL | Age: 65
Discharge: HOME | End: 2020-07-23
Attending: INTERNAL MEDICINE
Payer: COMMERCIAL

## 2020-07-23 DIAGNOSIS — M79.644 PAIN OF FINGER OF RIGHT HAND: ICD-10-CM

## 2020-07-23 PROCEDURE — 73130 X-RAY EXAM OF HAND: CPT

## 2020-07-27 ENCOUNTER — TELEPHONE (OUTPATIENT)
Dept: INTERNAL MEDICINE CLINIC | Facility: CLINIC | Age: 65
End: 2020-07-27

## 2020-07-27 NOTE — TELEPHONE ENCOUNTER
----- Message from Robbie Emanuel MD sent at 7/26/2020  7:37 PM EDT -----  Notify-no acute findings on xray, degenerative changes noted consistent with osteoarthritis

## 2020-07-30 ENCOUNTER — APPOINTMENT (OUTPATIENT)
Dept: LAB | Facility: HOSPITAL | Age: 65
End: 2020-07-30
Attending: INTERNAL MEDICINE
Payer: COMMERCIAL

## 2020-07-30 DIAGNOSIS — E11.9 TYPE 2 DIABETES MELLITUS WITHOUT COMPLICATION, WITHOUT LONG-TERM CURRENT USE OF INSULIN (HCC): ICD-10-CM

## 2020-07-30 DIAGNOSIS — Z12.5 SCREENING FOR PROSTATE CANCER: ICD-10-CM

## 2020-07-30 DIAGNOSIS — Z11.4 SCREENING FOR HIV (HUMAN IMMUNODEFICIENCY VIRUS): ICD-10-CM

## 2020-07-30 LAB
ALBUMIN SERPL BCP-MCNC: 3.9 G/DL (ref 3.5–5)
ALP SERPL-CCNC: 66 U/L (ref 46–116)
ALT SERPL W P-5'-P-CCNC: 35 U/L (ref 12–78)
ANION GAP SERPL CALCULATED.3IONS-SCNC: 9 MMOL/L (ref 4–13)
AST SERPL W P-5'-P-CCNC: 31 U/L (ref 5–45)
BASOPHILS # BLD AUTO: 0.05 THOUSANDS/ΜL (ref 0–0.1)
BASOPHILS NFR BLD AUTO: 1 % (ref 0–1)
BILIRUB SERPL-MCNC: 0.6 MG/DL (ref 0.2–1)
BUN SERPL-MCNC: 16 MG/DL (ref 5–25)
CALCIUM SERPL-MCNC: 9.5 MG/DL (ref 8.3–10.1)
CHLORIDE SERPL-SCNC: 100 MMOL/L (ref 100–108)
CHOLEST SERPL-MCNC: 119 MG/DL (ref 50–200)
CO2 SERPL-SCNC: 29 MMOL/L (ref 21–32)
CREAT SERPL-MCNC: 0.94 MG/DL (ref 0.6–1.3)
EOSINOPHIL # BLD AUTO: 0.21 THOUSAND/ΜL (ref 0–0.61)
EOSINOPHIL NFR BLD AUTO: 3 % (ref 0–6)
ERYTHROCYTE [DISTWIDTH] IN BLOOD BY AUTOMATED COUNT: 12.7 % (ref 11.6–15.1)
EST. AVERAGE GLUCOSE BLD GHB EST-MCNC: 217 MG/DL
GFR SERPL CREATININE-BSD FRML MDRD: 87 ML/MIN/1.73SQ M
GLUCOSE P FAST SERPL-MCNC: 134 MG/DL (ref 65–99)
HBA1C MFR BLD: 9.2 %
HCT VFR BLD AUTO: 43.5 % (ref 36.5–49.3)
HDLC SERPL-MCNC: 32 MG/DL
HGB BLD-MCNC: 14.8 G/DL (ref 12–17)
IMM GRANULOCYTES # BLD AUTO: 0.02 THOUSAND/UL (ref 0–0.2)
IMM GRANULOCYTES NFR BLD AUTO: 0 % (ref 0–2)
LDLC SERPL CALC-MCNC: 50 MG/DL (ref 0–100)
LYMPHOCYTES # BLD AUTO: 2.74 THOUSANDS/ΜL (ref 0.6–4.47)
LYMPHOCYTES NFR BLD AUTO: 38 % (ref 14–44)
MCH RBC QN AUTO: 29.3 PG (ref 26.8–34.3)
MCHC RBC AUTO-ENTMCNC: 34 G/DL (ref 31.4–37.4)
MCV RBC AUTO: 86 FL (ref 82–98)
MONOCYTES # BLD AUTO: 0.55 THOUSAND/ΜL (ref 0.17–1.22)
MONOCYTES NFR BLD AUTO: 8 % (ref 4–12)
NEUTROPHILS # BLD AUTO: 3.58 THOUSANDS/ΜL (ref 1.85–7.62)
NEUTS SEG NFR BLD AUTO: 50 % (ref 43–75)
NONHDLC SERPL-MCNC: 87 MG/DL
NRBC BLD AUTO-RTO: 0 /100 WBCS
PLATELET # BLD AUTO: 216 THOUSANDS/UL (ref 149–390)
PMV BLD AUTO: 10.1 FL (ref 8.9–12.7)
POTASSIUM SERPL-SCNC: 4 MMOL/L (ref 3.5–5.3)
PROT SERPL-MCNC: 7.6 G/DL (ref 6.4–8.2)
PSA SERPL-MCNC: 0.3 NG/ML (ref 0–4)
RBC # BLD AUTO: 5.05 MILLION/UL (ref 3.88–5.62)
SODIUM SERPL-SCNC: 138 MMOL/L (ref 136–145)
TRIGL SERPL-MCNC: 187 MG/DL
TSH SERPL DL<=0.05 MIU/L-ACNC: 2.21 UIU/ML (ref 0.36–3.74)
WBC # BLD AUTO: 7.15 THOUSAND/UL (ref 4.31–10.16)

## 2020-07-30 PROCEDURE — 36415 COLL VENOUS BLD VENIPUNCTURE: CPT

## 2020-07-30 PROCEDURE — 83036 HEMOGLOBIN GLYCOSYLATED A1C: CPT

## 2020-07-30 PROCEDURE — 80053 COMPREHEN METABOLIC PANEL: CPT

## 2020-07-30 PROCEDURE — 87389 HIV-1 AG W/HIV-1&-2 AB AG IA: CPT

## 2020-07-30 PROCEDURE — G0103 PSA SCREENING: HCPCS

## 2020-07-30 PROCEDURE — 84443 ASSAY THYROID STIM HORMONE: CPT

## 2020-07-30 PROCEDURE — 85025 COMPLETE CBC W/AUTO DIFF WBC: CPT

## 2020-07-30 PROCEDURE — 3046F HEMOGLOBIN A1C LEVEL >9.0%: CPT | Performed by: INTERNAL MEDICINE

## 2020-07-30 PROCEDURE — 80061 LIPID PANEL: CPT

## 2020-07-31 LAB — HIV 1+2 AB+HIV1 P24 AG SERPL QL IA: NORMAL

## 2020-08-13 ENCOUNTER — OFFICE VISIT (OUTPATIENT)
Dept: INTERNAL MEDICINE CLINIC | Facility: CLINIC | Age: 65
End: 2020-08-13
Payer: COMMERCIAL

## 2020-08-13 VITALS
WEIGHT: 198 LBS | SYSTOLIC BLOOD PRESSURE: 136 MMHG | DIASTOLIC BLOOD PRESSURE: 92 MMHG | HEART RATE: 64 BPM | HEIGHT: 67 IN | BODY MASS INDEX: 31.08 KG/M2 | RESPIRATION RATE: 12 BRPM | TEMPERATURE: 97.9 F

## 2020-08-13 DIAGNOSIS — E78.2 MIXED HYPERLIPIDEMIA: ICD-10-CM

## 2020-08-13 DIAGNOSIS — G57.61 MORTON'S NEUROMA OF RIGHT FOOT: ICD-10-CM

## 2020-08-13 DIAGNOSIS — I10 ESSENTIAL HYPERTENSION: Chronic | ICD-10-CM

## 2020-08-13 DIAGNOSIS — E11.8 TYPE 2 DIABETES MELLITUS WITH COMPLICATION, WITHOUT LONG-TERM CURRENT USE OF INSULIN (HCC): Primary | ICD-10-CM

## 2020-08-13 DIAGNOSIS — I25.10 CORONARY ARTERY DISEASE INVOLVING NATIVE HEART WITHOUT ANGINA PECTORIS, UNSPECIFIED VESSEL OR LESION TYPE: ICD-10-CM

## 2020-08-13 PROCEDURE — 3075F SYST BP GE 130 - 139MM HG: CPT | Performed by: INTERNAL MEDICINE

## 2020-08-13 PROCEDURE — 99214 OFFICE O/P EST MOD 30 MIN: CPT | Performed by: INTERNAL MEDICINE

## 2020-08-13 PROCEDURE — 2022F DILAT RTA XM EVC RTNOPTHY: CPT | Performed by: INTERNAL MEDICINE

## 2020-08-13 PROCEDURE — 1036F TOBACCO NON-USER: CPT | Performed by: INTERNAL MEDICINE

## 2020-08-13 PROCEDURE — 3080F DIAST BP >= 90 MM HG: CPT | Performed by: INTERNAL MEDICINE

## 2020-08-13 PROCEDURE — 3008F BODY MASS INDEX DOCD: CPT | Performed by: INTERNAL MEDICINE

## 2020-08-13 PROCEDURE — 3046F HEMOGLOBIN A1C LEVEL >9.0%: CPT | Performed by: INTERNAL MEDICINE

## 2020-08-13 PROCEDURE — 3725F SCREEN DEPRESSION PERFORMED: CPT | Performed by: INTERNAL MEDICINE

## 2020-08-13 RX ORDER — ATORVASTATIN CALCIUM 20 MG/1
20 TABLET, FILM COATED ORAL
Qty: 90 TABLET | Refills: 3 | Status: SHIPPED | OUTPATIENT
Start: 2020-08-13 | End: 2021-12-01 | Stop reason: SDUPTHER

## 2020-08-13 RX ORDER — GLIMEPIRIDE 1 MG/1
1 TABLET ORAL
Qty: 90 TABLET | Refills: 3 | Status: CANCELLED | OUTPATIENT
Start: 2020-08-13

## 2020-08-13 RX ORDER — EMPAGLIFLOZIN 10 MG/1
10 TABLET, FILM COATED ORAL EVERY MORNING
Qty: 30 TABLET | Refills: 3 | Status: SHIPPED | OUTPATIENT
Start: 2020-08-13 | End: 2020-09-01

## 2020-08-13 NOTE — PROGRESS NOTES
Assessment/Plan:    Type 2 diabetes mellitus with complication, without long-term current use of insulin ([facility])    Lab Results   Component Value Date    HGBA1C 9 2 (H) 07/30/2020   Patient hemoglobin HGB1AC increase to 9 2 from 7 9 in 10 months  Patient states he was not compliant with diabetic diet during the last months due to being at home  Was not checking glycemia frequently  Will start patient in Jardiance 10 mg po daily in addition to continue Glucophage 2 grams qpm    Essential hypertension  BP well controlled continue HCTZ    Page's neuroma of right foot  Patient complains of pain between the 3rd and 4th interdigital space in the right foot  On physical exam there was tenderness and small mass palpable resembling Page's neuroma  Patient was instructed to use fitted shoes in order to prevent pain  Diagnoses and all orders for this visit:    Type 2 diabetes mellitus with complication, without long-term current use of insulin ([facility])  -     Empagliflozin (Jardiance) 10 MG TABS; Take 1 tablet (10 mg total) by mouth every morning  -     Comprehensive metabolic panel; Future    Mixed hyperlipidemia  -     atorvastatin (LIPITOR) 20 mg tablet; Take 1 tablet (20 mg total) by mouth daily at bedtime    Essential hypertension    Coronary artery disease involving native heart without angina pectoris, unspecified vessel or lesion type    Page's neuroma of right foot    Other orders  -     Cancel: glimepiride (AMARYL) 1 mg tablet; Take 1 tablet (1 mg total) by mouth daily with breakfast          Subjective:      Patient ID: Barbara Yun is a 60 y o  male  Patient comes today follow-up appointment, he comes to discuss blood work  Also would like to discuss recent XR of the right hand  Does not have specific complains  Review of Systems   Constitutional: Negative for activity change, appetite change, chills, fatigue, fever and unexpected weight change  HENT: Negative    Negative for congestion, ear pain, facial swelling, hearing loss, mouth sores, nosebleeds, rhinorrhea, sinus pressure, sinus pain, sneezing, sore throat, trouble swallowing and voice change  Eyes: Negative for photophobia, pain, discharge, redness, itching and visual disturbance  Respiratory: Negative for apnea, cough, choking, chest tightness, shortness of breath and wheezing  Cardiovascular: Negative for chest pain, palpitations and leg swelling  Gastrointestinal: Negative for abdominal distention, abdominal pain, blood in stool, constipation, diarrhea, nausea and vomiting  Endocrine: Negative for cold intolerance, heat intolerance, polydipsia, polyphagia and polyuria  Genitourinary: Positive for enuresis  Negative for decreased urine volume, difficulty urinating, dysuria, flank pain, frequency and urgency  Musculoskeletal: Negative for arthralgias, back pain, gait problem, joint swelling, myalgias, neck pain and neck stiffness  Skin: Negative for color change, pallor, rash and wound  Allergic/Immunologic: Negative for environmental allergies, food allergies and immunocompromised state  Neurological: Negative for dizziness, tremors, seizures, syncope, facial asymmetry, speech difficulty, weakness, light-headedness, numbness and headaches  Hematological: Negative for adenopathy  Does not bruise/bleed easily  Psychiatric/Behavioral: Negative for behavioral problems, confusion, decreased concentration, hallucinations, sleep disturbance and suicidal ideas  The patient is not nervous/anxious  Objective:      /92 (BP Location: Left arm, Patient Position: Sitting)   Pulse 64   Temp 97 9 °F (36 6 °C) (Tympanic)   Resp 12   Ht 5' 7" (1 702 m)   Wt 89 8 kg (198 lb)   BMI 31 01 kg/m²          Physical Exam  Constitutional:       Appearance: Normal appearance  HENT:      Head: Normocephalic and atraumatic     Eyes:      Conjunctiva/sclera: Conjunctivae normal       Pupils: Pupils are equal, round, and reactive to light  Neck:      Musculoskeletal: Normal range of motion and neck supple  No neck rigidity or muscular tenderness  Cardiovascular:      Rate and Rhythm: Normal rate and regular rhythm  Pulses: Normal pulses  no weak pulses          Dorsalis pedis pulses are 2+ on the right side and 2+ on the left side  Posterior tibial pulses are 2+ on the right side and 2+ on the left side  Heart sounds: No murmur  No friction rub  No gallop  Pulmonary:      Effort: Pulmonary effort is normal  No respiratory distress  Breath sounds: Normal breath sounds  No wheezing, rhonchi or rales  Abdominal:      General: Bowel sounds are normal  There is no distension  Palpations: There is no mass  Tenderness: There is no abdominal tenderness  There is no guarding  Hernia: No hernia is present  Musculoskeletal: Normal range of motion  General: Tenderness (Between the 3rd and 4th interdigital space on the right foot) and deformity (missing 5th finger on the right s/p surgery  for cancer) present  No swelling  Right lower leg: No edema  Left lower leg: No edema  Feet:      Right foot:      Skin integrity: No ulcer, skin breakdown, erythema, warmth, callus or dry skin  Left foot:      Skin integrity: No ulcer, skin breakdown, erythema, warmth, callus or dry skin  Skin:     General: Skin is warm  Capillary Refill: Capillary refill takes less than 2 seconds  Coloration: Skin is not pale  Findings: No bruising, erythema, lesion or rash  Neurological:      Mental Status: He is alert and oriented to person, place, and time  Sensory: No sensory deficit  Motor: No weakness  Gait: Gait normal    Psychiatric:         Mood and Affect: Mood normal        BMI Counseling: Body mass index is 31 01 kg/m²  The BMI is above normal  Nutrition recommendations include decreasing portion sizes and limiting drinks that contain sugar  Exercise recommendations include moderate physical activity 150 minutes/week and exercising 3-5 times per week  No pharmacotherapy was ordered  Right Foot/Ankle   Right Foot Inspection  Skin Exam: skin not intact, no dry skin, no warmth, no callus, no erythema, no maceration, no abnormal color, no pre-ulcer, no ulcer and no callus                          Toe Exam: no swelling, no tenderness, erythema and  no right toe deformity  Sensory   Vibration: intact  Proprioception: intact   Monofilament testing: intact  Vascular  Capillary refills: < 3 seconds  The right DP pulse is 2+  The right PT pulse is 2+  Right Toe  - Comprehensive Exam  Ecchymosis: none  Swelling: none         Left Foot/Ankle  Left Foot Inspection  Skin Exam: skin not intact, no dry skin, no warmth, no erythema, no maceration, normal color, no pre-ulcer, no ulcer and no callus                         Toe Exam: no swelling, no tenderness, no erythema and no left toe deformity                   Sensory   Vibration: intact  Proprioception: intact  Monofilament: intact  Vascular  Capillary refills: < 3 seconds  The left DP pulse is 2+  The left PT pulse is 2+  Left Toe  - Comprehensive Exam  Ecchymosis: none  Swelling: none       Assign Risk Category:  No deformity present; No loss of protective sensation;  No weak pulses       Risk: 0

## 2020-08-13 NOTE — ASSESSMENT & PLAN NOTE
Lab Results   Component Value Date    HGBA1C 9 2 (H) 07/30/2020   Patient hemoglobin HGB1AC increase to 9 2 from 7 9 in 10 months  Patient states he was not compliant with diabetic diet during the last months due to being at home  Was not checking glycemia frequently    Will start patient in Jardiance 10 mg po daily in addition to continue Glucophage 2 grams qpm

## 2020-08-13 NOTE — ASSESSMENT & PLAN NOTE
Patient complains of pain between the 3rd and 4th interdigital space in the right foot  On physical exam there was tenderness and small mass palpable resembling Page's neuroma  Patient was instructed to use fitted shoes in order to prevent pain

## 2020-08-14 ENCOUNTER — TELEPHONE (OUTPATIENT)
Dept: ADMINISTRATIVE | Facility: OTHER | Age: 65
End: 2020-08-14

## 2020-08-14 DIAGNOSIS — T78.40XA ALLERGIC STATE, INITIAL ENCOUNTER: ICD-10-CM

## 2020-08-14 RX ORDER — FLUTICASONE PROPIONATE 50 MCG
SPRAY, SUSPENSION (ML) NASAL
Qty: 16 ML | Refills: 0 | Status: SHIPPED | OUTPATIENT
Start: 2020-08-14 | End: 2020-09-14

## 2020-08-14 NOTE — TELEPHONE ENCOUNTER
Upon review of the In Basket request and the patient's chart, initial outreach has been made via fax, please see Contacts section for details  A second outreach attempt will be made within 5 business days      Thank you  Yahaira Davis

## 2020-08-14 NOTE — TELEPHONE ENCOUNTER
----- Message from Luz Elena Kowalski sent at 2020  3:49 PM EDT -----  [facility]  Contact: [phone]  20 3:49 PM    Jeovanny, our patient Teresa Eaton has had Diabetic Eye Exam completed/performed  Please assist in updating the patient chart by making an External outreach to Dr Catracho Joiner facility located in [place]  The date of service is doesn't remember when      Thank you,  Luz Elena Flores 66

## 2020-08-14 NOTE — TELEPHONE ENCOUNTER
Upon review of the In Basket request we were able to locate, review, and update the patient chart as requested for Diabetic Eye Exam     Last DM eye exam completed 6/6/19 no retinopathy changed HM modifier to 2 years  Any additional questions or concerns should be emailed to the Practice Liaisons via [email or web address] email, please do not reply via In Basket      Thank you  Radha Mitchell

## 2020-08-14 NOTE — LETTER
Diabetic Eye Exam Form    Date Requested: 20  Patient: Rima Jerry  Patient : 1955   Referring Provider: Joe Johnson MD    Dilated Retinal Exam, Optomap-Iris Exam, or Fundus Photography Done         Yes ([place] one above)         No     Date of Diabetic Eye Exam ______________________________  Left Eye      Exam did show retinopathy    Exam did not show retinopathy         Mild       Moderate       None       Proliferative       Severe     Right Eye     Exam did show retinopathy    Exam did not show retinopathy         Mild       Moderate       None       Proliferative       Severe     Comments __________________________________________________________    Practice Providing Exam ______________________________________________    Exam Performed By (print name) _______________________________________      Provider Signature ___________________________________________________      These reports are needed for  compliance    Please fax this completed form and a copy of the Diabetic Eye Exam report to our office located at [place] [address] as soon as possible to [phone] attention Carri Baumann: Phone [phone]    We thank you for your assistance in treating our mutual patient

## 2020-08-20 DIAGNOSIS — E11.8 TYPE 2 DIABETES MELLITUS WITH COMPLICATION, WITHOUT LONG-TERM CURRENT USE OF INSULIN (HCC): Primary | ICD-10-CM

## 2020-08-20 RX ORDER — LANCETS
EACH MISCELLANEOUS
Qty: 200 EACH | Refills: 0 | Status: SHIPPED | OUTPATIENT
Start: 2020-08-20 | End: 2020-09-16

## 2020-08-20 NOTE — TELEPHONE ENCOUNTER
----- Message from Margy Cogan sent at 8/19/2020  9:27 PM EDT -----  Regarding: Prescription Question  Contact: [phone]  I have the One Touch Ultra 2 glucose meter  I am out of supplies  The pharmacist suggested I contact you for a prescription for the test strips and lancets  Can you send a prescription for these two items to [facility] in [facility]? Thank you  Feel free to call with any questions or concerns  Nelly Tate  [phone]

## 2020-09-01 ENCOUNTER — TELEPHONE (OUTPATIENT)
Dept: INTERNAL MEDICINE CLINIC | Facility: CLINIC | Age: 65
End: 2020-09-01

## 2020-09-01 ENCOUNTER — OFFICE VISIT (OUTPATIENT)
Dept: INTERNAL MEDICINE CLINIC | Facility: CLINIC | Age: 65
End: 2020-09-01
Payer: COMMERCIAL

## 2020-09-01 VITALS
RESPIRATION RATE: 14 BRPM | WEIGHT: 196 LBS | DIASTOLIC BLOOD PRESSURE: 76 MMHG | HEART RATE: 68 BPM | TEMPERATURE: 97.2 F | SYSTOLIC BLOOD PRESSURE: 114 MMHG | BODY MASS INDEX: 30.76 KG/M2 | HEIGHT: 67 IN

## 2020-09-01 DIAGNOSIS — E78.2 MIXED HYPERLIPIDEMIA: ICD-10-CM

## 2020-09-01 DIAGNOSIS — K12.30 MUCOSITIS: ICD-10-CM

## 2020-09-01 DIAGNOSIS — E66.9 OBESITY (BMI 30-39.9): ICD-10-CM

## 2020-09-01 DIAGNOSIS — L29.0 ANAL ITCH: ICD-10-CM

## 2020-09-01 DIAGNOSIS — I10 ESSENTIAL HYPERTENSION: Chronic | ICD-10-CM

## 2020-09-01 DIAGNOSIS — E11.8 TYPE 2 DIABETES MELLITUS WITH COMPLICATION, WITHOUT LONG-TERM CURRENT USE OF INSULIN (HCC): Primary | ICD-10-CM

## 2020-09-01 DIAGNOSIS — E78.5 HYPERLIPIDEMIA, UNSPECIFIED HYPERLIPIDEMIA TYPE: ICD-10-CM

## 2020-09-01 PROCEDURE — 99214 OFFICE O/P EST MOD 30 MIN: CPT | Performed by: PHYSICIAN ASSISTANT

## 2020-09-01 RX ORDER — DIBUCAINE 0.28 G/28G
OINTMENT TOPICAL 3 TIMES DAILY
Qty: 30 G | Refills: 0 | Status: SHIPPED | OUTPATIENT
Start: 2020-09-01 | End: 2020-09-16

## 2020-09-01 NOTE — TELEPHONE ENCOUNTER
----- Message from Siri Marie MD sent at 9/1/2020  8:35 AM EDT -----  Regarding: FW: Non-Urgent Medical Question  Contact: [phone]  Please call and offer office visit    ----- Message -----  From: Junaid Hargrove  Sent: 9/1/2020   7:45 AM EDT  To: Siri Marie MD  Subject: FW: Non-Urgent Medical Question                    ----- Message -----  From: Raman Jerry  Sent: 9/1/2020   6:36 AM EDT  To: [facility] Med Clinical  Subject: Non-Urgent Medical Question                      Dr Virginia Casillas, Over the past two days I seem to have developed 2 conditions that I am not sure are related or not  First, the inside of my mouth gets irritated with many foods  Wife gave me a cookie and inside felt like it was blistering  This is since Saturday  I used Listerine yesterday, it may be a little better  Second, within same time frame I have developed rather severe anal itching, which has kept me up tonight  Prep H past 2 nights helped alleviate but just for a few hours and no noticeable improvement  I was wondering if these could have to do with the yeast you advised me about with the Jardiance  Any ointments or remedies I can try? Thank you    Ema Wilson

## 2020-09-01 NOTE — PROGRESS NOTES
Assessment/Plan: symptoms suggesting mucositis in his mouth in irritation itching of his anus  Recommend stopping the Jardiance and follow-up in 2 weeks  His physical exam is unremarkable       Diagnoses and all orders for this visit:    Type 2 diabetes mellitus with complication, without long-term current use of insulin (HCC)    Essential hypertension    Mixed hyperlipidemia    Obesity (BMI 30-39  9)    Hyperlipidemia, unspecified hyperlipidemia type    Mucositis    Anal itch  -     dibucaine (NUPERCAINAL) 1 % ointment; Apply topically 3 (three) times a day        No problem-specific Assessment & Plan notes found for this encounter  Subjective:      Patient ID: Tessa Shi is a 60 y o  male  He suddenly noticed severe burning pain inside of his mouth when he ate something 2 days ago  He felt burning in his entire mouth  The burning has persisted although  Improved since then  He also developed anal itching at the same time which persists  Started on Jardiance a couple of weeks ago  His blood sugars have not changed much  He had noticed no rash around his anus or anything abnormal looking in his mouth  Otherwise active and well  History of coronary disease which has been managed currently no angina symptoms hyperlipidemia hypertension well controlled meds  Diabetic on oral medication currently not that well controlled recent A1c 9 2      The following portions of the patient's history were reviewed and updated as appropriate:   He has a past medical history of Coronary artery disease, Diabetes mellitus ([address] ), Diastolic dysfunction, Diverticulitis, Hyperlipidemia, Hypertension, Melanoma ([address] ), Obesity, Prediabetes, and 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)  ,  does not have any pertinent problems on file  ,   has a past surgical history that includes Hernia repair; Bowel resection (2008); Amputation at metacarpal (Right, 2009); Malignant skin lesion excision (9331);  Skin biopsy; Colonoscopy; Coronary artery bypass graft; Hand surgery; and pr colonoscopy flx dx w/collj spec when pfrmd (N/A, 12/11/2018)  ,  family history includes Basal cell carcinoma in his father; Coronary artery disease in his mother; Diabetes type II in his father and mother; Emphysema in his maternal grandfather; Hyperlipidemia in his mother; Hypertension in his mother and sister  ,   reports that he has never smoked  He has never used smokeless tobacco  He reports previous alcohol use  He reports that he does not use drugs  ,  has No Known Allergies     Current Outpatient Medications   Medication Sig Dispense Refill    aspirin 81 MG tablet Take 81 mg by mouth daily      atorvastatin (LIPITOR) 20 mg tablet Take 1 tablet (20 mg total) by mouth daily at bedtime 90 tablet 3    fluticasone (FLONASE) 50 mcg/act nasal spray SPRAY 2 SPRAYS INTO EACH NOSTRIL EVERY DAY 16 mL 0    glucose blood test strip Use as instructed 200 each 0    hydrochlorothiazide (HYDRODIURIL) 25 mg tablet TAKE 1 TABLET DAILY AS DIRECTED  90 tablet 3    irbesartan (AVAPRO) 150 mg tablet Take 1 tablet (150 mg total) by mouth daily 90 tablet 3    Lancets (onetouch ultrasoft) lancets Use as instructed 200 each 0    metFORMIN (GLUCOPHAGE-XR) 500 mg 24 hr tablet Take 4 tablets (2,000 mg total) by mouth daily with dinner 360 tablet 3    metoprolol succinate (TOPROL-XL) 100 mg 24 hr tablet Take 1 tablet (100 mg total) by mouth daily 90 tablet 3    albuterol (PROAIR HFA) 90 mcg/act inhaler Inhale 2 puffs every 4 (four) hours as needed      dibucaine (NUPERCAINAL) 1 % ointment Apply topically 3 (three) times a day 30 g 0    fluticasone-vilanterol (BREO ELLIPTA) 100-25 mcg/inh inhaler Inhale daily       No current facility-administered medications for this visit  Review of Systems   Constitutional: Negative for activity change, appetite change, chills, diaphoresis, fatigue, fever and unexpected weight change     HENT: Negative for congestion, dental problem, drooling, ear discharge, ear pain, facial swelling, hearing loss, nosebleeds, postnasal drip, rhinorrhea, sinus pressure, sinus pain, sneezing, sore throat, tinnitus, trouble swallowing and voice change  Eyes: Negative for photophobia, pain, discharge, redness, itching and visual disturbance  Respiratory: Negative for apnea, cough, choking, chest tightness, shortness of breath, wheezing and stridor  Cardiovascular: Negative for chest pain, palpitations and leg swelling  Gastrointestinal: Negative for abdominal distention, abdominal pain, anal bleeding, blood in stool, constipation, diarrhea, nausea, rectal pain and vomiting  Endocrine: Negative for cold intolerance, heat intolerance, polydipsia, polyphagia and polyuria  Genitourinary: Negative for decreased urine volume, difficulty urinating, dysuria, enuresis, flank pain, frequency, genital sores, hematuria and urgency  Musculoskeletal: Negative for arthralgias, back pain, gait problem, joint swelling, myalgias, neck pain and neck stiffness  Skin: Negative for color change, pallor, rash and wound  Neurological: Negative for dizziness, tremors, seizures, syncope, facial asymmetry, speech difficulty, weakness, light-headedness, numbness and headaches  Hematological: Negative for adenopathy  Does not bruise/bleed easily  Psychiatric/Behavioral: Negative for agitation, behavioral problems, confusion, decreased concentration, dysphoric mood, hallucinations, self-injury, sleep disturbance and suicidal ideas  The patient is not nervous/anxious and is not hyperactive  Objective:  Vitals:    09/01/20 1525   BP: 114/76   BP Location: Left arm   Patient Position: Sitting   Cuff Size: Standard   Pulse: 68   Resp: 14   Temp: (!) 97 2 °F (36 2 °C)   TempSrc: Temporal   Weight: 88 9 kg (196 lb)   Height: 5' 7" (1 702 m)     Body mass index is 30 7 kg/m²  Physical Exam  Vitals signs reviewed     Constitutional:       Appearance: He is well-developed  He is obese  HENT:      Head: Normocephalic  Right Ear: Tympanic membrane, ear canal and external ear normal       Left Ear: Tympanic membrane, ear canal and external ear normal       Nose: Nose normal  No congestion or rhinorrhea  Mouth/Throat:      Mouth: Mucous membranes are moist       Pharynx: Oropharynx is clear  No oropharyngeal exudate or posterior oropharyngeal erythema  Eyes:      Conjunctiva/sclera: Conjunctivae normal       Pupils: Pupils are equal, round, and reactive to light  Neck:      Musculoskeletal: Neck supple  Thyroid: No thyromegaly  Cardiovascular:      Rate and Rhythm: Regular rhythm  Tachycardia present  Pulses: Normal pulses  Heart sounds: Normal heart sounds  No murmur  Pulmonary:      Effort: Pulmonary effort is normal  No respiratory distress  Breath sounds: Normal breath sounds  Abdominal:      General: Bowel sounds are normal  There is no distension  Palpations: Abdomen is soft  There is no mass  Tenderness: There is no abdominal tenderness  There is no right CVA tenderness, left CVA tenderness, guarding or rebound  Hernia: No hernia is present  Genitourinary:     Rectum: Normal    Musculoskeletal: Normal range of motion  General: No swelling  Skin:     General: Skin is warm and dry  Coloration: Skin is not jaundiced or pale  Findings: No bruising, erythema, lesion or rash  Neurological:      General: No focal deficit present  Mental Status: He is alert and oriented to person, place, and time  Deep Tendon Reflexes: Reflexes are normal and symmetric  Psychiatric:         Mood and Affect: Mood normal          Thought Content:  Thought content normal          Judgment: Judgment normal

## 2020-09-01 NOTE — TELEPHONE ENCOUNTER
----- Message from Berenice Lugo MD sent at 9/1/2020  8:35 AM EDT -----  Regarding: FW: Non-Urgent Medical Question  Contact: [phone]  Please call and offer office visit    ----- Message -----  From: Vielka Lindo  Sent: 9/1/2020   7:45 AM EDT  To: Berenice Lugo MD  Subject: FW: Non-Urgent Medical Question                    ----- Message -----  From: Codi Jerry  Sent: 9/1/2020   6:36 AM EDT  To: [facility]  Subject: Non-Urgent Medical Question                      Dr Monica Quinn, Over the past two days I seem to have developed 2 conditions that I am not sure are related or not  First, the inside of my mouth gets irritated with many foods  Wife gave me a cookie and inside felt like it was blistering  This is since Saturday  I used Listerine yesterday, it may be a little better  Second, within same time frame I have developed rather severe anal itching, which has kept me up tonight  Prep H past 2 nights helped alleviate but just for a few hours and no noticeable improvement  I was wondering if these could have to do with the yeast you advised me about with the Jardiance  Any ointments or remedies I can try? Thank you    Nisa Rodriguez

## 2020-09-12 DIAGNOSIS — T78.40XA ALLERGIC STATE, INITIAL ENCOUNTER: ICD-10-CM

## 2020-09-14 RX ORDER — FLUTICASONE PROPIONATE 50 MCG
SPRAY, SUSPENSION (ML) NASAL
Qty: 16 ML | Refills: 0 | Status: SHIPPED | OUTPATIENT
Start: 2020-09-14 | End: 2020-10-08

## 2020-09-16 ENCOUNTER — OFFICE VISIT (OUTPATIENT)
Dept: INTERNAL MEDICINE CLINIC | Facility: CLINIC | Age: 65
End: 2020-09-16
Payer: COMMERCIAL

## 2020-09-16 VITALS
BODY MASS INDEX: 31.23 KG/M2 | WEIGHT: 199 LBS | SYSTOLIC BLOOD PRESSURE: 144 MMHG | TEMPERATURE: 96.5 F | RESPIRATION RATE: 12 BRPM | HEIGHT: 67 IN | DIASTOLIC BLOOD PRESSURE: 96 MMHG | HEART RATE: 64 BPM

## 2020-09-16 DIAGNOSIS — E11.8 TYPE 2 DIABETES MELLITUS WITH COMPLICATION, WITHOUT LONG-TERM CURRENT USE OF INSULIN (HCC): Primary | ICD-10-CM

## 2020-09-16 PROCEDURE — 99213 OFFICE O/P EST LOW 20 MIN: CPT | Performed by: INTERNAL MEDICINE

## 2020-09-16 RX ORDER — GLIMEPIRIDE 1 MG/1
1 TABLET ORAL
COMMUNITY
End: 2020-11-30 | Stop reason: SDUPTHER

## 2020-09-16 NOTE — PROGRESS NOTES
Assessment/Plan:    Diagnoses and all orders for this visit:    Type 2 diabetes mellitus with complication, without long-term current use of insulin ([facility])  -     Empagliflozin 10 MG TABS; Take 1 tablet (10 mg total) by mouth every morning  -     Glucometer  -     Glucometer test strips  -     Lancets    Other orders  -     glimepiride (AMARYL) 1 mg tablet; Take 1 mg by mouth daily with breakfast         Patient Instructions   Continue on metformin, start back on Jardiance 10 mg daily  Use glimepiride only for blood sugar greater than 200  Check comprehensive metabolic panel in 2-3 weeks and follow-up in a month with blood sugars  Accu-Chek glucometer and supplies ordered  Contact me if any issues getting these supplies  Subjective:      Patient ID: Minnie Fair is a 60 y o  male    Patient returns to follow-up diabetes  He was started on Jardiance last month but then had issue with rectal itching predominantly at nighttime  This itching has resolved  He was seen by William Rouse and urged to stop the Jardiance as it seems to be temporally related  Blood sugars since that time have been elevated, typically greater than 200 so he went back on 1 mg of glimepiride daily  He has not been eating regularly because of commuting back and forth to help his mother in [place]  He is having issues getting appropriate testing supplies          Current Outpatient Medications:     albuterol (PROAIR HFA) 90 mcg/act inhaler, Inhale 2 puffs every 4 (four) hours as needed, Disp: , Rfl:     aspirin 81 MG tablet, Take 81 mg by mouth daily, Disp: , Rfl:     atorvastatin (LIPITOR) 20 mg tablet, Take 1 tablet (20 mg total) by mouth daily at bedtime, Disp: 90 tablet, Rfl: 3    fluticasone (FLONASE) 50 mcg/act nasal spray, SPRAY 2 SPRAYS INTO EACH NOSTRIL EVERY DAY, Disp: 16 mL, Rfl: 0    fluticasone-vilanterol (BREO ELLIPTA) 100-25 mcg/inh inhaler, Inhale daily, Disp: , Rfl:     glimepiride (AMARYL) 1 mg tablet, Take 1 mg by mouth daily with breakfast, Disp: , Rfl:     glucose blood test strip, Use as instructed, Disp: 200 each, Rfl: 0    hydrochlorothiazide (HYDRODIURIL) 25 mg tablet, TAKE 1 TABLET DAILY AS DIRECTED , Disp: 90 tablet, Rfl: 3    irbesartan (AVAPRO) 150 mg tablet, Take 1 tablet (150 mg total) by mouth daily, Disp: 90 tablet, Rfl: 3    metFORMIN (GLUCOPHAGE-XR) 500 mg 24 hr tablet, Take 4 tablets (2,000 mg total) by mouth daily with dinner, Disp: 360 tablet, Rfl: 3    metoprolol succinate (TOPROL-XL) 100 mg 24 hr tablet, Take 1 tablet (100 mg total) by mouth daily, Disp: 90 tablet, Rfl: 3    Empagliflozin 10 MG TABS, Take 1 tablet (10 mg total) by mouth every morning, Disp: 30 tablet, Rfl: 5    No results found for this or any previous visit (from the past 1008 hour(s))  The following portions of the patient's history were reviewed and updated as appropriate: allergies, current medications, past family history, past medical history, past social history, past surgical history and problem list      Review of Systems   Constitutional: Negative for appetite change, chills, diaphoresis, fatigue, fever and unexpected weight change  HENT: Negative for congestion, hearing loss and rhinorrhea  Eyes: Negative for visual disturbance  Respiratory: Negative for cough, chest tightness, shortness of breath and wheezing  Cardiovascular: Negative for chest pain, palpitations and leg swelling  Gastrointestinal: Negative for abdominal pain and blood in stool  Endocrine: Negative for cold intolerance, heat intolerance, polydipsia and polyuria  Genitourinary: Negative for difficulty urinating, dysuria, frequency and urgency  Musculoskeletal: Negative for arthralgias and myalgias  Skin: Negative for rash  Neurological: Negative for dizziness, weakness, light-headedness and headaches  Hematological: Does not bruise/bleed easily  Psychiatric/Behavioral: Negative for dysphoric mood and sleep disturbance  Objective:      Vitals:    09/16/20 0935   BP: 144/96   Pulse: 64   Resp: 12   Temp: (!) 96 5 °F (35 8 °C)          Physical Exam  Constitutional:       Appearance: He is well-developed  HENT:      Head: Normocephalic and atraumatic  Pulmonary:      Effort: Pulmonary effort is normal    Neurological:      Mental Status: He is alert and oriented to person, place, and time  Psychiatric:         Behavior: Behavior normal  Behavior is cooperative  Thought Content:  Thought content normal          Judgment: Judgment normal

## 2020-09-16 NOTE — PATIENT INSTRUCTIONS
Continue on metformin, start back on Jardiance 10 mg daily  Use glimepiride only for blood sugar greater than 200  Check comprehensive metabolic panel in 2-3 weeks and follow-up in a month with blood sugars  Accu-Chek glucometer and supplies ordered  Contact me if any issues getting these supplies

## 2020-09-25 DIAGNOSIS — E11.8 TYPE 2 DIABETES MELLITUS WITH COMPLICATION, WITHOUT LONG-TERM CURRENT USE OF INSULIN (HCC): ICD-10-CM

## 2020-09-26 DIAGNOSIS — E11.8 TYPE 2 DIABETES MELLITUS WITH COMPLICATION, WITHOUT LONG-TERM CURRENT USE OF INSULIN (HCC): ICD-10-CM

## 2020-09-26 RX ORDER — BLOOD-GLUCOSE METER
EACH MISCELLANEOUS
COMMUNITY
Start: 2020-09-17

## 2020-09-26 RX ORDER — LANCETS
EACH MISCELLANEOUS
COMMUNITY
Start: 2020-09-17

## 2020-09-26 NOTE — TELEPHONE ENCOUNTER
----- Message from Regan Cuevas sent at 9/25/2020  8:50 PM EDT -----  Regarding: Prescription Question  Contact: [phone]  I cannot get refill of Jardiance  Can you help? I am out  I see you prescribed it on 9/16 but [facility] claims they have no prescription  They will not refill based on August prescription because they say they were notified not to refill by Filemon Correa  on 9/1  I have been going there since 9/16  and they say everyday that they have nothing  Now today pharmacist gave me 3 pills for weekend  I think they were contacting the other Dr who was shadowing you, Mira Stewart?)  I told them, from the beginning that he was not my Dr   This one pharmacist is good, he tried to help and said he would write you directly  Just wanted you to be aware of what is happening  Thanks for everything    Zachariah Mirza

## 2020-09-28 RX ORDER — EMPAGLIFLOZIN 10 MG/1
TABLET, FILM COATED ORAL
Qty: 30 TABLET | Refills: 3 | Status: SHIPPED | OUTPATIENT
Start: 2020-09-28 | End: 2020-10-09

## 2020-09-29 ENCOUNTER — APPOINTMENT (OUTPATIENT)
Dept: LAB | Facility: HOSPITAL | Age: 65
End: 2020-09-29
Payer: COMMERCIAL

## 2020-09-29 DIAGNOSIS — E11.8 TYPE 2 DIABETES MELLITUS WITH COMPLICATION, WITHOUT LONG-TERM CURRENT USE OF INSULIN (HCC): ICD-10-CM

## 2020-09-29 LAB
ALBUMIN SERPL BCP-MCNC: 4.1 G/DL (ref 3.5–5)
ALP SERPL-CCNC: 68 U/L (ref 46–116)
ALT SERPL W P-5'-P-CCNC: 35 U/L (ref 12–78)
ANION GAP SERPL CALCULATED.3IONS-SCNC: 10 MMOL/L (ref 4–13)
AST SERPL W P-5'-P-CCNC: 19 U/L (ref 5–45)
BILIRUB SERPL-MCNC: 0.4 MG/DL (ref 0.2–1)
BUN SERPL-MCNC: 23 MG/DL (ref 5–25)
CALCIUM SERPL-MCNC: 10.1 MG/DL (ref 8.3–10.1)
CHLORIDE SERPL-SCNC: 100 MMOL/L (ref 100–108)
CO2 SERPL-SCNC: 29 MMOL/L (ref 21–32)
CREAT SERPL-MCNC: 1.03 MG/DL (ref 0.6–1.3)
GFR SERPL CREATININE-BSD FRML MDRD: 77 ML/MIN/1.73SQ M
GLUCOSE P FAST SERPL-MCNC: 129 MG/DL (ref 65–99)
POTASSIUM SERPL-SCNC: 4.2 MMOL/L (ref 3.5–5.3)
PROT SERPL-MCNC: 8.4 G/DL (ref 6.4–8.2)
SODIUM SERPL-SCNC: 139 MMOL/L (ref 136–145)

## 2020-09-29 PROCEDURE — 80053 COMPREHEN METABOLIC PANEL: CPT

## 2020-09-29 PROCEDURE — 36415 COLL VENOUS BLD VENIPUNCTURE: CPT

## 2020-10-08 DIAGNOSIS — T78.40XA ALLERGY, INITIAL ENCOUNTER: ICD-10-CM

## 2020-10-08 RX ORDER — FLUTICASONE PROPIONATE 50 MCG
SPRAY, SUSPENSION (ML) NASAL
Qty: 16 ML | Refills: 0 | Status: SHIPPED | OUTPATIENT
Start: 2020-10-08 | End: 2020-11-02

## 2020-10-09 ENCOUNTER — OFFICE VISIT (OUTPATIENT)
Dept: INTERNAL MEDICINE CLINIC | Facility: CLINIC | Age: 65
End: 2020-10-09
Payer: COMMERCIAL

## 2020-10-09 VITALS
BODY MASS INDEX: 30.54 KG/M2 | HEART RATE: 64 BPM | RESPIRATION RATE: 12 BRPM | HEIGHT: 67 IN | TEMPERATURE: 96.7 F | WEIGHT: 194.6 LBS | DIASTOLIC BLOOD PRESSURE: 92 MMHG | SYSTOLIC BLOOD PRESSURE: 148 MMHG

## 2020-10-09 DIAGNOSIS — I10 ESSENTIAL HYPERTENSION: ICD-10-CM

## 2020-10-09 DIAGNOSIS — Z23 ENCOUNTER FOR IMMUNIZATION: Primary | ICD-10-CM

## 2020-10-09 DIAGNOSIS — E11.8 TYPE 2 DIABETES MELLITUS WITH COMPLICATION, WITHOUT LONG-TERM CURRENT USE OF INSULIN (HCC): ICD-10-CM

## 2020-10-09 DIAGNOSIS — M54.50 ACUTE MIDLINE LOW BACK PAIN WITHOUT SCIATICA: ICD-10-CM

## 2020-10-09 PROCEDURE — 4010F ACE/ARB THERAPY RXD/TAKEN: CPT | Performed by: INTERNAL MEDICINE

## 2020-10-09 PROCEDURE — 99214 OFFICE O/P EST MOD 30 MIN: CPT | Performed by: INTERNAL MEDICINE

## 2020-10-09 PROCEDURE — 3080F DIAST BP >= 90 MM HG: CPT | Performed by: INTERNAL MEDICINE

## 2020-10-09 PROCEDURE — 1036F TOBACCO NON-USER: CPT | Performed by: INTERNAL MEDICINE

## 2020-10-09 PROCEDURE — 90471 IMMUNIZATION ADMIN: CPT | Performed by: INTERNAL MEDICINE

## 2020-10-09 PROCEDURE — 90682 RIV4 VACC RECOMBINANT DNA IM: CPT | Performed by: INTERNAL MEDICINE

## 2020-10-09 RX ORDER — EMPAGLIFLOZIN 25 MG/1
25 TABLET, FILM COATED ORAL EVERY MORNING
Qty: 90 TABLET | Refills: 3 | Status: SHIPPED | OUTPATIENT
Start: 2020-10-09 | End: 2021-10-05

## 2020-10-09 RX ORDER — IRBESARTAN 150 MG/1
150 TABLET ORAL DAILY
Qty: 90 TABLET | Refills: 3 | Status: SHIPPED | OUTPATIENT
Start: 2020-10-09 | End: 2021-10-05

## 2020-10-12 ENCOUNTER — TELEPHONE (OUTPATIENT)
Dept: PHYSICAL THERAPY | Facility: OTHER | Age: 65
End: 2020-10-12

## 2020-10-23 ENCOUNTER — TELEPHONE (OUTPATIENT)
Dept: PHYSICAL THERAPY | Facility: OTHER | Age: 65
End: 2020-10-23

## 2020-11-01 DIAGNOSIS — T78.40XA ALLERGY, INITIAL ENCOUNTER: ICD-10-CM

## 2020-11-02 RX ORDER — FLUTICASONE PROPIONATE 50 MCG
SPRAY, SUSPENSION (ML) NASAL
Qty: 16 ML | Refills: 0 | Status: SHIPPED | OUTPATIENT
Start: 2020-11-02 | End: 2020-12-04

## 2020-11-24 ENCOUNTER — TELEMEDICINE (OUTPATIENT)
Dept: INTERNAL MEDICINE CLINIC | Facility: CLINIC | Age: 65
End: 2020-11-24
Payer: COMMERCIAL

## 2020-11-24 DIAGNOSIS — Z11.9 ENCOUNTER FOR SCREENING FOR INFECTIOUS AND PARASITIC DISEASES, UNSPECIFIED: Primary | ICD-10-CM

## 2020-11-24 DIAGNOSIS — Z11.9 ENCOUNTER FOR SCREENING FOR INFECTIOUS AND PARASITIC DISEASES, UNSPECIFIED: ICD-10-CM

## 2020-11-24 PROCEDURE — 99213 OFFICE O/P EST LOW 20 MIN: CPT | Performed by: INTERNAL MEDICINE

## 2020-11-24 PROCEDURE — U0003 INFECTIOUS AGENT DETECTION BY NUCLEIC ACID (DNA OR RNA); SEVERE ACUTE RESPIRATORY SYNDROME CORONAVIRUS 2 (SARS-COV-2) (CORONAVIRUS DISEASE [COVID-19]), AMPLIFIED PROBE TECHNIQUE, MAKING USE OF HIGH THROUGHPUT TECHNOLOGIES AS DESCRIBED BY CMS-2020-01-R: HCPCS | Performed by: INTERNAL MEDICINE

## 2020-11-25 LAB — SARS-COV-2 RNA SPEC QL NAA+PROBE: NOT DETECTED

## 2020-11-30 DIAGNOSIS — E11.8 TYPE 2 DIABETES MELLITUS WITH COMPLICATION, WITHOUT LONG-TERM CURRENT USE OF INSULIN (HCC): Primary | ICD-10-CM

## 2020-11-30 RX ORDER — GLIMEPIRIDE 1 MG/1
1 TABLET ORAL
Qty: 90 TABLET | Refills: 3 | Status: SHIPPED | OUTPATIENT
Start: 2020-11-30 | End: 2021-06-18

## 2020-12-04 DIAGNOSIS — T78.40XA ALLERGY, INITIAL ENCOUNTER: ICD-10-CM

## 2020-12-04 RX ORDER — FLUTICASONE PROPIONATE 50 MCG
SPRAY, SUSPENSION (ML) NASAL
Qty: 16 ML | Refills: 0 | Status: SHIPPED | OUTPATIENT
Start: 2020-12-04 | End: 2020-12-28

## 2020-12-05 ENCOUNTER — LAB (OUTPATIENT)
Dept: LAB | Facility: HOSPITAL | Age: 65
End: 2020-12-05
Attending: INTERNAL MEDICINE
Payer: COMMERCIAL

## 2020-12-05 DIAGNOSIS — E11.8 TYPE 2 DIABETES MELLITUS WITH COMPLICATION, WITHOUT LONG-TERM CURRENT USE OF INSULIN (HCC): ICD-10-CM

## 2020-12-05 LAB
ALBUMIN SERPL BCP-MCNC: 3.9 G/DL (ref 3.5–5)
ALP SERPL-CCNC: 67 U/L (ref 46–116)
ALT SERPL W P-5'-P-CCNC: 29 U/L (ref 12–78)
ANION GAP SERPL CALCULATED.3IONS-SCNC: 7 MMOL/L (ref 4–13)
AST SERPL W P-5'-P-CCNC: 18 U/L (ref 5–45)
BASOPHILS # BLD AUTO: 0.05 THOUSANDS/ΜL (ref 0–0.1)
BASOPHILS NFR BLD AUTO: 1 % (ref 0–1)
BILIRUB SERPL-MCNC: 0.4 MG/DL (ref 0.2–1)
BUN SERPL-MCNC: 20 MG/DL (ref 5–25)
CALCIUM SERPL-MCNC: 9.5 MG/DL (ref 8.3–10.1)
CHLORIDE SERPL-SCNC: 101 MMOL/L (ref 100–108)
CHOLEST SERPL-MCNC: 129 MG/DL (ref 50–200)
CO2 SERPL-SCNC: 29 MMOL/L (ref 21–32)
CREAT SERPL-MCNC: 1.02 MG/DL (ref 0.6–1.3)
EOSINOPHIL # BLD AUTO: 0.23 THOUSAND/ΜL (ref 0–0.61)
EOSINOPHIL NFR BLD AUTO: 3 % (ref 0–6)
ERYTHROCYTE [DISTWIDTH] IN BLOOD BY AUTOMATED COUNT: 12.7 % (ref 11.6–15.1)
EST. AVERAGE GLUCOSE BLD GHB EST-MCNC: 148 MG/DL
GFR SERPL CREATININE-BSD FRML MDRD: 78 ML/MIN/1.73SQ M
GLUCOSE P FAST SERPL-MCNC: 122 MG/DL (ref 65–99)
HBA1C MFR BLD: 6.8 %
HCT VFR BLD AUTO: 46 % (ref 36.5–49.3)
HDLC SERPL-MCNC: 35 MG/DL
HGB BLD-MCNC: 15.7 G/DL (ref 12–17)
IMM GRANULOCYTES # BLD AUTO: 0.02 THOUSAND/UL (ref 0–0.2)
IMM GRANULOCYTES NFR BLD AUTO: 0 % (ref 0–2)
LDLC SERPL CALC-MCNC: 51 MG/DL (ref 0–100)
LYMPHOCYTES # BLD AUTO: 3.03 THOUSANDS/ΜL (ref 0.6–4.47)
LYMPHOCYTES NFR BLD AUTO: 39 % (ref 14–44)
MCH RBC QN AUTO: 29.4 PG (ref 26.8–34.3)
MCHC RBC AUTO-ENTMCNC: 34.1 G/DL (ref 31.4–37.4)
MCV RBC AUTO: 86 FL (ref 82–98)
MONOCYTES # BLD AUTO: 0.71 THOUSAND/ΜL (ref 0.17–1.22)
MONOCYTES NFR BLD AUTO: 9 % (ref 4–12)
NEUTROPHILS # BLD AUTO: 3.65 THOUSANDS/ΜL (ref 1.85–7.62)
NEUTS SEG NFR BLD AUTO: 48 % (ref 43–75)
NONHDLC SERPL-MCNC: 94 MG/DL
NRBC BLD AUTO-RTO: 0 /100 WBCS
PLATELET # BLD AUTO: 203 THOUSANDS/UL (ref 149–390)
PMV BLD AUTO: 9.8 FL (ref 8.9–12.7)
POTASSIUM SERPL-SCNC: 4.6 MMOL/L (ref 3.5–5.3)
PROT SERPL-MCNC: 7.9 G/DL (ref 6.4–8.2)
RBC # BLD AUTO: 5.34 MILLION/UL (ref 3.88–5.62)
SODIUM SERPL-SCNC: 137 MMOL/L (ref 136–145)
TRIGL SERPL-MCNC: 214 MG/DL
WBC # BLD AUTO: 7.69 THOUSAND/UL (ref 4.31–10.16)

## 2020-12-05 PROCEDURE — 80053 COMPREHEN METABOLIC PANEL: CPT

## 2020-12-05 PROCEDURE — 85025 COMPLETE CBC W/AUTO DIFF WBC: CPT

## 2020-12-05 PROCEDURE — 3044F HG A1C LEVEL LT 7.0%: CPT | Performed by: INTERNAL MEDICINE

## 2020-12-05 PROCEDURE — 83036 HEMOGLOBIN GLYCOSYLATED A1C: CPT

## 2020-12-05 PROCEDURE — 80061 LIPID PANEL: CPT

## 2020-12-05 PROCEDURE — 36415 COLL VENOUS BLD VENIPUNCTURE: CPT

## 2020-12-10 ENCOUNTER — OFFICE VISIT (OUTPATIENT)
Dept: INTERNAL MEDICINE CLINIC | Facility: CLINIC | Age: 65
End: 2020-12-10
Payer: COMMERCIAL

## 2020-12-10 VITALS
DIASTOLIC BLOOD PRESSURE: 80 MMHG | SYSTOLIC BLOOD PRESSURE: 120 MMHG | TEMPERATURE: 97.7 F | RESPIRATION RATE: 12 BRPM | BODY MASS INDEX: 30.42 KG/M2 | WEIGHT: 193.8 LBS | HEIGHT: 67 IN | HEART RATE: 68 BPM

## 2020-12-10 DIAGNOSIS — I10 ESSENTIAL HYPERTENSION: ICD-10-CM

## 2020-12-10 DIAGNOSIS — I25.10 CORONARY ARTERY DISEASE INVOLVING NATIVE HEART WITHOUT ANGINA PECTORIS, UNSPECIFIED VESSEL OR LESION TYPE: Primary | ICD-10-CM

## 2020-12-10 DIAGNOSIS — I51.89 DIASTOLIC DYSFUNCTION: ICD-10-CM

## 2020-12-10 DIAGNOSIS — E66.9 OBESITY (BMI 30-39.9): ICD-10-CM

## 2020-12-10 DIAGNOSIS — Z95.1 HX OF CABG: ICD-10-CM

## 2020-12-10 DIAGNOSIS — E11.9 TYPE 2 DIABETES MELLITUS WITHOUT COMPLICATION, WITHOUT LONG-TERM CURRENT USE OF INSULIN (HCC): ICD-10-CM

## 2020-12-10 DIAGNOSIS — E78.2 MIXED HYPERLIPIDEMIA: ICD-10-CM

## 2020-12-10 PROCEDURE — 1036F TOBACCO NON-USER: CPT | Performed by: INTERNAL MEDICINE

## 2020-12-10 PROCEDURE — 99214 OFFICE O/P EST MOD 30 MIN: CPT | Performed by: INTERNAL MEDICINE

## 2020-12-10 PROCEDURE — 3074F SYST BP LT 130 MM HG: CPT | Performed by: INTERNAL MEDICINE

## 2020-12-10 PROCEDURE — 3079F DIAST BP 80-89 MM HG: CPT | Performed by: INTERNAL MEDICINE

## 2020-12-10 PROCEDURE — 3008F BODY MASS INDEX DOCD: CPT | Performed by: INTERNAL MEDICINE

## 2020-12-10 RX ORDER — HYDROCHLOROTHIAZIDE 25 MG/1
25 TABLET ORAL DAILY
Qty: 90 TABLET | Refills: 3 | Status: SHIPPED | OUTPATIENT
Start: 2020-12-10 | End: 2021-12-14

## 2020-12-10 RX ORDER — METFORMIN HYDROCHLORIDE 500 MG/1
2000 TABLET, EXTENDED RELEASE ORAL
Qty: 360 TABLET | Refills: 3 | Status: SHIPPED | OUTPATIENT
Start: 2020-12-10 | End: 2021-12-15

## 2020-12-27 DIAGNOSIS — T78.40XA ALLERGY, INITIAL ENCOUNTER: ICD-10-CM

## 2020-12-28 RX ORDER — FLUTICASONE PROPIONATE 50 MCG
SPRAY, SUSPENSION (ML) NASAL
Qty: 16 ML | Refills: 0 | Status: SHIPPED | OUTPATIENT
Start: 2020-12-28 | End: 2021-01-04

## 2021-01-01 DIAGNOSIS — T78.40XA ALLERGY, INITIAL ENCOUNTER: ICD-10-CM

## 2021-01-04 RX ORDER — FLUTICASONE PROPIONATE 50 MCG
SPRAY, SUSPENSION (ML) NASAL
Qty: 48 ML | Refills: 1 | Status: SHIPPED | OUTPATIENT
Start: 2021-01-04

## 2021-02-11 DIAGNOSIS — I10 HYPERTENSION, UNSPECIFIED TYPE: ICD-10-CM

## 2021-02-11 RX ORDER — METOPROLOL SUCCINATE 100 MG/1
100 TABLET, EXTENDED RELEASE ORAL DAILY
Qty: 90 TABLET | Refills: 3 | Status: SHIPPED | OUTPATIENT
Start: 2021-02-11 | End: 2022-04-30

## 2021-02-11 NOTE — TELEPHONE ENCOUNTER
----- Message from Roxana Enrique sent at 2/10/2021  6:38 PM EST -----  Regarding: Prescription Question  Contact: [phone]  Ar Laura,  I need a refill for my Metoprolol for [facility]   They will not do last refill for this  Hope you are well  Any idea when COVID shots for me and Sis Hernandez may be available? Having a terrible time trying to get one for my 80 yr old mom in [place], with Parkinsons and memory loss  Stay safe       Danielle Brown

## 2021-03-10 DIAGNOSIS — Z23 ENCOUNTER FOR IMMUNIZATION: ICD-10-CM

## 2021-06-02 ENCOUNTER — OFFICE VISIT (OUTPATIENT)
Dept: CARDIOLOGY CLINIC | Facility: CLINIC | Age: 66
End: 2021-06-02
Payer: COMMERCIAL

## 2021-06-02 VITALS
OXYGEN SATURATION: 98 % | BODY MASS INDEX: 30.42 KG/M2 | DIASTOLIC BLOOD PRESSURE: 90 MMHG | WEIGHT: 193.8 LBS | SYSTOLIC BLOOD PRESSURE: 136 MMHG | HEART RATE: 69 BPM | HEIGHT: 67 IN

## 2021-06-02 DIAGNOSIS — I25.10 CORONARY ARTERY DISEASE INVOLVING NATIVE HEART WITHOUT ANGINA PECTORIS, UNSPECIFIED VESSEL OR LESION TYPE: Primary | ICD-10-CM

## 2021-06-02 DIAGNOSIS — Z95.1 HX OF CABG: ICD-10-CM

## 2021-06-02 DIAGNOSIS — I51.89 DIASTOLIC DYSFUNCTION: ICD-10-CM

## 2021-06-02 DIAGNOSIS — E78.2 MIXED HYPERLIPIDEMIA: ICD-10-CM

## 2021-06-02 DIAGNOSIS — I10 ESSENTIAL HYPERTENSION: ICD-10-CM

## 2021-06-02 DIAGNOSIS — E66.9 OBESITY (BMI 30-39.9): ICD-10-CM

## 2021-06-02 PROCEDURE — 3075F SYST BP GE 130 - 139MM HG: CPT | Performed by: INTERNAL MEDICINE

## 2021-06-02 PROCEDURE — 99214 OFFICE O/P EST MOD 30 MIN: CPT | Performed by: INTERNAL MEDICINE

## 2021-06-02 PROCEDURE — 1036F TOBACCO NON-USER: CPT | Performed by: INTERNAL MEDICINE

## 2021-06-02 PROCEDURE — 3080F DIAST BP >= 90 MM HG: CPT | Performed by: INTERNAL MEDICINE

## 2021-06-02 PROCEDURE — 3008F BODY MASS INDEX DOCD: CPT | Performed by: INTERNAL MEDICINE

## 2021-06-02 NOTE — PROGRESS NOTES
CARDIOLOGY OFFICE VISIT  [facility]  [facility], [address], [address], [address]  Tel: [phone]      NAME: Celina Jerry  AGE: 63 y o  SEX: male  : 1955   MRN: [id number]      Chief Complaint:  Chief Complaint   Patient presents with    Follow-up         History of Present Illness:   Fam Ramírez comes for follow-up  States he is doing well cardiac-wise  He denies chest pain, shortness of breath, palpitations, lightheadedness, syncope, swelling feet, orthopnea, PND, claudication  CAD s/p CABG surgery at [facility] on 2017 by Dr iMhaela Gomez  Post surgery he needed a left-sided thoracentesis for hemothorax  Other than that he had a good postop recovery  Currently on aspirin, beta-blocker, statin  States is doing well cardiac wise with no cardiac symptoms   Taking all medications regularly    HTN -  Has been hypertensive for many years  Taking medications regularly  Denies lightheadedness, headache, medication side effects  HLP -  Has had hyperlipidemia for many years  Taking statin regularly along with diet control  Denies myalgia  PCP closely monitoring the blood work  Obesity -  Trying to lose weight  He earlier lost few lb then he gained them back        Past Medical History:  Past Medical History:   Diagnosis Date    Coronary artery disease     Diabetes mellitus ([address] )     Diastolic dysfunction     Diverticulitis     Hyperlipidemia     Hypertension     Melanoma ([address] )     stomach area     Obesity     Prediabetes     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         Past Surgical History:  Past Surgical History:   Procedure Laterality Date    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    BOWEL RESECTION      Secondary to diverticulitis    COLONOSCOPY      CORONARY ARTERY BYPASS GRAFT      HAND SURGERY      triple bypass    HERNIA REPAIR      MALIGNANT SKIN LESION EXCISION  1963    Abdominal wall surgical resection of melanoma    ME COLONOSCOPY FLX DX W/COLLJ SPEC WHEN PFRMD N/A 12/11/2018    Procedure: COLONOSCOPY;  Surgeon: Nicole Richey MD;  Location: [facility];   Service: Gastroenterology    SKIN BIOPSY           Family History:  Family History   Problem Relation Age of Onset    Emphysema Maternal Grandfather     Coronary artery disease Mother     Hyperlipidemia Mother     Hypertension Mother     Diabetes type II Mother     Diabetes type II Father     Basal cell carcinoma Father     Hypertension Sister          Social History:  Social History     Socioeconomic History    Marital status: /Civil Union     Spouse name: None    Number of children: None    Years of education: None    Highest education level: None   Occupational History    Occupation:    Social Needs    Financial resource strain: None    Food insecurity     Worry: None     Inability: None    Transportation needs     Medical: None     Non-medical: None   Tobacco Use    Smoking status: Never Smoker    Smokeless tobacco: Never Used   Substance and Sexual Activity    Alcohol use: Not Currently    Drug use: No    Sexual activity: Yes   Lifestyle    Physical activity     Days per week: 3 days     Minutes per session: 20 min    Stress: Not at all   Relationships    Social connections     Talks on phone: None     Gets together: None     Attends Holiness service: None     Active member of club or organization: None     Attends meetings of clubs or organizations: None     Relationship status: None    Intimate partner violence     Fear of current or ex partner: None     Emotionally abused: None     Physically abused: None     Forced sexual activity: None   Other Topics Concern    None   Social History Narrative    None         Active Problems:  Patient Active Problem List   Diagnosis    Type 2 diabetes mellitus ([address] )    Essential hypertension    Vitamin D deficiency  Transaminitis    Coronary artery disease involving native heart without angina pectoris    Hx of CABG    Mixed hyperlipidemia    Obesity (BMI 30-39  9)    Diastolic dysfunction    Special screening for malignant neoplasms, colon    Type 2 diabetes mellitus with complication, without long-term current use of insulin ([facility] )    Page's neuroma of right foot         The following portions of the patient's history were reviewed and updated as appropriate: past medical history, past surgical history, past family history,  past social history, current medications, allergies and problem list       Review of Systems:  Constitutional: Denies fever, chills  Eyes: Denies eye redness, eye discharge  ENT: Denies hearing loss, tinnitus, sneezing, nasal discharge, sore throat   Respiratory: Denies cough, expectoration, hemoptysis, shortness of breath  Cardiovascular: Denies chest pain, palpitations, orthopnea, PND, lower extremity swelling  Gastrointestinal: Denies abdominal pain, nausea, vomiting, hematemesis, diarrhea, bloody stools  Genito-Urinary: Denies dysuria, incontinence  Musculoskeletal: Denies back pain, joint pain, muscle pain  Neurologic: Denies lightheadedness, syncope, headache, seizures  Endocrine: Denies polydipsia, temperature intolerance  Allergy and Immunology: Denies hives, insect bite sensitivity  Hematological and Lymphatic: Denies bleeding problems, swollen glands   Psychological: Denies depression, suicidal ideation, anxiety, panic  Dermatological: Denies pruritus, rash, skin lesion changes      Vitals:  Vitals:    06/02/21 0856   BP: 136/90   Pulse: 69   SpO2: 98%       Body mass index is 30 35 kg/m²  Weight (last 2 days)     Date/Time   Weight    06/02/21 0856   87 9 (193 8)              Physical Examination:  General: Patient is not in acute distress  Awake, alert, oriented in time, place and person  Responding to commands  Head: Normocephalic  Atraumatic  Eyes:  Both pupils normal sized, round and reactive to light  Nonicteric  ENT: Normal external ear canals  Neck: Supple  JVP not raised  Trachea central  No thyromegaly  Lungs: Bilateral bronchovascular breath sounds with no crackles or rhonchi  Chest wall: No tenderness  Cardiovascular: RRR  S1 and S2 normal  No murmur, rub or gallop  Gastrointestinal: Abdomen soft, nontender  No guarding or rigidity  Liver and spleen not palpable  Bowel sounds present  Neurologic: Patient is awake, alert, oriented in time, place and person  Responding to command  Moving all extremities  Integumentary:  No skin rash  Lymphatic: No cervical lymphadenopathy  Back: Symmetric   No CVA tenderness  Extremities: No clubbing, cyanosis or edema      Laboratory Results:  CBC with diff:   Lab Results   Component Value Date    WBC 7 69 12/05/2020    WBC 6 2 09/21/2017    RBC 5 34 12/05/2020    RBC 5 29 12/02/2015    HGB 15 7 12/05/2020    HGB 14 2 09/21/2017    HCT 46 0 12/05/2020    HCT 42 4 09/21/2017    MCV 86 12/05/2020    MCV 86 09/21/2017    MCH 29 4 12/05/2020    MCH 28 7 09/21/2017    RDW 12 7 12/05/2020    RDW 14 7 09/21/2017     12/05/2020     09/21/2017       CMP:  Lab Results   Component Value Date    CREATININE 1 02 12/05/2020    CREATININE 0 80 09/21/2017    BUN 20 12/05/2020    BUN 16 09/21/2017     09/21/2017    K 4 6 12/05/2020    K 4 4 09/21/2017     12/05/2020    CL 98 09/21/2017    CO2 29 12/05/2020    CO2 23 09/21/2017    GLUCOSE 119 (H) 09/21/2017    PROT 7 0 09/21/2017    ALKPHOS 67 12/05/2020    ALKPHOS 80 09/21/2017    ALT 29 12/05/2020    ALT 24 09/21/2017    AST 18 12/05/2020    AST 18 09/21/2017    BILIDIR 0 08 09/21/2017       Lab Results   Component Value Date    HGBA1C 6 8 (H) 12/05/2020    HGBA1C 6 4 (H) 09/21/2017    MG 2 1 02/07/2017    PHOS 4 2 01/20/2017       Lab Results   Component Value Date    TROPONINI <0 02 08/01/2019    TROPONINI <0 02 01/08/2017    CKTOTAL 73 08/01/2019       Lipid Profile:   Lab Results Component Value Date    CHOL 128 2017    CHOL 116 2016    CHOL 151 2016     Lab Results   Component Value Date    HDL 35 (L) 2020    HDL 32 (L) 2020    HDL 33 (L) 2019     Lab Results   Component Value Date    LDLCALC 51 2020    LDLCALC 50 2020    LDLCALC 36 2019     Lab Results   Component Value Date    TRIG 214 (H) 2020    TRIG 187 (H) 2020    TRIG 221 (H) 2019       Cardiac testing:   Results for orders placed during the hospital encounter of 17   Echo complete with contrast if indicated    Narrative [facility], [address] [facility]  [phone]    Transthoracic Echocardiogram  2D, M-mode, Doppler, and Color Doppler    Study date:  08-May-2017    Patient: Mya Cary  MR number: [id number]  Account number: [de-identified]  : [id number]  Age: 63 years  Gender: Male  Status: Outpatient  Location: [facility]  Height: 67 in  Weight: 190 lb  BP: 126/ 100 mmHg    Indications: CAD  Diagnoses: I25 10 - Atherosclerotic heart disease of native coronary artery without angina pectoris    Sonographer:  Romeo Feldman, RCS  Interpreting Physician:  Bree Tejeda MD  Primary Physician:  Freedom Villaseñor MD  Referring Physician:  Bree Tejeda MD  Group:  [facility]    LEFT VENTRICLE:  Systolic function was normal  Ejection fraction was estimated to be 60 %  There were no regional wall motion abnormalities  Features were consistent with a pseudonormal left ventricular filling pattern, with concomitant abnormal relaxation and increased filling pressure (grade 2 diastolic dysfunction)  RIGHT VENTRICLE:  The size was normal   Systolic function was normal     AORTIC VALVE:  There was trace regurgitation  TRICUSPID VALVE:  There was trace regurgitation      HISTORY: PRIOR HISTORY: Risk factors: hypertension, oral hypoglycemic-treated diabetes, medication-treated hypercholesterolemia, and a family history of coronary artery disease  PROCEDURE: The study was performed in the [address] [place] [address]  This was a routine study  The transthoracic approach was used  The study included complete 2D imaging, M-mode, complete spectral Doppler, and color Doppler  Image  quality was adequate  LEFT VENTRICLE: Size was normal  Systolic function was normal  Ejection fraction was estimated to be 60 %  There were no regional wall motion abnormalities  Wall thickness was normal  No evidence of apical thrombus  DOPPLER: Features were  consistent with a pseudonormal left ventricular filling pattern, with concomitant abnormal relaxation and increased filling pressure (grade 2 diastolic dysfunction)  RIGHT VENTRICLE: The size was normal  Systolic function was normal  Wall thickness was normal     LEFT ATRIUM: Size was normal     RIGHT ATRIUM: Size was normal     MITRAL VALVE: Valve structure was normal  There was normal leaflet separation  DOPPLER: The transmitral velocity was within the normal range  There was no evidence for stenosis  There was no significant regurgitation  AORTIC VALVE: The valve was trileaflet  Leaflets exhibited normal thickness and normal cuspal separation  DOPPLER: Transaortic velocity was within the normal range  There was no evidence for stenosis  There was trace regurgitation  TRICUSPID VALVE: The valve structure was normal  There was normal leaflet separation  DOPPLER: The transtricuspid velocity was within the normal range  There was no evidence for stenosis  There was trace regurgitation  PULMONIC VALVE: Leaflets exhibited normal thickness, no calcification, and normal cuspal separation  DOPPLER: The transpulmonic velocity was within the normal range  There was no significant regurgitation  PERICARDIUM: There was no pericardial effusion  The pericardium was normal in appearance      AORTA: The root exhibited normal size     SYSTEMIC VEINS: IVC: The inferior vena cava was not well visualized  SYSTEM MEASUREMENT TABLES    Apical four chamber  4 chamber Left Atrium Volume Index; Planimetry; End Systole; Apical four chamber;: 20 64 cm2  Left Ventricular End Diastolic Volume; Method of Disks, Single Plane; Apical four chamber;: 83 2 ml  Left Ventricular End Systolic Volume; Method of Disks, Single Plane; Apical four chamber;: 38 3 ml  Right Atrium Systolic Area; Planimetry; End Systole; Apical four chamber;: 19 42 cm2  Right Ventricular Internal Diastolic Dimension; End Diastole; Apical four chamber;: 34 2 mm  TAPSE: 19 mm    Unspecified Scan Mode  Aortic Root Diameter; End Systole;: 28 9 mm  Aortic valve Area; Continuity Equation by Velocity Time Integral; Systole;: 1 9 cm2  Gradient Pressure, Peak; Simplified Bernoulli; Antegrade Flow; Systole;: 12 9 mm[Hg]  Gradient pressure, average; Simplified Bernoulli; Antegrade Flow; Systole;: 7 5 mm[Hg]  HR: 66 /min  Left atrial diameter; End Diastole;: 37 9 mm  Cardiac Output; Systole;: 4 52 L/min  Cardiac Output; Teichholz; Systole;: 3 19 L/min  HR: 64 /min  Heart rate; Teichholz;: 66 /min  Interventricular Septum Diastolic Thickness; Teichholz; End Diastole;: 9 mm  Left Ventricle Internal End Diastolic Dimension; Teichholz;: 47 5 mm  Left Ventricle Internal Systolic Dimension; Teichholz; End Systole;: 36 6 mm  Left Ventricle Mass; Mass AVCube with Teichholz; End Diastole;: 164 g  Left Ventricle Posterior Wall Diastolic Thickness; Teichholz; End Diastole;: 10 7 mm  Left Ventricular Ejection Fraction; Teichholz;: 46 %  Left Ventricular End Diastolic Volume; Teichholz;: 104 9 ml  Left Ventricular End Systolic Volume; Teichholz;: 56 6 ml  Left Ventricular Fractional Shortening;: 22 9 %  Stroke volume; Systole;: 68 5 ml  Stroke volume;  Teichholz; Systole;: 48 3 ml  Mitral Valve Area; Area by Pressure Half-Time; Systole;: 3 24 cm2  Mitral Valve E to A Ratio; Systole;: 1 34    [employer] Commission Accredited Echocardiography Laboratory    Prepared and electronically signed by    Lydia Herbert MD  Signed [id number] 10:53:52         Results for orders placed during the hospital encounter of 17   NM myocardial perfusion spect (stress and/or rest)    Narrative [address] [place], [address] [place] [address]  [phone]    Rest/Stress Gated SPECT Myocardial Perfusion Imaging After Exercise    Patient: Heaven Adkins  MR number: [id number]  Account number: [de-identified]  : 1955  Age: 63 years  Gender: Male  Status: Outpatient  Location: [facility]  Height: 66 in  Weight: 198 lb  BP: 120/ 78 mmHg    Allergies: NO KNOWN ALLERGIES    Diagnosis: I25 10 - Atherosclerotic heart disease of native coronary artery without angina pectoris, R06 02 - Shortness of breath    Primary Physician:  Berenice Lugo MD  Interpreting Physician:  Maya Wilhelm MD  Referring Physician:  Lydia Herbert MD  Technician:  Bronwyn Varner BS, BA, AART(N)  Group:  [facility]  Other:  Morris Troncoso MS, CCT    INDICATIONS: CAD, SOB    HISTORY: The patient is a 63year old  male  Chest pain status: no chest pain  Other symptoms: dyspnea  Coronary artery disease risk factors: dyslipidemia, hypertension, family history of premature coronary artery disease, and  diabetes mellitus  REST ECG: Normal sinus rhythm  Nonspecific T wave abnormalities were present  PROCEDURE: The study was performed at [address] [place] [address]  Treadmill exercise testing was performed, using the Palomo protocol  Gated SPECT myocardial perfusion imaging was performed after stress and at rest  Systolic blood  pressure was 120 mmHg, at the start of the study  Diastolic blood pressure was 78 mmHg, at the start of the study  The heart rate was 75 bpm, at the start of the study      PALOMO PROTOCOL:  HR bpm SBP mmHg DBP mmHg Symptoms Rhythm/conduct  Baseline 75 120 78 none rare PVC's  Stage 1 109 128 60 -- same as above  Stage 2 123 148 78 -- same as above  Stage 3 144 -- -- -- rare PVC's  Immediate 144 160 78 -- no ventricular ectopy  Recovery 1 127 -- -- -- no ventricular ectopy  Recovery 2 110 -- -- -- no ventricular ectopy  Recovery 3 100 -- -- -- rare PVC's  Recovery 5 92 120 78 -- no ventricular ectopy  No medications or fluids given  STRESS SUMMARY: Duration of exercise was 8 min  The patient exercised to protocol stage 3  Maximal work rate was 9 1 METs  Functional capacity was normal  Maximal heart rate during stress was 144 bpm ( 89 % of maximal predicted heart  rate)  The heart rate response to stress was normal  There was normal resting blood pressure with an appropriate response to stress  The rate-pressure product for the peak heart rate and blood pressure was 94887  There was no chest pain  during stress  The stress test was terminated due to achievement of target heart rate and mild fatigue  The stress ECG was negative for ischemia  Arrhythmia during stress: isolated premature ventricular beats  ISOTOPE ADMINISTRATION:  Resting isotope administration Stress isotope administration  Agent Tetrofosmin Tetrofosmin  Dose 9 76 mCi 31 1 mCi  Date 05/02/2017 05/02/2017    MYOCARDIAL PERFUSION IMAGING:  The image quality was good  Left ventricular size was normal  The TID ratio was 1 14  GATED SPECT:  The calculated left ventricular ejection fraction was 51 %  There was no left ventricular regional abnormality  SUMMARY:  -  Stress results: Duration of exercise was 8 min  Target heart rate was achieved  There was no chest pain during stress  -  ECG conclusions: The stress ECG was negative for ischemia   -  Gated SPECT: The calculated left ventricular ejection fraction was 51 %  There was no left ventricular regional abnormality  IMPRESSIONS: Small sized, mild intensity reversible inferior defect which could represent ischemia   Left ventricular systolic function was low normal  LVEF 51%  Prepared and signed by    Unknown Quivers, MD  Signed 05/02/2017 13:12:41         Medications:    Current Outpatient Medications:     Accu-Chek FastClix Lancets MISC, USE TO TEST BLOOD SUGAR AS DIRECTED, Disp: , Rfl:     aspirin 81 MG tablet, Take 81 mg by mouth daily, Disp: , Rfl:     atorvastatin (LIPITOR) 20 mg tablet, Take 1 tablet (20 mg total) by mouth daily at bedtime, Disp: 90 tablet, Rfl: 3    Blood Glucose Monitoring Suppl (Accu-Chek Guide) w/Device KIT, , Disp: , Rfl:     Empagliflozin (Jardiance) 25 MG TABS, Take 1 tablet (25 mg total) by mouth every morning, Disp: 90 tablet, Rfl: 3    fluticasone (FLONASE) 50 mcg/act nasal spray, SPRAY 2 SPRAYS INTO EACH NOSTRIL EVERY DAY, Disp: 48 mL, Rfl: 1    glimepiride (AMARYL) 1 mg tablet, Take 1 tablet (1 mg total) by mouth daily with breakfast, Disp: 90 tablet, Rfl: 3    glucose blood test strip, Use as instructed, Disp: 200 each, Rfl: 0    hydrochlorothiazide (HYDRODIURIL) 25 mg tablet, Take 1 tablet (25 mg total) by mouth daily, Disp: 90 tablet, Rfl: 3    irbesartan (AVAPRO) 150 mg tablet, Take 1 tablet (150 mg total) by mouth daily, Disp: 90 tablet, Rfl: 3    metFORMIN (GLUCOPHAGE-XR) 500 mg 24 hr tablet, Take 4 tablets (2,000 mg total) by mouth daily with dinner, Disp: 360 tablet, Rfl: 3    metoprolol succinate (TOPROL-XL) 100 mg 24 hr tablet, Take 1 tablet (100 mg total) by mouth daily, Disp: 90 tablet, Rfl: 3    albuterol (PROAIR HFA) 90 mcg/act inhaler, Inhale 2 puffs every 4 (four) hours as needed, Disp: , Rfl:     fluticasone-vilanterol (BREO ELLIPTA) 100-25 mcg/inh inhaler, Inhale daily, Disp: , Rfl:       Allergies:  No Known Allergies      Assessment and Plan:  1  Coronary artery disease s/p CABG   doing well  Continue aspirin, statin, beta-blocker, ARB     2  Essential hypertension   BP at goal   Continue current medications     3  Mixed hyperlipidemia   continue statin and diet control    Last lipid panel reviewed with the patient     4  Obesity (BMI 30-39 9)   counseled to continue to lose weight     5  Diastolic dysfunction   tight BP control    Recommend aggressive risk factor modification and therapeutic lifestyle changes  Low-salt, low-calorie, low-fat, low-cholesterol diet with regular exercise and to optimize weight  I will defer the ordering and monitoring of necessity lab studies to you, but I am available and happy to review and manage any of the data at your request in the future  Discussed concepts of atherosclerosis, including signs and symptoms of cardiac disease  Previous studies were reviewed  Safety measures were reviewed  Questions were entertained and answered  Patient was advised to report any problems requiring medical attention  Follow-up with PCP and appropriate specialist and lab work as discussed  Return for follow up visit as scheduled or earlier, if needed  Thank you for allowing me to participate in the care and evaluation of your patient  Should you have any questions, please feel free to contact me        Natali Vera MD  6/2/2021,1:22 PM

## 2021-06-18 DIAGNOSIS — E11.8 TYPE 2 DIABETES MELLITUS WITH COMPLICATION, WITHOUT LONG-TERM CURRENT USE OF INSULIN (HCC): ICD-10-CM

## 2021-06-18 RX ORDER — GLIMEPIRIDE 1 MG/1
1 TABLET ORAL
Qty: 90 TABLET | Refills: 3 | Status: SHIPPED | OUTPATIENT
Start: 2021-06-18 | End: 2022-05-23

## 2021-09-19 ENCOUNTER — OFFICE VISIT (OUTPATIENT)
Dept: URGENT CARE | Facility: MEDICAL CENTER | Age: 66
End: 2021-09-19
Payer: COMMERCIAL

## 2021-09-19 VITALS
BODY MASS INDEX: 29.35 KG/M2 | WEIGHT: 187 LBS | OXYGEN SATURATION: 96 % | HEIGHT: 67 IN | TEMPERATURE: 99.6 F | RESPIRATION RATE: 18 BRPM | HEART RATE: 112 BPM

## 2021-09-19 DIAGNOSIS — R05.9 COUGH: Primary | ICD-10-CM

## 2021-09-19 PROCEDURE — U0005 INFEC AGEN DETEC AMPLI PROBE: HCPCS | Performed by: PHYSICIAN ASSISTANT

## 2021-09-19 PROCEDURE — U0003 INFECTIOUS AGENT DETECTION BY NUCLEIC ACID (DNA OR RNA); SEVERE ACUTE RESPIRATORY SYNDROME CORONAVIRUS 2 (SARS-COV-2) (CORONAVIRUS DISEASE [COVID-19]), AMPLIFIED PROBE TECHNIQUE, MAKING USE OF HIGH THROUGHPUT TECHNOLOGIES AS DESCRIBED BY CMS-2020-01-R: HCPCS | Performed by: PHYSICIAN ASSISTANT

## 2021-09-19 PROCEDURE — 99213 OFFICE O/P EST LOW 20 MIN: CPT | Performed by: PHYSICIAN ASSISTANT

## 2021-09-19 RX ORDER — AZITHROMYCIN 250 MG/1
TABLET, FILM COATED ORAL
Qty: 6 TABLET | Refills: 0 | Status: SHIPPED | OUTPATIENT
Start: 2021-09-19 | End: 2021-09-23

## 2021-09-19 NOTE — PROGRESS NOTES
[facility]        NAME: Walker Tomlinson is a 63 y o  male  : 1955    MRN: [id number]  DATE: 2021  TIME: 3:35 PM    Assessment and Plan   Cough [R05]  1  Cough           Patient Instructions     Cough  covid test sent  Follow up with PCP in 3-5 days  Proceed to  ER if symptoms worsen  Chief Complaint     Chief Complaint   Patient presents with    Sore Throat     feels like there is a frog in his throat and that it cant's com out   Cold Like Symptoms    Cough         History of Present Illness       64 y/o male presents c/o sore throat, runny nose, congestion, and non productive cough      Review of Systems   Review of Systems   Constitutional: Negative  HENT: Positive for congestion and rhinorrhea  Eyes: Negative  Respiratory: Positive for cough  Negative for apnea, choking, chest tightness, shortness of breath, wheezing and stridor  Cardiovascular: Negative  Negative for chest pain           Current Medications       Current Outpatient Medications:     Accu-Chek FastClix Lancets MISC, USE TO TEST BLOOD SUGAR AS DIRECTED, Disp: , Rfl:     aspirin 81 MG tablet, Take 81 mg by mouth daily, Disp: , Rfl:     atorvastatin (LIPITOR) 20 mg tablet, Take 1 tablet (20 mg total) by mouth daily at bedtime, Disp: 90 tablet, Rfl: 3    Blood Glucose Monitoring Suppl (Accu-Chek Guide) w/Device KIT, , Disp: , Rfl:     Empagliflozin (Jardiance) 25 MG TABS, Take 1 tablet (25 mg total) by mouth every morning, Disp: 90 tablet, Rfl: 3    fluticasone (FLONASE) 50 mcg/act nasal spray, SPRAY 2 SPRAYS INTO EACH NOSTRIL EVERY DAY, Disp: 48 mL, Rfl: 1    glimepiride (AMARYL) 1 mg tablet, TAKE 1 TABLET (1 MG TOTAL) BY MOUTH DAILY WITH BREAKFAST, Disp: 90 tablet, Rfl: 3    glucose blood test strip, Use as instructed, Disp: 200 each, Rfl: 0    hydrochlorothiazide (HYDRODIURIL) 25 mg tablet, Take 1 tablet (25 mg total) by mouth daily, Disp: 90 tablet, Rfl: 3    irbesartan (AVAPRO) 150 mg tablet, Take 1 tablet (150 mg total) by mouth daily, Disp: 90 tablet, Rfl: 3    metFORMIN (GLUCOPHAGE-XR) 500 mg 24 hr tablet, Take 4 tablets (2,000 mg total) by mouth daily with dinner, Disp: 360 tablet, Rfl: 3    metoprolol succinate (TOPROL-XL) 100 mg 24 hr tablet, Take 1 tablet (100 mg total) by mouth daily, Disp: 90 tablet, Rfl: 3    albuterol (PROAIR HFA) 90 mcg/act inhaler, Inhale 2 puffs every 4 (four) hours as needed (Patient not taking: Reported on 9/19/2021), Disp: , Rfl:     fluticasone-vilanterol (BREO ELLIPTA) 100-25 mcg/inh inhaler, Inhale daily (Patient not taking: Reported on 9/19/2021), Disp: , Rfl:     Current Allergies     Allergies as of 09/19/2021    (No Known Allergies)            The following portions of the patient's history were reviewed and updated as appropriate: allergies, current medications, past family history, past medical history, past social history, past surgical history and problem list      Past Medical History:   Diagnosis Date    Coronary artery disease     Diabetes mellitus ([address] )     Diastolic dysfunction     Diverticulitis     Hyperlipidemia     Hypertension     Melanoma ([address] )     stomach area     Obesity     Prediabetes     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       Past Surgical History:   Procedure Laterality Date    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    BOWEL RESECTION  2008    Secondary to diverticulitis    COLONOSCOPY      CORONARY ARTERY BYPASS GRAFT      HAND SURGERY      triple bypass    HERNIA REPAIR      MALIGNANT SKIN LESION EXCISION  1963    Abdominal wall surgical resection of melanoma    [place] COLONOSCOPY FLX DX W/COLLJ SPEC WHEN PFRMD N/A 12/11/2018    Procedure: COLONOSCOPY;  Surgeon: Aaron Rivas MD;  Location[facility];   Service: Gastroenterology    SKIN BIOPSY         Family History   Problem Relation Age of Onset    Emphysema Maternal Grandfather     Coronary artery disease Mother     Hyperlipidemia Mother     Hypertension Mother     Diabetes type II Mother     Diabetes type II Father     Basal cell carcinoma Father     Hypertension Sister          Medications have been verified  Objective   Pulse (!) 112   Temp 99 6 °F (37 6 °C)   Resp 18   Ht 5' 7" (1 702 m)   Wt 84 8 kg (187 lb)   SpO2 96%   BMI 29 29 kg/m²        Physical Exam     Physical Exam  Constitutional:       General: He is not in acute distress  Appearance: He is well-developed  He is not diaphoretic  HENT:      Head: Normocephalic and atraumatic  Right Ear: Hearing, tympanic membrane, ear canal and external ear normal       Left Ear: Hearing, tympanic membrane, ear canal and external ear normal       Nose: Rhinorrhea present  Right Sinus: No maxillary sinus tenderness or frontal sinus tenderness  Left Sinus: No maxillary sinus tenderness or frontal sinus tenderness  Mouth/Throat:      Pharynx: Uvula midline  Cardiovascular:      Rate and Rhythm: Normal rate and regular rhythm  Heart sounds: Normal heart sounds  Pulmonary:      Effort: Pulmonary effort is normal  No respiratory distress  Breath sounds: Normal breath sounds  No wheezing or rales  Chest:      Chest wall: No tenderness  Musculoskeletal:      Cervical back: Normal range of motion and neck supple  Lymphadenopathy:      Cervical: No cervical adenopathy

## 2021-09-19 NOTE — PATIENT INSTRUCTIONS
Cough  covid test sent  Follow up with PCP in 3-5 days  Proceed to  ER if symptoms worsen  [address]    Your healthcare provider and/or public health staff have evaluated you and have determined that you do not need to remain in the hospital at this time  At this time you can be isolated at home where you will be monitored by staff from your local or state health department  You should carefully follow the prevention and isolation steps below until a healthcare provider or local or state health department says that you can return to your normal activities  Stay home except to get medical care    People who are mildly ill with COVID-19 are able to isolate at home during their illness  You should restrict activities outside your home, except for getting medical care  Do not go to work, school, or public areas  Avoid using public transportation, ride-sharing, or taxis  Separate yourself from other people and animals in your home    People: As much as possible, you should stay in a specific room and away from other people in your home  Also, you should use a separate bathroom, if available  Animals: You should restrict contact with pets and other animals while you are sick with COVID-19, just like you would around other people  Although there have not been reports of pets or other animals becoming sick with COVID-19, it is still recommended that people sick with COVID-19 limit contact with animals until more information is known about the virus  When possible, have another member of your household care for your animals while you are sick  If you are sick with COVID-19, avoid contact with your pet, including petting, snuggling, being kissed or licked, and sharing food  If you must care for your pet or be around animals while you are sick, wash your hands before and after you interact with pets and wear a facemask  See COVID-19 and Animals for more information      Call ahead before visiting your doctor    If you have a medical appointment, call the healthcare provider and tell them that you have or may have COVID-19  This will help the healthcare providers office take steps to keep other people from getting infected or exposed  Wear a facemask    You should wear a facemask when you are around other people (e g , sharing a room or vehicle) or pets and before you enter a healthcare providers office  If you are not able to wear a facemask (for example, because it causes trouble breathing), then people who live with you should not stay in the same room with you, or they should wear a facemask if they enter your room  Cover your coughs and sneezes    Cover your mouth and nose with a tissue when you cough or sneeze  Throw used tissues in a lined trash can  Immediately wash your hands with soap and water for at least 20 seconds or, if soap and water are not available, clean your hands with an alcohol-based hand  that contains at least 60% alcohol  Clean your hands often    Wash your hands often with soap and water for at least 20 seconds, especially after blowing your nose, coughing, or sneezing; going to the bathroom; and before eating or preparing food  If soap and water are not readily available, use an alcohol-based hand  with at least 60% alcohol, covering all surfaces of your hands and rubbing them together until they feel dry  Soap and water are the best option if hands are visibly dirty  Avoid touching your eyes, nose, and mouth with unwashed hands  Avoid sharing personal household items    You should not share dishes, drinking glasses, cups, eating utensils, towels, or bedding with other people or pets in your home  After using these items, they should be washed thoroughly with soap and water      Clean all high-touch surfaces everyday    High touch surfaces include counters, tabletops, doorknobs, bathroom fixtures, toilets, phones, keyboards, tablets, and bedside tables  Also, clean any surfaces that may have blood, stool, or body fluids on them  Use a household cleaning spray or wipe, according to the label instructions  Labels contain instructions for safe and effective use of the cleaning product including precautions you should take when applying the product, such as wearing gloves and making sure you have good ventilation during use of the product  Monitor your symptoms    Seek prompt medical attention if your illness is worsening (e g , difficulty breathing)  Before seeking care, call your healthcare provider and tell them that you have, or are being evaluated for, COVID-19  Put on a facemask before you enter the facility  These steps will help the healthcare providers office to keep other people in the office or waiting room from getting infected or exposed  Ask your healthcare provider to call the local or [facility] health department  Persons who are placed under active monitoring or facilitated self-monitoring should follow instructions provided by their local health department or occupational health professionals, as appropriate  If you have a medical emergency and need to call 911, notify the dispatch personnel that you have, or are being evaluated for COVID-19  If possible, put on a facemask before emergency medical services arrive      Discontinuing home isolation    Patients with confirmed COVID-19 should remain under home isolation precautions until the following conditions are met:   - They have had no fever for at least 24 hours (that is one full day of no fever without the use medicine that reduces fevers)  AND  - other symptoms have improved (for example, when their cough or shortness of breath have improved)  AND  - If had mild or moderate illness, at least 10 days have passed since their symptoms first appeared or if severe illness (needed oxygen) or immunosuppressed, at least 20 days have passed since symptoms first appeared  Patients with confirmed COVID-19 should also notify close contacts (including their workplace) and ask that they self-quarantine  Currently, close contact is defined as being within 6 feet for 15 minutes or more from the period 24 hours starting 48 hours before symptom onset to the time at which the patient went into isolation  Close contacts of patients diagnosed with COVID-19 should be instructed by the patient to self-quarantine for 14 days from the last time of their last contact with the patient       Source: [employer]

## 2021-09-20 LAB — SARS-COV-2 RNA RESP QL NAA+PROBE: NEGATIVE

## 2021-09-22 ENCOUNTER — APPOINTMENT (OUTPATIENT)
Dept: LAB | Facility: HOSPITAL | Age: 66
End: 2021-09-22
Attending: INTERNAL MEDICINE
Payer: COMMERCIAL

## 2021-09-22 DIAGNOSIS — E11.9 TYPE 2 DIABETES MELLITUS WITHOUT COMPLICATION, WITHOUT LONG-TERM CURRENT USE OF INSULIN (HCC): ICD-10-CM

## 2021-09-22 LAB
ALBUMIN SERPL BCP-MCNC: 3.5 G/DL (ref 3.5–5)
ALP SERPL-CCNC: 67 U/L (ref 46–116)
ALT SERPL W P-5'-P-CCNC: 26 U/L (ref 12–78)
ANION GAP SERPL CALCULATED.3IONS-SCNC: 8 MMOL/L (ref 4–13)
AST SERPL W P-5'-P-CCNC: 17 U/L (ref 5–45)
BASOPHILS # BLD AUTO: 0.04 THOUSANDS/ΜL (ref 0–0.1)
BASOPHILS NFR BLD AUTO: 1 % (ref 0–1)
BILIRUB SERPL-MCNC: 0.32 MG/DL (ref 0.2–1)
BUN SERPL-MCNC: 14 MG/DL (ref 5–25)
CALCIUM SERPL-MCNC: 9.5 MG/DL (ref 8.3–10.1)
CHLORIDE SERPL-SCNC: 99 MMOL/L (ref 100–108)
CHOLEST SERPL-MCNC: 146 MG/DL (ref 50–200)
CO2 SERPL-SCNC: 30 MMOL/L (ref 21–32)
CREAT SERPL-MCNC: 0.92 MG/DL (ref 0.6–1.3)
EOSINOPHIL # BLD AUTO: 0.24 THOUSAND/ΜL (ref 0–0.61)
EOSINOPHIL NFR BLD AUTO: 5 % (ref 0–6)
ERYTHROCYTE [DISTWIDTH] IN BLOOD BY AUTOMATED COUNT: 13 % (ref 11.6–15.1)
EST. AVERAGE GLUCOSE BLD GHB EST-MCNC: 194 MG/DL
GFR SERPL CREATININE-BSD FRML MDRD: 88 ML/MIN/1.73SQ M
GLUCOSE P FAST SERPL-MCNC: 157 MG/DL (ref 65–99)
HBA1C MFR BLD: 8.4 %
HCT VFR BLD AUTO: 39.8 % (ref 36.5–49.3)
HDLC SERPL-MCNC: 32 MG/DL
HGB BLD-MCNC: 13.8 G/DL (ref 12–17)
IMM GRANULOCYTES # BLD AUTO: 0.01 THOUSAND/UL (ref 0–0.2)
IMM GRANULOCYTES NFR BLD AUTO: 0 % (ref 0–2)
LDLC SERPL CALC-MCNC: 69 MG/DL (ref 0–100)
LYMPHOCYTES # BLD AUTO: 2.53 THOUSANDS/ΜL (ref 0.6–4.47)
LYMPHOCYTES NFR BLD AUTO: 49 % (ref 14–44)
MCH RBC QN AUTO: 29.4 PG (ref 26.8–34.3)
MCHC RBC AUTO-ENTMCNC: 34.7 G/DL (ref 31.4–37.4)
MCV RBC AUTO: 85 FL (ref 82–98)
MONOCYTES # BLD AUTO: 0.36 THOUSAND/ΜL (ref 0.17–1.22)
MONOCYTES NFR BLD AUTO: 7 % (ref 4–12)
NEUTROPHILS # BLD AUTO: 1.95 THOUSANDS/ΜL (ref 1.85–7.62)
NEUTS SEG NFR BLD AUTO: 38 % (ref 43–75)
NONHDLC SERPL-MCNC: 114 MG/DL
NRBC BLD AUTO-RTO: 0 /100 WBCS
PLATELET # BLD AUTO: 186 THOUSANDS/UL (ref 149–390)
PMV BLD AUTO: 9.6 FL (ref 8.9–12.7)
POTASSIUM SERPL-SCNC: 4.5 MMOL/L (ref 3.5–5.3)
PROT SERPL-MCNC: 7.5 G/DL (ref 6.4–8.2)
RBC # BLD AUTO: 4.7 MILLION/UL (ref 3.88–5.62)
SODIUM SERPL-SCNC: 137 MMOL/L (ref 136–145)
TRIGL SERPL-MCNC: 227 MG/DL
WBC # BLD AUTO: 5.13 THOUSAND/UL (ref 4.31–10.16)

## 2021-09-22 PROCEDURE — 80053 COMPREHEN METABOLIC PANEL: CPT

## 2021-09-22 PROCEDURE — 80061 LIPID PANEL: CPT

## 2021-09-22 PROCEDURE — 36415 COLL VENOUS BLD VENIPUNCTURE: CPT

## 2021-09-22 PROCEDURE — 85025 COMPLETE CBC W/AUTO DIFF WBC: CPT

## 2021-09-22 PROCEDURE — 83036 HEMOGLOBIN GLYCOSYLATED A1C: CPT

## 2021-09-23 ENCOUNTER — TELEPHONE (OUTPATIENT)
Dept: INTERNAL MEDICINE CLINIC | Facility: CLINIC | Age: 66
End: 2021-09-23

## 2021-10-04 DIAGNOSIS — E11.8 TYPE 2 DIABETES MELLITUS WITH COMPLICATION, WITHOUT LONG-TERM CURRENT USE OF INSULIN (HCC): ICD-10-CM

## 2021-10-04 DIAGNOSIS — I10 ESSENTIAL HYPERTENSION: ICD-10-CM

## 2021-10-05 RX ORDER — IRBESARTAN 150 MG/1
TABLET ORAL
Qty: 90 TABLET | Refills: 3 | Status: SHIPPED | OUTPATIENT
Start: 2021-10-05

## 2021-10-05 RX ORDER — EMPAGLIFLOZIN 25 MG/1
TABLET, FILM COATED ORAL
Qty: 90 TABLET | Refills: 3 | Status: SHIPPED | OUTPATIENT
Start: 2021-10-05

## 2021-10-18 ENCOUNTER — OFFICE VISIT (OUTPATIENT)
Dept: INTERNAL MEDICINE CLINIC | Facility: CLINIC | Age: 66
End: 2021-10-18
Payer: COMMERCIAL

## 2021-10-18 VITALS
WEIGHT: 194.8 LBS | DIASTOLIC BLOOD PRESSURE: 80 MMHG | RESPIRATION RATE: 12 BRPM | HEART RATE: 74 BPM | OXYGEN SATURATION: 96 % | HEIGHT: 67 IN | BODY MASS INDEX: 30.57 KG/M2 | SYSTOLIC BLOOD PRESSURE: 130 MMHG | TEMPERATURE: 97.1 F

## 2021-10-18 DIAGNOSIS — Z95.1 HX OF CABG: ICD-10-CM

## 2021-10-18 DIAGNOSIS — Z23 ENCOUNTER FOR IMMUNIZATION: Primary | ICD-10-CM

## 2021-10-18 DIAGNOSIS — E11.8 TYPE 2 DIABETES MELLITUS WITH COMPLICATION, WITHOUT LONG-TERM CURRENT USE OF INSULIN (HCC): ICD-10-CM

## 2021-10-18 DIAGNOSIS — I10 ESSENTIAL HYPERTENSION: Chronic | ICD-10-CM

## 2021-10-18 DIAGNOSIS — M70.62 TROCHANTERIC BURSITIS OF LEFT HIP: ICD-10-CM

## 2021-10-18 DIAGNOSIS — E78.2 MIXED HYPERLIPIDEMIA: ICD-10-CM

## 2021-10-18 DIAGNOSIS — K62.5 RECTAL BLEEDING: ICD-10-CM

## 2021-10-18 PROCEDURE — 90682 RIV4 VACC RECOMBINANT DNA IM: CPT | Performed by: INTERNAL MEDICINE

## 2021-10-18 PROCEDURE — 90471 IMMUNIZATION ADMIN: CPT | Performed by: INTERNAL MEDICINE

## 2021-10-18 PROCEDURE — 99214 OFFICE O/P EST MOD 30 MIN: CPT | Performed by: INTERNAL MEDICINE

## 2021-12-01 DIAGNOSIS — E78.2 MIXED HYPERLIPIDEMIA: ICD-10-CM

## 2021-12-01 RX ORDER — ATORVASTATIN CALCIUM 20 MG/1
20 TABLET, FILM COATED ORAL
Qty: 90 TABLET | Refills: 3 | Status: SHIPPED | OUTPATIENT
Start: 2021-12-01

## 2021-12-14 DIAGNOSIS — I10 ESSENTIAL HYPERTENSION: ICD-10-CM

## 2021-12-14 RX ORDER — HYDROCHLOROTHIAZIDE 25 MG/1
TABLET ORAL
Qty: 90 TABLET | Refills: 3 | Status: SHIPPED | OUTPATIENT
Start: 2021-12-14

## 2021-12-15 DIAGNOSIS — E11.9 TYPE 2 DIABETES MELLITUS WITHOUT COMPLICATION, WITHOUT LONG-TERM CURRENT USE OF INSULIN (HCC): ICD-10-CM

## 2021-12-15 RX ORDER — METFORMIN HYDROCHLORIDE 500 MG/1
2000 TABLET, EXTENDED RELEASE ORAL
Qty: 120 TABLET | Refills: 0 | Status: SHIPPED | OUTPATIENT
Start: 2021-12-15 | End: 2022-01-09

## 2022-01-08 DIAGNOSIS — E11.9 TYPE 2 DIABETES MELLITUS WITHOUT COMPLICATION, WITHOUT LONG-TERM CURRENT USE OF INSULIN (HCC): ICD-10-CM

## 2022-01-09 RX ORDER — METFORMIN HYDROCHLORIDE 500 MG/1
2000 TABLET, EXTENDED RELEASE ORAL
Qty: 120 TABLET | Refills: 0 | Status: SHIPPED | OUTPATIENT
Start: 2022-01-09 | End: 2022-02-08

## 2022-02-08 DIAGNOSIS — E11.9 TYPE 2 DIABETES MELLITUS WITHOUT COMPLICATION, WITHOUT LONG-TERM CURRENT USE OF INSULIN (HCC): ICD-10-CM

## 2022-02-08 RX ORDER — METFORMIN HYDROCHLORIDE 500 MG/1
2000 TABLET, EXTENDED RELEASE ORAL
Qty: 120 TABLET | Refills: 0 | Status: SHIPPED | OUTPATIENT
Start: 2022-02-08 | End: 2022-02-11

## 2022-02-10 DIAGNOSIS — E11.9 TYPE 2 DIABETES MELLITUS WITHOUT COMPLICATION, WITHOUT LONG-TERM CURRENT USE OF INSULIN (HCC): ICD-10-CM

## 2022-02-11 RX ORDER — METFORMIN HYDROCHLORIDE 500 MG/1
2000 TABLET, EXTENDED RELEASE ORAL
Qty: 120 TABLET | Refills: 0 | Status: SHIPPED | OUTPATIENT
Start: 2022-02-11 | End: 2022-03-31

## 2022-03-10 ENCOUNTER — OFFICE VISIT (OUTPATIENT)
Dept: CARDIOLOGY CLINIC | Facility: CLINIC | Age: 67
End: 2022-03-10
Payer: COMMERCIAL

## 2022-03-10 ENCOUNTER — TELEPHONE (OUTPATIENT)
Dept: CARDIOLOGY CLINIC | Facility: CLINIC | Age: 67
End: 2022-03-10

## 2022-03-10 VITALS
OXYGEN SATURATION: 99 % | HEART RATE: 77 BPM | SYSTOLIC BLOOD PRESSURE: 140 MMHG | WEIGHT: 198 LBS | DIASTOLIC BLOOD PRESSURE: 90 MMHG | RESPIRATION RATE: 16 BRPM | BODY MASS INDEX: 31.08 KG/M2 | HEIGHT: 67 IN

## 2022-03-10 DIAGNOSIS — E78.2 MIXED HYPERLIPIDEMIA: ICD-10-CM

## 2022-03-10 DIAGNOSIS — E11.8 TYPE 2 DIABETES MELLITUS WITH COMPLICATION, WITHOUT LONG-TERM CURRENT USE OF INSULIN (HCC): ICD-10-CM

## 2022-03-10 DIAGNOSIS — E66.9 OBESITY (BMI 30-39.9): ICD-10-CM

## 2022-03-10 DIAGNOSIS — I25.10 CORONARY ARTERY DISEASE INVOLVING NATIVE HEART WITHOUT ANGINA PECTORIS, UNSPECIFIED VESSEL OR LESION TYPE: Primary | ICD-10-CM

## 2022-03-10 DIAGNOSIS — I10 ESSENTIAL HYPERTENSION: Chronic | ICD-10-CM

## 2022-03-10 DIAGNOSIS — Z95.1 HX OF CABG: ICD-10-CM

## 2022-03-10 PROCEDURE — 1036F TOBACCO NON-USER: CPT | Performed by: PHYSICIAN ASSISTANT

## 2022-03-10 PROCEDURE — 3080F DIAST BP >= 90 MM HG: CPT | Performed by: PHYSICIAN ASSISTANT

## 2022-03-10 PROCEDURE — 99214 OFFICE O/P EST MOD 30 MIN: CPT | Performed by: PHYSICIAN ASSISTANT

## 2022-03-10 PROCEDURE — 3077F SYST BP >= 140 MM HG: CPT | Performed by: PHYSICIAN ASSISTANT

## 2022-03-10 PROCEDURE — 93000 ELECTROCARDIOGRAM COMPLETE: CPT | Performed by: PHYSICIAN ASSISTANT

## 2022-03-10 RX ORDER — NITROGLYCERIN 0.4 MG/1
0.4 TABLET SUBLINGUAL
Qty: 100 TABLET | Refills: 0 | Status: SHIPPED | OUTPATIENT
Start: 2022-03-10

## 2022-03-10 NOTE — TELEPHONE ENCOUNTER
----- Message -----  From: Jefferson Laureano PA-C  Sent: 3/10/2022  10:03 AM EST  To: Cardiology [facility] Clerical  Subject: Please call                                      Please call and set up patient for cardiac catheterization, 1st available please    He will also need CABG report from 2017 done at [facility]   Let me know if you need anything else    Jonathan Schneider

## 2022-03-10 NOTE — TELEPHONE ENCOUNTER
S/w patient, prescreening completed  Aware BW is needed and [facility] lab confirmed  Aware to not take Glimepiride, HCTZ, Irbesartan, and Jardiance the morning of procedure  Also aware not to take Metformin 24 hours prior  Cath prep/info sheet was sent to patient through [employer]  Patient informed per Jh Lafleur to have echo prior to cath  Patient also stated that he would prefer to have procedure done in April and will call back to discuss date for cath

## 2022-03-10 NOTE — H&P (VIEW-ONLY)
Cardiology Follow Up    Mick Lindo [facility]  1955  [id number]  [facility]  [address] [place] [address]  [phone]    1  Coronary artery disease involving native heart without angina pectoris, unspecified vessel or lesion type  Echo complete w/ contrast if indicated    nitroglycerin (NITROSTAT) 0 4 mg SL tablet   2  Hx of CABG     3  Essential hypertension     4  Mixed hyperlipidemia     5  Type 2 diabetes mellitus with complication, without long-term current use of insulin (HCC)     6  Obesity (BMI 30-39  9)         Interval History: Nik Ch is a 19-year-old male with history of CAD s/p CABG x3 (2017), hypertension, hyperlipidemia, diabetes mellitus type 2 and obesity who presents for follow-up visit  Patient is known to cardiologist Dr Ryan Arnold  Patient reports over the last month he has noticed a sharp decrease in his exertional capacity over the past month  He was traveling a few weeks ago and was loading luggage into his car and felt short of breath for a few minutes and had to stop to catch his breath  He was also out shoveling snow a few days after that and only could do 3-4 shovels of snow at a time before needing to take breaks  Reports before this last month he was able to work outside often carrying heavy tools and wood/equipment without any issue  He denies any overt chest pain or discomfort or dyspnea at rest  He denies any major weight fluctuations, orthopnea or LE edema  No recent illness, fever/chills, cough, syncope or presyncope  No CHF Hx  He has been taking all of his medications faithfully  He does not have SL NTG to use at home  He denies any angina in the past when he received 3V CABG at [facility]- in 2017 by Dr Charm Schaumann  CABG report is unavailable in Care Everywhere       In office EKG:  Normal sinus rhythm HR 69, left anterior fascicular block, inferior infarct, age undetermined  Patient Active Problem List   Diagnosis    Type 2 diabetes mellitus ([facility] )    Essential hypertension    Vitamin D deficiency    Transaminitis    Coronary artery disease involving native heart without angina pectoris    Hx of CABG    Mixed hyperlipidemia    Obesity (BMI 30-39  9)    Diastolic dysfunction    Special screening for malignant neoplasms, colon    Type 2 diabetes mellitus with complication, without long-term current use of insulin ([place] [address] )    Page's neuroma of right foot     Past Medical History:   Diagnosis Date    Coronary artery disease     Diabetes mellitus ([facility] )     Diastolic dysfunction     Diverticulitis     Hyperlipidemia     Hypertension     Melanoma ([place] [address] )     stomach area     Obesity     Prediabetes     Sarcoma of right upper extremity ([place] [address] ) 2009    Fifth metacarpal     Social History     Socioeconomic History    Marital status: /Civil Union     Spouse name: Not on file    Number of children: Not on file    Years of education: Not on file    Highest education level: Not on file   Occupational History    Occupation:    Tobacco Use    Smoking status: Never Smoker    Smokeless tobacco: Never Used   Vaping Use    Vaping Use: Never used   Substance and Sexual Activity    Alcohol use: Not Currently    Drug use: No    Sexual activity: Yes   Other Topics Concern    Not on file   Social History Narrative    Not on file     Social Determinants of Health     Financial Resource Strain: Not on file   Food Insecurity: Not on file   Transportation Needs: Not on file   Physical Activity: Not on file   Stress: Not on file   Social Connections: Not on file   Intimate Partner Violence: Not on file   Housing Stability: Not on file      Family History   Problem Relation Age of Onset    Emphysema Maternal Grandfather     Coronary artery disease Mother     Hyperlipidemia Mother     Hypertension Mother     Diabetes type II Mother     Diabetes type II Father     Basal cell carcinoma Father     Hypertension Sister      Past Surgical History:   Procedure Laterality Date    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    BOWEL RESECTION  2008    Secondary to diverticulitis    COLONOSCOPY      CORONARY ARTERY BYPASS GRAFT      HAND SURGERY      triple bypass    HERNIA REPAIR      MALIGNANT SKIN LESION EXCISION  1963    Abdominal wall surgical resection of melanoma    [place] COLONOSCOPY FLX DX W/COLLJ SPEC WHEN PFRMD N/A 12/11/2018    Procedure: COLONOSCOPY;  Surgeon: Lavern Evans MD;  Location: [facility];   Service: Gastroenterology    SKIN BIOPSY         Current Outpatient Medications:     Accu-Chek FastClix Lancets MISC, USE TO TEST BLOOD SUGAR AS DIRECTED, Disp: , Rfl:     aspirin 81 MG tablet, Take 81 mg by mouth daily, Disp: , Rfl:     atorvastatin (LIPITOR) 20 mg tablet, Take 1 tablet (20 mg total) by mouth daily at bedtime, Disp: 90 tablet, Rfl: 3    Blood Glucose Monitoring Suppl (Accu-Chek Guide) w/Device KIT, , Disp: , Rfl:     fluticasone (FLONASE) 50 mcg/act nasal spray, SPRAY 2 SPRAYS INTO EACH NOSTRIL EVERY DAY, Disp: 48 mL, Rfl: 1    glimepiride (AMARYL) 1 mg tablet, TAKE 1 TABLET (1 MG TOTAL) BY MOUTH DAILY WITH BREAKFAST, Disp: 90 tablet, Rfl: 3    glucose blood test strip, Use as instructed, Disp: 200 each, Rfl: 0    hydrochlorothiazide (HYDRODIURIL) 25 mg tablet, TAKE 1 TABLET BY MOUTH EVERY DAY, Disp: 90 tablet, Rfl: 3    irbesartan (AVAPRO) 150 mg tablet, TAKE 1 TABLET BY MOUTH EVERY DAY, Disp: 90 tablet, Rfl: 3    Jardiance 25 MG TABS, TAKE 1 TABLET BY MOUTH EVERY DAY IN THE MORNING, Disp: 90 tablet, Rfl: 3    metFORMIN (GLUCOPHAGE-XR) 500 mg 24 hr tablet, TAKE 4 TABLETS (2,000 MG TOTAL) BY MOUTH DAILY WITH DINNER, Disp: 120 tablet, Rfl: 0    metoprolol succinate (TOPROL-XL) 100 mg 24 hr tablet, Take 1 tablet (100 mg total) by mouth daily, Disp: 90 tablet, Rfl: 3    nitroglycerin (NITROSTAT) 0 4 mg SL tablet, Place 1 tablet (0 4 mg total) under the tongue every 5 (five) minutes as needed for chest pain, Disp: 100 tablet, Rfl: 0  No Known Allergies    Labs:  No visits with results within 2 Month(s) from this visit  Latest known visit with results is:   Appointment on 09/22/2021   Component Date Value    WBC 09/22/2021 5 13     RBC 09/22/2021 4 70     Hemoglobin 09/22/2021 13 8     Hematocrit 09/22/2021 39 8     MCV 09/22/2021 85     MCH 09/22/2021 29 4     MCHC 09/22/2021 34 7     RDW 09/22/2021 13 0     MPV 09/22/2021 9 6     Platelets 00/46/4722 186     nRBC 09/22/2021 0     Neutrophils Relative 09/22/2021 38*    Immat GRANS % 09/22/2021 0     Lymphocytes Relative 09/22/2021 49*    Monocytes Relative 09/22/2021 7     Eosinophils Relative 09/22/2021 5     Basophils Relative 09/22/2021 1     Neutrophils Absolute 09/22/2021 1 95     Immature Grans Absolute 09/22/2021 0 01     Lymphocytes Absolute 09/22/2021 2 53     Monocytes Absolute 09/22/2021 0 36     Eosinophils Absolute 09/22/2021 0 24     Basophils Absolute 09/22/2021 0 04     Sodium 09/22/2021 137     Potassium 09/22/2021 4 5     Chloride 09/22/2021 99*    CO2 09/22/2021 30     ANION GAP 09/22/2021 8     BUN 09/22/2021 14     Creatinine 09/22/2021 0 92     Glucose, Fasting 09/22/2021 157*    Calcium 09/22/2021 9 5     AST 09/22/2021 17     ALT 09/22/2021 26     Alkaline Phosphatase 09/22/2021 67     Total Protein 09/22/2021 7 5     Albumin 09/22/2021 3 5     Total Bilirubin 09/22/2021 0 32     eGFR 09/22/2021 88     Cholesterol 09/22/2021 146     Triglycerides 09/22/2021 227*    HDL, Direct 09/22/2021 32*    LDL Calculated 09/22/2021 69     Non-HDL-Chol (CHOL-HDL) 09/22/2021 114     Hemoglobin A1C 09/22/2021 8 4*    EAG 09/22/2021 194      Imaging: No results found  Review of Systems:  Review of Systems   Constitutional: Positive for fatigue  Negative for appetite change, chills, diaphoresis and fever  Respiratory: Positive for chest tightness and shortness of breath  Negative for cough  Cardiovascular: Negative for chest pain, palpitations and leg swelling  Gastrointestinal: Negative for diarrhea, nausea and vomiting  Endocrine: Negative for cold intolerance and heat intolerance  Genitourinary: Negative for difficulty urinating, dysuria and enuresis  Musculoskeletal: Negative for arthralgias, back pain and gait problem  Allergic/Immunologic: Negative for environmental allergies and food allergies  Neurological: Negative for dizziness, facial asymmetry and headaches  Hematological: Negative for adenopathy  Does not bruise/bleed easily  Psychiatric/Behavioral: Negative for agitation, behavioral problems and confusion  Physical Exam:  Physical Exam  Constitutional:       Appearance: He is well-developed  HENT:      Right Ear: External ear normal       Left Ear: External ear normal    Eyes:      Pupils: Pupils are equal, round, and reactive to light  Cardiovascular:      Rate and Rhythm: Normal rate and regular rhythm  Heart sounds: Normal heart sounds  No murmur heard  No friction rub  No gallop  Pulmonary:      Effort: Pulmonary effort is normal       Breath sounds: Normal breath sounds  Abdominal:      Palpations: Abdomen is soft  Musculoskeletal:         General: Normal range of motion  Cervical back: Normal range of motion  Skin:     General: Skin is warm and dry  Neurological:      Mental Status: He is alert and oriented to person, place, and time  Deep Tendon Reflexes: Reflexes are normal and symmetric  Psychiatric:         Behavior: Behavior normal          Thought Content: Thought content normal          Judgment: Judgment normal        Discussion/Summary:  1  Dyspnea with exertion - Will order TTE to assess LVEF, RWMA and/or valvular abnormalities   He was offered Pharmacologic MPI vs Cardiac cath for ischemic assessment, he states he had normal stress prior to CABG and wants to go with definitive evaluation at this time  Will arrange for elective cath  Case was discussed with his primary cardiologist Dr Mago Johnson who agrees with plan  2  CAD s/p CABG x3 - See above  Will obtain CABG report from [facility] and upload them for IC review  Continue Toprol XL 100mg, ASA 81mg and atorvastatin 20mg daily  Ordered SL NTG PRN, he was instructed on how to take NTG  3  Hypertension - BP stable  Continue Toprol XL as above  Continue HCTZ and Irbesartan  4  Hyperlipidemia - Last LDL 69  Continue atorvastatin 20mg QD  He denies myalgias  Was instructed on a cardiac diet  5  DM type 2 - A1c 6 8->8 3%  Management per PCP  RTO in 1 month with Dr Mago Johnson or sooner if needed

## 2022-03-10 NOTE — PROGRESS NOTES
Cardiology Follow Up    Aditi Deal [facility] [place]  1955  [id number]  [facility]  [address] [place] [address] [phone]    1  Coronary artery disease involving native heart without angina pectoris, unspecified vessel or lesion type  Echo complete w/ contrast if indicated    nitroglycerin (NITROSTAT) 0 4 mg SL tablet   2  Hx of CABG     3  Essential hypertension     4  Mixed hyperlipidemia     5  Type 2 diabetes mellitus with complication, without long-term current use of insulin (HCC)     6  Obesity (BMI 30-39  9)         Interval History: Benny Mustafa is a 72-year-old male with history of CAD s/p CABG x3 (2017), hypertension, hyperlipidemia, diabetes mellitus type 2 and obesity who presents for follow-up visit  Patient is known to cardiologist Dr Raghav Grant  Patient reports over the last month he has noticed a sharp decrease in his exertional capacity over the past month  He was traveling a few weeks ago and was loading luggage into his car and felt short of breath for a few minutes and had to stop to catch his breath  He was also out shoveling snow a few days after that and only could do 3-4 shovels of snow at a time before needing to take breaks  Reports before this last month he was able to work outside often carrying heavy tools and wood/equipment without any issue  He denies any overt chest pain or discomfort or dyspnea at rest  He denies any major weight fluctuations, orthopnea or LE edema  No recent illness, fever/chills, cough, syncope or presyncope  No CHF Hx  He has been taking all of his medications faithfully  He does not have SL NTG to use at home  He denies any angina in the past when he received 3V CABG at [facility] in 2017 by Dr Michael Santoyo  CABG report is unavailable in Care Everywhere       In office EKG:  Normal sinus rhythm HR 69, left anterior fascicular block, inferior infarct, age undetermined  Patient Active Problem List   Diagnosis    Type 2 diabetes mellitus ([facility] )    Essential hypertension    Vitamin D deficiency    Transaminitis    Coronary artery disease involving native heart without angina pectoris    Hx of CABG    Mixed hyperlipidemia    Obesity (BMI 30-39  9)    Diastolic dysfunction    Special screening for malignant neoplasms, colon    Type 2 diabetes mellitus with complication, without long-term current use of insulin ([place] [address] )    Page's neuroma of right foot     Past Medical History:   Diagnosis Date    Coronary artery disease     Diabetes mellitus ([facility] )     Diastolic dysfunction     Diverticulitis     Hyperlipidemia     Hypertension     Melanoma ([place] [address] )     stomach area     Obesity     Prediabetes     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     Social History     Socioeconomic History    Marital status: /Civil Union     Spouse name: Not on file    Number of children: Not on file    Years of education: Not on file    Highest education level: Not on file   Occupational History    Occupation:    Tobacco Use    Smoking status: Never Smoker    Smokeless tobacco: Never Used   Vaping Use    Vaping Use: Never used   Substance and Sexual Activity    Alcohol use: Not Currently    Drug use: No    Sexual activity: Yes   Other Topics Concern    Not on file   Social History Narrative    Not on file     Social Determinants of Health     Financial Resource Strain: Not on file   Food Insecurity: Not on file   Transportation Needs: Not on file   Physical Activity: Not on file   Stress: Not on file   Social Connections: Not on file   Intimate Partner Violence: Not on file   Housing Stability: Not on file      Family History   Problem Relation Age of Onset    Emphysema Maternal Grandfather     Coronary artery disease Mother     Hyperlipidemia Mother     Hypertension Mother     Diabetes type II Mother     Diabetes type II Father     Basal cell carcinoma Father     Hypertension Sister      Past Surgical History:   Procedure Laterality Date    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    BOWEL RESECTION  2008    Secondary to diverticulitis    COLONOSCOPY      CORONARY ARTERY BYPASS GRAFT      HAND SURGERY      triple bypass    HERNIA REPAIR      MALIGNANT SKIN LESION EXCISION  1963    Abdominal wall surgical resection of melanoma    CO COLONOSCOPY FLX DX W/COLLJ SPEC WHEN PFRMD N/A 12/11/2018    Procedure: COLONOSCOPY;  Surgeon: Bo Emery MD;  Location: [facility];   Service: Gastroenterology    SKIN BIOPSY         Current Outpatient Medications:     Accu-Chek FastClix Lancets MISC, USE TO TEST BLOOD SUGAR AS DIRECTED, Disp: , Rfl:     aspirin 81 MG tablet, Take 81 mg by mouth daily, Disp: , Rfl:     atorvastatin (LIPITOR) 20 mg tablet, Take 1 tablet (20 mg total) by mouth daily at bedtime, Disp: 90 tablet, Rfl: 3    Blood Glucose Monitoring Suppl (Accu-Chek Guide) w/Device KIT, , Disp: , Rfl:     fluticasone (FLONASE) 50 mcg/act nasal spray, SPRAY 2 SPRAYS INTO EACH NOSTRIL EVERY DAY, Disp: 48 mL, Rfl: 1    glimepiride (AMARYL) 1 mg tablet, TAKE 1 TABLET (1 MG TOTAL) BY MOUTH DAILY WITH BREAKFAST, Disp: 90 tablet, Rfl: 3    glucose blood test strip, Use as instructed, Disp: 200 each, Rfl: 0    hydrochlorothiazide (HYDRODIURIL) 25 mg tablet, TAKE 1 TABLET BY MOUTH EVERY DAY, Disp: 90 tablet, Rfl: 3    irbesartan (AVAPRO) 150 mg tablet, TAKE 1 TABLET BY MOUTH EVERY DAY, Disp: 90 tablet, Rfl: 3    Jardiance 25 MG TABS, TAKE 1 TABLET BY MOUTH EVERY DAY IN THE MORNING, Disp: 90 tablet, Rfl: 3    metFORMIN (GLUCOPHAGE-XR) 500 mg 24 hr tablet, TAKE 4 TABLETS (2,000 MG TOTAL) BY MOUTH DAILY WITH DINNER, Disp: 120 tablet, Rfl: 0    metoprolol succinate (TOPROL-XL) 100 mg 24 hr tablet, Take 1 tablet (100 mg total) by mouth daily, Disp: 90 tablet, Rfl: 3    nitroglycerin (NITROSTAT) 0 4 mg SL tablet, Place 1 tablet (0 4 mg total) under the tongue every 5 (five) minutes as needed for chest pain, Disp: 100 tablet, Rfl: 0  No Known Allergies    Labs:  No visits with results within 2 Month(s) from this visit  Latest known visit with results is:   Appointment on 09/22/2021   Component Date Value    WBC 09/22/2021 5 13     RBC 09/22/2021 4 70     Hemoglobin 09/22/2021 13 8     Hematocrit 09/22/2021 39 8     MCV 09/22/2021 85     MCH 09/22/2021 29 4     MCHC 09/22/2021 34 7     RDW 09/22/2021 13 0     MPV 09/22/2021 9 6     Platelets 04/72/8686 186     nRBC 09/22/2021 0     Neutrophils Relative 09/22/2021 38*    Immat GRANS % 09/22/2021 0     Lymphocytes Relative 09/22/2021 49*    Monocytes Relative 09/22/2021 7     Eosinophils Relative 09/22/2021 5     Basophils Relative 09/22/2021 1     Neutrophils Absolute 09/22/2021 1 95     Immature Grans Absolute 09/22/2021 0 01     Lymphocytes Absolute 09/22/2021 2 53     Monocytes Absolute 09/22/2021 0 36     Eosinophils Absolute 09/22/2021 0 24     Basophils Absolute 09/22/2021 0 04     Sodium 09/22/2021 137     Potassium 09/22/2021 4 5     Chloride 09/22/2021 99*    CO2 09/22/2021 30     ANION GAP 09/22/2021 8     BUN 09/22/2021 14     Creatinine 09/22/2021 0 92     Glucose, Fasting 09/22/2021 157*    Calcium 09/22/2021 9 5     AST 09/22/2021 17     ALT 09/22/2021 26     Alkaline Phosphatase 09/22/2021 67     Total Protein 09/22/2021 7 5     Albumin 09/22/2021 3 5     Total Bilirubin 09/22/2021 0 32     eGFR 09/22/2021 88     Cholesterol 09/22/2021 146     Triglycerides 09/22/2021 227*    HDL, Direct 09/22/2021 32*    LDL Calculated 09/22/2021 69     Non-HDL-Chol (CHOL-HDL) 09/22/2021 114     Hemoglobin A1C 09/22/2021 8 4*    EAG 09/22/2021 194      Imaging: No results found  Review of Systems:  Review of Systems   Constitutional: Positive for fatigue  Negative for appetite change, chills, diaphoresis and fever  Respiratory: Positive for chest tightness and shortness of breath  Negative for cough  Cardiovascular: Negative for chest pain, palpitations and leg swelling  Gastrointestinal: Negative for diarrhea, nausea and vomiting  Endocrine: Negative for cold intolerance and heat intolerance  Genitourinary: Negative for difficulty urinating, dysuria and enuresis  Musculoskeletal: Negative for arthralgias, back pain and gait problem  Allergic/Immunologic: Negative for environmental allergies and food allergies  Neurological: Negative for dizziness, facial asymmetry and headaches  Hematological: Negative for adenopathy  Does not bruise/bleed easily  Psychiatric/Behavioral: Negative for agitation, behavioral problems and confusion  Physical Exam:  Physical Exam  Constitutional:       Appearance: He is well-developed  HENT:      Right Ear: External ear normal       Left Ear: External ear normal    Eyes:      Pupils: Pupils are equal, round, and reactive to light  Cardiovascular:      Rate and Rhythm: Normal rate and regular rhythm  Heart sounds: Normal heart sounds  No murmur heard  No friction rub  No gallop  Pulmonary:      Effort: Pulmonary effort is normal       Breath sounds: Normal breath sounds  Abdominal:      Palpations: Abdomen is soft  Musculoskeletal:         General: Normal range of motion  Cervical back: Normal range of motion  Skin:     General: Skin is warm and dry  Neurological:      Mental Status: He is alert and oriented to person, place, and time  Deep Tendon Reflexes: Reflexes are normal and symmetric  Psychiatric:         Behavior: Behavior normal          Thought Content: Thought content normal          Judgment: Judgment normal        Discussion/Summary:  1  Dyspnea with exertion - Will order TTE to assess LVEF, RWMA and/or valvular abnormalities   He was offered Pharmacologic MPI vs Cardiac cath for ischemic assessment, he states he had normal stress prior to CABG and wants to go with definitive evaluation at this time  Will arrange for elective cath  Case was discussed with his primary cardiologist Dr Brie Davila who agrees with plan  2  CAD s/p CABG x3 - See above  Will obtain CABG report from [facility] and upload them for IC review  Continue Toprol XL 100mg, ASA 81mg and atorvastatin 20mg daily  Ordered SL NTG PRN, he was instructed on how to take NTG  3  Hypertension - BP stable  Continue Toprol XL as above  Continue HCTZ and Irbesartan  4  Hyperlipidemia - Last LDL 69  Continue atorvastatin 20mg QD  He denies myalgias  Was instructed on a cardiac diet  5  DM type 2 - A1c 6 8->8 3%  Management per PCP  RTO in 1 month with Dr Baird Given or sooner if needed

## 2022-03-15 ENCOUNTER — PREP FOR PROCEDURE (OUTPATIENT)
Dept: CARDIOLOGY CLINIC | Facility: CLINIC | Age: 67
End: 2022-03-15

## 2022-03-15 DIAGNOSIS — E11.8 TYPE 2 DIABETES MELLITUS WITH COMPLICATION, WITHOUT LONG-TERM CURRENT USE OF INSULIN (HCC): ICD-10-CM

## 2022-03-15 DIAGNOSIS — I25.10 CORONARY ARTERY DISEASE INVOLVING NATIVE HEART WITHOUT ANGINA PECTORIS, UNSPECIFIED VESSEL OR LESION TYPE: ICD-10-CM

## 2022-03-15 DIAGNOSIS — Z95.1 HX OF CABG: Primary | ICD-10-CM

## 2022-03-15 NOTE — TELEPHONE ENCOUNTER
S/w patient, stated that he would like to schedule cath on 4/7/22  Can we have auth for [facility] on 4/7/22? Please and thank you!

## 2022-03-16 ENCOUNTER — HOSPITAL ENCOUNTER (OUTPATIENT)
Dept: NON INVASIVE DIAGNOSTICS | Facility: CLINIC | Age: 67
Discharge: HOME | End: 2022-03-16
Payer: COMMERCIAL

## 2022-03-16 VITALS
HEIGHT: 67 IN | HEART RATE: 63 BPM | DIASTOLIC BLOOD PRESSURE: 90 MMHG | WEIGHT: 198 LBS | BODY MASS INDEX: 31.08 KG/M2 | SYSTOLIC BLOOD PRESSURE: 140 MMHG

## 2022-03-16 DIAGNOSIS — I25.10 CORONARY ARTERY DISEASE INVOLVING NATIVE HEART WITHOUT ANGINA PECTORIS, UNSPECIFIED VESSEL OR LESION TYPE: ICD-10-CM

## 2022-03-16 LAB
AORTIC ROOT: 3.3 CM
AORTIC VALVE MEAN VELOCITY: 13.6 M/S
APICAL FOUR CHAMBER EJECTION FRACTION: 68 %
AV AREA BY CONTINUOUS VTI: 1.7 CM2
AV AREA PEAK VELOCITY: 1.9 CM2
AV LVOT MEAN GRADIENT: 2 MMHG
AV LVOT PEAK GRADIENT: 4 MMHG
AV MEAN GRADIENT: 8 MMHG
AV PEAK GRADIENT: 14 MMHG
AV VALVE AREA: 1.68 CM2
AV VELOCITY RATIO: 0.51
DOP CALC AO PEAK VEL: 1.86 M/S
DOP CALC AO VTI: 37.76 CM
DOP CALC LVOT AREA: 3.8 CM2
DOP CALC LVOT DIAMETER: 2.2 CM
DOP CALC LVOT PEAK VEL VTI: 16.69 CM
DOP CALC LVOT PEAK VEL: 0.95 M/S
DOP CALC LVOT STROKE INDEX: 31.3 ML/M2
DOP CALC LVOT STROKE VOLUME: 63.41 CM3
E WAVE DECELERATION TIME: 271 MS
FRACTIONAL SHORTENING: 41 % (ref 28–44)
INTERVENTRICULAR SEPTUM IN DIASTOLE (PARASTERNAL SHORT AXIS VIEW): 1.1 CM
INTERVENTRICULAR SEPTUM: 1.1 CM (ref 0.54–1.02)
LAAS-AP2: 22.9 CM2
LAAS-AP4: 18.2 CM2
LEFT ATRIUM AREA SYSTOLE SINGLE PLANE A4C: 17.7 CM2
LEFT ATRIUM SIZE: 3.9 CM
LEFT INTERNAL DIMENSION IN SYSTOLE: 2.3 CM (ref 3.2–4.85)
LEFT VENTRICULAR INTERNAL DIMENSION IN DIASTOLE: 3.9 CM (ref 5.28–7.87)
LEFT VENTRICULAR POSTERIOR WALL IN END DIASTOLE: 1.1 CM (ref 0.53–1)
LEFT VENTRICULAR STROKE VOLUME: 48 ML
LVSV (TEICH): 48 ML
MV E'TISSUE VEL-LAT: 7 CM/S
MV E'TISSUE VEL-SEP: 11 CM/S
MV PEAK A VEL: 0.66 M/S
MV PEAK E VEL: 98 CM/S
MV STENOSIS PRESSURE HALF TIME: 79 MS
MV VALVE AREA P 1/2 METHOD: 2.78 CM2
RIGHT ATRIUM AREA SYSTOLE A4C: 17.1 CM2
RIGHT VENTRICLE ID DIMENSION: 4.1 CM
SL CV LEFT ATRIUM LENGTH A2C: 6.2 CM
SL CV LV EF: 68
SL CV PED ECHO LEFT VENTRICLE DIASTOLIC VOLUME (MOD BIPLANE) 2D: 67 ML
SL CV PED ECHO LEFT VENTRICLE SYSTOLIC VOLUME (MOD BIPLANE) 2D: 18 ML
TR MAX PG: 15 MMHG
TR PEAK VELOCITY: 2 M/S
TRICUSPID VALVE PEAK REGURGITATION VELOCITY: 1.95 M/S
Z-SCORE OF INTERVENTRICULAR SEPTUM IN END DIASTOLE: 2.66
Z-SCORE OF LEFT VENTRICULAR DIMENSION IN END DIASTOLE: -5.03
Z-SCORE OF LEFT VENTRICULAR DIMENSION IN END SYSTOLE: -4.26
Z-SCORE OF LEFT VENTRICULAR POSTERIOR WALL IN END DIASTOLE: 2.77

## 2022-03-16 PROCEDURE — 93306 TTE W/DOPPLER COMPLETE: CPT | Performed by: INTERNAL MEDICINE

## 2022-03-16 PROCEDURE — 93306 TTE W/DOPPLER COMPLETE: CPT

## 2022-03-25 ENCOUNTER — APPOINTMENT (OUTPATIENT)
Dept: LAB | Facility: HOSPITAL | Age: 67
End: 2022-03-25
Attending: INTERNAL MEDICINE
Payer: COMMERCIAL

## 2022-03-25 DIAGNOSIS — Z95.1 HX OF CABG: ICD-10-CM

## 2022-03-25 DIAGNOSIS — E11.8 TYPE 2 DIABETES MELLITUS WITH COMPLICATION, WITHOUT LONG-TERM CURRENT USE OF INSULIN (HCC): ICD-10-CM

## 2022-03-25 DIAGNOSIS — I25.10 CORONARY ARTERY DISEASE INVOLVING NATIVE HEART WITHOUT ANGINA PECTORIS, UNSPECIFIED VESSEL OR LESION TYPE: ICD-10-CM

## 2022-03-25 LAB
ANION GAP SERPL CALCULATED.3IONS-SCNC: 9 MMOL/L (ref 4–13)
BUN SERPL-MCNC: 21 MG/DL (ref 5–25)
CALCIUM SERPL-MCNC: 10 MG/DL (ref 8.3–10.1)
CHLORIDE SERPL-SCNC: 102 MMOL/L (ref 100–108)
CO2 SERPL-SCNC: 28 MMOL/L (ref 21–32)
CREAT SERPL-MCNC: 0.91 MG/DL (ref 0.6–1.3)
ERYTHROCYTE [DISTWIDTH] IN BLOOD BY AUTOMATED COUNT: 12.9 % (ref 11.6–15.1)
GFR SERPL CREATININE-BSD FRML MDRD: 88 ML/MIN/1.73SQ M
GLUCOSE SERPL-MCNC: 175 MG/DL (ref 65–140)
HCT VFR BLD AUTO: 44.7 % (ref 36.5–49.3)
HGB BLD-MCNC: 15.5 G/DL (ref 12–17)
INR PPP: 0.89 (ref 0.84–1.19)
MCH RBC QN AUTO: 30 PG (ref 26.8–34.3)
MCHC RBC AUTO-ENTMCNC: 34.7 G/DL (ref 31.4–37.4)
MCV RBC AUTO: 87 FL (ref 82–98)
PLATELET # BLD AUTO: 194 THOUSANDS/UL (ref 149–390)
PMV BLD AUTO: 10.1 FL (ref 8.9–12.7)
POTASSIUM SERPL-SCNC: 4.2 MMOL/L (ref 3.5–5.3)
PROTHROMBIN TIME: 11.7 SECONDS (ref 11.6–14.5)
RBC # BLD AUTO: 5.17 MILLION/UL (ref 3.88–5.62)
SODIUM SERPL-SCNC: 139 MMOL/L (ref 136–145)
WBC # BLD AUTO: 6.77 THOUSAND/UL (ref 4.31–10.16)

## 2022-03-25 PROCEDURE — 85027 COMPLETE CBC AUTOMATED: CPT

## 2022-03-25 PROCEDURE — 85610 PROTHROMBIN TIME: CPT

## 2022-03-25 PROCEDURE — 80048 BASIC METABOLIC PNL TOTAL CA: CPT

## 2022-03-25 PROCEDURE — 36415 COLL VENOUS BLD VENIPUNCTURE: CPT

## 2022-03-31 DIAGNOSIS — E11.9 TYPE 2 DIABETES MELLITUS WITHOUT COMPLICATION, WITHOUT LONG-TERM CURRENT USE OF INSULIN (HCC): ICD-10-CM

## 2022-03-31 RX ORDER — METFORMIN HYDROCHLORIDE 500 MG/1
2000 TABLET, EXTENDED RELEASE ORAL
Qty: 120 TABLET | Refills: 0 | Status: SHIPPED | OUTPATIENT
Start: 2022-03-31 | End: 2022-04-13

## 2022-04-07 ENCOUNTER — HOSPITAL ENCOUNTER (OUTPATIENT)
Facility: HOSPITAL | Age: 67
Setting detail: OUTPATIENT SURGERY
Discharge: HOME | End: 2022-04-07
Attending: INTERNAL MEDICINE | Admitting: INTERNAL MEDICINE
Payer: COMMERCIAL

## 2022-04-07 VITALS
TEMPERATURE: 98.8 F | DIASTOLIC BLOOD PRESSURE: 91 MMHG | OXYGEN SATURATION: 97 % | WEIGHT: 199.74 LBS | SYSTOLIC BLOOD PRESSURE: 158 MMHG | RESPIRATION RATE: 16 BRPM | HEIGHT: 67 IN | HEART RATE: 84 BPM | BODY MASS INDEX: 31.35 KG/M2

## 2022-04-07 DIAGNOSIS — I25.10 CORONARY ARTERY DISEASE INVOLVING NATIVE HEART WITHOUT ANGINA PECTORIS, UNSPECIFIED VESSEL OR LESION TYPE: ICD-10-CM

## 2022-04-07 DIAGNOSIS — E11.8 TYPE 2 DIABETES MELLITUS WITH COMPLICATION, WITHOUT LONG-TERM CURRENT USE OF INSULIN (HCC): ICD-10-CM

## 2022-04-07 DIAGNOSIS — Z95.1 HX OF CABG: ICD-10-CM

## 2022-04-07 LAB
ANION GAP SERPL CALCULATED.3IONS-SCNC: 8 MMOL/L (ref 4–13)
BUN SERPL-MCNC: 22 MG/DL (ref 5–25)
CALCIUM SERPL-MCNC: 9.8 MG/DL (ref 8.3–10.1)
CHLORIDE SERPL-SCNC: 102 MMOL/L (ref 100–108)
CO2 SERPL-SCNC: 28 MMOL/L (ref 21–32)
CREAT SERPL-MCNC: 0.92 MG/DL (ref 0.6–1.3)
GFR SERPL CREATININE-BSD FRML MDRD: 87 ML/MIN/1.73SQ M
GLUCOSE P FAST SERPL-MCNC: 163 MG/DL (ref 65–99)
GLUCOSE SERPL-MCNC: 159 MG/DL (ref 65–140)
GLUCOSE SERPL-MCNC: 163 MG/DL (ref 65–140)
POTASSIUM SERPL-SCNC: 4.1 MMOL/L (ref 3.5–5.3)
SODIUM SERPL-SCNC: 138 MMOL/L (ref 136–145)

## 2022-04-07 PROCEDURE — 93455 CORONARY ART/GRFT ANGIO S&I: CPT | Performed by: INTERNAL MEDICINE

## 2022-04-07 PROCEDURE — C1769 GUIDE WIRE: HCPCS | Performed by: INTERNAL MEDICINE

## 2022-04-07 PROCEDURE — 93567 NJX CAR CTH SPRVLV AORTGRPHY: CPT | Performed by: INTERNAL MEDICINE

## 2022-04-07 PROCEDURE — C1760 CLOSURE DEV, VASC: HCPCS | Performed by: INTERNAL MEDICINE

## 2022-04-07 PROCEDURE — C1894 INTRO/SHEATH, NON-LASER: HCPCS | Performed by: INTERNAL MEDICINE

## 2022-04-07 PROCEDURE — 80048 BASIC METABOLIC PNL TOTAL CA: CPT | Performed by: INTERNAL MEDICINE

## 2022-04-07 PROCEDURE — 82948 REAGENT STRIP/BLOOD GLUCOSE: CPT

## 2022-04-07 PROCEDURE — 99153 MOD SED SAME PHYS/QHP EA: CPT | Performed by: INTERNAL MEDICINE

## 2022-04-07 PROCEDURE — 99152 MOD SED SAME PHYS/QHP 5/>YRS: CPT | Performed by: INTERNAL MEDICINE

## 2022-04-07 PROCEDURE — NC001 PR NO CHARGE: Performed by: INTERNAL MEDICINE

## 2022-04-07 DEVICE — ANGIO-SEAL VIP VASCULAR CLOSURE DEVICE
Type: IMPLANTABLE DEVICE | Site: GROIN | Status: FUNCTIONAL
Brand: ANGIO-SEAL

## 2022-04-07 RX ORDER — HEPARIN SODIUM 1000 [USP'U]/ML
INJECTION, SOLUTION INTRAVENOUS; SUBCUTANEOUS AS NEEDED
Status: DISCONTINUED | OUTPATIENT
Start: 2022-04-07 | End: 2022-04-07 | Stop reason: HOSPADM

## 2022-04-07 RX ORDER — MIDAZOLAM HYDROCHLORIDE 2 MG/2ML
INJECTION, SOLUTION INTRAMUSCULAR; INTRAVENOUS AS NEEDED
Status: DISCONTINUED | OUTPATIENT
Start: 2022-04-07 | End: 2022-04-07 | Stop reason: HOSPADM

## 2022-04-07 RX ORDER — SODIUM CHLORIDE 9 MG/ML
125 INJECTION, SOLUTION INTRAVENOUS CONTINUOUS
Status: DISCONTINUED | OUTPATIENT
Start: 2022-04-07 | End: 2022-04-07 | Stop reason: HOSPADM

## 2022-04-07 RX ORDER — NITROGLYCERIN 20 MG/100ML
INJECTION INTRAVENOUS AS NEEDED
Status: DISCONTINUED | OUTPATIENT
Start: 2022-04-07 | End: 2022-04-07 | Stop reason: HOSPADM

## 2022-04-07 RX ORDER — FENTANYL CITRATE 50 UG/ML
INJECTION, SOLUTION INTRAMUSCULAR; INTRAVENOUS AS NEEDED
Status: DISCONTINUED | OUTPATIENT
Start: 2022-04-07 | End: 2022-04-07 | Stop reason: HOSPADM

## 2022-04-07 RX ORDER — VERAPAMIL HCL 2.5 MG/ML
AMPUL (ML) INTRAVENOUS AS NEEDED
Status: DISCONTINUED | OUTPATIENT
Start: 2022-04-07 | End: 2022-04-07 | Stop reason: HOSPADM

## 2022-04-07 RX ORDER — ISOSORBIDE MONONITRATE 30 MG/1
30 TABLET, EXTENDED RELEASE ORAL DAILY
Status: DISCONTINUED | OUTPATIENT
Start: 2022-04-07 | End: 2022-04-07 | Stop reason: HOSPADM

## 2022-04-07 RX ORDER — LIDOCAINE WITH 8.4% SOD BICARB 0.9%(10ML)
SYRINGE (ML) INJECTION AS NEEDED
Status: DISCONTINUED | OUTPATIENT
Start: 2022-04-07 | End: 2022-04-07 | Stop reason: HOSPADM

## 2022-04-07 RX ADMIN — SODIUM CHLORIDE 125 ML/HR: 0.9 INJECTION, SOLUTION INTRAVENOUS at 10:56

## 2022-04-07 NOTE — Clinical Note
Defib pad site: left lower flank  Defib pad site: RT UPPER CHEST  Defib pad site assessment: skin integrity intact

## 2022-04-07 NOTE — Clinical Note
The saphenous vein graft was selectively engaged, injected and visualized  Multiple views of the injected vessel were taken    SEQUENTIAL TO OM1

## 2022-04-07 NOTE — Clinical Note
Prepped: groin and left radial  Prepped with: ChloraPrep  The site was clipped  The patient was draped

## 2022-04-07 NOTE — INTERVAL H&P NOTE
Update: (This section must be completed if the H&P was completed greater than 24 hrs to procedure or admission)    H&P reviewed  After examining the patient, I find no changed to the H&P since it had been written  Patient states he has dyspnea on exertion  I have discussed in detail with patient regarding the indications, alternatives, risks and benefit of cardiac catheterization and possible PCI  Patient demonstrates clear understanding and wants to proceed with the procedure  Patient re-evaluated   Accept as history and physical     Karissa Jin MD/April 7, 2022/8:21 AM

## 2022-04-12 NOTE — PROGRESS NOTES
Patient seen post cardiac cath  Doing well  No complaints  Cardiac cath showed patent LIMA to LAD, SVG to OM1 and SVG to OM2  He had diffuse calcified stenosis of small to medium sized RCA with significant distal small vessel CAD  Discussed options including medical management versus high risk PCI of RCA with atherectomy back up  RCA appears small to medium sized, calcified with diffuse CAD including small vessel CAD  Following detailed discussion involving indication, risk and benefits, both him and his wife want medical management with antianginals  States he does not have significant symptoms  Had one episode of dyspnea when [place] the airport when he felt a little SOB  No symptoms since  Cont medical management as per patient wishes     Follow up with primary cardiologist

## 2022-04-13 DIAGNOSIS — E11.9 TYPE 2 DIABETES MELLITUS WITHOUT COMPLICATION, WITHOUT LONG-TERM CURRENT USE OF INSULIN (HCC): ICD-10-CM

## 2022-04-13 RX ORDER — METFORMIN HYDROCHLORIDE 500 MG/1
2000 TABLET, EXTENDED RELEASE ORAL
Qty: 360 TABLET | Refills: 1 | Status: SHIPPED | OUTPATIENT
Start: 2022-04-13

## 2022-05-20 ENCOUNTER — APPOINTMENT (OUTPATIENT)
Dept: LAB | Facility: HOSPITAL | Age: 67
End: 2022-05-20
Payer: COMMERCIAL

## 2022-05-20 DIAGNOSIS — I10 ESSENTIAL HYPERTENSION: Chronic | ICD-10-CM

## 2022-05-20 DIAGNOSIS — E78.2 MIXED HYPERLIPIDEMIA: ICD-10-CM

## 2022-05-20 DIAGNOSIS — K62.5 RECTAL BLEEDING: ICD-10-CM

## 2022-05-20 DIAGNOSIS — E11.8 TYPE 2 DIABETES MELLITUS WITH COMPLICATION, WITHOUT LONG-TERM CURRENT USE OF INSULIN (HCC): ICD-10-CM

## 2022-05-20 LAB
ALBUMIN SERPL BCP-MCNC: 4.1 G/DL (ref 3.5–5)
ALP SERPL-CCNC: 76 U/L (ref 46–116)
ALT SERPL W P-5'-P-CCNC: 28 U/L (ref 12–78)
ANION GAP SERPL CALCULATED.3IONS-SCNC: 11 MMOL/L (ref 4–13)
AST SERPL W P-5'-P-CCNC: 22 U/L (ref 5–45)
BASOPHILS # BLD AUTO: 0.05 THOUSANDS/ΜL (ref 0–0.1)
BASOPHILS NFR BLD AUTO: 1 % (ref 0–1)
BILIRUB SERPL-MCNC: 0.45 MG/DL (ref 0.2–1)
BUN SERPL-MCNC: 19 MG/DL (ref 5–25)
CALCIUM SERPL-MCNC: 10.1 MG/DL (ref 8.3–10.1)
CHLORIDE SERPL-SCNC: 99 MMOL/L (ref 100–108)
CHOLEST SERPL-MCNC: 125 MG/DL
CO2 SERPL-SCNC: 28 MMOL/L (ref 21–32)
CREAT SERPL-MCNC: 1.08 MG/DL (ref 0.6–1.3)
EOSINOPHIL # BLD AUTO: 0.17 THOUSAND/ΜL (ref 0–0.61)
EOSINOPHIL NFR BLD AUTO: 2 % (ref 0–6)
ERYTHROCYTE [DISTWIDTH] IN BLOOD BY AUTOMATED COUNT: 13 % (ref 11.6–15.1)
EST. AVERAGE GLUCOSE BLD GHB EST-MCNC: 192 MG/DL
GFR SERPL CREATININE-BSD FRML MDRD: 72 ML/MIN/1.73SQ M
GLUCOSE P FAST SERPL-MCNC: 138 MG/DL (ref 65–99)
HBA1C MFR BLD: 8.3 %
HCT VFR BLD AUTO: 46.1 % (ref 36.5–49.3)
HDLC SERPL-MCNC: 32 MG/DL
HGB BLD-MCNC: 16.1 G/DL (ref 12–17)
IMM GRANULOCYTES # BLD AUTO: 0.03 THOUSAND/UL (ref 0–0.2)
IMM GRANULOCYTES NFR BLD AUTO: 0 % (ref 0–2)
LDLC SERPL CALC-MCNC: 56 MG/DL (ref 0–100)
LYMPHOCYTES # BLD AUTO: 2.4 THOUSANDS/ΜL (ref 0.6–4.47)
LYMPHOCYTES NFR BLD AUTO: 30 % (ref 14–44)
MCH RBC QN AUTO: 30.1 PG (ref 26.8–34.3)
MCHC RBC AUTO-ENTMCNC: 34.9 G/DL (ref 31.4–37.4)
MCV RBC AUTO: 86 FL (ref 82–98)
MONOCYTES # BLD AUTO: 0.51 THOUSAND/ΜL (ref 0.17–1.22)
MONOCYTES NFR BLD AUTO: 6 % (ref 4–12)
NEUTROPHILS # BLD AUTO: 4.92 THOUSANDS/ΜL (ref 1.85–7.62)
NEUTS SEG NFR BLD AUTO: 61 % (ref 43–75)
NONHDLC SERPL-MCNC: 93 MG/DL
NRBC BLD AUTO-RTO: 0 /100 WBCS
PLATELET # BLD AUTO: 228 THOUSANDS/UL (ref 149–390)
PMV BLD AUTO: 10.1 FL (ref 8.9–12.7)
POTASSIUM SERPL-SCNC: 4.7 MMOL/L (ref 3.5–5.3)
PROT SERPL-MCNC: 8.1 G/DL (ref 6.4–8.2)
RBC # BLD AUTO: 5.34 MILLION/UL (ref 3.88–5.62)
SODIUM SERPL-SCNC: 138 MMOL/L (ref 136–145)
TRIGL SERPL-MCNC: 184 MG/DL
TSH SERPL DL<=0.05 MIU/L-ACNC: 2.23 UIU/ML (ref 0.45–4.5)
WBC # BLD AUTO: 8.08 THOUSAND/UL (ref 4.31–10.16)

## 2022-05-20 PROCEDURE — 84443 ASSAY THYROID STIM HORMONE: CPT

## 2022-05-20 PROCEDURE — 83036 HEMOGLOBIN GLYCOSYLATED A1C: CPT

## 2022-05-20 PROCEDURE — 80053 COMPREHEN METABOLIC PANEL: CPT

## 2022-05-20 PROCEDURE — 85025 COMPLETE CBC W/AUTO DIFF WBC: CPT

## 2022-05-20 PROCEDURE — 3052F HG A1C>EQUAL 8.0%<EQUAL 9.0%: CPT | Performed by: INTERNAL MEDICINE

## 2022-05-20 PROCEDURE — 80061 LIPID PANEL: CPT

## 2022-05-20 PROCEDURE — 36415 COLL VENOUS BLD VENIPUNCTURE: CPT

## 2022-05-23 ENCOUNTER — OFFICE VISIT (OUTPATIENT)
Dept: INTERNAL MEDICINE CLINIC | Facility: CLINIC | Age: 67
End: 2022-05-23
Payer: COMMERCIAL

## 2022-05-23 VITALS
WEIGHT: 196 LBS | RESPIRATION RATE: 18 BRPM | BODY MASS INDEX: 30.76 KG/M2 | HEART RATE: 58 BPM | HEIGHT: 67 IN | TEMPERATURE: 98 F | SYSTOLIC BLOOD PRESSURE: 132 MMHG | DIASTOLIC BLOOD PRESSURE: 74 MMHG | OXYGEN SATURATION: 96 %

## 2022-05-23 DIAGNOSIS — Z95.1 HX OF CABG: ICD-10-CM

## 2022-05-23 DIAGNOSIS — E78.2 MIXED HYPERLIPIDEMIA: ICD-10-CM

## 2022-05-23 DIAGNOSIS — I25.10 CORONARY ARTERY DISEASE INVOLVING NATIVE HEART WITHOUT ANGINA PECTORIS, UNSPECIFIED VESSEL OR LESION TYPE: ICD-10-CM

## 2022-05-23 DIAGNOSIS — Z12.5 SCREENING FOR PROSTATE CANCER: ICD-10-CM

## 2022-05-23 DIAGNOSIS — N40.0 BENIGN PROSTATIC HYPERPLASIA, UNSPECIFIED WHETHER LOWER URINARY TRACT SYMPTOMS PRESENT: ICD-10-CM

## 2022-05-23 DIAGNOSIS — N50.89 SCROTAL MASS: ICD-10-CM

## 2022-05-23 DIAGNOSIS — I10 ESSENTIAL HYPERTENSION: Chronic | ICD-10-CM

## 2022-05-23 DIAGNOSIS — E66.9 OBESITY (BMI 30-39.9): ICD-10-CM

## 2022-05-23 DIAGNOSIS — E11.8 TYPE 2 DIABETES MELLITUS WITH COMPLICATION, WITHOUT LONG-TERM CURRENT USE OF INSULIN (HCC): Primary | ICD-10-CM

## 2022-05-23 PROCEDURE — 3078F DIAST BP <80 MM HG: CPT | Performed by: INTERNAL MEDICINE

## 2022-05-23 PROCEDURE — 1036F TOBACCO NON-USER: CPT | Performed by: INTERNAL MEDICINE

## 2022-05-23 PROCEDURE — 3075F SYST BP GE 130 - 139MM HG: CPT | Performed by: INTERNAL MEDICINE

## 2022-05-23 PROCEDURE — 3008F BODY MASS INDEX DOCD: CPT | Performed by: INTERNAL MEDICINE

## 2022-05-23 PROCEDURE — 99215 OFFICE O/P EST HI 40 MIN: CPT | Performed by: INTERNAL MEDICINE

## 2022-05-23 RX ORDER — GLIMEPIRIDE 1 MG/1
TABLET ORAL
Qty: 90 TABLET | Refills: 3
Start: 2022-05-23 | End: 2022-05-23 | Stop reason: SDUPTHER

## 2022-05-23 RX ORDER — TAMSULOSIN HYDROCHLORIDE 0.4 MG/1
0.4 CAPSULE ORAL
Qty: 30 CAPSULE | Refills: 5 | Status: SHIPPED | OUTPATIENT
Start: 2022-05-23

## 2022-05-23 RX ORDER — GLIMEPIRIDE 1 MG/1
TABLET ORAL
Qty: 270 TABLET | Refills: 3 | Status: SHIPPED | OUTPATIENT
Start: 2022-05-23

## 2022-05-23 NOTE — PROGRESS NOTES
Assessment/Plan:    Diagnoses and all orders for this visit:    Type 2 diabetes mellitus with complication, without long-term current use of insulin (HCC)  -     CBC and differential; Future  -     Comprehensive metabolic panel; Future  -     Lipid panel; Future  -     Hemoglobin A1C; Future  -     Discontinue: glimepiride (AMARYL) 1 mg tablet; 1 mg po in am, 2 mg with dinner  -     glimepiride (AMARYL) 1 mg tablet; 1 mg po in am, 2 mg with dinner    Coronary artery disease involving native heart without angina pectoris, unspecified vessel or lesion type    Essential hypertension    Hx of CABG    Mixed hyperlipidemia    Obesity (BMI 30-39  9)    Benign prostatic hyperplasia, unspecified whether lower urinary tract symptoms present  -     tamsulosin (FLOMAX) 0 4 mg; Take 1 capsule (0 4 mg total) by mouth daily with dinner    Scrotal mass  -     US scrotum and groin area; Future    Screening for prostate cancer  -     PSA, Total Screen; Future              Patient Instructions   Lab data reviewed in detail and compared prior    Type 2 diabetes mellitus remains under poor control, likely in part due to binge eating of candy over the Easter holiday  At this point will continue metformin and Jardiance and increase glimepiride to 2 mg in the evening, continue 1 mg in the morning and monitor blood sugars more closely  Patient encouraged to get back to regular exercise and continue with weight loss efforts  Consider adding Victoza or Trulicity at follow-up if not considerably improved  Hypertension and hyperlipidemia peer stable on present regimen    Coronary artery disease is stable without angina with stable recent cardiac catheterization  Continue aggressive risk factor modification as above      Scrotal mass-check ultrasound    Routine follow-up after labs in 4 months, will follow-up scrotal ultrasound through my chart, consider Urology consultation for any concerning ultrasound or failure of symptoms to improve on tamsulosin  Symptoms consistent with BPH-trial of tamsulosin 0 4 mg take in the evening  Contact me with any side effects or failure to improve symptoms  Subjective:      Patient ID: Brandan Yee is a 61 y o  male    Follow-up multiple problems and review labs    Feeling generally well, but 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  Notes low energy over past several mos w/ frost  Had cardiac w/u neg  Type 2 diabetes mellitus-tolerating metformin and Jardiance  He admits to binging on candy over Robert Applebaum MD  Testing sugars regularly, running between 120-160 all day, then up to 200+ after eating dinner  Not eating much breakfast or lunch  He has been taking glimepiride 1 mg bid  Hypertension/Hyperlipidemia-taking medication as directed, no recent home blood pressures    CAD s/p CABG '17-stable w/o angina  Cath w/ patent grafts 4/22  Allergy/asthma-only using rx prn  Using flonase a few x per week for pnd  Stopped w/ regular walks    Notes 2-3 x nocturia with incomplete evacuation, double voids and dribbling    Notes long history of mass in testicles that seems to be getting bigger, noting that his penis tends to be pulled back in                Current Outpatient Medications:     Accu-Chek FastClix Lancets MISC, USE TO TEST BLOOD SUGAR AS DIRECTED, Disp: , Rfl:     aspirin 81 MG tablet, Take 81 mg by mouth daily, Disp: , Rfl:     atorvastatin (LIPITOR) 20 mg tablet, Take 1 tablet (20 mg total) by mouth daily at bedtime, Disp: 90 tablet, Rfl: 3    Blood Glucose Monitoring Suppl (Accu-Chek Guide) w/Device KIT, , Disp: , Rfl:     fluticasone (FLONASE) 50 mcg/act nasal spray, SPRAY 2 SPRAYS INTO EACH NOSTRIL EVERY DAY, Disp: 48 mL, Rfl: 1    glimepiride (AMARYL) 1 mg tablet, 1 mg po in am, 2 mg with dinner, Disp: 270 tablet, Rfl: 3    glucose blood test strip, Use as instructed, Disp: 200 each, Rfl: 0    hydrochlorothiazide (HYDRODIURIL) 25 mg tablet, TAKE 1 TABLET BY MOUTH EVERY DAY, Disp: 90 tablet, Rfl: 3    irbesartan (AVAPRO) 150 mg tablet, TAKE 1 TABLET BY MOUTH EVERY DAY, Disp: 90 tablet, Rfl: 3    Jardiance 25 MG TABS, TAKE 1 TABLET BY MOUTH EVERY DAY IN THE MORNING, Disp: 90 tablet, Rfl: 3    metFORMIN (GLUCOPHAGE-XR) 500 mg 24 hr tablet, TAKE 4 TABLETS (2,000 MG TOTAL) BY MOUTH DAILY WITH DINNER, Disp: 360 tablet, Rfl: 1    metoprolol succinate (TOPROL-XL) 100 mg 24 hr tablet, TAKE 1 TABLET BY MOUTH EVERY DAY, Disp: 90 tablet, Rfl: 0    nitroglycerin (NITROSTAT) 0 4 mg SL tablet, Place 1 tablet (0 4 mg total) under the tongue every 5 (five) minutes as needed for chest pain, Disp: 100 tablet, Rfl: 0    tamsulosin (FLOMAX) 0 4 mg, Take 1 capsule (0 4 mg total) by mouth daily with dinner, Disp: 30 capsule, Rfl: 5    Recent Results (from the past 1008 hour(s))   CBC and differential    Collection Time: 05/20/22  8:55 AM   Result Value Ref Range    WBC 8 08 4 31 - 10 16 Thousand/uL    RBC 5 34 3 88 - 5 62 Million/uL    Hemoglobin 16 1 12 0 - 17 0 g/dL    Hematocrit 46 1 36 5 - 49 3 %    MCV 86 82 - 98 fL    MCH 30 1 26 8 - 34 3 pg    MCHC 34 9 31 4 - 37 4 g/dL    RDW 13 0 11 6 - 15 1 %    MPV 10 1 8 9 - 12 7 fL    Platelets 973 393 - 767 Thousands/uL    nRBC 0 /100 WBCs    Neutrophils Relative 61 43 - 75 %    Immat GRANS % 0 0 - 2 %    Lymphocytes Relative 30 14 - 44 %    Monocytes Relative 6 4 - 12 %    Eosinophils Relative 2 0 - 6 %    Basophils Relative 1 0 - 1 %    Neutrophils Absolute 4 92 1 85 - 7 62 Thousands/µL    Immature Grans Absolute 0 03 0 00 - 0 20 Thousand/uL    Lymphocytes Absolute 2 40 0 60 - 4 47 Thousands/µL    Monocytes Absolute 0 51 0 17 - 1 22 Thousand/µL    Eosinophils Absolute 0 17 0 00 - 0 61 Thousand/µL    Basophils Absolute 0 05 0 00 - 0 10 Thousands/µL   Lipid panel    Collection Time: 05/20/22  8:55 AM   Result Value Ref Range    Cholesterol 125 See Comment mg/dL    Triglycerides 184 (H) See Comment mg/dL    HDL, Direct 32 (L) >=40 mg/dL LDL Calculated 56 0 - 100 mg/dL    Non-HDL-Chol (CHOL-HDL) 93 mg/dl   TSH, 3rd generation with Free T4 reflex    Collection Time: 05/20/22  8:55 AM   Result Value Ref Range    TSH 3RD GENERATON 2 230 0 450 - 4 500 uIU/mL   HEMOGLOBIN A1C W/ EAG ESTIMATION    Collection Time: 05/20/22  8:55 AM   Result Value Ref Range    Hemoglobin A1C 8 3 (H) Normal 3 8-5 6%; PreDiabetic 5 7-6 4%; Diabetic >=6 5%; Glycemic control for adults with diabetes <7 0% %     mg/dl   Comprehensive metabolic panel    Collection Time: 05/20/22  8:55 AM   Result Value Ref Range    Sodium 138 136 - 145 mmol/L    Potassium 4 7 3 5 - 5 3 mmol/L    Chloride 99 (L) 100 - 108 mmol/L    CO2 28 21 - 32 mmol/L    ANION GAP 11 4 - 13 mmol/L    BUN 19 5 - 25 mg/dL    Creatinine 1 08 0 60 - 1 30 mg/dL    Glucose, Fasting 138 (H) 65 - 99 mg/dL    Calcium 10 1 8 3 - 10 1 mg/dL    AST 22 5 - 45 U/L    ALT 28 12 - 78 U/L    Alkaline Phosphatase 76 46 - 116 U/L    Total Protein 8 1 6 4 - 8 2 g/dL    Albumin 4 1 3 5 - 5 0 g/dL    Total Bilirubin 0 45 0 20 - 1 00 mg/dL    eGFR 72 ml/min/1 73sq m       The following portions of the patient's history were reviewed and updated as appropriate: allergies, current medications, past family history, past medical history, past social history, past surgical history and problem list      Review of Systems   Constitutional: Negative for appetite change, chills, diaphoresis, fatigue, fever and unexpected weight change  HENT: Negative for congestion, hearing loss and rhinorrhea  Eyes: Negative for visual disturbance  Respiratory: Positive for shortness of breath  Negative for cough, chest tightness and wheezing  Cardiovascular: Negative for chest pain, palpitations and leg swelling  Gastrointestinal: Negative for abdominal pain and blood in stool  Endocrine: Negative for cold intolerance, heat intolerance, polydipsia and polyuria     Genitourinary: Negative for difficulty urinating, dysuria, frequency and urgency  Musculoskeletal: Negative for arthralgias and myalgias  Skin: Negative for rash  Neurological: Negative for dizziness, weakness, light-headedness and headaches  Hematological: Does not bruise/bleed easily  Psychiatric/Behavioral: Negative for dysphoric mood and sleep disturbance  Objective:      Vitals:    05/23/22 1337   BP: 132/74   Pulse: 58   Resp: 18   Temp: 98 °F (36 7 °C)   SpO2: 96%          Physical Exam  Constitutional:       Appearance: He is well-developed  HENT:      Head: Normocephalic and atraumatic  Nose: Nose normal    Eyes:      General: No scleral icterus  Conjunctiva/sclera: Conjunctivae normal       Pupils: Pupils are equal, round, and reactive to light  Neck:      Thyroid: No thyromegaly  Vascular: No JVD  Trachea: No tracheal deviation  Cardiovascular:      Rate and Rhythm: Normal rate and regular rhythm  Heart sounds: No murmur heard  No friction rub  No gallop  Pulmonary:      Effort: Pulmonary effort is normal  No respiratory distress  Breath sounds: Normal breath sounds  No wheezing or rales  Genitourinary:     Comments: Normal circumcised male penis, testicles appear normal however there is a left-sided mass lesion that does not transilluminate however it does feel consistent with hydrocele  Musculoskeletal:         General: No deformity  Cervical back: Normal range of motion and neck supple  Lymphadenopathy:      Cervical: No cervical adenopathy  Skin:     General: Skin is warm and dry  Coloration: Skin is not pale  Findings: No erythema or rash  Neurological:      Mental Status: He is alert and oriented to person, place, and time  Cranial Nerves: No cranial nerve deficit  Psychiatric:         Behavior: Behavior normal          Thought Content:  Thought content normal          Judgment: Judgment normal

## 2022-05-23 NOTE — PATIENT INSTRUCTIONS
Lab data reviewed in detail and compared prior    Type 2 diabetes mellitus remains under poor control, likely in part due to binge eating of candy over the Easter holiday  At this point will continue metformin and Jardiance and increase glimepiride to 2 mg in the evening, continue 1 mg in the morning and monitor blood sugars more closely  Patient encouraged to get back to regular exercise and continue with weight loss efforts  Consider adding Victoza or Trulicity at follow-up if not considerably improved  Hypertension and hyperlipidemia peer stable on present regimen    Coronary artery disease is stable without angina with stable recent cardiac catheterization  Continue aggressive risk factor modification as above  Scrotal mass-check ultrasound    Routine follow-up after labs in 4 months, will follow-up scrotal ultrasound through my chart, consider Urology consultation for any concerning ultrasound or failure of symptoms to improve on tamsulosin  Symptoms consistent with BPH-trial of tamsulosin 0 4 mg take in the evening  Contact me with any side effects or failure to improve symptoms

## 2022-06-01 LAB
LEFT EYE DIABETIC RETINOPATHY: NORMAL
RIGHT EYE DIABETIC RETINOPATHY: NORMAL
SEVERITY (EYE EXAM): NORMAL

## 2022-07-16 DIAGNOSIS — I10 HYPERTENSION, UNSPECIFIED TYPE: ICD-10-CM

## 2022-07-17 RX ORDER — METOPROLOL SUCCINATE 100 MG/1
TABLET, EXTENDED RELEASE ORAL
Qty: 90 TABLET | Refills: 0 | Status: SHIPPED | OUTPATIENT
Start: 2022-07-17 | End: 2022-09-08

## 2022-08-31 ENCOUNTER — TELEPHONE (OUTPATIENT)
Dept: ADMINISTRATIVE | Facility: OTHER | Age: 67
End: 2022-08-31

## 2022-08-31 ENCOUNTER — TELEPHONE (OUTPATIENT)
Dept: INTERNAL MEDICINE CLINIC | Facility: CLINIC | Age: 67
End: 2022-08-31

## 2022-08-31 NOTE — TELEPHONE ENCOUNTER
Patient went to [facility] eye to Dr Becky Kate and will request the report from [facility]

## 2022-08-31 NOTE — LETTER
2nd Request  Diabetic Eye Exam Form    Date Requested: 22  Patient: Samantha Jerry  Patient : 1955   Referring Provider: Payam Flores MD    DIABETIC Eye Exam Date _______________________________    Type of Exam MUST be documented for Diabetic Eye Exams  Please CHECK ONE  Retinal Exam       Dilated Retinal Exam       OCT       Optomap-Iris Exam      Fundus Photography     Left Eye - Please check Retinopathy AND Type or No Retinopathy      Exam did show retinopathy    Exam did not show retinopathy         Mild     Proliferative           Moderate    Severe            None         Right Eye - Please check Retinopathy AND Type or No Retinopathy     Exam did show retinopathy    Exam did not show retinopathy         Mild     Proliferative        Moderate    Severe        None       Comments __________________________________________________________    Practice Providing Exam ______________________________________________    Exam Performed By (print name) _______________________________________      Provider Signature ___________________________________________________    These reports are needed for  compliance  Please fax this completed form and a copy of the Diabetic Eye Exam report to our office located at [place] [address] as soon as possible via [phone] attention Katalina: Phone [phone]  We thank you for your assistance in treating our mutual patient

## 2022-08-31 NOTE — TELEPHONE ENCOUNTER
----- Message from JORDANA SALTER sent at 8/31/2022 10:41 AM EDT -----  Regarding: care gap request  08/31/22 10:41 AM    Hello, our patient attached above has had DM eye exam at [facility] by Dr Oralia Piper performed  Please assist in updating the patient chart by obtaining a result  The date of service is the last recorded exam approximately two months ago      Thank you,  JORDANA SALTER  [facility] DELORIS ALTAMIRANO

## 2022-08-31 NOTE — LETTER
Diabetic Eye Exam Form    Date Requested: 22  Patient: Alondra Jerry  Patient : 1955   Referring Provider: Melanie Bell MD    DIABETIC Eye Exam Date _______________________________    Type of Exam MUST be documented for Diabetic Eye Exams  Please CHECK ONE  Retinal Exam       Dilated Retinal Exam       OCT       Optomap-Iris Exam      Fundus Photography     Left Eye - Please check Retinopathy AND Type or No Retinopathy      Exam did show retinopathy    Exam did not show retinopathy         Mild     Proliferative           Moderate    Severe            None         Right Eye - Please check Retinopathy AND Type or No Retinopathy     Exam did show retinopathy    Exam did not show retinopathy         Mild     Proliferative        Moderate    Severe        None       Comments __________________________________________________________    Practice Providing Exam ______________________________________________    Exam Performed By (print name) _______________________________________      Provider Signature ___________________________________________________    These reports are needed for  compliance  Please fax this completed form and a copy of the Diabetic Eye Exam report to our office located at [place] [address] as soon as possible via [phone] attention Katalina: Phone [phone]  We thank you for your assistance in treating our mutual patient

## 2022-08-31 NOTE — TELEPHONE ENCOUNTER
Upon review of the In Basket request and the patient's chart, initial outreach has been made via fax, please see Contacts section for details       Thank you  Austen Lewis MA

## 2022-09-08 DIAGNOSIS — I10 ESSENTIAL HYPERTENSION: ICD-10-CM

## 2022-09-08 DIAGNOSIS — E11.9 TYPE 2 DIABETES MELLITUS WITHOUT COMPLICATION, WITHOUT LONG-TERM CURRENT USE OF INSULIN (HCC): ICD-10-CM

## 2022-09-08 DIAGNOSIS — I10 HYPERTENSION, UNSPECIFIED TYPE: ICD-10-CM

## 2022-09-08 DIAGNOSIS — E11.8 TYPE 2 DIABETES MELLITUS WITH COMPLICATION, WITHOUT LONG-TERM CURRENT USE OF INSULIN (HCC): ICD-10-CM

## 2022-09-08 PROCEDURE — 4010F ACE/ARB THERAPY RXD/TAKEN: CPT | Performed by: INTERNAL MEDICINE

## 2022-09-08 RX ORDER — IRBESARTAN 150 MG/1
TABLET ORAL
Qty: 90 TABLET | Refills: 3 | Status: SHIPPED | OUTPATIENT
Start: 2022-09-08

## 2022-09-08 RX ORDER — METOPROLOL SUCCINATE 100 MG/1
TABLET, EXTENDED RELEASE ORAL
Qty: 90 TABLET | Refills: 0 | Status: SHIPPED | OUTPATIENT
Start: 2022-09-08

## 2022-09-08 NOTE — TELEPHONE ENCOUNTER
As a follow-up, a second attempt has been made for outreach via fax, please see Contacts section for details      Thank you  Elham Hood MA

## 2022-09-09 RX ORDER — METFORMIN HYDROCHLORIDE 500 MG/1
2000 TABLET, EXTENDED RELEASE ORAL
Qty: 360 TABLET | Refills: 1 | Status: SHIPPED | OUTPATIENT
Start: 2022-09-09

## 2022-09-09 RX ORDER — EMPAGLIFLOZIN 25 MG/1
TABLET, FILM COATED ORAL
Qty: 90 TABLET | Refills: 3 | Status: SHIPPED | OUTPATIENT
Start: 2022-09-09

## 2022-09-09 NOTE — TELEPHONE ENCOUNTER
Upon review of the In Basket request we were able to locate, review, and update the patient chart as requested for Diabetic Eye Exam     Any additional questions or concerns should be emailed to the Practice Liaisons via [email or web address] email, please do not reply via In Basket      Thank you  Mady Murray MA

## 2022-09-14 ENCOUNTER — APPOINTMENT (OUTPATIENT)
Dept: LAB | Facility: HOSPITAL | Age: 67
End: 2022-09-14
Payer: COMMERCIAL

## 2022-09-14 DIAGNOSIS — E11.8 TYPE 2 DIABETES MELLITUS WITH COMPLICATION, WITHOUT LONG-TERM CURRENT USE OF INSULIN (HCC): ICD-10-CM

## 2022-09-14 DIAGNOSIS — Z12.5 SCREENING FOR PROSTATE CANCER: ICD-10-CM

## 2022-09-14 LAB
ALBUMIN SERPL BCP-MCNC: 4.1 G/DL (ref 3.5–5)
ALP SERPL-CCNC: 69 U/L (ref 46–116)
ALT SERPL W P-5'-P-CCNC: 30 U/L (ref 12–78)
ANION GAP SERPL CALCULATED.3IONS-SCNC: 12 MMOL/L (ref 4–13)
AST SERPL W P-5'-P-CCNC: 15 U/L (ref 5–45)
BASOPHILS # BLD AUTO: 0.06 THOUSANDS/ΜL (ref 0–0.1)
BASOPHILS NFR BLD AUTO: 1 % (ref 0–1)
BILIRUB SERPL-MCNC: 0.49 MG/DL (ref 0.2–1)
BUN SERPL-MCNC: 16 MG/DL (ref 5–25)
CALCIUM SERPL-MCNC: 9.7 MG/DL (ref 8.3–10.1)
CHLORIDE SERPL-SCNC: 102 MMOL/L (ref 96–108)
CHOLEST SERPL-MCNC: 135 MG/DL
CO2 SERPL-SCNC: 29 MMOL/L (ref 21–32)
CREAT SERPL-MCNC: 1.08 MG/DL (ref 0.6–1.3)
EOSINOPHIL # BLD AUTO: 0.2 THOUSAND/ΜL (ref 0–0.61)
EOSINOPHIL NFR BLD AUTO: 3 % (ref 0–6)
ERYTHROCYTE [DISTWIDTH] IN BLOOD BY AUTOMATED COUNT: 13.2 % (ref 11.6–15.1)
GFR SERPL CREATININE-BSD FRML MDRD: 72 ML/MIN/1.73SQ M
GLUCOSE P FAST SERPL-MCNC: 140 MG/DL (ref 65–99)
HCT VFR BLD AUTO: 45.9 % (ref 36.5–49.3)
HDLC SERPL-MCNC: 36 MG/DL
HGB BLD-MCNC: 15.8 G/DL (ref 12–17)
IMM GRANULOCYTES # BLD AUTO: 0.02 THOUSAND/UL (ref 0–0.2)
IMM GRANULOCYTES NFR BLD AUTO: 0 % (ref 0–2)
LDLC SERPL CALC-MCNC: 70 MG/DL (ref 0–100)
LYMPHOCYTES # BLD AUTO: 2.1 THOUSANDS/ΜL (ref 0.6–4.47)
LYMPHOCYTES NFR BLD AUTO: 33 % (ref 14–44)
MCH RBC QN AUTO: 29.3 PG (ref 26.8–34.3)
MCHC RBC AUTO-ENTMCNC: 34.4 G/DL (ref 31.4–37.4)
MCV RBC AUTO: 85 FL (ref 82–98)
MONOCYTES # BLD AUTO: 0.49 THOUSAND/ΜL (ref 0.17–1.22)
MONOCYTES NFR BLD AUTO: 8 % (ref 4–12)
NEUTROPHILS # BLD AUTO: 3.42 THOUSANDS/ΜL (ref 1.85–7.62)
NEUTS SEG NFR BLD AUTO: 55 % (ref 43–75)
NONHDLC SERPL-MCNC: 99 MG/DL
NRBC BLD AUTO-RTO: 0 /100 WBCS
PLATELET # BLD AUTO: 190 THOUSANDS/UL (ref 149–390)
PMV BLD AUTO: 9.8 FL (ref 8.9–12.7)
POTASSIUM SERPL-SCNC: 4.7 MMOL/L (ref 3.5–5.3)
PROT SERPL-MCNC: 8.3 G/DL (ref 6.4–8.4)
PSA SERPL-MCNC: 0.3 NG/ML (ref 0–4)
RBC # BLD AUTO: 5.39 MILLION/UL (ref 3.88–5.62)
SODIUM SERPL-SCNC: 143 MMOL/L (ref 135–147)
TRIGL SERPL-MCNC: 147 MG/DL
WBC # BLD AUTO: 6.29 THOUSAND/UL (ref 4.31–10.16)

## 2022-09-14 PROCEDURE — 85025 COMPLETE CBC W/AUTO DIFF WBC: CPT

## 2022-09-14 PROCEDURE — G0103 PSA SCREENING: HCPCS

## 2022-09-14 PROCEDURE — 36415 COLL VENOUS BLD VENIPUNCTURE: CPT

## 2022-09-14 PROCEDURE — 83036 HEMOGLOBIN GLYCOSYLATED A1C: CPT

## 2022-09-14 PROCEDURE — 80061 LIPID PANEL: CPT

## 2022-09-14 PROCEDURE — 80053 COMPREHEN METABOLIC PANEL: CPT

## 2022-09-15 LAB
EST. AVERAGE GLUCOSE BLD GHB EST-MCNC: 169 MG/DL
HBA1C MFR BLD: 7.5 %

## 2022-09-15 PROCEDURE — 3051F HG A1C>EQUAL 7.0%<8.0%: CPT | Performed by: INTERNAL MEDICINE

## 2022-09-19 ENCOUNTER — OFFICE VISIT (OUTPATIENT)
Dept: INTERNAL MEDICINE CLINIC | Facility: CLINIC | Age: 67
End: 2022-09-19
Payer: COMMERCIAL

## 2022-09-19 VITALS
SYSTOLIC BLOOD PRESSURE: 140 MMHG | HEIGHT: 67 IN | TEMPERATURE: 98.2 F | BODY MASS INDEX: 30.61 KG/M2 | RESPIRATION RATE: 20 BRPM | HEART RATE: 80 BPM | WEIGHT: 195 LBS | DIASTOLIC BLOOD PRESSURE: 72 MMHG | OXYGEN SATURATION: 98 %

## 2022-09-19 DIAGNOSIS — E78.2 MIXED HYPERLIPIDEMIA: ICD-10-CM

## 2022-09-19 DIAGNOSIS — E11.8 TYPE 2 DIABETES MELLITUS WITH COMPLICATION, WITHOUT LONG-TERM CURRENT USE OF INSULIN (HCC): ICD-10-CM

## 2022-09-19 DIAGNOSIS — N40.0 BENIGN PROSTATIC HYPERPLASIA, UNSPECIFIED WHETHER LOWER URINARY TRACT SYMPTOMS PRESENT: ICD-10-CM

## 2022-09-19 DIAGNOSIS — E11.9 TYPE 2 DIABETES MELLITUS WITHOUT COMPLICATION, WITHOUT LONG-TERM CURRENT USE OF INSULIN (HCC): Primary | Chronic | ICD-10-CM

## 2022-09-19 DIAGNOSIS — I25.10 CORONARY ARTERY DISEASE INVOLVING NATIVE HEART WITHOUT ANGINA PECTORIS, UNSPECIFIED VESSEL OR LESION TYPE: ICD-10-CM

## 2022-09-19 DIAGNOSIS — R35.0 BENIGN PROSTATIC HYPERPLASIA WITH URINARY FREQUENCY: ICD-10-CM

## 2022-09-19 DIAGNOSIS — Z95.1 HX OF CABG: ICD-10-CM

## 2022-09-19 DIAGNOSIS — N40.1 BENIGN PROSTATIC HYPERPLASIA WITH URINARY FREQUENCY: ICD-10-CM

## 2022-09-19 DIAGNOSIS — I10 ESSENTIAL HYPERTENSION: Chronic | ICD-10-CM

## 2022-09-19 DIAGNOSIS — E66.9 OBESITY (BMI 30-39.9): ICD-10-CM

## 2022-09-19 PROCEDURE — 3077F SYST BP >= 140 MM HG: CPT | Performed by: INTERNAL MEDICINE

## 2022-09-19 PROCEDURE — 3725F SCREEN DEPRESSION PERFORMED: CPT | Performed by: INTERNAL MEDICINE

## 2022-09-19 PROCEDURE — 99214 OFFICE O/P EST MOD 30 MIN: CPT | Performed by: INTERNAL MEDICINE

## 2022-09-19 PROCEDURE — 3078F DIAST BP <80 MM HG: CPT | Performed by: INTERNAL MEDICINE

## 2022-09-19 RX ORDER — TAMSULOSIN HYDROCHLORIDE 0.4 MG/1
0.8 CAPSULE ORAL
Qty: 30 CAPSULE | Refills: 5
Start: 2022-09-19

## 2022-09-19 NOTE — PROGRESS NOTES
Assessment/Plan:    Diagnoses and all orders for this visit:    Type 2 diabetes mellitus without complication, without long-term current use of insulin (HCC)    Type 2 diabetes mellitus with complication, without long-term current use of insulin (HCC)  -     CBC and differential; Future  -     Comprehensive metabolic panel; Future  -     Lipid panel; Future  -     Hemoglobin A1C; Future  -     TSH, 3rd generation with Free T4 reflex; Future    Coronary artery disease involving native heart without angina pectoris, unspecified vessel or lesion type    Essential hypertension    Hx of CABG    Mixed hyperlipidemia    Obesity (BMI 30-39  9)    Benign prostatic hyperplasia, unspecified whether lower urinary tract symptoms present  -     tamsulosin (FLOMAX) 0 4 mg; Take 2 capsules (0 8 mg total) by mouth daily with dinner  -     Ambulatory Referral to Urology; Future    Benign prostatic hyperplasia with urinary frequency            Patient Instructions   Lab data reviewed in detail and compared prior    Type 2 diabetes mellitus is under improved control with some room for continued improvement  We discussed escalating medication verses increasing exercise and weight loss efforts  We have opted for the latter  If this is not going to happen, contact me to consider increasing the glimepiride  Hypertension stable on present regimen    Hyperlipidemia at goal on statin    Allergic rhinitis-refill fluticasone    BPH/OAB-referral to Urology, double the Flomax for a few days and see if that improves symptoms  Check your scrotal ultrasound as previously ordered prior to that visit  Routine follow-up after labs 6 months, sooner as needed  Subjective:      Patient ID: Jaclyn Magallon is a 61 y o  male    Follow-up multiple problems and review labs    Feeling generally well    Type 2 diabetes mellitus-tolerating metformin, glimiperide and Jardiance    Testing sugars regularly, running between 120-160 all day, then up to 200+ after eating dinner  Not eating much breakfast or lunch  Hypertension/Hyperlipidemia-taking medication as directed, no recent home blood pressures    CAD s/p CABG '17-stable w/o angina  Cath w/ patent grafts 4/22  Allergy/asthma-bad allergies right now, needs renewal on flonase    Not exercising regularly, but has stat bike in bedroom  Notes 2-3 x nocturia with incomplete evacuation, double voids and dribbling  No significant improvement w/ flomax  Lump in scrotum-no change, but didn't do u/s                  Current Outpatient Medications:     Accu-Chek FastClix Lancets MISC, USE TO TEST BLOOD SUGAR AS DIRECTED, Disp: , Rfl:     aspirin 81 MG tablet, Take 81 mg by mouth daily, Disp: , Rfl:     atorvastatin (LIPITOR) 20 mg tablet, Take 1 tablet (20 mg total) by mouth daily at bedtime, Disp: 90 tablet, Rfl: 3    Blood Glucose Monitoring Suppl (Accu-Chek Guide) w/Device KIT, , Disp: , Rfl:     fluticasone (FLONASE) 50 mcg/act nasal spray, SPRAY 2 SPRAYS INTO EACH NOSTRIL EVERY DAY, Disp: 48 mL, Rfl: 1    glimepiride (AMARYL) 1 mg tablet, 1 mg po in am, 2 mg with dinner, Disp: 270 tablet, Rfl: 3    glucose blood test strip, Use as instructed, Disp: 200 each, Rfl: 0    hydrochlorothiazide (HYDRODIURIL) 25 mg tablet, TAKE 1 TABLET BY MOUTH EVERY DAY, Disp: 90 tablet, Rfl: 3    irbesartan (AVAPRO) 150 mg tablet, TAKE 1 TABLET BY MOUTH EVERY DAY, Disp: 90 tablet, Rfl: 3    Jardiance 25 MG TABS, TAKE 1 TABLET BY MOUTH EVERY DAY IN THE MORNING, Disp: 90 tablet, Rfl: 3    metFORMIN (GLUCOPHAGE-XR) 500 mg 24 hr tablet, TAKE 4 TABLETS (2,000 MG TOTAL) BY MOUTH DAILY WITH DINNER, Disp: 360 tablet, Rfl: 1    metoprolol succinate (TOPROL-XL) 100 mg 24 hr tablet, TAKE 1 TABLET BY MOUTH EVERY DAY, Disp: 90 tablet, Rfl: 0    nitroglycerin (NITROSTAT) 0 4 mg SL tablet, Place 1 tablet (0 4 mg total) under the tongue every 5 (five) minutes as needed for chest pain, Disp: 100 tablet, Rfl: 0   tamsulosin (FLOMAX) 0 4 mg, Take 2 capsules (0 8 mg total) by mouth daily with dinner, Disp: 30 capsule, Rfl: 5    Recent Results (from the past 1008 hour(s))   CBC and differential    Collection Time: 09/14/22  9:08 AM   Result Value Ref Range    WBC 6 29 4 31 - 10 16 Thousand/uL    RBC 5 39 3 88 - 5 62 Million/uL    Hemoglobin 15 8 12 0 - 17 0 g/dL    Hematocrit 45 9 36 5 - 49 3 %    MCV 85 82 - 98 fL    MCH 29 3 26 8 - 34 3 pg    MCHC 34 4 31 4 - 37 4 g/dL    RDW 13 2 11 6 - 15 1 %    MPV 9 8 8 9 - 12 7 fL    Platelets 524 241 - 732 Thousands/uL    nRBC 0 /100 WBCs    Neutrophils Relative 55 43 - 75 %    Immat GRANS % 0 0 - 2 %    Lymphocytes Relative 33 14 - 44 %    Monocytes Relative 8 4 - 12 %    Eosinophils Relative 3 0 - 6 %    Basophils Relative 1 0 - 1 %    Neutrophils Absolute 3 42 1 85 - 7 62 Thousands/µL    Immature Grans Absolute 0 02 0 00 - 0 20 Thousand/uL    Lymphocytes Absolute 2 10 0 60 - 4 47 Thousands/µL    Monocytes Absolute 0 49 0 17 - 1 22 Thousand/µL    Eosinophils Absolute 0 20 0 00 - 0 61 Thousand/µL    Basophils Absolute 0 06 0 00 - 0 10 Thousands/µL   Comprehensive metabolic panel    Collection Time: 09/14/22  9:08 AM   Result Value Ref Range    Sodium 143 135 - 147 mmol/L    Potassium 4 7 3 5 - 5 3 mmol/L    Chloride 102 96 - 108 mmol/L    CO2 29 21 - 32 mmol/L    ANION GAP 12 4 - 13 mmol/L    BUN 16 5 - 25 mg/dL    Creatinine 1 08 0 60 - 1 30 mg/dL    Glucose, Fasting 140 (H) 65 - 99 mg/dL    Calcium 9 7 8 3 - 10 1 mg/dL    AST 15 5 - 45 U/L    ALT 30 12 - 78 U/L    Alkaline Phosphatase 69 46 - 116 U/L    Total Protein 8 3 6 4 - 8 4 g/dL    Albumin 4 1 3 5 - 5 0 g/dL    Total Bilirubin 0 49 0 20 - 1 00 mg/dL    eGFR 72 ml/min/1 73sq m   Lipid panel    Collection Time: 09/14/22  9:08 AM   Result Value Ref Range    Cholesterol 135 See Comment mg/dL    Triglycerides 147 See Comment mg/dL    HDL, Direct 36 (L) >=40 mg/dL    LDL Calculated 70 0 - 100 mg/dL    Non-HDL-Chol (CHOL-HDL) 99 mg/dl   Hemoglobin A1C    Collection Time: 09/14/22  9:08 AM   Result Value Ref Range    Hemoglobin A1C 7 5 (H) Normal 3 8-5 6%; PreDiabetic 5 7-6 4%; Diabetic >=6 5%; Glycemic control for adults with diabetes <7 0% %     mg/dl   PSA, Total Screen    Collection Time: 09/14/22  9:08 AM   Result Value Ref Range    PSA 0 3 0 0 - 4 0 ng/mL       The following portions of the patient's history were reviewed and updated as appropriate: allergies, current medications, past family history, past medical history, past social history, past surgical history and problem list      Review of Systems   Constitutional: Negative for appetite change, chills, diaphoresis, fatigue, fever and unexpected weight change  HENT: Negative for congestion, hearing loss and rhinorrhea  Eyes: Negative for visual disturbance  Respiratory: Negative for cough, chest tightness, shortness of breath and wheezing  Cardiovascular: Negative for chest pain, palpitations and leg swelling  Gastrointestinal: Negative for abdominal pain and blood in stool  Endocrine: Negative for cold intolerance, heat intolerance, polydipsia and polyuria  Genitourinary: Negative for difficulty urinating, dysuria, frequency and urgency  Musculoskeletal: Negative for arthralgias and myalgias  Skin: Negative for rash  Neurological: Negative for dizziness, weakness, light-headedness and headaches  Hematological: Does not bruise/bleed easily  Psychiatric/Behavioral: Negative for dysphoric mood and sleep disturbance  Objective:      Vitals:    09/19/22 1725   BP: 140/72   Pulse: 80   Resp: 20   Temp: 98 2 °F (36 8 °C)   SpO2: 98%          Physical Exam  Constitutional:       Appearance: He is well-developed  HENT:      Head: Normocephalic and atraumatic  Nose: Nose normal    Eyes:      General: No scleral icterus  Conjunctiva/sclera: Conjunctivae normal       Pupils: Pupils are equal, round, and reactive to light     Neck: Thyroid: No thyromegaly  Vascular: No JVD  Trachea: No tracheal deviation  Cardiovascular:      Rate and Rhythm: Normal rate and regular rhythm  Heart sounds: No murmur heard  No friction rub  No gallop  Pulmonary:      Effort: Pulmonary effort is normal  No respiratory distress  Breath sounds: Normal breath sounds  No wheezing or rales  Musculoskeletal:         General: No deformity  Cervical back: Normal range of motion and neck supple  Lymphadenopathy:      Cervical: No cervical adenopathy  Skin:     General: Skin is warm and dry  Coloration: Skin is not pale  Findings: No erythema or rash  Neurological:      Mental Status: He is alert and oriented to person, place, and time  Cranial Nerves: No cranial nerve deficit  Psychiatric:         Behavior: Behavior normal          Thought Content:  Thought content normal          Judgment: Judgment normal

## 2022-09-19 NOTE — PATIENT INSTRUCTIONS
Lab data reviewed in detail and compared prior    Type 2 diabetes mellitus is under improved control with some room for continued improvement  We discussed escalating medication verses increasing exercise and weight loss efforts  We have opted for the latter  If this is not going to happen, contact me to consider increasing the glimepiride  Hypertension stable on present regimen    Hyperlipidemia at goal on statin    Allergic rhinitis-refill fluticasone    BPH/OAB-referral to Urology, double the Flomax for a few days and see if that improves symptoms  Check your scrotal ultrasound as previously ordered prior to that visit  Routine follow-up after labs 6 months, sooner as needed

## 2022-11-25 DIAGNOSIS — N40.0 BENIGN PROSTATIC HYPERPLASIA, UNSPECIFIED WHETHER LOWER URINARY TRACT SYMPTOMS PRESENT: ICD-10-CM

## 2022-11-25 RX ORDER — TAMSULOSIN HYDROCHLORIDE 0.4 MG/1
CAPSULE ORAL
Qty: 90 CAPSULE | Refills: 1 | Status: SHIPPED | OUTPATIENT
Start: 2022-11-25

## 2022-12-06 DIAGNOSIS — E78.2 MIXED HYPERLIPIDEMIA: ICD-10-CM

## 2022-12-06 RX ORDER — ATORVASTATIN CALCIUM 20 MG/1
TABLET, FILM COATED ORAL
Qty: 90 TABLET | Refills: 3 | Status: SHIPPED | OUTPATIENT
Start: 2022-12-06

## 2022-12-13 ENCOUNTER — OFFICE VISIT (OUTPATIENT)
Dept: UROLOGY | Facility: CLINIC | Age: 67
End: 2022-12-13

## 2022-12-13 VITALS
WEIGHT: 196 LBS | OXYGEN SATURATION: 99 % | DIASTOLIC BLOOD PRESSURE: 84 MMHG | SYSTOLIC BLOOD PRESSURE: 130 MMHG | HEART RATE: 87 BPM | HEIGHT: 67 IN | BODY MASS INDEX: 30.76 KG/M2

## 2022-12-13 DIAGNOSIS — N52.9 ERECTILE DYSFUNCTION, UNSPECIFIED ERECTILE DYSFUNCTION TYPE: ICD-10-CM

## 2022-12-13 DIAGNOSIS — N40.1 BENIGN LOCALIZED PROSTATIC HYPERPLASIA WITH LOWER URINARY TRACT SYMPTOMS (LUTS): Primary | ICD-10-CM

## 2022-12-13 DIAGNOSIS — R68.82 LOW LIBIDO: ICD-10-CM

## 2022-12-13 DIAGNOSIS — N50.89 TESTICULAR SWELLING: ICD-10-CM

## 2022-12-13 LAB
POST-VOID RESIDUAL VOLUME, ML POC: 19 ML
SL AMB  POCT GLUCOSE, UA: 1
SL AMB LEUKOCYTE ESTERASE,UA: NORMAL
SL AMB POCT BILIRUBIN,UA: NORMAL
SL AMB POCT BLOOD,UA: NORMAL
SL AMB POCT CLARITY,UA: CLEAR
SL AMB POCT COLOR,UA: YELLOW
SL AMB POCT KETONES,UA: NORMAL
SL AMB POCT NITRITE,UA: NORMAL
SL AMB POCT PH,UA: 5
SL AMB POCT SPECIFIC GRAVITY,UA: 1.01
SL AMB POCT URINE PROTEIN: NORMAL
SL AMB POCT UROBILINOGEN: 0.2

## 2022-12-13 NOTE — PROGRESS NOTES
12/13/2022      Chief Complaint   Patient presents with   • Benign Prostatic Hypertrophy     BPH with LUTS     Assessment and Plan    74 y o  male new patient     1  BPH with LUTS  - Both obstructive and irritative voiding symptoms    - On Flomax with minor benefit  - Jardiance and HCTZ likely contributory  - Reviewed other medication options which he is not an ideal candidate for due to previous side effects with Cialis and baseline sexual dysfunction which could worsen with the finasteride  Reviewed cystoscopy and transrectal ultrasound for further evaluation of potential surgical options which he declines at this time as symptoms are not overly bothersome  I recommend nighttime fluid restriction, avoiding bladder irritants, and good glycemic control  2  Prostate cancer screening  - PSA from 9/14/22 was 0 3  - LEOLA benign     3  Testicular swelling  - Consistent with left spermatocele  - Obtain US  - If bothersome, could consider spermatocelectomy    4  ED  - Previously failed PDE-5 inhibitors due to side effects and ineffectiveness    - Discussed ICI and IPP which he is not interested in  - Will check testosterone level given reports of low libido    - Follow up in 2-3 months for symptom reassessment and US and labwork review  History of Present Illness  Shadi Bill is a 74 y o  male here for new patient patient evaluation of multiple urologic complaints  He complains of lower urinary tract symptoms including urinary frequency, urgency, nocturia and weak urinary stream and occasional straining with urination  He has been taking Flomax with minor benefit  He does have significant history of diabetes and is on Jardiance  He is also on hydrochlorothiazide  He denies any dysuria or hematuria  Denies any prior  surgical manipulation  Also complains of lifelong painless testicular swelling on the left  He feels this is increased in size over time    Denies any erythema or tenderness to palpation  No trauma or injury  US in 2013 Large left epididymal head cyst and adjacent complex  hydrocele, possibly from previous infection  He also complains of erectile dysfunction  Issues with both maintaining and attaining erections  Denies any penile curvature or pain with erections  Previously has failed both Cialis and Viagra due to side effects and ineffectiveness  Nitroglycerin is listed on his medication list however he has never used this medication  He also complains of low libido  Medical history includes type 2 diabetes, hypertension, coronary artery disease status post CABG, hyperlipidemia  Denies any family history of  malignancy  Denies any smoking history  Review of Systems   Constitutional: Negative for chills and fever  Respiratory: Negative for shortness of breath  Cardiovascular: Negative for chest pain  Gastrointestinal: Negative for abdominal pain  Genitourinary: Positive for difficulty urinating, frequency, scrotal swelling and urgency  Negative for dysuria, flank pain, hematuria, penile pain and testicular pain  Neurological: Negative for dizziness         AUA SYMPTOM SCORE    Flowsheet Row Most Recent Value   AUA SYMPTOM SCORE    How often have you had a sensation of not emptying your bladder completely after you finished urinating? 3 (P)     How often have you had to urinate again less than two hours after you finished urinating? 2 (P)     How often have you found you stopped and started again several times when you urinate? 5 (P)     How often have you found it difficult to postpone urination? 1 (P)     How often have you had a weak urinary stream? 1 (P)     How often have you had to push or strain to begin urination? 0 (P)     How many times did you most typically get up to urinate from the time you went to bed at night until the time you got up in the morning? 5 (P)     Quality of Life: If you were to spend the rest of your life with your urinary condition just the way it is now, how would you feel about that? 3 (P)     AUA SYMPTOM SCORE 17 (P)              Past Medical History  Past Medical History:   Diagnosis Date   • Allergic    • Arthritis 2017    Fingers   • Cancer [facility]) 2009   • Coronary artery disease    • Diabetes mellitus ([facility] [address] )    • Diastolic dysfunction    • Diverticulitis    • Hyperlipidemia    • Hypertension    • Melanoma ([facility] [address] )     stomach area    • Obesity    • Prediabetes    • [facility] of right upper extremity ([facility] ) 2009    Fifth metacarpal       Past Social History  Past Surgical History:   Procedure Laterality Date   • 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   • BOWEL RESECTION  2008    Secondary to diverticulitis   • CARDIAC CATHETERIZATION N/A 04/07/2022    Procedure: Cardiac catheterization;  Surgeon: Catherine Sanchez MD;  Location: [address] [place] [address] CATH LAB; Service: Cardiology   • CARDIAC CATHETERIZATION N/A 04/07/2022    Procedure: Cardiac Coronary Angiogram;  Surgeon: Catherine Sanchez MD;  Location: [address] [place] [address] CATH LAB; Service: Cardiology   • COLON SURGERY  2007   • COLONOSCOPY     • CORONARY ARTERY BYPASS GRAFT     • HAND SURGERY      triple bypass   • HERNIA REPAIR     • MALIGNANT SKIN LESION EXCISION  1963    Abdominal wall surgical resection of melanoma   • TX COLONOSCOPY FLX DX W/COLLJ SPEC WHEN PFRMD N/A 12/11/2018    Procedure: COLONOSCOPY;  Surgeon: Raquel Rocha MD;  Location: [facility];   Service: Gastroenterology   • SKIN BIOPSY       Social History     Tobacco Use   Smoking Status Never   Smokeless Tobacco Never       Past Family History  Family History   Problem Relation Age of Onset   • Emphysema Maternal Grandfather    • Coronary artery disease Mother    • Hyperlipidemia Mother    • Hypertension Mother    • Diabetes type II Mother    • Arthritis Mother    • Diabetes type II Father    • Basal cell carcinoma Father    • Hypertension Father    • Heart disease Father    • Diabetes Father    • Hypertension Sister        Past Social history  Social History     Socioeconomic History   • Marital status: /Civil Union     Spouse name: Not on file   • Number of children: Not on file   • Years of education: Not on file   • Highest education level: Not on file   Occupational History   • Occupation:    Tobacco Use   • Smoking status: Never   • Smokeless tobacco: Never   Vaping Use   • Vaping Use: Never used   Substance and Sexual Activity   • Alcohol use: Not Currently   • Drug use: No   • Sexual activity: Yes     Partners: Female   Other Topics Concern   • Not on file   Social History Narrative   • Not on file     Social Determinants of Health     Financial Resource Strain: Not on file   Food Insecurity: Not on file   Transportation Needs: Not on file   Physical Activity: Not on file   Stress: Not on file   Social Connections: Not on file   Intimate Partner Violence: Not on file   Housing Stability: Not on file       Current Medications  Current Outpatient Medications   Medication Sig Dispense Refill   • Accu-Chek FastClix Lancets MISC USE TO TEST BLOOD SUGAR AS DIRECTED     • aspirin 81 MG tablet Take 81 mg by mouth daily     • atorvastatin (LIPITOR) 20 mg tablet TAKE 1 TABLET BY MOUTH DAILY AT BEDTIME 90 tablet 3   • Blood Glucose Monitoring Suppl (Accu-Chek Guide) w/Device KIT      • fluticasone (FLONASE) 50 mcg/act nasal spray SPRAY 2 SPRAYS INTO EACH NOSTRIL EVERY DAY 48 mL 1   • glimepiride (AMARYL) 1 mg tablet 1 mg po in am, 2 mg with dinner 270 tablet 3   • glucose blood test strip Use as instructed 200 each 0   • hydrochlorothiazide (HYDRODIURIL) 25 mg tablet TAKE 1 TABLET BY MOUTH EVERY DAY 90 tablet 3   • irbesartan (AVAPRO) 150 mg tablet TAKE 1 TABLET BY MOUTH EVERY DAY 90 tablet 3   • Jardiance 25 MG TABS TAKE 1 TABLET BY MOUTH EVERY DAY IN THE MORNING 90 tablet 3   • metFORMIN (GLUCOPHAGE-XR) 500 mg 24 hr tablet TAKE 4 TABLETS (2,000 MG TOTAL) BY MOUTH DAILY WITH DINNER 360 tablet 1   • metoprolol succinate (TOPROL-XL) 100 mg 24 hr tablet TAKE 1 TABLET BY MOUTH EVERY DAY 90 tablet 0   • nitroglycerin (NITROSTAT) 0 4 mg SL tablet Place 1 tablet (0 4 mg total) under the tongue every 5 (five) minutes as needed for chest pain (Patient taking differently: Place 0 4 mg under the tongue every 5 (five) minutes as needed for chest pain Only when needed) 100 tablet 0   • tamsulosin (FLOMAX) 0 4 mg TAKE 1 CAPSULE BY MOUTH EVERY DAY WITH DINNER 90 capsule 1     No current facility-administered medications for this visit  Allergies  No Known Allergies      The following portions of the patient's history were reviewed and updated as appropriate: allergies, current medications, past medical history, past social history, past surgical history and problem list       Vitals  Vitals:    12/13/22 1418   BP: 130/84   Pulse: 87   SpO2: 99%   Weight: 88 9 kg (196 lb)   Height: 5' 7" (1 702 m)           Physical Exam  Physical Exam  Constitutional:       Appearance: Normal appearance  HENT:      Head: Normocephalic and atraumatic  Right Ear: External ear normal       Left Ear: External ear normal       Nose: Nose normal    Eyes:      General: No scleral icterus  Conjunctiva/sclera: Conjunctivae normal    Cardiovascular:      Pulses: Normal pulses  Pulmonary:      Effort: Pulmonary effort is normal    Genitourinary:     Comments: Left extratesticular mass that is approx 3 inches, well circumscribed and rubbery and consistent with spermatocele  No intratesticular masses  No scrotal erythema or swelling  No tenderness  Prostate approx 35 g without nodules or tenderness  Musculoskeletal:         General: Normal range of motion  Cervical back: Normal range of motion  Skin:     General: Skin is warm and dry  Neurological:      General: No focal deficit present  Mental Status: He is alert and oriented to person, place, and time     Psychiatric:         Mood and Affect: Mood normal  Behavior: Behavior normal          Thought Content:  Thought content normal          Judgment: Judgment normal            Results  Recent Results (from the past 1 hour(s))   POCT urine dip    Collection Time: 12/13/22  2:24 PM   Result Value Ref Range    LEUKOCYTE ESTERASE,UA -     NITRITE,UA -     SL AMB POCT UROBILINOGEN 0 2     POCT URINE PROTEIN -      PH,UA 5 0     BLOOD,UA -     SPECIFIC GRAVITY,UA 1 010     KETONES,UA -     BILIRUBIN,UA -     GLUCOSE, UA 1      COLOR,UA Yellow     CLARITY,UA Clear    POCT Measure PVR    Collection Time: 12/13/22  2:26 PM   Result Value Ref Range    POST-VOID RESIDUAL VOLUME, ML POC 19 mL   ]  Lab Results   Component Value Date    PSA 0 3 09/14/2022    PSA 0 3 07/30/2020    PSA 0 2 04/02/2019     Lab Results   Component Value Date    GLUCOSE 119 (H) 09/21/2017    CALCIUM 9 7 09/14/2022     09/21/2017    K 4 7 09/14/2022    CO2 29 09/14/2022     09/14/2022    BUN 16 09/14/2022    CREATININE 1 08 09/14/2022     Lab Results   Component Value Date    WBC 6 29 09/14/2022    HGB 15 8 09/14/2022    HCT 45 9 09/14/2022    MCV 85 09/14/2022     09/14/2022           Orders  Orders Placed This Encounter   Procedures   • POCT Measure PVR   • POCT urine dip       Bruno Koehler

## 2023-01-08 DIAGNOSIS — I10 HYPERTENSION, UNSPECIFIED TYPE: ICD-10-CM

## 2023-01-08 RX ORDER — METOPROLOL SUCCINATE 100 MG/1
TABLET, EXTENDED RELEASE ORAL
Qty: 90 TABLET | Refills: 0 | Status: SHIPPED | OUTPATIENT
Start: 2023-01-08

## 2023-01-26 ENCOUNTER — PATIENT MESSAGE (OUTPATIENT)
Dept: INTERNAL MEDICINE CLINIC | Facility: CLINIC | Age: 68
End: 2023-01-26

## 2023-01-26 DIAGNOSIS — N40.0 BENIGN PROSTATIC HYPERPLASIA, UNSPECIFIED WHETHER LOWER URINARY TRACT SYMPTOMS PRESENT: ICD-10-CM

## 2023-01-31 RX ORDER — TAMSULOSIN HYDROCHLORIDE 0.4 MG/1
0.4 CAPSULE ORAL
Qty: 90 CAPSULE | Refills: 1 | Status: SHIPPED | OUTPATIENT
Start: 2023-01-31

## 2023-02-21 DIAGNOSIS — I10 ESSENTIAL HYPERTENSION: ICD-10-CM

## 2023-02-21 RX ORDER — HYDROCHLOROTHIAZIDE 25 MG/1
TABLET ORAL
Qty: 90 TABLET | Refills: 3 | Status: SHIPPED | OUTPATIENT
Start: 2023-02-21

## 2023-03-08 ENCOUNTER — HOSPITAL ENCOUNTER (OUTPATIENT)
Dept: ULTRASOUND IMAGING | Facility: HOSPITAL | Age: 68
Discharge: HOME | End: 2023-03-08

## 2023-03-08 DIAGNOSIS — N50.89 TESTICULAR SWELLING: ICD-10-CM

## 2023-03-16 ENCOUNTER — APPOINTMENT (OUTPATIENT)
Dept: LAB | Facility: HOSPITAL | Age: 68
End: 2023-03-16

## 2023-03-16 DIAGNOSIS — R68.82 LOW LIBIDO: ICD-10-CM

## 2023-03-16 DIAGNOSIS — E11.8 TYPE 2 DIABETES MELLITUS WITH COMPLICATION, WITHOUT LONG-TERM CURRENT USE OF INSULIN (HCC): ICD-10-CM

## 2023-03-16 LAB
ALBUMIN SERPL BCP-MCNC: 4.3 G/DL (ref 3.5–5)
ALP SERPL-CCNC: 58 U/L (ref 34–104)
ALT SERPL W P-5'-P-CCNC: 18 U/L (ref 7–52)
ANION GAP SERPL CALCULATED.3IONS-SCNC: 7 MMOL/L (ref 4–13)
AST SERPL W P-5'-P-CCNC: 17 U/L (ref 13–39)
BASOPHILS # BLD AUTO: 0.06 THOUSANDS/ÂΜL (ref 0–0.1)
BASOPHILS NFR BLD AUTO: 1 % (ref 0–1)
BILIRUB SERPL-MCNC: 0.51 MG/DL (ref 0.2–1)
BUN SERPL-MCNC: 16 MG/DL (ref 5–25)
CALCIUM SERPL-MCNC: 10 MG/DL (ref 8.4–10.2)
CHLORIDE SERPL-SCNC: 98 MMOL/L (ref 96–108)
CHOLEST SERPL-MCNC: 138 MG/DL
CO2 SERPL-SCNC: 31 MMOL/L (ref 21–32)
CREAT SERPL-MCNC: 0.89 MG/DL (ref 0.6–1.3)
EOSINOPHIL # BLD AUTO: 0.17 THOUSAND/ÂΜL (ref 0–0.61)
EOSINOPHIL NFR BLD AUTO: 3 % (ref 0–6)
ERYTHROCYTE [DISTWIDTH] IN BLOOD BY AUTOMATED COUNT: 12.5 % (ref 11.6–15.1)
EST. AVERAGE GLUCOSE BLD GHB EST-MCNC: 186 MG/DL
GFR SERPL CREATININE-BSD FRML MDRD: 89 ML/MIN/1.73SQ M
GLUCOSE P FAST SERPL-MCNC: 156 MG/DL (ref 65–99)
HBA1C MFR BLD: 8.1 %
HCT VFR BLD AUTO: 43.1 % (ref 36.5–49.3)
HDLC SERPL-MCNC: 34 MG/DL
HGB BLD-MCNC: 14.7 G/DL (ref 12–17)
IMM GRANULOCYTES # BLD AUTO: 0.02 THOUSAND/UL (ref 0–0.2)
IMM GRANULOCYTES NFR BLD AUTO: 0 % (ref 0–2)
LDLC SERPL CALC-MCNC: 66 MG/DL (ref 0–100)
LYMPHOCYTES # BLD AUTO: 2.29 THOUSANDS/ÂΜL (ref 0.6–4.47)
LYMPHOCYTES NFR BLD AUTO: 38 % (ref 14–44)
MCH RBC QN AUTO: 28.9 PG (ref 26.8–34.3)
MCHC RBC AUTO-ENTMCNC: 34.1 G/DL (ref 31.4–37.4)
MCV RBC AUTO: 85 FL (ref 82–98)
MONOCYTES # BLD AUTO: 0.54 THOUSAND/ÂΜL (ref 0.17–1.22)
MONOCYTES NFR BLD AUTO: 9 % (ref 4–12)
NEUTROPHILS # BLD AUTO: 2.96 THOUSANDS/ÂΜL (ref 1.85–7.62)
NEUTS SEG NFR BLD AUTO: 49 % (ref 43–75)
NONHDLC SERPL-MCNC: 104 MG/DL
NRBC BLD AUTO-RTO: 0 /100 WBCS
PLATELET # BLD AUTO: 195 THOUSANDS/UL (ref 149–390)
PMV BLD AUTO: 9.7 FL (ref 8.9–12.7)
POTASSIUM SERPL-SCNC: 4.6 MMOL/L (ref 3.5–5.3)
PROT SERPL-MCNC: 7.5 G/DL (ref 6.4–8.4)
RBC # BLD AUTO: 5.08 MILLION/UL (ref 3.88–5.62)
SODIUM SERPL-SCNC: 136 MMOL/L (ref 135–147)
TESTOST SERPL-MCNC: 193 NG/DL (ref 95–948)
TRIGL SERPL-MCNC: 192 MG/DL
TSH SERPL DL<=0.05 MIU/L-ACNC: 2.49 UIU/ML (ref 0.45–4.5)
WBC # BLD AUTO: 6.04 THOUSAND/UL (ref 4.31–10.16)

## 2023-03-20 NOTE — PROGRESS NOTES
3/23/2023      Chief Complaint   Patient presents with   • Follow-up     Assessment and Plan    1  BPH with LUTS  - Incomplete emptying noted today with  mL  - On Flomax with some benefit  - Declines additional pharmacotherapy or work-up with cystoscopy and TRUS  - Obtain renal US prior to next visit to ensure no upper tract obstruction    2  Prostate cancer screening  - PSA from 9/14/22 was 0 3  - LEOLA benign at recent visit    3  Left spermatocele   - US from 3/8/23 - Complex, septated left epididymal cyst measuring up to 5 0 cm  Bilateral varicoceles  - Consider left spermatocelectomy which he declines at this time  4  ED  - Previously failed PDE-5 inhibitors due to side effects and ineffectiveness    - Discussed ICI and IPP which he is not interested in    5  Low libido  - Testosterone from 3/16/23 low at 193  - Obtain repeat testosterone and additional labs as part of hypogonadism work-up  - Should repeat be low and additional labs be normal, consider TRT with Androgel     History of Present Illness  Janel Ribeiro is a 74 y o  male here for follow up evaluation of multiple issues as above  He is currently managed on Flomax for lower urinary tract symptoms with mild benefit  He is on Jardiance and hydrochlorothiazide which contribute to his urinary symptoms  He denies any difficulties urinating currently  He complained of lifelong painless left testicular swelling  Ultrasound was obtained which shows a left spermatocele  He currently denies any significant bother with this  He also complained of erectile dysfunction at last visit  Previously failed Cialis and Viagra  He complained of low libido  Recent testosterone level was 193  AUA SYMPTOM SCORE    Flowsheet Row Most Recent Value   AUA SYMPTOM SCORE    How often have you had a sensation of not emptying your bladder completely after you finished urinating?  3   How often have you had to urinate again less than two hours after you finished urinating? 4   How often have you found you stopped and started again several times when you urinate? 5   How often have you found it difficult to postpone urination? 2   How often have you had a weak urinary stream? 0   How often have you had to push or strain to begin urination? 0   How many times did you most typically get up to urinate from the time you went to bed at night until the time you got up in the morning? 1   Quality of Life: If you were to spend the rest of your life with your urinary condition just the way it is now, how would you feel about that? 3   AUA SYMPTOM SCORE 15            Review of Systems   Constitutional: Negative for chills and fever  Respiratory: Negative for shortness of breath  Cardiovascular: Negative for chest pain  Gastrointestinal: Negative for abdominal pain  Genitourinary: Negative for difficulty urinating, dysuria, flank pain, frequency, hematuria and urgency  Neurological: Negative for dizziness  Past Medical History  Past Medical History:   Diagnosis Date   • Allergic    • Arthritis 2017    Fingers   • Cancer [facility]) 2009   • Coronary artery disease    • Diabetes mellitus ([facility] )    • Diastolic dysfunction    • Diverticulitis    • Hyperlipidemia    • Hypertension    • Melanoma ([facility] [address] )     stomach area    • Obesity    • Prediabetes    • [facility] right upper extremity [facility] 2009    Fifth metacarpal       Past Social History  Past Surgical History:   Procedure Laterality Date   • 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   • BOWEL RESECTION  2008    Secondary to diverticulitis   • CARDIAC CATHETERIZATION N/A 04/07/2022    Procedure: Cardiac catheterization;  Surgeon: Migel Matute MD;  Location: [address] [place] [address] CATH LAB; Service: Cardiology   • CARDIAC CATHETERIZATION N/A 04/07/2022    Procedure: Cardiac Coronary Angiogram;  Surgeon: Migel Matute MD;  Location: [address] [place] [address] CATH LAB;   Service: Cardiology   • COLON SURGERY  2007   • COLONOSCOPY     • CORONARY ARTERY BYPASS GRAFT     • HAND SURGERY      triple bypass   • HERNIA REPAIR     • MALIGNANT SKIN LESION EXCISION  1963    Abdominal wall surgical resection of melanoma   • OR COLONOSCOPY FLX DX W/COLLJ SPEC WHEN PFRMD N/A 12/11/2018    Procedure: COLONOSCOPY;  Surgeon: Joel West MD;  Location: [facility]; Service: Gastroenterology   • SKIN BIOPSY       Social History     Tobacco Use   Smoking Status Never   Smokeless Tobacco Never       Past Family History  Family History   Problem Relation Age of Onset   • Emphysema Maternal Grandfather    • Coronary artery disease Mother    • Hyperlipidemia Mother    • Hypertension Mother    • Diabetes type II Mother    • Arthritis Mother    • Diabetes type II Father    • Basal cell carcinoma Father    • Hypertension Father    • Heart disease Father    • Diabetes Father    • Hypertension Sister        Past Social history  Social History     Socioeconomic History   • Marital status: /Civil Union     Spouse name: Not on file   • Number of children: Not on file   • Years of education: Not on file   • Highest education level: Not on file   Occupational History   • Occupation:    Tobacco Use   • Smoking status: Never   • Smokeless tobacco: Never   Vaping Use   • Vaping Use: Never used   Substance and Sexual Activity   • Alcohol use: Not Currently   • Drug use: No   • Sexual activity: Yes     Partners: Female   Other Topics Concern   • Not on file   Social History Narrative   • Not on file     Social Determinants of Health     Financial Resource Strain: Low Risk    • Difficulty of Paying Living Expenses: Not hard at all   Food Insecurity: Not on file   Transportation Needs: No Transportation Needs   • Lack of Transportation (Medical): No   • Lack of Transportation (Non-Medical):  No   Physical Activity: Not on file   Stress: Not on file   Social Connections: Not on file   Intimate Partner Violence: Not on file Housing Stability: Not on file       Current Medications  Current Outpatient Medications   Medication Sig Dispense Refill   • Accu-Chek FastClix Lancets MISC USE TO TEST BLOOD SUGAR AS DIRECTED     • aspirin 81 MG tablet Take 81 mg by mouth daily     • atorvastatin (LIPITOR) 20 mg tablet TAKE 1 TABLET BY MOUTH DAILY AT BEDTIME 90 tablet 3   • Blood Glucose Monitoring Suppl (Accu-Chek Guide) w/Device KIT      • Cholecalciferol (Vitamin D3 Super Strength) 50 MCG (2000 UT) CAPS 1 po qd     • fluticasone (FLONASE) 50 mcg/act nasal spray SPRAY 2 SPRAYS INTO EACH NOSTRIL EVERY DAY 48 mL 1   • glimepiride (AMARYL) 1 mg tablet 1 mg po in am, 2 mg with dinner (Patient taking differently: Take 1 mg by mouth 1 mg po in am, 1mg with dinner) 270 tablet 3   • glucose blood test strip Use as instructed 200 each 0   • hydrochlorothiazide (HYDRODIURIL) 25 mg tablet TAKE 1 TABLET BY MOUTH EVERY DAY 90 tablet 3   • Insulin Pen Needle 33G X 4 MM MISC Use 3 (three) times a day 100 each 0   • irbesartan (AVAPRO) 150 mg tablet TAKE 1 TABLET BY MOUTH EVERY DAY 90 tablet 3   • Jardiance 25 MG TABS TAKE 1 TABLET BY MOUTH EVERY DAY IN THE MORNING 90 tablet 3   • metFORMIN (GLUCOPHAGE-XR) 500 mg 24 hr tablet TAKE 4 TABLETS (2,000 MG TOTAL) BY MOUTH DAILY WITH DINNER 360 tablet 1   • metoprolol succinate (TOPROL-XL) 100 mg 24 hr tablet TAKE 1 TABLET BY MOUTH EVERY DAY 90 tablet 0   • nitroglycerin (NITROSTAT) 0 4 mg SL tablet Place 1 tablet (0 4 mg total) under the tongue every 5 (five) minutes as needed for chest pain (Patient taking differently: Place 0 4 mg under the tongue every 5 (five) minutes as needed for chest pain Only when needed) 100 tablet 0   • semaglutide, 0 25 or 0 5 mg/dose, (Ozempic) 2 mg/1 5 mL injection pen Inject 0 19 mL (0 25 mg total) under the skin every 7 days 2 mL 5   • tamsulosin (FLOMAX) 0 4 mg Take 2 capsules (0 8 mg total) by mouth daily with dinner 180 capsule 3     No current facility-administered medications for this visit  Allergies  No Known Allergies      The following portions of the patient's history were reviewed and updated as appropriate: allergies, current medications, past medical history, past social history, past surgical history and problem list       Vitals  Vitals:    03/23/23 0857   BP: 100/70   BP Location: Left arm   Patient Position: Sitting   Cuff Size: Large   Pulse: 87   SpO2: 98%   Weight: 88 5 kg (195 lb)   Height: 5' 7" (1 702 m)           Physical Exam  Physical Exam  Constitutional:       Appearance: Normal appearance  HENT:      Head: Normocephalic and atraumatic  Right Ear: External ear normal       Left Ear: External ear normal       Nose: Nose normal    Eyes:      General: No scleral icterus  Conjunctiva/sclera: Conjunctivae normal    Cardiovascular:      Pulses: Normal pulses  Pulmonary:      Effort: Pulmonary effort is normal    Musculoskeletal:         General: Normal range of motion  Cervical back: Normal range of motion  Neurological:      General: No focal deficit present  Mental Status: He is alert and oriented to person, place, and time  Psychiatric:         Mood and Affect: Mood normal          Behavior: Behavior normal          Thought Content:  Thought content normal          Judgment: Judgment normal            Results  Recent Results (from the past 1 hour(s))   POCT Measure PVR    Collection Time: 03/23/23  9:06 AM   Result Value Ref Range    POST-VOID RESIDUAL VOLUME, ML  mL   ]  Lab Results   Component Value Date    PSA 0 3 09/14/2022    PSA 0 3 07/30/2020    PSA 0 2 04/02/2019     Lab Results   Component Value Date    GLUCOSE 119 (H) 09/21/2017    CALCIUM 10 0 03/16/2023     09/21/2017    K 4 6 03/16/2023    CO2 31 03/16/2023    CL 98 03/16/2023    BUN 16 03/16/2023    CREATININE 0 89 03/16/2023     Lab Results   Component Value Date    WBC 6 04 03/16/2023    HGB 14 7 03/16/2023    HCT 43 1 03/16/2023    MCV 85 03/16/2023    PLT 195 03/16/2023           Orders  Orders Placed This Encounter   Procedures   • POCT Measure PVR       Shayy Flores

## 2023-03-22 ENCOUNTER — OFFICE VISIT (OUTPATIENT)
Dept: INTERNAL MEDICINE CLINIC | Facility: CLINIC | Age: 68
End: 2023-03-22

## 2023-03-22 VITALS
RESPIRATION RATE: 16 BRPM | HEIGHT: 67 IN | OXYGEN SATURATION: 99 % | HEART RATE: 68 BPM | WEIGHT: 195.2 LBS | SYSTOLIC BLOOD PRESSURE: 134 MMHG | DIASTOLIC BLOOD PRESSURE: 88 MMHG | BODY MASS INDEX: 30.64 KG/M2

## 2023-03-22 DIAGNOSIS — I25.10 CORONARY ARTERY DISEASE INVOLVING NATIVE HEART WITHOUT ANGINA PECTORIS, UNSPECIFIED VESSEL OR LESION TYPE: ICD-10-CM

## 2023-03-22 DIAGNOSIS — K92.1 HEMATOCHEZIA: ICD-10-CM

## 2023-03-22 DIAGNOSIS — E11.9 TYPE 2 DIABETES MELLITUS WITHOUT COMPLICATION, WITHOUT LONG-TERM CURRENT USE OF INSULIN (HCC): Chronic | ICD-10-CM

## 2023-03-22 DIAGNOSIS — Z95.1 HX OF CABG: ICD-10-CM

## 2023-03-22 DIAGNOSIS — N40.0 BENIGN PROSTATIC HYPERPLASIA, UNSPECIFIED WHETHER LOWER URINARY TRACT SYMPTOMS PRESENT: ICD-10-CM

## 2023-03-22 DIAGNOSIS — Z23 NEED FOR PNEUMOCOCCAL VACCINATION: ICD-10-CM

## 2023-03-22 DIAGNOSIS — I25.10 STENOSIS OF RIGHT CORONARY ARTERY: ICD-10-CM

## 2023-03-22 DIAGNOSIS — E55.9 VITAMIN D DEFICIENCY: ICD-10-CM

## 2023-03-22 DIAGNOSIS — I51.89 DIASTOLIC DYSFUNCTION: ICD-10-CM

## 2023-03-22 DIAGNOSIS — N40.1 BENIGN PROSTATIC HYPERPLASIA WITH URINARY FREQUENCY: ICD-10-CM

## 2023-03-22 DIAGNOSIS — R35.0 BENIGN PROSTATIC HYPERPLASIA WITH URINARY FREQUENCY: ICD-10-CM

## 2023-03-22 DIAGNOSIS — I10 ESSENTIAL HYPERTENSION: Chronic | ICD-10-CM

## 2023-03-22 DIAGNOSIS — E11.8 TYPE 2 DIABETES MELLITUS WITH COMPLICATION, WITHOUT LONG-TERM CURRENT USE OF INSULIN (HCC): Primary | ICD-10-CM

## 2023-03-22 DIAGNOSIS — E78.2 MIXED HYPERLIPIDEMIA: ICD-10-CM

## 2023-03-22 RX ORDER — CHOLECALCIFEROL (VITAMIN D3) 50 MCG
CAPSULE ORAL
Start: 2023-03-22

## 2023-03-22 RX ORDER — TAMSULOSIN HYDROCHLORIDE 0.4 MG/1
0.8 CAPSULE ORAL
Qty: 180 CAPSULE | Refills: 3 | Status: SHIPPED | OUTPATIENT
Start: 2023-03-22

## 2023-03-22 NOTE — PATIENT INSTRUCTIONS
Lab data reviewed in detail and compared to prior    Type 2 diabetes mellitus is under suboptimal control, will discontinue Amaryl and start on Ozempic and titrate dose as tolerated  Contact me for any significant side effects  Hypertension-stable on present regimen    Hyperlipidemia-LDL at goal on statin however triglycerides remain elevated with HDL low, increase aerobic exercise    Coronary artery disease-we reviewed coronary catheterization from last April indicating patent bypass grafts with 70 to 90% lesions throughout RCA  Patient remains asymptomatic  Concentrate on aggressive lifestyle modifications as above      Health maintenance-due for colonoscopy, Prevnar 20 today, otherwise up-to-date    EKG normal sinus rhythm with left anterior fascicular block and voltage criteria for LVH unchanged from prior March 10, 2022    Routine follow-up after labs in 4 months, sooner as needed Lonnie Rios MD as PCP - [facility] Empaneled Provider  Lilly Blake MD (Ophthalmology)  Heidi Devine [facility] s/p Janet Loyola [id number]    CHRIS on CPAP Dr Kelsie Cardoso [facility] 05/30/2018     Philomena Aguirre; [place] score 24/30, MRI head w wm changes    BCC (basal cell carcinoma of skin)     nose    Cellulitis     left leg    Diverticulosis 2003    Dr Nevaeh Jimenez    DM (diabetes mellitus) ([facility] [id number]    CHRIS on CPAP 1995    Dr Mai Capone; last showed AHI 25.4, minim desats 76%     PAD (peripheral artery disease) ([facility]     Dr Tanner Mireles; [facility] 2011    Dr Adele Casey       Katherine Govea  09/2016    R GIANCARLO Piper Pete MD

## 2023-03-22 NOTE — PROGRESS NOTES
Assessment and Plan:     Problem List Items Addressed This Visit        Endocrine    Type 2 diabetes mellitus (HCC) (Chronic)    Relevant Medications    semaglutide, 0 25 or 0 5 mg/dose, (Ozempic) 2 mg/1 5 mL injection pen    Insulin Pen Needle 33G X 4 MM MISC    Type 2 diabetes mellitus with complication, without long-term current use of insulin (HCC) - Primary    Relevant Medications    semaglutide, 0 25 or 0 5 mg/dose, (Ozempic) 2 mg/1 5 mL injection pen    Insulin Pen Needle 33G X 4 MM MISC    Other Relevant Orders    CBC and differential    Comprehensive metabolic panel    Lipid panel    Hemoglobin A1C    TSH, 3rd generation with Free T4 reflex    Microalbumin / creatinine urine ratio       Cardiovascular and Mediastinum    Essential hypertension (Chronic)    Coronary artery disease involving native heart without angina pectoris    Stenosis of right coronary artery       Other    Vitamin D deficiency    Relevant Medications    Cholecalciferol (Vitamin D3 Super Strength) 50 MCG (2000 UT) CAPS    Other Relevant Orders    Vitamin D 25 hydroxy    Hx of CABG    Mixed hyperlipidemia    Diastolic dysfunction    Benign prostatic hyperplasia with urinary frequency   Other Visit Diagnoses     Benign prostatic hyperplasia, unspecified whether lower urinary tract symptoms present        Relevant Medications    tamsulosin (FLOMAX) 0 4 mg    Need for pneumococcal vaccination        Relevant Orders    Pneumococcal Conjugate Vaccine 20-valent (Pcv20)    Hematochezia        Relevant Orders    Ambulatory referral for colonoscopy           Preventive health issues were discussed with patient, and age appropriate screening tests were ordered as noted in patient's After Visit Summary  Personalized health advice and appropriate referrals for health education or preventive services given if needed, as noted in patient's After Visit Summary       History of Present Illness:     Patient presents for a [employer] Visit    Follow-up multiple problems, Medicare welcome, and review labs    Feeling generally well, multiple recent cruises, keeping active, but no regular exercise  Type 2 diabetes mellitus-tolerating metformin, glimiperide and Jardiance  Not testing sugars regularly  Not eating much breakfast or lunch, and notes feeling hypoglycemic by 3 pm, then overeats, but now snacking on protein bars, which has helped  Hypertension/Hyperlipidemia-taking medication as directed, no recent home blood pressures    CAD s/p CABG '17-stable w/o angina  Cath w/ patent grafts 4/22 but multiple 70-90% RCA lesions that weren't intervened upon  Allergy/asthma-bad allergies right now, needs renewal on flonase    Notes 2-3 x nocturia with incomplete evacuation, double voids and dribbling  No significant improvement w/ flomax  BPH-fairly stable on double flomax, still w/ some sx, but f/b urology and not interested in more aggressive w/u  Audrey Vann Patient Care Team:  Melanie Bell MD as PCP - MD Atiya Ritter MD as Endoscopist     Review of Systems:     Review of Systems   Constitutional: Negative for chills and fever  HENT: Negative for ear pain and sore throat  Eyes: Negative for pain and visual disturbance  Respiratory: Negative for cough and shortness of breath  Cardiovascular: Negative for chest pain and palpitations  Gastrointestinal: Negative for abdominal pain and vomiting  Genitourinary: Negative for dysuria and hematuria  Musculoskeletal: Negative for arthralgias and back pain  Skin: Negative for color change and rash  Neurological: Negative for seizures and syncope  All other systems reviewed and are negative         Problem List:     Patient Active Problem List   Diagnosis   • Type 2 diabetes mellitus ([facility] [address]   • Essential hypertension   • Vitamin D deficiency   • Transaminitis   • Coronary artery disease involving native heart without angina pectoris   • Hx of CABG   • Mixed hyperlipidemia   • Obesity (BMI 30-39  9)   • Diastolic dysfunction   • Special screening for malignant neoplasms, colon   • Type 2 diabetes mellitus with complication, without long-term current use of insulin (HCC)   • Page's neuroma of right foot   • Benign prostatic hyperplasia with urinary frequency   • Stenosis of right coronary artery      Past Medical and Surgical History:     Past Medical History:   Diagnosis Date   • Allergic    • Arthritis 2017    Fingers   • Cancer [facility]) 2009   • Coronary artery disease    • [facility]    • Diastolic dysfunction    • Diverticulitis    • Hyperlipidemia    • Hypertension    • Melanoma ([facility] [address]     stomach area    • Obesity    • Prediabetes    • 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     Past Surgical History:   Procedure Laterality Date   • 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   • BOWEL RESECTION  2008    Secondary to diverticulitis   • CARDIAC CATHETERIZATION N/A 04/07/2022    Procedure: Cardiac catheterization;  Surgeon: Aayush Pritchett MD;  Location: [address] [place] [address] CATH LAB; Service: Cardiology   • CARDIAC CATHETERIZATION N/A 04/07/2022    Procedure: Cardiac Coronary Angiogram;  Surgeon: Aayush Pritchett MD;  Location: [address] [place] [address] CATH LAB; Service: Cardiology   • COLON SURGERY  2007   • COLONOSCOPY     • CORONARY ARTERY BYPASS GRAFT     • HAND SURGERY      triple bypass   • HERNIA REPAIR     • MALIGNANT SKIN LESION EXCISION  1963    Abdominal wall surgical resection of melanoma   • UT COLONOSCOPY FLX DX W/COLLJ SPEC WHEN PFRMD N/A 12/11/2018    Procedure: COLONOSCOPY;  Surgeon: Stanford Smalls MD;  Location: [facility];   Service: Gastroenterology   • SKIN BIOPSY        Family History:     Family History   Problem Relation Age of Onset   • Emphysema Maternal Grandfather    • Coronary artery disease Mother    • Hyperlipidemia Mother    • Hypertension Mother    • Diabetes type II Mother    • Arthritis Mother    • Diabetes type II Father    • Basal cell carcinoma Father    • Hypertension Father    • Heart disease Father    • Diabetes Father    • Hypertension Sister       Social History:     Social History     Socioeconomic History   • Marital status: /Civil Union     Spouse name: None   • Number of children: None   • Years of education: None   • Highest education level: None   Occupational History   • Occupation:    Tobacco Use   • Smoking status: Never   • Smokeless tobacco: Never   Vaping Use   • Vaping Use: Never used   Substance and Sexual Activity   • Alcohol use: Not Currently   • Drug use: No   • Sexual activity: Yes     Partners: Female   Other Topics Concern   • None   Social History Narrative   • None     Social Determinants of Health     Financial Resource Strain: Low Risk    • Difficulty of Paying Living Expenses: Not hard at all   Food Insecurity: Not on file   Transportation Needs: No Transportation Needs   • Lack of Transportation (Medical): No   • Lack of Transportation (Non-Medical):  No   Physical Activity: Not on file   Stress: Not on file   Social Connections: Not on file   Intimate Partner Violence: Not on file   Housing Stability: Not on file      Medications and Allergies:     Current Outpatient Medications   Medication Sig Dispense Refill   • Accu-Chek FastClix Lancets MISC USE TO TEST BLOOD SUGAR AS DIRECTED     • aspirin 81 MG tablet Take 81 mg by mouth daily     • atorvastatin (LIPITOR) 20 mg tablet TAKE 1 TABLET BY MOUTH DAILY AT BEDTIME 90 tablet 3   • Blood Glucose Monitoring Suppl (Accu-Chek Guide) w/Device KIT      • Cholecalciferol (Vitamin D3 Super Strength) 50 MCG (2000 UT) CAPS 1 po qd     • fluticasone (FLONASE) 50 mcg/act nasal spray SPRAY 2 SPRAYS INTO EACH NOSTRIL EVERY DAY 48 mL 1   • glimepiride (AMARYL) 1 mg tablet 1 mg po in am, 2 mg with dinner (Patient taking differently: Take 1 mg by mouth 1 mg po in am, 1mg with dinner) 270 tablet 3 • glucose blood test strip Use as instructed 200 each 0   • hydrochlorothiazide (HYDRODIURIL) 25 mg tablet TAKE 1 TABLET BY MOUTH EVERY DAY 90 tablet 3   • Insulin Pen Needle 33G X 4 MM MISC Use 3 (three) times a day 100 each 0   • irbesartan (AVAPRO) 150 mg tablet TAKE 1 TABLET BY MOUTH EVERY DAY 90 tablet 3   • Jardiance 25 MG TABS TAKE 1 TABLET BY MOUTH EVERY DAY IN THE MORNING 90 tablet 3   • metFORMIN (GLUCOPHAGE-XR) 500 mg 24 hr tablet TAKE 4 TABLETS (2,000 MG TOTAL) BY MOUTH DAILY WITH DINNER 360 tablet 1   • metoprolol succinate (TOPROL-XL) 100 mg 24 hr tablet TAKE 1 TABLET BY MOUTH EVERY DAY 90 tablet 0   • nitroglycerin (NITROSTAT) 0 4 mg SL tablet Place 1 tablet (0 4 mg total) under the tongue every 5 (five) minutes as needed for chest pain (Patient taking differently: Place 0 4 mg under the tongue every 5 (five) minutes as needed for chest pain Only when needed) 100 tablet 0   • semaglutide, 0 25 or 0 5 mg/dose, (Ozempic) 2 mg/1 5 mL injection pen Inject 0 19 mL (0 25 mg total) under the skin every 7 days 2 mL 5   • tamsulosin (FLOMAX) 0 4 mg Take 2 capsules (0 8 mg total) by mouth daily with dinner 180 capsule 3     No current facility-administered medications for this visit       No Known Allergies   Immunizations:     Immunization History   Administered Date(s) Administered   • COVID-19 MODERNA VACC 0 5 ML IM 03/06/2021, 04/03/2021, 11/18/2021   • INFLUENZA 10/10/2009, 12/01/2010, 11/09/2011, 10/01/2012, 09/21/2013, 10/23/2015, 10/14/2018   • Influenza Quadrivalent, 6-35 Months IM 10/23/2015   • Influenza, injectable, quadrivalent, preservative free 0 5 mL 10/04/2019   • Influenza, recombinant, quadrivalent,injectable, preservative free 10/09/2020, 10/18/2021   • Influenza, seasonal, injectable 10/10/2009, 12/01/2010, 11/09/2011, 09/21/2013, 11/10/2016, 11/01/2017   • Pneumococcal Polysaccharide PPV23 07/28/2010   • Tdap 07/31/2005, 03/01/2015      Health Maintenance:         Topic Date Due   • Colorectal Cancer Screening  12/11/2023   • HIV Screening  Completed   • Hepatitis C Screening  Completed         Topic Date Due   • Pneumococcal Vaccine: 65+ Years (2 - PCV) 07/28/2011   • COVID-19 Vaccine (4 - Booster for Moderna series) 01/13/2022   • Influenza Vaccine (1) 09/01/2022      Medicare Screening Tests and Risk Assessments:     Aure Richards is here for his Welcome to Medicare visit  Health Risk Assessment:   Patient rates overall health as good  Patient feels that their physical health rating is same  Patient is satisfied with their life  Eyesight was rated as same  Hearing was rated as same  Patient feels that their emotional and mental health rating is same  Patients states they are never, rarely angry  Patient states they are never, rarely unusually tired/fatigued  Pain experienced in the last 7 days has been none  Patient states that he has experienced no weight loss or gain in last 6 months  Fall Risk Screening: In the past year, patient has experienced: no history of falling in past year      Home Safety:  Patient does not have trouble with stairs inside or outside of their home  Patient has working smoke alarms and has working carbon monoxide detector  Home safety hazards include: none  Nutrition:   Current diet is Regular  Medications:   Patient is currently taking over-the-counter supplements  OTC medications include: see medication list  Patient is able to manage medications  Activities of Daily Living (ADLs)/Instrumental Activities of Daily Living (IADLs):   Walk and transfer into and out of bed and chair?: Yes  Dress and groom yourself?: Yes    Bathe or shower yourself?: Yes    Feed yourself?  Yes  Do your laundry/housekeeping?: Yes  Manage your money, pay your bills and track your expenses?: Yes  Make your own meals?: Yes    Do your own shopping?: Yes    Previous Hospitalizations:   Any hospitalizations or ED visits within the last 12 months?: No      Advance Care Planning: Living will: Yes    Advanced directive: Yes      Cognitive Screening:   Provider or family/friend/caregiver concerned regarding cognition?: No    PREVENTIVE SCREENINGS      Cardiovascular Screening:    General: Screening Not Indicated and History Lipid Disorder      Diabetes Screening:     General: Screening Not Indicated and History Diabetes      Colorectal Cancer Screening:     General: Screening Current      Prostate Cancer Screening:    General: Screening Current      Osteoporosis Screening:    General: Screening Not Indicated      Abdominal Aortic Aneurysm (AAA) Screening:    Risk factors include: age between 73-67 yo        Lung Cancer Screening:     General: Screening Not Indicated      Hepatitis C Screening:    General: Screening Current    Screening, Brief Intervention, and Referral to Treatment (SBIRT)    Screening  Typical number of drinks in a day: 0  Typical number of drinks in a week: 0  Interpretation: Low risk drinking behavior  Single Item Drug Screening:  How often have you used an illegal drug (including marijuana) or a prescription medication for non-medical reasons in the past year? never    Single Item Drug Screen Score: 0  Interpretation: Negative screen for possible drug use disorder    No results found  Physical Exam:     /88 (BP Location: Left arm)   Pulse 68   Resp 16   Ht 5' 7" (1 702 m)   Wt 88 5 kg (195 lb 3 2 oz)   SpO2 99%   BMI 30 57 kg/m²     Physical Exam  Vitals and nursing note reviewed  Constitutional:       General: He is not in acute distress  Appearance: He is well-developed  HENT:      Head: Normocephalic and atraumatic  Eyes:      Conjunctiva/sclera: Conjunctivae normal    Cardiovascular:      Rate and Rhythm: Normal rate and regular rhythm  Heart sounds: No murmur heard  Pulmonary:      Effort: Pulmonary effort is normal  No respiratory distress  Breath sounds: Normal breath sounds  Abdominal:      Palpations: Abdomen is soft  Tenderness: There is no abdominal tenderness  Musculoskeletal:         General: No swelling  Cervical back: Neck supple  Skin:     General: Skin is warm and dry  Capillary Refill: Capillary refill takes less than 2 seconds  Neurological:      Mental Status: He is alert     Psychiatric:         Mood and Affect: Mood normal           Vijay Chung MD

## 2023-03-23 ENCOUNTER — OFFICE VISIT (OUTPATIENT)
Dept: UROLOGY | Facility: CLINIC | Age: 68
End: 2023-03-23

## 2023-03-23 VITALS
SYSTOLIC BLOOD PRESSURE: 100 MMHG | WEIGHT: 195 LBS | DIASTOLIC BLOOD PRESSURE: 70 MMHG | HEIGHT: 67 IN | OXYGEN SATURATION: 98 % | BODY MASS INDEX: 30.61 KG/M2 | HEART RATE: 87 BPM

## 2023-03-23 DIAGNOSIS — R33.9 INCOMPLETE BLADDER EMPTYING: ICD-10-CM

## 2023-03-23 DIAGNOSIS — N40.1 BENIGN LOCALIZED PROSTATIC HYPERPLASIA WITH LOWER URINARY TRACT SYMPTOMS (LUTS): Primary | ICD-10-CM

## 2023-03-23 DIAGNOSIS — R79.89 LOW TESTOSTERONE: ICD-10-CM

## 2023-03-23 DIAGNOSIS — N43.40 SPERMATOCELE: ICD-10-CM

## 2023-03-23 LAB — POST-VOID RESIDUAL VOLUME, ML POC: 236 ML

## 2023-04-03 DIAGNOSIS — E11.9 TYPE 2 DIABETES MELLITUS WITHOUT COMPLICATION, WITHOUT LONG-TERM CURRENT USE OF INSULIN (HCC): ICD-10-CM

## 2023-04-03 RX ORDER — METFORMIN HYDROCHLORIDE 500 MG/1
2000 TABLET, EXTENDED RELEASE ORAL
Qty: 360 TABLET | Refills: 1 | Status: SHIPPED | OUTPATIENT
Start: 2023-04-03

## 2023-04-04 ENCOUNTER — TELEPHONE (OUTPATIENT)
Age: 68
End: 2023-04-04

## 2023-04-04 NOTE — TELEPHONE ENCOUNTER
"----- Message from Peri Block sent at 4/3/2023  3:13 PM EDT -----  Regarding: FW: Ozempic  Contact: [phone]    ----- Message -----  From: Luann Rader \"Rene\"  Sent: 4/3/2023   9:51 AM EDT  To: [facility]  Subject: Ozempic                                          After much running around and phone calls, the Ozempic prescription still not authorized but I now know reason  I think I am to get starter dosage of  25 mg  That does not have pre-approval because prior approval was given to 2 0 mg  So they need you to get the prior approval for   25  Thank you        "

## 2023-04-04 NOTE — TELEPHONE ENCOUNTER
Patient said he was laying dustin and since then he has lost strength in left arm and can not grasp objects, and has tingling in fingers

## 2023-05-01 DIAGNOSIS — E11.8 TYPE 2 DIABETES MELLITUS WITH COMPLICATION, WITHOUT LONG-TERM CURRENT USE OF INSULIN (HCC): ICD-10-CM

## 2023-05-02 ENCOUNTER — COSMETIC (OUTPATIENT)
Dept: DERMATOLOGY | Facility: CLINIC | Age: 68
End: 2023-05-02

## 2023-05-02 ENCOUNTER — OFFICE VISIT (OUTPATIENT)
Dept: DERMATOLOGY | Facility: CLINIC | Age: 68
End: 2023-05-02

## 2023-05-02 ENCOUNTER — TELEPHONE (OUTPATIENT)
Dept: DERMATOLOGY | Facility: CLINIC | Age: 68
End: 2023-05-02

## 2023-05-02 VITALS — HEIGHT: 67 IN | BODY MASS INDEX: 29.98 KG/M2 | TEMPERATURE: 97 F | WEIGHT: 191 LBS

## 2023-05-02 DIAGNOSIS — L72.0 EPIDERMAL INCLUSION CYST: ICD-10-CM

## 2023-05-02 DIAGNOSIS — L85.3 XEROSIS OF SKIN: ICD-10-CM

## 2023-05-02 DIAGNOSIS — Z85.820 HISTORY OF MELANOMA: ICD-10-CM

## 2023-05-02 DIAGNOSIS — D18.01 CHERRY ANGIOMA: ICD-10-CM

## 2023-05-02 DIAGNOSIS — D22.9 MULTIPLE MELANOCYTIC NEVI: Primary | ICD-10-CM

## 2023-05-02 DIAGNOSIS — L81.4 LENTIGO: ICD-10-CM

## 2023-05-02 DIAGNOSIS — L82.1 SEBORRHEIC KERATOSIS: ICD-10-CM

## 2023-05-02 DIAGNOSIS — L82.1 SEBORRHEIC KERATOSIS: Primary | ICD-10-CM

## 2023-05-02 NOTE — PATIENT INSTRUCTIONS
PROCEDURE:  DESTRUCTION OF BENIGN LESIONS WITH CRYOTHERAPY  After a thorough discussion of treatment options and risk/benefits/alternatives (including but not limited to local pain, scarring, dyspigmentation, blistering, and possible superinfection), verbal and written consent were obtained and the aforementioned lesions were treated on with cryotherapy using liquid nitrogen x 1 cycle for 5-10 seconds  TOTAL NUMBER of 1 lesions were treated today on the ANATOMIC LOCATION: back  The patient tolerated the procedure well, and after-care instructions were provided

## 2023-05-02 NOTE — PATIENT INSTRUCTIONS
"MELANOCYTIC NEVI (\"Moles\")    Assessment and Plan:  Based on a thorough discussion of this condition and the management approach to it (including a comprehensive discussion of the known risks, side effects and potential benefits of treatment), the patient (family) agrees to implement the following specific plan:  When outside we recommend using a wide brim hat, sunglasses, long sleeve and pants, sunscreen with SPF 77+ with reapplication every 2 hours, or SPF specific clothing   Benign, reassured  Annual skin check     Melanocytic Nevi  Melanocytic nevi (\"moles\") are tan or brown, raised or flat areas of the skin which have an increased number of melanocytes  Melanocytes are the cells in our body which make pigment and account for skin color  Some moles are present at birth (I e , \"congenital nevi\"), while others come up later in life (i e , \"acquired nevi\")  The sun can stimulate the body to make more moles  Sunburns are not the only thing that triggers more moles  Chronic sun exposure can do it too  Clinically distinguishing a healthy mole from melanoma may be difficult, even for experienced dermatologists  The \"ABCDE's\" of moles have been suggested as a means of helping to alert a person to a suspicious mole and the possible increased risk of melanoma  The suggestions for raising alert are as follows:    Asymmetry: Healthy moles tend to be symmetric, while melanomas are often asymmetric  Asymmetry means if you draw a line through the mole, the two halves do not match in color, size, shape, or surface texture  Asymmetry can be a result of rapid enlargement of a mole, the development of a raised area on a previously flat lesion, scaling, ulceration, bleeding or scabbing within the mole  Any mole that starts to demonstrate \"asymmetry\" should be examined promptly by a board certified dermatologist      Border: Healthy moles tend to have discrete, even borders    The border of a melanoma often blends into " "the normal skin and does not sharply delineate the mole from normal skin  Any mole that starts to demonstrate \"uneven borders\" should be examined promptly by a board certified dermatologist      Color: Healthy moles tend to be one color throughout  Melanomas tend to be made up of different colors ranging from dark black, blue, white, or red  Any mole that demonstrates a color change should be examined promptly by a board certified dermatologist      Diameter: Healthy moles tend to be smaller than 0 6 cm in size; an exception are \"congenital nevi\" that can be larger  Melanomas tend to grow and can often be greater than 0 6 cm (1/4 of an inch, or the size of a pencil eraser)  This is only a guideline, and many normal moles may be larger than 0 6 cm without being unhealthy  Any mole that starts to change in size (small to bigger or bigger to smaller) should be examined promptly by a board certified dermatologist      Evolving: Healthy moles tend to \"stay the same  \"  Melanomas may often show signs of change or evolution such as a change in size, shape, color, or elevation  Any mole that starts to itch, bleed, crust, burn, hurt, or ulcerate or demonstrate a change or evolution should be examined promptly by a board certified dermatologist         Richie Kitchen and Plan:  Based on a thorough discussion of this condition and the management approach to it (including a comprehensive discussion of the known risks, side effects and potential benefits of treatment), the patient (family) agrees to implement the following specific plan:  When outside we recommend using a wide brim hat, sunglasses, long sleeve and pants, sunscreen with SPF 43+ with reapplication every 2 hours, or SPF specific clothing     What is a lentigo? A lentigo is a pigmented flat or slightly raised lesion with a clearly defined edge  Unlike an ephelis (freckle), it does not fade in the winter months  There are several kinds of lentigo    The " name lentigo originally referred to its appearance resembling a small lentil  The plural of lentigo is lentigines, although lentigos is also in common use  Who gets lentigines? Lentigines can affect males and females of all ages and races  Solar lentigines are especially prevalent in fair skinned adults  Lentigines associated with syndromes are present at birth or arise during childhood  What causes lentigines? Common forms of lentigo are due to exposure to ultraviolet radiation:  Sun damage including sunburn   Indoor tanning   Phototherapy, especially photochemotherapy (PUVA)    Ionizing radiation, eg radiation therapy, can also cause lentigines  Several familial syndromes associated with widespread lentigines originate from mutations in Triston-MAP kinase, mTOR signaling and PTEN pathways  What is the treatment for lentigines? Most lentigines are left alone  Attempts to lighten them may not be successful  The following approaches are used:  SPF 50+ broad-spectrum sunscreen   Hydroquinone bleaching cream   Alpha hydroxy acids   Vitamin C   Retinoids   Azelaic acid   Chemical peels  Individual lesions can be permanently removed using:  Cryotherapy   Intense pulsed light   Pigment lasers    How can lentigines be prevented? Lentigines associated with exposure ultraviolet radiation can be prevented by very careful sun protection  Clothing is more successful at preventing new lentigines than are sunscreens  What is the outlook for lentigines? Lentigines usually persist  They may increase in number with age and sun exposure  Some in sun-protected sites may fade and disappear      ORELLANA ANGIOMAS    Assessment and Plan:  Based on a thorough discussion of this condition and the management approach to it (including a comprehensive discussion of the known risks, side effects and potential benefits of treatment), the patient (family) agrees to implement the following specific plan:  Monitor for changes  Benign, "reassured    Assessment and Plan:    Cherry angioma, also known as [employer] spots, are benign vascular skin lesions  A \"cherry angioma\" is a firm red, blue or purple papule, 0 1-1 cm in diameter  When thrombosed, they can appear black in colour until evaluated with a dermatoscope when the red or purple colour is more easily seen  Cherry angioma may develop on any part of the body but most often appear on the scalp, face, lips and trunk  An angioma is due to proliferating endothelial cells; these are the cells that line the inside of a blood vessel  Angiomas can arise in early life or later in life; the most common type of angioma is a cherry angioma  Cherry angiomas are very common in males and females of any age or race  They are more noticeable in white skin than in skin of colour  They markedly increase in number from about the age of 36  There may be a family history of similar lesions  Eruptive cherry angiomas have been rarely reported to be associated with internal malignancy  The cause of angiomas is unknown  Genetic analysis of cherry angiomas has shown that they frequently carry specific somatic missense mutations in the GNAQ and GNA11 (Q209H) genes, which are involved in other vascular and melanocytic proliferations  SEBORRHEIC KERATOSIS; NON-INFLAMED       Assessment and Plan:  Based on a thorough discussion of this condition and the management approach to it (including a comprehensive discussion of the known risks, side effects and potential benefits of treatment), the patient (family) agrees to implement the following specific plan:  Monitor for changes  Benign, reassured  Seborrheic Keratosis  A seborrheic keratosis is a harmless warty spot that appears during adult life as a common sign of skin aging  Seborrheic keratoses can arise on any area of skin, covered or uncovered, with the usual exception of the palms and soles  They do not arise from mucous membranes   Seborrheic " "keratoses can have highly variable appearance  Seborrheic keratoses are extremely common  It has been estimated that over 90% of adults over the age of 61 years have one or more of them  They occur in males and females of all races, typically beginning to erupt in the 35s or 45s  They are uncommon under the age of 21 years  The precise cause of seborrhoeic keratoses is not known  Seborrhoeic keratoses are considered degenerative in nature  As time goes by, seborrheic keratoses tend to become more numerous  Some people inherit a tendency to develop a very large number of them; some people may have hundreds of them  There is no easy way to remove multiple lesions on a single occasion  Unless a specific lesion is \"inflamed\" and is causing pain or stinging/burning or is bleeding, most insurance companies do not authorize treatment  XEROSIS (\"DRY SKIN\")    Assessment and Plan:  Based on a thorough discussion of this condition and the management approach to it (including a comprehensive discussion of the known risks, side effects and potential benefits of treatment), the patient (family) agrees to implement the following specific plan:  Use moisturizer like Eucerin,Cerave or Aveeno Cream 3 times a day for the dry skin            Dry skin refers to skin that feels dry to touch  Dry skin has a dull surface with a rough, scaly quality  The skin is less pliable and cracked  When dryness is severe, the skin may become inflamed and fissured  Although any body site can be dry, dry skin tends to affect the shins more than any other site  Dry skin is lacking moisture in the outer horny cell layer (stratum corneum) and this results in cracks in the skin surface  Dry skin is also called xerosis, xeroderma or asteatosis (lack of fat)  It can affect males and females of all ages  There is some racial variability in water and lipid content of the skin    Dry skin that starts in early childhood may be one of about " 20 types of ichthyosis (fish-scale skin)  There is often a family history of dry skin  Dry skin is commonly seen in people with atopic dermatitis  Nearly everyone > 60 years has dry skin  Dry skin that begins later may be seen in people with certain diseases and conditions  Postmenopausal women  Hypothyroidism  Chronic renal disease   Malnutrition and weight loss   Subclinical dermatitis   Treatment with certain drugs such as oral retinoids, diuretics and epidermal growth factor receptor inhibitors      What is the treatment for dry skin? The mainstay of treatment of dry skin and ichthyosis is moisturisers/emollients  They should be applied liberally and often enough to:  Reduce itch   Improve the barrier function   Prevent entry of irritants, bacteria   Reduce transepidermal water loss  How can dry skin be prevented? Eliminate aggravating factors:  Reduce the frequency of bathing  A humidifier in winter and air conditioner in summer   Compare having a short shower with a prolonged soak in a bath  Use lukewarm, not hot, water  Replace standard soap with a substitute such as a synthetic detergent cleanser, water-miscible emollient, bath oil, anti-pruritic tar oil, colloidal oatmeal etc    Apply an emollient liberally and often, particularly shortly after bathing, and when itchy  The drier the skin, the thicker this should be, especially on the hands  What is the outlook for dry skin? A tendency to dry skin may persist life-long, or it may improve once contributing factors are controlled      HISTORY OF MELANOMA     Assessment and Plan:  Based on a thorough discussion of this condition and the management approach to it (including a comprehensive discussion of the known risks, side effects and potential benefits of treatment), the patient (family) agrees to implement the following specific plan:  Continue yearly exams   Continue to have yearly check ups with your dentist and eye doctor  When "outside we recommend using a wide brim hat, sunglasses, long sleeve and pants, sunscreen with SPF 74+ with reapplication every 2 hours, or SPF specific clothing      What happens at follow-up? The main purpose of follow-up is to detect recurrences early (metastatic melanoma), but it also offers an opportunity to diagnose a new primary melanoma at the first possible opportunity  A second invasive melanoma occurs in 5-10% of melanoma patients and a new melanoma in situ is diagnosed in more than 20% of melanoma patients  Our practice makes the following recommendations for follow-up for patients with invasive melanoma  At-least \"monthly\" self-skin examinations   Routine skin checks by a board certified dermatologist  Follow-up intervals are \"every 3 months\" within 2 years of a new melanoma diagnosis; \"every 6 months\" between 2-4 years of a new melanoma diagnosis; and \"annually\" after 4 years of a new melanoma diagnosis  Individual patient's needs should be considered before an appropriate follow-up is offered  Provide education and support to help the patient adjust to their illness     Follow-up appointments should include:  A check of the scar where the primary melanoma was removed  Checking the regional lymph nodes  A general skin examination  A full physical examination at least annually by your primary care physician     In those with more advanced primary disease, follow-up may include:  Blood tests  Imaging: ultrasound, X-ray, CT, MRI and PET scan  Most tests are not worthwhile for patients with stage 1 or 2 melanoma unless there are signs or symptoms of disease recurrence or metastasis  No tests are necessary for healthy patients who have remained well for five years or longer after removal of their melanoma  What is the outlook for patients with melanoma? Melanoma in situ is cured by excision because it has no potential to spread around the body    The risk of spread and ultimate death from " invasive melanoma depends on several factors, but the main one is the Breslow thickness of the melanoma at the time it was surgically removed  Metastases are rare for melanomas < 0 75 mm and the risk for tumours 0 75-1 mm thick is about 5%  The risk steadily increases with thickness so that melanomas > 4 mm have a risk of metastasis of about 40%  Melanoma is a potentially serious type of skin cancer, in which there is uncontrolled growth of melanocytes (pigment cells)  Melanoma is sometimes called malignant melanoma  Normal melanocytes are found in the basal layer of the epidermis (the outer layer of skin)  Melanocytes produce a protein called melanin, which protects skin cells by absorbing ultraviolet (UV) radiation  Melanocytes are found in equal numbers in black and white skin, but melanocytes in black skin produce much more melanin  People with dark brown or black skin are very much less likely to be damaged by UV radiation than those with white skin  EPIDERMAL INCLUSION CYST    Assessment and Plan:  Based on a thorough discussion of this condition and the management approach to it (including a comprehensive discussion of the known risks, side effects and potential benefits of treatment), the patient (family) agrees to implement the following specific plan:  Benign, assurance provided    What are epidermal inclusion cysts? Epidermal inclusion cysts are the most common, benign cutaneous cysts  There are many different names for epidermal inclusion cysts, including epidermoid cyst, epidermal cyst, infundibular cyst, inclusion cyst, and keratin cyst  These cysts can occur anywhere on the body and typically present as nodules directly underneath the skin  There is often a visible pore or opening in the center  The cysts are freely moveable and can range from a few millimeters to several centimeters in diameter   The center of epidermoid cysts almost always contains keratin, which has a cheesy appearance, and not sebum  They also do not originate from sebaceous glands  Therefore, epidermal inclusion cysts are not the same as sebaceous cysts  Cysts may remain stable or progressively enlarge over time  There are no reliable predictive factors to tell if an epidermal inclusion cyst will enlarge, become inflamed, or remain quiescent  Infected cysts tend to become larger, turn red, and are more noticeable to the patient  There may be accompanying pain and discomfort  What causes epidermal inclusion cysts? Epidermal inclusion cysts often appear out of the blue and are not contagious  They are due to a proliferation of epidermal cells within the dermis and are more common in men than women  They occur more frequently in patients in their 20s to 45s  Epidermal inclusion cysts by themselves are usually not inherited, but they can be hereditary in rare syndromes such as Cortez syndrome, nodular elastosis with cysts and comedones (Favre-Racouchot syndrome), and basal cell nevus syndrome (Gorlin syndrome)  Elderly patients with chronic sun-damaged skin areas have a higher likelihood of developing epidermoid cysts  They often occur in areas where hair follicles have been inflamed or repeatedly irritated are more frequent in patients with acne vulgaris  In the  period, they are called milia  Patients on BRAF inhibitors such as imiquimod and cyclosporine have a higher incidence of epidermoid cysts of the face  How do we diagnose an epidermal inclusion cyst?  Epidermoid inclusion cysts are often diagnosed by history and physical exam  There is usually no need for biopsy prior to removal   Radiographic and laboratory exams, such as ultrasound studies, are unnecessary and not typically ordered unless the practitioner suspects a genetic condition      What is the treatment for an epidermal inclusion cyst?  Inflamed, uninfected epidermal inclusion cysts rarely resolve spontaneously without therapy or surgical intervention  Treatment is not emergent unless desired by the patient  Definitive treatment is via surgical excision with walls intact  This method will prevent recurrence  This is best done when the cyst is not inflamed, to decrease the probability of rupture during surgery  A local anesthetic will be injected around the cyst  A small incision is made in the skin overlying the cyst, and contents are expressed  The incision is repaired with sutures    Another option is to use a 4mm punch biopsy with cyst extraction through the defect  Incision and drainage is often needed if the cyst is infected or inflamed  If there is surrounding cellulitis, oral antibiotic therapy may be necessary  The common agents used target methicillin sensitive Staphylococcal aureus and methicillin resistant S aureus in areas of high prevalence

## 2023-05-02 NOTE — PROGRESS NOTES
THIS IS A COSMETIC PATIENT WHO PAID OUT OF POCKET AT TIME OF VISIT  DO NOT BILL  $20  THE PATIENT ALREADY PAID IN FULL FOR SERVICES    PROCEDURE:  DESTRUCTION OF BENIGN LESIONS WITH CRYOTHERAPY  After a thorough discussion of treatment options and risk/benefits/alternatives (including but not limited to local pain, scarring, dyspigmentation, blistering, and possible superinfection), verbal and written consent were obtained and the aforementioned lesions were treated on with cryotherapy using liquid nitrogen x 1 cycle for 5-10 seconds  TOTAL NUMBER of 1 lesions were treated today on the ANATOMIC LOCATION: back  The patient tolerated the procedure well, and after-care instructions were provided

## 2023-05-02 NOTE — PROGRESS NOTES
"[facility] Note     Patient Name: Grover Vázquez  Encounter Date: 5/2/2023     Have you been cared for by a [place] [address] Dermatologist in the last 3 years and, if so, which description applies to you? Yes  I have been here within the last 3 years, and my medical history has NOT changed since that time  I am MALE/not capable of bearing children  REVIEW OF SYSTEMS:  Have you recently had or currently have any of the following? · No changes in my recent health  PAST MEDICAL HISTORY:  Have you personally ever had or currently have any of the following? If \"YES,\" then please provide more detail  · No changes in my medical history  FAMILY HISTORY:  Any \"first degree relatives\" (parent, brother, sister, or child) with the following?  No changes in my family's known health  PATIENT EXPERIENCE:     Do you want the Dermatologist to perform a COMPLETE skin exam today including a clinical examination under the \"bra and underwear\" areas? Yes   If necessary, do we have your permission to call and leave a detailed message on your Preferred Phone number that includes your specific medical information?   Yes      No Known Allergies   Current Outpatient Medications:     Accu-Chek FastClix Lancets MISC, USE TO TEST BLOOD SUGAR AS DIRECTED, Disp: , Rfl:     aspirin 81 MG tablet, Take 81 mg by mouth daily, Disp: , Rfl:     atorvastatin (LIPITOR) 20 mg tablet, TAKE 1 TABLET BY MOUTH DAILY AT BEDTIME, Disp: 90 tablet, Rfl: 3    Blood Glucose Monitoring Suppl (Accu-Chek Guide) w/Device KIT, , Disp: , Rfl:     Cholecalciferol (Vitamin D3 Super Strength) 50 MCG (2000 UT) CAPS, 1 po qd, Disp: , Rfl:     fluticasone (FLONASE) 50 mcg/act nasal spray, SPRAY 2 SPRAYS INTO EACH NOSTRIL EVERY DAY, Disp: 48 mL, Rfl: 1    glimepiride (AMARYL) 1 mg tablet, 1 mg po in am, 2 mg with dinner (Patient taking differently: Take 1 mg by mouth 1 mg po in am, 1mg with dinner), Disp: 270 tablet, Rfl: 3    glucose " "blood test strip, Use as instructed, Disp: 200 each, Rfl: 0    hydrochlorothiazide (HYDRODIURIL) 25 mg tablet, TAKE 1 TABLET BY MOUTH EVERY DAY, Disp: 90 tablet, Rfl: 3    Insulin Pen Needle 33G X 4 MM MISC, Use 3 (three) times a day, Disp: 100 each, Rfl: 0    irbesartan (AVAPRO) 150 mg tablet, TAKE 1 TABLET BY MOUTH EVERY DAY, Disp: 90 tablet, Rfl: 3    Jardiance 25 MG TABS, TAKE 1 TABLET BY MOUTH EVERY DAY IN THE MORNING, Disp: 90 tablet, Rfl: 3    metFORMIN (GLUCOPHAGE-XR) 500 mg 24 hr tablet, TAKE 4 TABLETS (2,000 MG TOTAL) BY MOUTH DAILY WITH DINNER, Disp: 360 tablet, Rfl: 1    metoprolol succinate (TOPROL-XL) 100 mg 24 hr tablet, TAKE 1 TABLET BY MOUTH EVERY DAY, Disp: 90 tablet, Rfl: 0    nitroglycerin (NITROSTAT) 0 4 mg SL tablet, Place 1 tablet (0 4 mg total) under the tongue every 5 (five) minutes as needed for chest pain (Patient taking differently: Place 0 4 mg under the tongue every 5 (five) minutes as needed for chest pain Only when needed), Disp: 100 tablet, Rfl: 0    semaglutide, 0 25 or 0 5 mg/dose, (Ozempic) 2 mg/1 5 mL injection pen, Inject 0 38 mL (0 5 mg total) under the skin every 7 days, Disp: 2 mL, Rfl: 5    tamsulosin (FLOMAX) 0 4 mg, Take 2 capsules (0 8 mg total) by mouth daily with dinner, Disp: 180 capsule, Rfl: 3           Whom besides the patient is providing clinical information about today's encounter?   o NO ADDITIONAL HISTORIAN (patient alone provided history)    Physical Exam and Assessment/Plan by Diagnosis:      MELANOCYTIC NEVI (\"Moles\")    Physical Exam:   Anatomic Location Affected:   Mostly on sun-exposed areas of the trunk and extremities   Morphological Description:  Scattered, 1-4mm round to ovoid, symmetrical-appearing, even bordered, skin colored to dark brown macules/papules, mostly in sun-exposed areas   Pertinent Positives:   Pertinent Negatives:     Additional History of Present Condition:      Assessment and Plan:  Based on a thorough discussion of " "this condition and the management approach to it (including a comprehensive discussion of the known risks, side effects and potential benefits of treatment), the patient (family) agrees to implement the following specific plan:   When outside we recommend using a wide brim hat, sunglasses, long sleeve and pants, sunscreen with SPF 33+ with reapplication every 2 hours, or SPF specific clothing    Benign, reassured   Annual skin check     Melanocytic Nevi  Melanocytic nevi (\"moles\") are tan or brown, raised or flat areas of the skin which have an increased number of melanocytes  Melanocytes are the cells in our body which make pigment and account for skin color  Some moles are present at birth (I e , \"congenital nevi\"), while others come up later in life (i e , \"acquired nevi\")  The sun can stimulate the body to make more moles  Sunburns are not the only thing that triggers more moles  Chronic sun exposure can do it too  Clinically distinguishing a healthy mole from melanoma may be difficult, even for experienced dermatologists  The \"ABCDE's\" of moles have been suggested as a means of helping to alert a person to a suspicious mole and the possible increased risk of melanoma  The suggestions for raising alert are as follows:    Asymmetry: Healthy moles tend to be symmetric, while melanomas are often asymmetric  Asymmetry means if you draw a line through the mole, the two halves do not match in color, size, shape, or surface texture  Asymmetry can be a result of rapid enlargement of a mole, the development of a raised area on a previously flat lesion, scaling, ulceration, bleeding or scabbing within the mole  Any mole that starts to demonstrate \"asymmetry\" should be examined promptly by a board certified dermatologist      Border: Healthy moles tend to have discrete, even borders  The border of a melanoma often blends into the normal skin and does not sharply delineate the mole from normal skin    Any " "mole that starts to demonstrate \"uneven borders\" should be examined promptly by a board certified dermatologist      Color: Healthy moles tend to be one color throughout  Melanomas tend to be made up of different colors ranging from dark black, blue, white, or red  Any mole that demonstrates a color change should be examined promptly by a board certified dermatologist      Diameter: Healthy moles tend to be smaller than 0 6 cm in size; an exception are \"congenital nevi\" that can be larger  Melanomas tend to grow and can often be greater than 0 6 cm (1/4 of an inch, or the size of a pencil eraser)  This is only a guideline, and many normal moles may be larger than 0 6 cm without being unhealthy  Any mole that starts to change in size (small to bigger or bigger to smaller) should be examined promptly by a board certified dermatologist      Evolving: Healthy moles tend to \"stay the same  \"  Melanomas may often show signs of change or evolution such as a change in size, shape, color, or elevation  Any mole that starts to itch, bleed, crust, burn, hurt, or ulcerate or demonstrate a change or evolution should be examined promptly by a board certified dermatologist         LENTIGO    Physical Exam:   Anatomic Location Affected:  Trunk and bilateral upper extremities    Morphological Description:  Light brown macules   Pertinent Positives:   Pertinent Negatives: Additional History of Present Condition:      Assessment and Plan:  Based on a thorough discussion of this condition and the management approach to it (including a comprehensive discussion of the known risks, side effects and potential benefits of treatment), the patient (family) agrees to implement the following specific plan:   When outside we recommend using a wide brim hat, sunglasses, long sleeve and pants, sunscreen with SPF 19+ with reapplication every 2 hours, or SPF specific clothing       What is a lentigo?   A lentigo is a pigmented flat or " slightly raised lesion with a clearly defined edge  Unlike an ephelis (freckle), it does not fade in the winter months  There are several kinds of lentigo  The name lentigo originally referred to its appearance resembling a small lentil  The plural of lentigo is lentigines, although lentigos is also in common use  Who gets lentigines? Lentigines can affect males and females of all ages and races  Solar lentigines are especially prevalent in fair skinned adults  Lentigines associated with syndromes are present at birth or arise during childhood  What causes lentigines? Common forms of lentigo are due to exposure to ultraviolet radiation:   Sun damage including sunburn    Indoor tanning    Phototherapy, especially photochemotherapy (PUVA)    Ionizing radiation, eg radiation therapy, can also cause lentigines  Several familial syndromes associated with widespread lentigines originate from mutations in Triston-MAP kinase, mTOR signaling and PTEN pathways  What is the treatment for lentigines? Most lentigines are left alone  Attempts to lighten them may not be successful  The following approaches are used:   SPF 50+ broad-spectrum sunscreen    Hydroquinone bleaching cream    Alpha hydroxy acids    Vitamin C    Retinoids    Azelaic acid    Chemical peels  Individual lesions can be permanently removed using:   Cryotherapy    Intense pulsed light    Pigment lasers    How can lentigines be prevented? Lentigines associated with exposure ultraviolet radiation can be prevented by very careful sun protection  Clothing is more successful at preventing new lentigines than are sunscreens  What is the outlook for lentigines? Lentigines usually persist  They may increase in number with age and sun exposure  Some in sun-protected sites may fade and disappear      ORELLANA ANGIOMAS    Physical Exam:   Anatomic Location Affected:  trunk   Morphological Description:  Scattered cherry red, 1-4 mm "papules   Pertinent Positives:   Pertinent Negatives: Additional History of Present Condition:      Assessment and Plan:  Based on a thorough discussion of this condition and the management approach to it (including a comprehensive discussion of the known risks, side effects and potential benefits of treatment), the patient (family) agrees to implement the following specific plan:   Monitor for changes   Benign, reassured       Assessment and Plan:    Cherry angioma, also known as [employer] spots, are benign vascular skin lesions  A \"cherry angioma\" is a firm red, blue or purple papule, 0 1-1 cm in diameter  When thrombosed, they can appear black in colour until evaluated with a dermatoscope when the red or purple colour is more easily seen  Cherry angioma may develop on any part of the body but most often appear on the scalp, face, lips and trunk  An angioma is due to proliferating endothelial cells; these are the cells that line the inside of a blood vessel  Angiomas can arise in early life or later in life; the most common type of angioma is a cherry angioma  Cherry angiomas are very common in males and females of any age or race  They are more noticeable in white skin than in skin of colour  They markedly increase in number from about the age of 36  There may be a family history of similar lesions  Eruptive cherry angiomas have been rarely reported to be associated with internal malignancy  The cause of angiomas is unknown  Genetic analysis of cherry angiomas has shown that they frequently carry specific somatic missense mutations in the GNAQ and GNA11 (Q209H) genes, which are involved in other vascular and melanocytic proliferations        SEBORRHEIC KERATOSIS; NON-INFLAMED     Physical Exam:   Anatomic Location Affected:  Trunk, face   Morphological Description:  Flat and raised, waxy, smooth to warty textured, yellow to brownish-grey to dark brown to blackish, discrete, \"stuck-on\" " "appearing papules   Pertinent Positives:   Pertinent Negatives: Additional History of Present Condition:      Assessment and Plan:  Based on a thorough discussion of this condition and the management approach to it (including a comprehensive discussion of the known risks, side effects and potential benefits of treatment), the patient (family) agrees to implement the following specific plan:   Monitor for changes   Benign, reassured    Seborrheic Keratosis  A seborrheic keratosis is a harmless warty spot that appears during adult life as a common sign of skin aging  Seborrheic keratoses can arise on any area of skin, covered or uncovered, with the usual exception of the palms and soles  They do not arise from mucous membranes  Seborrheic keratoses can have highly variable appearance  Seborrheic keratoses are extremely common  It has been estimated that over 90% of adults over the age of 61 years have one or more of them  They occur in males and females of all races, typically beginning to erupt in the 35s or 45s  They are uncommon under the age of 21 years  The precise cause of seborrhoeic keratoses is not known  Seborrhoeic keratoses are considered degenerative in nature  As time goes by, seborrheic keratoses tend to become more numerous  Some people inherit a tendency to develop a very large number of them; some people may have hundreds of them  There is no easy way to remove multiple lesions on a single occasion  Unless a specific lesion is \"inflamed\" and is causing pain or stinging/burning or is bleeding, most insurance companies do not authorize treatment  XEROSIS (\"DRY SKIN\")    Physical Exam:   Anatomic Location Affected:  diffuse   Morphological Description:  xerosis   Pertinent Positives:   Pertinent Negatives:     Additional History of Present Condition:      Assessment and Plan:  Based on a thorough discussion of this condition and the management approach to it (including a " comprehensive discussion of the known risks, side effects and potential benefits of treatment), the patient (family) agrees to implement the following specific plan:   Use moisturizer like Eucerin,Cerave or Aveeno Cream 3 times a day for the dry skin               Dry skin refers to skin that feels dry to touch  Dry skin has a dull surface with a rough, scaly quality  The skin is less pliable and cracked  When dryness is severe, the skin may become inflamed and fissured  Although any body site can be dry, dry skin tends to affect the shins more than any other site  Dry skin is lacking moisture in the outer horny cell layer (stratum corneum) and this results in cracks in the skin surface  Dry skin is also called xerosis, xeroderma or asteatosis (lack of fat)  It can affect males and females of all ages  There is some racial variability in water and lipid content of the skin   Dry skin that starts in early childhood may be one of about 20 types of ichthyosis (fish-scale skin)  There is often a family history of dry skin   Dry skin is commonly seen in people with atopic dermatitis   Nearly everyone > 60 years has dry skin  Dry skin that begins later may be seen in people with certain diseases and conditions   Postmenopausal women   Hypothyroidism   Chronic renal disease    Malnutrition and weight loss    Subclinical dermatitis    Treatment with certain drugs such as oral retinoids, diuretics and epidermal growth factor receptor inhibitors      What is the treatment for dry skin? The mainstay of treatment of dry skin and ichthyosis is moisturisers/emollients  They should be applied liberally and often enough to:   Reduce itch    Improve the barrier function    Prevent entry of irritants, bacteria    Reduce transepidermal water loss  How can dry skin be prevented? Eliminate aggravating factors:   Reduce the frequency of bathing      A humidifier in winter and air conditioner in "summer    Compare having a short shower with a prolonged soak in a bath   Use lukewarm, not hot, water   Replace standard soap with a substitute such as a synthetic detergent cleanser, water-miscible emollient, bath oil, anti-pruritic tar oil, colloidal oatmeal etc     Apply an emollient liberally and often, particularly shortly after bathing, and when itchy  The drier the skin, the thicker this should be, especially on the hands  What is the outlook for dry skin? A tendency to dry skin may persist life-long, or it may improve once contributing factors are controlled  HISTORY OF MELANOMA     Physical Exam:   Anatomic Location Affected:  Right lower abdomen   Morphological Description of Scar:  Well healed    Suspected Recurrence: no   Regional adenopathy: no     Assessment and Plan:  Based on a thorough discussion of this condition and the management approach to it (including a comprehensive discussion of the known risks, side effects and potential benefits of treatment), the patient (family) agrees to implement the following specific plan:   Continue yearly exams    Continue to have yearly check ups with your dentist and eye doctor   When outside we recommend using a wide brim hat, sunglasses, long sleeve and pants, sunscreen with SPF 17+ with reapplication every 2 hours, or SPF specific clothing      What happens at follow-up? The main purpose of follow-up is to detect recurrences early (metastatic melanoma), but it also offers an opportunity to diagnose a new primary melanoma at the first possible opportunity  A second invasive melanoma occurs in 5-10% of melanoma patients and a new melanoma in situ is diagnosed in more than 20% of melanoma patients      Our practice makes the following recommendations for follow-up for patients with invasive melanoma     At-least \"monthly\" self-skin examinations    Routine skin checks by a board certified dermatologist   Follow-up intervals are \"every 3 " "months\" within 2 years of a new melanoma diagnosis; \"every 6 months\" between 2-4 years of a new melanoma diagnosis; and \"annually\" after 4 years of a new melanoma diagnosis   Individual patient's needs should be considered before an appropriate follow-up is offered  Sameer Frank Provide education and support to help the patient adjust to their illness     Follow-up appointments should include:   A check of the scar where the primary melanoma was removed   Checking the regional lymph nodes   A general skin examination   A full physical examination at least annually by your primary care physician     In those with more advanced primary disease, follow-up may include:   Blood tests   Imaging: ultrasound, X-ray, CT, MRI and PET scan      Most tests are not worthwhile for patients with stage 1 or 2 melanoma unless there are signs or symptoms of disease recurrence or metastasis  No tests are necessary for healthy patients who have remained well for five years or longer after removal of their melanoma      What is the outlook for patients with melanoma?  Melanoma in situ is cured by excision because it has no potential to spread around the body   The risk of spread and ultimate death from invasive melanoma depends on several factors, but the main one is the Breslow thickness of the melanoma at the time it was surgically removed   Metastases are rare for melanomas < 0 75 mm and the risk for tumours 0 75-1 mm thick is about 5%  The risk steadily increases with thickness so that melanomas > 4 mm have a risk of metastasis of about 40%     Melanoma is a potentially serious type of skin cancer, in which there is uncontrolled growth of melanocytes (pigment cells)  Melanoma is sometimes called malignant melanoma  Normal melanocytes are found in the basal layer of the epidermis (the outer layer of skin)  Melanocytes produce a protein called melanin, which protects skin cells by absorbing ultraviolet (UV) radiation   Melanocytes " are found in equal numbers in black and white skin, but melanocytes in black skin produce much more melanin  People with dark brown or black skin are very much less likely to be damaged by UV radiation than those with white skin  EPIDERMAL INCLUSION CYST    Physical Exam:   Anatomic Location Affected:  Left axilla   Morphological Description:  Subcutaneous nodule    Pertinent Positives:   Pertinent Negatives: Additional History of Present Condition:  Found on exam    Assessment and Plan:  Based on a thorough discussion of this condition and the management approach to it (including a comprehensive discussion of the known risks, side effects and potential benefits of treatment), the patient (family) agrees to implement the following specific plan:   Benign, assurance provided    What are epidermal inclusion cysts? Epidermal inclusion cysts are the most common, benign cutaneous cysts  There are many different names for epidermal inclusion cysts, including epidermoid cyst, epidermal cyst, infundibular cyst, inclusion cyst, and keratin cyst  These cysts can occur anywhere on the body and typically present as nodules directly underneath the skin  There is often a visible pore or opening in the center  The cysts are freely moveable and can range from a few millimeters to several centimeters in diameter  The center of epidermoid cysts almost always contains keratin, which has a cheesy appearance, and not sebum  They also do not originate from sebaceous glands  Therefore, epidermal inclusion cysts are not the same as sebaceous cysts  Cysts may remain stable or progressively enlarge over time  There are no reliable predictive factors to tell if an epidermal inclusion cyst will enlarge, become inflamed, or remain quiescent  Infected cysts tend to become larger, turn red, and are more noticeable to the patient  There may be accompanying pain and discomfort  What causes epidermal inclusion cysts?   Epidermal inclusion cysts often appear out of the blue and are not contagious  They are due to a proliferation of epidermal cells within the dermis and are more common in men than women  They occur more frequently in patients in their 20s to 45s  Epidermal inclusion cysts by themselves are usually not inherited, but they can be hereditary in rare syndromes such as Cortez syndrome, nodular elastosis with cysts and comedones (Favre-Racouchot syndrome), and basal cell nevus syndrome (Gorlin syndrome)  Elderly patients with chronic sun-damaged skin areas have a higher likelihood of developing epidermoid cysts  They often occur in areas where hair follicles have been inflamed or repeatedly irritated are more frequent in patients with acne vulgaris  In the  period, they are called milia  Patients on BRAF inhibitors such as imiquimod and cyclosporine have a higher incidence of epidermoid cysts of the face  How do we diagnose an epidermal inclusion cyst?  Epidermoid inclusion cysts are often diagnosed by history and physical exam  There is usually no need for biopsy prior to removal   Radiographic and laboratory exams, such as ultrasound studies, are unnecessary and not typically ordered unless the practitioner suspects a genetic condition  What is the treatment for an epidermal inclusion cyst?  Inflamed, uninfected epidermal inclusion cysts rarely resolve spontaneously without therapy or surgical intervention  Treatment is not emergent unless desired by the patient  Definitive treatment is via surgical excision with walls intact  This method will prevent recurrence  This is best done when the cyst is not inflamed, to decrease the probability of rupture during surgery     - A local anesthetic will be injected around the cyst  - A small incision is made in the skin overlying the cyst, and contents are expressed  - The incision is repaired with sutures    Another option is to use a 4mm punch biopsy with cyst extraction through the defect  Incision and drainage is often needed if the cyst is infected or inflamed  If there is surrounding cellulitis, oral antibiotic therapy may be necessary   The common agents used target methicillin sensitive Staphylococcal aureus and methicillin resistant S aureus in areas of high prevalence         Scribe Attestation    I,:  Hilario Callahan am acting as a scribe while in the presence of the attending physician :       I,:  Marie Spangler MD personally performed the services described in this documentation    as scribed in my presence :

## 2023-05-17 DIAGNOSIS — E11.8 TYPE 2 DIABETES MELLITUS WITH COMPLICATION, WITHOUT LONG-TERM CURRENT USE OF INSULIN (HCC): ICD-10-CM

## 2023-05-17 RX ORDER — GLIMEPIRIDE 1 MG/1
TABLET ORAL
Qty: 270 TABLET | Refills: 3 | Status: SHIPPED | OUTPATIENT
Start: 2023-05-17 | End: 2023-05-27

## 2023-05-27 ENCOUNTER — HOSPITAL ENCOUNTER (EMERGENCY)
Facility: HOSPITAL | Age: 68
Discharge: HOME | End: 2023-05-27
Attending: EMERGENCY MEDICINE

## 2023-05-27 ENCOUNTER — APPOINTMENT (EMERGENCY)
Dept: RADIOLOGY | Facility: HOSPITAL | Age: 68
End: 2023-05-27

## 2023-05-27 ENCOUNTER — OFFICE VISIT (OUTPATIENT)
Dept: URGENT CARE | Facility: CLINIC | Age: 68
End: 2023-05-27

## 2023-05-27 VITALS
HEART RATE: 72 BPM | HEIGHT: 67 IN | OXYGEN SATURATION: 98 % | DIASTOLIC BLOOD PRESSURE: 67 MMHG | BODY MASS INDEX: 29.35 KG/M2 | RESPIRATION RATE: 16 BRPM | TEMPERATURE: 98 F | WEIGHT: 187 LBS | SYSTOLIC BLOOD PRESSURE: 106 MMHG

## 2023-05-27 VITALS
SYSTOLIC BLOOD PRESSURE: 78 MMHG | BODY MASS INDEX: 29.35 KG/M2 | HEIGHT: 67 IN | RESPIRATION RATE: 18 BRPM | OXYGEN SATURATION: 97 % | HEART RATE: 98 BPM | WEIGHT: 187 LBS | DIASTOLIC BLOOD PRESSURE: 60 MMHG | TEMPERATURE: 97.8 F

## 2023-05-27 DIAGNOSIS — I95.9 HYPOTENSION, UNSPECIFIED HYPOTENSION TYPE: ICD-10-CM

## 2023-05-27 DIAGNOSIS — R42 DIZZINESS: Primary | ICD-10-CM

## 2023-05-27 DIAGNOSIS — R01.1 MURMUR, CARDIAC: ICD-10-CM

## 2023-05-27 DIAGNOSIS — I95.9 HYPOTENSIVE EPISODE: ICD-10-CM

## 2023-05-27 DIAGNOSIS — R42 POSTURAL LIGHTHEADEDNESS: Primary | ICD-10-CM

## 2023-05-27 DIAGNOSIS — N17.9 AKI (ACUTE KIDNEY INJURY) (HCC): ICD-10-CM

## 2023-05-27 LAB
ALBUMIN SERPL BCP-MCNC: 4.3 G/DL (ref 3.5–5)
ALP SERPL-CCNC: 63 U/L (ref 34–104)
ALT SERPL W P-5'-P-CCNC: 19 U/L (ref 7–52)
ANION GAP SERPL CALCULATED.3IONS-SCNC: 10 MMOL/L (ref 4–13)
ANION GAP SERPL CALCULATED.3IONS-SCNC: 10 MMOL/L (ref 4–13)
APTT PPP: 26 SECONDS (ref 23–37)
AST SERPL W P-5'-P-CCNC: 16 U/L (ref 13–39)
BASOPHILS # BLD AUTO: 0.05 THOUSANDS/ÂΜL (ref 0–0.1)
BASOPHILS NFR BLD AUTO: 1 % (ref 0–1)
BILIRUB DIRECT SERPL-MCNC: 0.05 MG/DL (ref 0–0.2)
BILIRUB SERPL-MCNC: 0.57 MG/DL (ref 0.2–1)
BUN SERPL-MCNC: 29 MG/DL (ref 5–25)
BUN SERPL-MCNC: 30 MG/DL (ref 5–25)
CALCIUM SERPL-MCNC: 10 MG/DL (ref 8.4–10.2)
CALCIUM SERPL-MCNC: 9.1 MG/DL (ref 8.4–10.2)
CARDIAC TROPONIN I PNL SERPL HS: 3 NG/L
CHLORIDE SERPL-SCNC: 103 MMOL/L (ref 96–108)
CHLORIDE SERPL-SCNC: 99 MMOL/L (ref 96–108)
CO2 SERPL-SCNC: 23 MMOL/L (ref 21–32)
CO2 SERPL-SCNC: 27 MMOL/L (ref 21–32)
CREAT SERPL-MCNC: 1.36 MG/DL (ref 0.6–1.3)
CREAT SERPL-MCNC: 1.57 MG/DL (ref 0.6–1.3)
EOSINOPHIL # BLD AUTO: 0.14 THOUSAND/ÂΜL (ref 0–0.61)
EOSINOPHIL NFR BLD AUTO: 2 % (ref 0–6)
ERYTHROCYTE [DISTWIDTH] IN BLOOD BY AUTOMATED COUNT: 12.6 % (ref 11.6–15.1)
GFR SERPL CREATININE-BSD FRML MDRD: 45 ML/MIN/1.73SQ M
GFR SERPL CREATININE-BSD FRML MDRD: 54 ML/MIN/1.73SQ M
GLUCOSE SERPL-MCNC: 119 MG/DL (ref 65–140)
GLUCOSE SERPL-MCNC: 143 MG/DL (ref 65–140)
HCT VFR BLD AUTO: 40.7 % (ref 36.5–49.3)
HGB BLD-MCNC: 14.5 G/DL (ref 12–17)
IMM GRANULOCYTES # BLD AUTO: 0.02 THOUSAND/UL (ref 0–0.2)
IMM GRANULOCYTES NFR BLD AUTO: 0 % (ref 0–2)
INR PPP: 1.08 (ref 0.84–1.19)
LYMPHOCYTES # BLD AUTO: 2.46 THOUSANDS/ÂΜL (ref 0.6–4.47)
LYMPHOCYTES NFR BLD AUTO: 32 % (ref 14–44)
MAGNESIUM SERPL-MCNC: 1.8 MG/DL (ref 1.9–2.7)
MCH RBC QN AUTO: 29.9 PG (ref 26.8–34.3)
MCHC RBC AUTO-ENTMCNC: 35.6 G/DL (ref 31.4–37.4)
MCV RBC AUTO: 84 FL (ref 82–98)
MONOCYTES # BLD AUTO: 0.67 THOUSAND/ÂΜL (ref 0.17–1.22)
MONOCYTES NFR BLD AUTO: 9 % (ref 4–12)
NEUTROPHILS # BLD AUTO: 4.28 THOUSANDS/ÂΜL (ref 1.85–7.62)
NEUTS SEG NFR BLD AUTO: 56 % (ref 43–75)
NRBC BLD AUTO-RTO: 0 /100 WBCS
PLATELET # BLD AUTO: 201 THOUSANDS/UL (ref 149–390)
PMV BLD AUTO: 10 FL (ref 8.9–12.7)
POTASSIUM SERPL-SCNC: 3.5 MMOL/L (ref 3.5–5.3)
POTASSIUM SERPL-SCNC: 3.7 MMOL/L (ref 3.5–5.3)
PROT SERPL-MCNC: 7.3 G/DL (ref 6.4–8.4)
PROTHROMBIN TIME: 13.8 SECONDS (ref 11.6–14.5)
RBC # BLD AUTO: 4.85 MILLION/UL (ref 3.88–5.62)
SODIUM SERPL-SCNC: 136 MMOL/L (ref 135–147)
SODIUM SERPL-SCNC: 136 MMOL/L (ref 135–147)
TSH SERPL DL<=0.05 MIU/L-ACNC: 1.5 UIU/ML (ref 0.45–4.5)
WBC # BLD AUTO: 7.62 THOUSAND/UL (ref 4.31–10.16)

## 2023-05-27 RX ORDER — LIDOCAINE 50 MG/G
1 PATCH TOPICAL ONCE
Status: DISCONTINUED | OUTPATIENT
Start: 2023-05-27 | End: 2023-05-27

## 2023-05-27 RX ORDER — LANOLIN ALCOHOL/MO/W.PET/CERES
400 CREAM (GRAM) TOPICAL ONCE
Status: COMPLETED | OUTPATIENT
Start: 2023-05-27 | End: 2023-05-27

## 2023-05-27 RX ADMIN — Medication 400 MG: at 18:18

## 2023-05-27 RX ADMIN — SODIUM CHLORIDE 1000 ML: 0.9 INJECTION, SOLUTION INTRAVENOUS at 17:08

## 2023-05-27 NOTE — PROGRESS NOTES
[facility]        NAME: Mary Bertrand is a 74 y o  male  : 1955    MRN: [id number]  DATE: May 27, 2023  TIME: 4:05 PM    Assessment and Plan   Dizziness [R42]  1  Dizziness  Transfer to other facility      2  Hypotension, unspecified hypotension type  Transfer to other facility      3  Murmur, cardiac  Transfer to other facility      Patient presents with symptoms consistent with hypotension secondary to medications most likely  However on examination he has what appears to be new onset murmur at the pulmonic valve  As result I recommended further evaluation in the ER  ECG performed in clinic demonstrates normal sinus rhythm with old inferior infarct similar to ECG from 2022 prior to cardiac catheterization  Patient appears stable at this time and is safe for transport via private operating vehicle will present to [address] [place] [address]  Patient is aware that he may need to be transferred via ambulance to another location  Patient Instructions   There are no Patient Instructions on file for this visit  Follow up with PCP in 3-5 days  Proceed to  ER if symptoms worsen  Chief Complaint     Chief Complaint   Patient presents with   • Dizziness     Patient reported that for two days he's been feeling dizzy while doing things or bending over  Patient reported he felt like his ears and eyes would feel funny  Patient reported today was more frequent and feeling weak  Patient reported he was placed on a new medication, has taken 3 doses over 3 weeks and looked it up, found it can cause hypotension  He's not sure  Patient reported having BP cuff at home, and numbers are 70/60 and 80/60  Patient reported also reports feel weak  History of Present Illness       53-year-old male presents with complaint of dizziness and weakness when standing or bending forward  Patient does have a history of cardiac disease diabetes, and high blood pressure    Patient has been taking his blood pressure medications regularly  He has been on Ozempic for last 7 weeks and reports that he recently had an increased dosage about 3 weeks ago and read that hypotension could be a potential side effect  He denies any chest pain, shortness of breath, back pain, and abdominal pain  Patient does have a history of carotid stenosis to the right rotted artery  Review of Systems   Review of Systems   Constitutional: Negative for chills, diaphoresis and fever  Respiratory: Negative for shortness of breath  Cardiovascular: Negative for chest pain and palpitations  Musculoskeletal: Negative for back pain  Neurological: Positive for dizziness, weakness and light-headedness           Current Medications       Current Outpatient Medications:   •  Accu-Chek FastClix Lancets MISC, USE TO TEST BLOOD SUGAR AS DIRECTED, Disp: , Rfl:   •  aspirin 81 MG tablet, Take 81 mg by mouth daily, Disp: , Rfl:   •  atorvastatin (LIPITOR) 20 mg tablet, TAKE 1 TABLET BY MOUTH DAILY AT BEDTIME, Disp: 90 tablet, Rfl: 3  •  Blood Glucose Monitoring Suppl (Accu-Chek Guide) w/Device KIT, , Disp: , Rfl:   •  Cholecalciferol (Vitamin D3 Super Strength) 50 MCG (2000 UT) CAPS, 1 po qd, Disp: , Rfl:   •  fluticasone (FLONASE) 50 mcg/act nasal spray, SPRAY 2 SPRAYS INTO EACH NOSTRIL EVERY DAY, Disp: 48 mL, Rfl: 1  •  glucose blood test strip, Use as instructed, Disp: 200 each, Rfl: 0  •  hydrochlorothiazide (HYDRODIURIL) 25 mg tablet, TAKE 1 TABLET BY MOUTH EVERY DAY, Disp: 90 tablet, Rfl: 3  •  irbesartan (AVAPRO) 150 mg tablet, TAKE 1 TABLET BY MOUTH EVERY DAY, Disp: 90 tablet, Rfl: 3  •  Jardiance 25 MG TABS, TAKE 1 TABLET BY MOUTH EVERY DAY IN THE MORNING, Disp: 90 tablet, Rfl: 3  •  metFORMIN (GLUCOPHAGE-XR) 500 mg 24 hr tablet, TAKE 4 TABLETS (2,000 MG TOTAL) BY MOUTH DAILY WITH DINNER, Disp: 360 tablet, Rfl: 1  •  metoprolol succinate (TOPROL-XL) 100 mg 24 hr tablet, TAKE 1 TABLET BY MOUTH EVERY DAY, Disp: 90 tablet, Rfl: 0  • nitroglycerin (NITROSTAT) 0 4 mg SL tablet, Place 1 tablet (0 4 mg total) under the tongue every 5 (five) minutes as needed for chest pain (Patient taking differently: Place 0 4 mg under the tongue every 5 (five) minutes as needed for chest pain Only when needed), Disp: 100 tablet, Rfl: 0  •  semaglutide, 0 25 or 0 5 mg/dose, (Ozempic) 2 mg/1 5 mL injection pen, Inject 0 38 mL (0 5 mg total) under the skin every 7 days, Disp: 2 mL, Rfl: 5  •  tamsulosin (FLOMAX) 0 4 mg, Take 2 capsules (0 8 mg total) by mouth daily with dinner, Disp: 180 capsule, Rfl: 3  •  Insulin Pen Needle 33G X 4 MM MISC, Use 3 (three) times a day, Disp: 100 each, Rfl: 0    Current Allergies     Allergies as of 05/27/2023   • (No Known Allergies)            The following portions of the patient's history were reviewed and updated as appropriate: allergies, current medications, past family history, past medical history, past social history, past surgical history and problem list      Past Medical History:   Diagnosis Date   • Allergic    • Arthritis 2017    Fingers   • Cancer ([address] ) 2009   • Coronary artery disease    • Diabetes mellitus ([address] )    • Diastolic dysfunction    • Diverticulitis    • History of open heart surgery    • Hyperlipidemia    • Hypertension    • Melanoma ([address] )     stomach area    • Obesity    • Prediabetes    • 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       Past Surgical History:   Procedure Laterality Date   • 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   • BOWEL RESECTION  2008    Secondary to diverticulitis   • CARDIAC CATHETERIZATION N/A 04/07/2022    Procedure: Cardiac catheterization;  Surgeon: Simeon Pham MD;  Location: [address] [place] [address] CATH LAB; Service: Cardiology   • CARDIAC CATHETERIZATION N/A 04/07/2022    Procedure: Cardiac Coronary Angiogram;  Surgeon: Simeon Pham MD;  Location: [address] [place] [address] CATH LAB;   Service: Cardiology   • COLON SURGERY  2007   • COLONOSCOPY "  • CORONARY ARTERY BYPASS GRAFT     • HAND SURGERY      triple bypass   • HERNIA REPAIR     • MALIGNANT SKIN LESION EXCISION  1963    Abdominal wall surgical resection of melanoma   • SD COLONOSCOPY FLX DX W/COLLJ SPEC WHEN PFRMD N/A 12/11/2018    Procedure: COLONOSCOPY;  Surgeon: Jessenia Lambert MD;  Location: [facility]; Service: Gastroenterology   • SKIN BIOPSY         Family History   Problem Relation Age of Onset   • Emphysema Maternal Grandfather    • Coronary artery disease Mother    • Hyperlipidemia Mother    • Hypertension Mother    • Diabetes type II Mother    • Arthritis Mother    • Diabetes type II Father    • Basal cell carcinoma Father    • Hypertension Father    • Heart disease Father    • Diabetes Father    • Hypertension Sister          Medications have been verified  Objective   BP (!) 78/60   Pulse 98   Temp 97 8 °F (36 6 °C) (Tympanic)   Resp 18   Ht 5' 7\" (1 702 m)   Wt 84 8 kg (187 lb)   SpO2 97%   BMI 29 29 kg/m²   No LMP for male patient  Physical Exam     Physical Exam  Vitals and nursing note reviewed  Constitutional:       General: He is awake  He is not in acute distress  Appearance: Normal appearance  He is well-developed and well-groomed  He is not ill-appearing, toxic-appearing or diaphoretic  HENT:      Head: Normocephalic and atraumatic  Right Ear: Hearing and external ear normal       Left Ear: Hearing and external ear normal    Eyes:      General: Lids are normal  Vision grossly intact  Gaze aligned appropriately  Cardiovascular:      Rate and Rhythm: Normal rate and regular rhythm  Heart sounds: Murmur (first pulmonic) heard  Crescendo systolic murmur is present with a grade of 3/6  Pulmonary:      Effort: Pulmonary effort is normal       Breath sounds: Normal breath sounds  No decreased breath sounds, wheezing, rhonchi or rales  Comments: Patient is speaking in full sentences with no increased respiratory effort   No audible " "wheezing or stridor  Musculoskeletal:      Cervical back: Normal range of motion  Skin:     General: Skin is warm and dry  Neurological:      Mental Status: He is alert and oriented to person, place, and time  Coordination: Coordination is intact  Gait: Gait is intact  Psychiatric:         Attention and Perception: Attention and perception normal          Mood and Affect: Mood and affect normal          Speech: Speech normal          Behavior: Behavior normal  Behavior is cooperative  Note: Portions of this record may have been created with voice recognition software  Occasional wrong word or \"sound a like\" substitutions may have occurred due to the inherent limitations of voice recognition software  Please read the chart carefully and recognize, using context, where substitutions have occurred  *      "

## 2023-05-27 NOTE — DISCHARGE INSTRUCTIONS
Stop taking your hydrochlorothiazide and reduce your dose of Irbesartan to half a tablet daily  Continue the metoprolol as prescribed  Take your blood pressure at least once daily and if your BP is becoming high (>049 systolic), resume the original dose of the Irbesartan  Follow up with your cardiologist and family doctor ASAP  Return if symptoms worsen  Drink plenty of water throughout the day

## 2023-05-27 NOTE — ED PROVIDER NOTES
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, prior complicated diverticulitis status post partial colon resection, arthritis, BPH, presents to the emergency department for lightheadedness, low blood pressure and possible new heart murmur  Patient states that since yesterday he noticed he has been increasingly lightheaded when he bends down or stands up from a seated position  He states when he does get lightheaded he also sees stars in his vision and this lasts about 10 seconds prior to going away  He denies any room spinning sensation and denies feeling lightheaded when he is sitting at rest   He brought a blood pressure cuff and started taking his blood pressure this morning and noted it was 80/60 and 1 hour later it was in the 70s over 50s  He then went to urgent care where he was also noted to be hypotensive  He was also told by the urgent care advanced practitioner that he had a new cardiac murmur involving the pulmonic valve and was referred to the ER for further evaluation  The urgent care AP also advised him to stop taking his hydrochlorothiazide due to the low blood pressure  Patient states that he was recently started on Ozempic about 7 weeks ago and looked up the side effects and low blood pressure was listed  He states that since being on the Ozempic, he has lost approximately 10 pounds in the last 7 weeks  He admits to doing a lot of strenuous activity and heavy lifting recently and believes he is not drinking enough water  He reports a slight discomfort in the right upper chest wall that he localizes to between 2 ribs and states it feels muscular in etiology    He admits to a lot of heavy lifting  He denies any recent headaches, fevers, chills, cough, URI symptoms, dyspnea, palpitations, abdominal pain, nausea, vomiting, diarrhea, constipation, rectal bleeding, melena, dysuria, change in frequency, hematuria, leg pain or swelling, lateralizing extremity weakness or paresthesia or other focal neurologic deficits  He does report feeling generally more weak and fatigued doing normal household activities since yesterday  History provided by:  Patient, spouse and medical records   used: No    Dizziness  Associated symptoms: chest pain    Associated symptoms: no diarrhea, no headaches, no hearing loss, no nausea, no palpitations, no shortness of breath, no vomiting and no weakness        Prior to Admission Medications   Prescriptions Last Dose Informant Patient Reported? Taking?    Accu-Chek FastClix Lancets MISC  Self Yes No   Sig: USE TO TEST BLOOD SUGAR AS DIRECTED   Blood Glucose Monitoring Suppl (Accu-Chek Guide) w/Device KIT  Self Yes No   Cholecalciferol (Vitamin D3 Super Strength) 50 MCG (2000 UT) CAPS  Self No No   Si po qd   Insulin Pen Needle 33G X 4 MM MISC  Self No No   Sig: Use 3 (three) times a day   Jardiance 25 MG TABS  Self No No   Sig: TAKE 1 TABLET BY MOUTH EVERY DAY IN THE MORNING   aspirin 81 MG tablet  Self Yes No   Sig: Take 81 mg by mouth daily   atorvastatin (LIPITOR) 20 mg tablet  Self No No   Sig: TAKE 1 TABLET BY MOUTH DAILY AT BEDTIME   fluticasone (FLONASE) 50 mcg/act nasal spray  Self No No   Sig: SPRAY 2 SPRAYS INTO EACH NOSTRIL EVERY DAY   glucose blood test strip  Self No No   Sig: Use as instructed   hydrochlorothiazide (HYDRODIURIL) 25 mg tablet  Self No No   Sig: TAKE 1 TABLET BY MOUTH EVERY DAY   irbesartan (AVAPRO) 150 mg tablet  Self No No   Sig: TAKE 1 TABLET BY MOUTH EVERY DAY   metFORMIN (GLUCOPHAGE-XR) 500 mg 24 hr tablet   No No   Sig: TAKE 4 TABLETS (2,000 MG TOTAL) BY MOUTH DAILY WITH DINNER   metoprolol succinate (TOPROL-XL) 100 mg 24 hr tablet   No No   Sig: TAKE 1 TABLET BY MOUTH EVERY DAY   nitroglycerin (NITROSTAT) 0 4 mg SL tablet  Self No No   Sig: Place 1 tablet (0 4 mg total) under the tongue every 5 (five) minutes as needed for chest pain   Patient taking differently: Place 0 4 mg under the tongue every 5 (five) minutes as needed for chest pain Only when needed   semaglutide, 0 25 or 0 5 mg/dose, (Ozempic) 2 mg/1 5 mL injection pen   No No   Sig: Inject 0 38 mL (0 5 mg total) under the skin every 7 days   tamsulosin (FLOMAX) 0 4 mg  Self No No   Sig: Take 2 capsules (0 8 mg total) by mouth daily with dinner      Facility-Administered Medications: None       Past Medical History:   Diagnosis Date   • Allergic    • Arthritis 2017    Fingers   • Cancer ([facility] [address] ) 2009   • Coronary artery disease    • Diabetes mellitus ([facility] [address] )    • Diastolic dysfunction    • Diverticulitis    • History of open heart surgery    • Hyperlipidemia    • Hypertension    • Melanoma ([facility] [address] )     stomach area    • Obesity    • Prediabetes    • 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    Fifth metacarpal       Past Surgical History:   Procedure Laterality Date   • 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   • BOWEL RESECTION  2008    Secondary to diverticulitis   • CARDIAC CATHETERIZATION N/A 04/07/2022    Procedure: Cardiac catheterization;  Surgeon: Butch Dumont MD;  Location: [address] [place] [address] CATH LAB; Service: Cardiology   • CARDIAC CATHETERIZATION N/A 04/07/2022    Procedure: Cardiac Coronary Angiogram;  Surgeon: Butch Dumont MD;  Location: [address] [place] [address] CATH LAB;   Service: Cardiology   • COLON SURGERY  2007   • COLONOSCOPY     • CORONARY ARTERY BYPASS GRAFT     • HAND SURGERY      triple bypass   • HERNIA REPAIR     • MALIGNANT SKIN LESION EXCISION  1963    Abdominal wall surgical resection of melanoma   • NV COLONOSCOPY FLX DX W/COLLJ SPEC WHEN PFRMD N/A 12/11/2018    Procedure: COLONOSCOPY;  Surgeon: Dawn Fleischer, MD; Location: [facility]; Service: Gastroenterology   • SKIN BIOPSY         Family History   Problem Relation Age of Onset   • Emphysema Maternal Grandfather    • Coronary artery disease Mother    • Hyperlipidemia Mother    • Hypertension Mother    • Diabetes type II Mother    • Arthritis Mother    • Diabetes type II Father    • Basal cell carcinoma Father    • Hypertension Father    • Heart disease Father    • Diabetes Father    • Hypertension Sister      I have reviewed and agree with the history as documented  E-Cigarette/Vaping   • E-Cigarette Use Never User      E-Cigarette/Vaping Substances   • Nicotine No    • THC No    • CBD No    • Flavoring No    • Other No    • Unknown No      Social History     Tobacco Use   • Smoking status: Never   • Smokeless tobacco: Never   Vaping Use   • Vaping Use: Never used   Substance Use Topics   • Alcohol use: Not Currently   • Drug use: No       Review of Systems   Constitutional: Negative for chills, diaphoresis and fever  +Generalized weakness  HENT: Negative for congestion, hearing loss, rhinorrhea and sore throat  Eyes: Negative for photophobia, pain and visual disturbance  Respiratory: Negative for cough, chest tightness, shortness of breath and wheezing  Cardiovascular: Positive for chest pain  Negative for palpitations and leg swelling  Gastrointestinal: Negative for abdominal pain, constipation, diarrhea, nausea and vomiting  Genitourinary: Negative for dysuria, flank pain, frequency and hematuria  Musculoskeletal: Negative for back pain, neck pain and neck stiffness  Skin: Negative for color change, pallor, rash and wound  Allergic/Immunologic: Negative for immunocompromised state  Neurological: Positive for light-headedness  Negative for dizziness, syncope, facial asymmetry, speech difficulty, weakness, numbness and headaches  Hematological: Negative for adenopathy  Does not bruise/bleed easily     Psychiatric/Behavioral: Negative for confusion and decreased concentration  All other systems reviewed and are negative  Physical Exam  Physical Exam  Vitals and nursing note reviewed  Constitutional:       General: He is not in acute distress  Appearance: Normal appearance  He is well-developed  He is not ill-appearing, toxic-appearing or diaphoretic  HENT:      Head: Normocephalic and atraumatic  Right Ear: External ear normal       Left Ear: External ear normal       Nose: Nose normal       Mouth/Throat:      Mouth: Mucous membranes are moist       Pharynx: Oropharynx is clear  Eyes:      Extraocular Movements: Extraocular movements intact  Conjunctiva/sclera: Conjunctivae normal       Pupils: Pupils are equal, round, and reactive to light  Neck:      Vascular: No JVD  Cardiovascular:      Rate and Rhythm: Normal rate and regular rhythm  Pulses: Normal pulses  Heart sounds: Normal heart sounds  No murmur heard  No friction rub  No gallop  Comments: No significant murmur appreciated  Pulmonary:      Effort: Pulmonary effort is normal  No respiratory distress  Breath sounds: Normal breath sounds  No wheezing, rhonchi or rales  Chest:      Chest wall: No tenderness  Abdominal:      General: There is no distension  Palpations: Abdomen is soft  Tenderness: There is no abdominal tenderness  There is no guarding or rebound  Musculoskeletal:         General: No swelling or tenderness  Normal range of motion  Cervical back: Normal range of motion and neck supple  No rigidity  Right lower leg: No edema  Left lower leg: No edema  Skin:     General: Skin is warm and dry  Coloration: Skin is not pale  Findings: No erythema or rash  Neurological:      General: No focal deficit present  Mental Status: He is alert and oriented to person, place, and time  Cranial Nerves: No cranial nerve deficit  Sensory: No sensory deficit  Motor: No weakness  Psychiatric:         Mood and Affect: Mood normal          Behavior: Behavior normal          Vital Signs  ED Triage Vitals   Temperature Pulse Respirations Blood Pressure SpO2   05/27/23 1634 05/27/23 1636 05/27/23 1636 05/27/23 1636 05/27/23 1636   98 °F (36 7 °C) 88 18 114/71 97 %      Temp src Heart Rate Source Patient Position - Orthostatic VS BP Location FiO2 (%)   -- 05/27/23 1636 05/27/23 1636 05/27/23 1636 --    Monitor Sitting Left arm       Pain Score       --                  Vitals:    05/27/23 1709 05/27/23 1712 05/27/23 1800 05/27/23 1830   BP: 106/67 107/71 96/65 106/67   Pulse: 79 94 74 72   Patient Position - Orthostatic VS: Sitting - Orthostatic VS Standing for 3 minutes - Orthostatic VS Sitting Lying     Vitals:    05/27/23 1709 05/27/23 1712 05/27/23 1800 05/27/23 1830   BP: 106/67 107/71 96/65 106/67   BP Location: Left arm Left arm Left arm Left arm   Pulse: 79 94 74 72   Resp:   17 16   Temp:       SpO2:   96% 98%   Weight:       Height:           Visual Acuity      ED Medications  Medications   sodium chloride 0 9 % bolus 1,000 mL (0 mL Intravenous Stopped 5/27/23 1808)   magnesium Oxide (MAG-OX) tablet 400 mg (400 mg Oral Given 5/27/23 1818)       Diagnostic Studies  Results Reviewed     Procedure Component Value Units Date/Time    Basic metabolic panel [[id number]]  (Abnormal) Collected: 05/27/23 1819    Lab Status: Final result Specimen: Blood from Arm, Right Updated: 05/27/23 1837     Sodium 136 mmol/L      Potassium 3 5 mmol/L      Chloride 103 mmol/L      CO2 23 mmol/L      ANION GAP 10 mmol/L      BUN 29 mg/dL      Creatinine 1 36 mg/dL      Glucose 119 mg/dL      Calcium 9 1 mg/dL      eGFR 54 ml/min/1 73sq m     Narrative:      [place] guidelines for Chronic Kidney Disease (CKD):   •  Stage 1 with normal or high GFR (GFR > 90 mL/min/1 73 square meters)  •  Stage 2 Mild CKD (GFR = 60-89 mL/min/1 73 square meters)  •  Stage 3A Moderate CKD (GFR = 45-59 mL/min/1 73 square meters)  •  Stage 3B Moderate CKD (GFR = 30-44 mL/min/1 73 square meters)  •  Stage 4 Severe CKD (GFR = 15-29 mL/min/1 73 square meters)  •  Stage 5 End Stage CKD (GFR <15 mL/min/1 73 square meters)  Note: GFR calculation is accurate only with a steady state creatinine    TSH, 3rd generation with Free T4 reflex [[id number]]  (Normal) Collected: 05/27/23 1708    Lab Status: Final result Specimen: Blood from Arm, Right Updated: 05/27/23 1802     TSH 3RD GENERATON 1 495 uIU/mL     HS Troponin 0hr (reflex protocol) [[id number]]  (Normal) Collected: 05/27/23 1708    Lab Status: Final result Specimen: Blood from Arm, Right Updated: 05/27/23 1754     hs TnI 0hr 3 ng/L     Basic metabolic panel [[id number]]  (Abnormal) Collected: 05/27/23 1708    Lab Status: Final result Specimen: Blood from Arm, Right Updated: 05/27/23 1749     Sodium 136 mmol/L      Potassium 3 7 mmol/L      Chloride 99 mmol/L      CO2 27 mmol/L      ANION GAP 10 mmol/L      BUN 30 mg/dL      Creatinine 1 57 mg/dL      Glucose 143 mg/dL      Calcium 10 0 mg/dL      eGFR 45 ml/min/1 73sq m     Narrative:      [place] guidelines for Chronic Kidney Disease (CKD):   •  Stage 1 with normal or high GFR (GFR > 90 mL/min/1 73 square meters)  •  Stage 2 Mild CKD (GFR = 60-89 mL/min/1 73 square meters)  •  Stage 3A Moderate CKD (GFR = 45-59 mL/min/1 73 square meters)  •  Stage 3B Moderate CKD (GFR = 30-44 mL/min/1 73 square meters)  •  Stage 4 Severe CKD (GFR = 15-29 mL/min/1 73 square meters)  •  Stage 5 End Stage CKD (GFR <15 mL/min/1 73 square meters)  Note: GFR calculation is accurate only with a steady state creatinine    Hepatic function panel [[id number]]  (Normal) Collected: 05/27/23 1708    Lab Status: Final result Specimen: Blood from Arm, Right Updated: 05/27/23 1749     Total Bilirubin 0 57 mg/dL      Bilirubin, Direct 0 05 mg/dL      Alkaline Phosphatase 63 U/L      AST 16 U/L      ALT 19 U/L      Total Protein 7 3 g/dL      Albumin 4 3 g/dL     Magnesium [[id number]]  (Abnormal) Collected: 05/27/23 1708    Lab Status: Final result Specimen: Blood from Arm, Right Updated: 05/27/23 1749     Magnesium 1 8 mg/dL     Protime-INR [[id number]]  (Normal) Collected: 05/27/23 1708    Lab Status: Final result Specimen: Blood from Arm, Right Updated: 05/27/23 1736     Protime 13 8 seconds      INR 1 08    APTT [[id number]]  (Normal) Collected: 05/27/23 1708    Lab Status: Final result Specimen: Blood from Arm, Right Updated: 05/27/23 1736     PTT 26 seconds     CBC and differential [[id number]] Collected: 05/27/23 1708    Lab Status: Final result Specimen: Blood from Arm, Right Updated: 05/27/23 1730     WBC 7 62 Thousand/uL      RBC 4 85 Million/uL      Hemoglobin 14 5 g/dL      Hematocrit 40 7 %      MCV 84 fL      MCH 29 9 pg      MCHC 35 6 g/dL      RDW 12 6 %      MPV 10 0 fL      Platelets 583 Thousands/uL      nRBC 0 /100 WBCs      Neutrophils Relative 56 %      Immat GRANS % 0 %      Lymphocytes Relative 32 %      Monocytes Relative 9 %      Eosinophils Relative 2 %      Basophils Relative 1 %      Neutrophils Absolute 4 28 Thousands/µL      Immature Grans Absolute 0 02 Thousand/uL      Lymphocytes Absolute 2 46 Thousands/µL      Monocytes Absolute 0 67 Thousand/µL      Eosinophils Absolute 0 14 Thousand/µL      Basophils Absolute 0 05 Thousands/µL                  XR chest 2 views   ED Interpretation by Louisa Carroll DO (05/27 1809)   No acute abnormality in the chest                  Procedures  ECG 12 Lead Documentation Only    Date/Time: 5/27/2023 4:38 PM    Performed by: Louisa Carroll DO  Authorized by: Louisa Carroll DO    ECG reviewed by me, the ED Provider: yes    Patient location:  ED  Previous ECG:     Previous ECG:  Compared to current    Comparison ECG info:  NO change compared to EKG obtained in urgent care today 5/27 @ 15:44   EKG prior to that on 6-5-2017, no significant change other than new Q waves in inferior leads  Rate:     ECG rate:  91    ECG rate assessment: normal    Rhythm:     Rhythm: sinus rhythm    Ectopy:     Ectopy: none    QRS:     QRS axis:  Left    QRS intervals:  Normal  Conduction:     Conduction: abnormal      Abnormal conduction: LAFB    ST segments:     ST segments:  Normal  T waves:     T waves: normal    Q waves:     Q waves:  III and aVF             ED Course  ED Course as of 05/27/23 1854   Sat May 27, 2023   1739 Hemoglobin: 14 5   1739 WBC: 7 62   1739 Platelet Count: 429   1739 Orthostatic VS reviewed and unremarkable  1807 hs TnI 0hr: 3   1807 Creatinine(!): 1 57   1807 eGFR: 45  TYE present compared to labs 2 months ago  Will recheck after 1L IVF as this could be secondary to dehydration  1808 Magnesium(!): 1 8  Will replace with oral magnesium  1809 hs TnI 0hr: 3  Right-sided chest wall pain has been constant since yesterday therefore repeat troponin testing not necessary  [id number] Patient updated of work-up thus far including mild acute kidney injury  We will recheck BMP now that he got the 1 L of IV fluids  Blood pressure still soft, 96/65 however patient currently is denying any symptoms and is asymptomatic  Will ambulate patient in the ED to see if he becomes lightheaded  I also [place] texted on-call cardiologist, Dr Fouzia Heart to discuss possible medication dose adjustments upon discharge  I did caution patient about getting on a cruise tomorrow with his current symptoms and low blood pressure however patient is adamant that he will not miss his vacation  1824 Per nursing, patient ambulated without difficulty, without unsteadiness and he denies feeling dizzy/lightheaded  Im [place] [address] with cardiologist via tiger text, Dr Fouzia Heart, who recommended stopping the HCTZ and cutting his dose of Irbesartan in half and to continue the beta blocker at current dose   He suggested he take his BP cuff with him on his cruise to monitor and if the BP starts becoming too elevated, he may resume the normal dose of irbesartan  1845 Creatinine(!): 1 36   1845 eGFR: 54  Improved after 1 L  Will encourage further water intake/hydration at home  Recommended he f/u with his PCP and his cardiologist ASAP  I also cautioned further use of Ozempic with TYE and recommended he discuss with PCP  Discussed ED return parameters  HEART Risk Score    Flowsheet Row Most Recent Value   Heart Score Risk Calculator    History 0 Filed at: 05/27/2023 1808   ECG 0 Filed at: 05/27/2023 1808   Age 2 Filed at: 05/27/2023 1808   Risk Factors 2 Filed at: 05/27/2023 1808   Troponin 0 Filed at: 05/27/2023 1808   HEART Score 4 Filed at: 05/27/2023 1808                        SBIRT 20yo+    Flowsheet Row Most Recent Value   Initial Alcohol Screen: US AUDIT-C     1  How often do you have a drink containing alcohol? 0 Filed at: 05/27/2023 1657   2  How many drinks containing alcohol do you have on a typical day you are drinking? 0 Filed at: 05/27/2023 1657   3b  FEMALE Any Age, or MALE 65+: How often do you have 4 or more drinks on one occassion? 0 Filed at: 05/27/2023 1657   Audit-C Score 0 Filed at: 05/27/2023 1657   NAYELI: How many times in the past year have you    Used an illegal drug or used a prescription medication for non-medical reasons? Never Filed at: 05/27/2023 1657                    Medical Decision Making  79-year-old male with history of hypertension, hyperlipidemia, diabetes, CAD, presents to the ED for 2 days of postural and positional lightheadedness, worse with bending and standing up  Lightheadedness episodes are brief  No vertigo  Patient noted hypotension today at home and at urgent care and was referred here  Suspect that this is likely combination of dehydration and medication adverse effects as he is on high-dose metoprolol, hydrochlorothiazide, irbesartan as well as Ozempic, all of which can lower BP   Given his recent weight loss, possibly these dosages are not longer needed and he needs dose adjustment  Will work up with cardiac labs, EKG, CXR  Will check orthostatic VS  Will hydrate with 1L IVF and monitor BP  Will consult with cardiology regarding possible medication dose changes  Amount and/or Complexity of Data Reviewed  External Data Reviewed: ECG and notes  Details: Reviewed records from cardiac cath and echocardiogram in 2022  Reviewed records from recent urgent care visit from earlier today 5/27 as well as prior EKG done today and in 2017  Labs: ordered  Decision-making details documented in ED Course  Radiology: ordered and independent interpretation performed  Decision-making details documented in ED Course  ECG/medicine tests: ordered and independent interpretation performed  Decision-making details documented in ED Course  Risk  Prescription drug management  Disposition  Final diagnoses:   Postural lightheadedness   Hypotensive episode   TYE (acute kidney injury) ([facility] [address] )     Time reflects when diagnosis was documented in both MDM as applicable and the Disposition within this note     Time User Action Codes Description Comment    5/27/2023  6:52 PM Sanket Clause E Add [R42] Postural lightheadedness     5/27/2023  6:52 PM Sanket Clause E Add [I95 9] Hypotensive episode     5/27/2023  6:52 PM Sanket Clause E Add [N17 9] TYE (acute kidney injury) [facility])       ED Disposition     ED Disposition   Discharge    Condition   Stable    Date/Time   Sat May 27, 2023  6:52 PM    Comment   [facility] [place] discharge to home/self care                 Follow-up Information     Follow up With Specialties Details Why Contact Info Additional Information    Renetta Jacob MD Internal Medicine, Hospice Services, Palliative Care Schedule an appointment as soon as possible for a visit   [address] [facility] [address] [facility]       Vinayak Rodriguez MD Cardiology Schedule an appointment as soon as possible for a visit   [address] [place] [address]/[facility] [place] [address] [facility] Emergency Department Emergency Medicine Go to  If symptoms worsen 34 [facility] 109 [facility] Emergency Department, [address] [place] [address], [id number]          Patient's Medications   Discharge Prescriptions    No medications on file       No discharge procedures on file      PDMP Review     None          ED Provider  Electronically Signed by           Agnieszka Flores DO  05/27/23 9314

## 2023-05-28 LAB
ATRIAL RATE: 92 BPM
P AXIS: 44 DEGREES
PR INTERVAL: 166 MS
QRS AXIS: -54 DEGREES
QRSD INTERVAL: 92 MS
QT INTERVAL: 360 MS
QTC INTERVAL: 445 MS
T WAVE AXIS: 24 DEGREES
VENTRICULAR RATE: 92 BPM

## 2023-06-01 LAB
ATRIAL RATE: 91 BPM
P AXIS: 62 DEGREES
PR INTERVAL: 164 MS
QRS AXIS: -49 DEGREES
QRSD INTERVAL: 90 MS
QT INTERVAL: 366 MS
QTC INTERVAL: 450 MS
T WAVE AXIS: 7 DEGREES
VENTRICULAR RATE: 91 BPM

## 2023-06-05 ENCOUNTER — HOSPITAL ENCOUNTER (OUTPATIENT)
Dept: RADIOLOGY | Facility: MEDICAL CENTER | Age: 68
Discharge: HOME | End: 2023-06-05
Payer: COMMERCIAL

## 2023-06-05 DIAGNOSIS — R33.9 INCOMPLETE BLADDER EMPTYING: ICD-10-CM

## 2023-06-05 PROCEDURE — 76775 US EXAM ABDO BACK WALL LIM: CPT

## 2023-06-07 ENCOUNTER — OFFICE VISIT (OUTPATIENT)
Age: 68
End: 2023-06-07
Payer: COMMERCIAL

## 2023-06-07 ENCOUNTER — APPOINTMENT (OUTPATIENT)
Age: 68
End: 2023-06-07
Payer: COMMERCIAL

## 2023-06-07 VITALS
HEIGHT: 67 IN | OXYGEN SATURATION: 97 % | DIASTOLIC BLOOD PRESSURE: 80 MMHG | HEART RATE: 94 BPM | BODY MASS INDEX: 29.66 KG/M2 | WEIGHT: 189 LBS | SYSTOLIC BLOOD PRESSURE: 130 MMHG

## 2023-06-07 DIAGNOSIS — N17.9 AKI (ACUTE KIDNEY INJURY) (HCC): ICD-10-CM

## 2023-06-07 DIAGNOSIS — N17.9 AKI (ACUTE KIDNEY INJURY) (HCC): Primary | ICD-10-CM

## 2023-06-07 DIAGNOSIS — I35.0 NONRHEUMATIC AORTIC VALVE STENOSIS: ICD-10-CM

## 2023-06-07 DIAGNOSIS — I10 ESSENTIAL HYPERTENSION: ICD-10-CM

## 2023-06-07 LAB
ANION GAP SERPL CALCULATED.3IONS-SCNC: 2 MMOL/L (ref 4–13)
BUN SERPL-MCNC: 21 MG/DL (ref 5–25)
CALCIUM SERPL-MCNC: 9.6 MG/DL (ref 8.3–10.1)
CHLORIDE SERPL-SCNC: 109 MMOL/L (ref 96–108)
CO2 SERPL-SCNC: 28 MMOL/L (ref 21–32)
CREAT SERPL-MCNC: 1.2 MG/DL (ref 0.6–1.3)
GFR SERPL CREATININE-BSD FRML MDRD: 63 ML/MIN/1.73SQ M
GLUCOSE P FAST SERPL-MCNC: 208 MG/DL (ref 65–99)
POTASSIUM SERPL-SCNC: 4.4 MMOL/L (ref 3.5–5.3)
SODIUM SERPL-SCNC: 139 MMOL/L (ref 135–147)

## 2023-06-07 PROCEDURE — 80048 BASIC METABOLIC PNL TOTAL CA: CPT

## 2023-06-07 PROCEDURE — 36415 COLL VENOUS BLD VENIPUNCTURE: CPT

## 2023-06-07 PROCEDURE — 99214 OFFICE O/P EST MOD 30 MIN: CPT | Performed by: INTERNAL MEDICINE

## 2023-06-07 RX ORDER — IRBESARTAN 75 MG/1
75 TABLET ORAL DAILY
Start: 2023-06-07

## 2023-06-07 NOTE — PATIENT INSTRUCTIONS
ER work-up reviewed, echo from March 2022 and 2017 reviewed    Hypotension-likely multifactorial related to weight loss, addition of Jardiance and perhaps some influence from the Ozempic, more likely related to the weight loss in that regard  Blood pressure today is stable off of hydrochlorothiazide on half dose of irbesartan, continue with same and monitor home blood pressures closely  Acute kidney injury-likely multifactorial related to diuretic, Jardiance, hypotension  We will repeat basic metabolic panel today and follow accordingly    Aortic stenosis-mild by echo in 2022, recheck echo and follow accordingly    Follow-up labs should be after July 21 and reschedule follow-up to soon after those labs

## 2023-06-07 NOTE — PROGRESS NOTES
Assessment/Plan:    Diagnoses and all orders for this visit:    TYE (acute kidney injury) ([facility] [address] )  -     Basic metabolic panel; Future    Essential hypertension  -     irbesartan (AVAPRO) 75 mg tablet; Take 1 tablet (75 mg total) by mouth daily    Nonrheumatic aortic valve stenosis  -     Echo complete w/ contrast if indicated; Future              Patient Instructions   ER work-up reviewed, echo from March 2022 and 2017 reviewed    Hypotension-likely multifactorial related to weight loss, addition of Jardiance and perhaps some influence from the Ozempic, more likely related to the weight loss in that regard  Blood pressure today is stable off of hydrochlorothiazide on half dose of irbesartan, continue with same and monitor home blood pressures closely  Acute kidney injury-likely multifactorial related to diuretic, Jardiance, hypotension  We will repeat basic metabolic panel today and follow accordingly    Aortic stenosis-mild by echo in 2022, recheck echo and follow accordingly    Follow-up labs should be after July 21 and reschedule follow-up to soon after those labs  Subjective:      Patient ID: Shanta Thibodeaux is a 74 y o  male    ER follow-up    Patient had been feeling lightheaded upon standing on the 26th and 27 May  He took his blood pressure and it was 90/70 so he decided to go to urgent care  While in urgent care he had persistent low blood pressure but they also heard a murmur so they sent him to the emergency department  In the ER no murmur was heard  He received IV fluids bringing his systolic up from  and he was told to make some medication adjustments  In consultation with cardiology he dropped his irbesartan to half tablet daily and cut out hydrochlorothiazide  He had not been routinely taking blood pressures prior to these dizzy episodes  He has been on Ozempic for about 7 weeks and had increased from 0 25 to 0 5 mg 2 weeks prior to these episodes    He had lost 13 pounds in that interval   Subsequent to this episode he went on a cruise and gained a few pounds  He has had no further lightheadedness and is checked his blood pressure a few times and its been in the normal range  He did not have any nausea, vomiting, diarrhea, excessive sweating or other notable changes to precipitate this hypotension  He was noted w/ shawn w/ baseline 70's down to 45            Current Outpatient Medications:   •  Accu-Chek FastClix Lancets MISC, USE TO TEST BLOOD SUGAR AS DIRECTED, Disp: , Rfl:   •  aspirin 81 MG tablet, Take 81 mg by mouth daily, Disp: , Rfl:   •  atorvastatin (LIPITOR) 20 mg tablet, TAKE 1 TABLET BY MOUTH DAILY AT BEDTIME, Disp: 90 tablet, Rfl: 3  •  Blood Glucose Monitoring Suppl (Accu-Chek Guide) w/Device KIT, , Disp: , Rfl:   •  Cholecalciferol (Vitamin D3 Super Strength) 50 MCG (2000 UT) CAPS, 1 po qd, Disp: , Rfl:   •  fluticasone (FLONASE) 50 mcg/act nasal spray, SPRAY 2 SPRAYS INTO EACH NOSTRIL EVERY DAY, Disp: 48 mL, Rfl: 1  •  glucose blood test strip, Use as instructed, Disp: 200 each, Rfl: 0  •  irbesartan (AVAPRO) 75 mg tablet, Take 1 tablet (75 mg total) by mouth daily, Disp: , Rfl:   •  Jardiance 25 MG TABS, TAKE 1 TABLET BY MOUTH EVERY DAY IN THE MORNING, Disp: 90 tablet, Rfl: 3  •  metFORMIN (GLUCOPHAGE-XR) 500 mg 24 hr tablet, TAKE 4 TABLETS (2,000 MG TOTAL) BY MOUTH DAILY WITH DINNER, Disp: 360 tablet, Rfl: 1  •  metoprolol succinate (TOPROL-XL) 100 mg 24 hr tablet, TAKE 1 TABLET BY MOUTH EVERY DAY, Disp: 90 tablet, Rfl: 0  •  nitroglycerin (NITROSTAT) 0 4 mg SL tablet, Place 1 tablet (0 4 mg total) under the tongue every 5 (five) minutes as needed for chest pain (Patient taking differently: Place 0 4 mg under the tongue every 5 (five) minutes as needed for chest pain Only when needed), Disp: 100 tablet, Rfl: 0  •  semaglutide, 0 25 or 0 5 mg/dose, (Ozempic) 2 mg/1 5 mL injection pen, Inject 0 38 mL (0 5 mg total) under the skin every 7 days, Disp: 2 mL, Rfl: 5  • tamsulosin (FLOMAX) 0 4 mg, Take 2 capsules (0 8 mg total) by mouth daily with dinner, Disp: 180 capsule, Rfl: 3  •  Insulin Pen Needle 33G X 4 MM MISC, Use 3 (three) times a day (Patient not taking: Reported on 6/7/2023), Disp: 100 each, Rfl: 0    Recent Results (from the past 1008 hour(s))   ECG 12 lead    Collection Time: 05/27/23  3:44 PM   Result Value Ref Range    Ventricular Rate 92 BPM    Atrial Rate 92 BPM    ME Interval 166 ms    QRSD Interval 92 ms    QT Interval 360 ms    QTC Interval 445 ms    P Axis 44 degrees    QRS Axis -54 degrees    T Wave Axis 24 degrees   ECG 12 lead    Collection Time: 05/27/23  4:37 PM   Result Value Ref Range    Ventricular Rate 91 BPM    Atrial Rate 91 BPM    ME Interval 164 ms    QRSD Interval 90 ms    QT Interval 366 ms    QTC Interval 450 ms    P Axis 62 degrees    QRS Axis -49 degrees    T Wave Axis 7 degrees   CBC and differential    Collection Time: 05/27/23  5:08 PM   Result Value Ref Range    WBC 7 62 4 31 - 10 16 Thousand/uL    RBC 4 85 3 88 - 5 62 Million/uL    Hemoglobin 14 5 12 0 - 17 0 g/dL    Hematocrit 40 7 36 5 - 49 3 %    MCV 84 82 - 98 fL    MCH 29 9 26 8 - 34 3 pg    MCHC 35 6 31 4 - 37 4 g/dL    RDW 12 6 11 6 - 15 1 %    MPV 10 0 8 9 - 12 7 fL    Platelets 418 665 - 024 Thousands/uL    nRBC 0 /100 WBCs    Neutrophils Relative 56 43 - 75 %    Immat GRANS % 0 0 - 2 %    Lymphocytes Relative 32 14 - 44 %    Monocytes Relative 9 4 - 12 %    Eosinophils Relative 2 0 - 6 %    Basophils Relative 1 0 - 1 %    Neutrophils Absolute 4 28 1 85 - 7 62 Thousands/µL    Immature Grans Absolute 0 02 0 00 - 0 20 Thousand/uL    Lymphocytes Absolute 2 46 0 60 - 4 47 Thousands/µL    Monocytes Absolute 0 67 0 17 - 1 22 Thousand/µL    Eosinophils Absolute 0 14 0 00 - 0 61 Thousand/µL    Basophils Absolute 0 05 0 00 - 0 10 Thousands/µL   Protime-INR    Collection Time: 05/27/23  5:08 PM   Result Value Ref Range    Protime 13 8 11 6 - 14 5 seconds    INR 1 08 0 84 - 1 19   APTT "Collection Time: 05/27/23  5:08 PM   Result Value Ref Range    PTT 26 23 - 37 seconds   Basic metabolic panel    Collection Time: 05/27/23  5:08 PM   Result Value Ref Range    Sodium 136 135 - 147 mmol/L    Potassium 3 7 3 5 - 5 3 mmol/L    Chloride 99 96 - 108 mmol/L    CO2 27 21 - 32 mmol/L    ANION GAP 10 4 - 13 mmol/L    BUN 30 (H) 5 - 25 mg/dL    Creatinine 1 57 (H) 0 60 - 1 30 mg/dL    Glucose 143 (H) 65 - 140 mg/dL    Calcium 10 0 8 4 - 10 2 mg/dL    eGFR 45 ml/min/1 73sq m   Hepatic function panel    Collection Time: 05/27/23  5:08 PM   Result Value Ref Range    Total Bilirubin 0 57 0 20 - 1 00 mg/dL    Bilirubin, Direct 0 05 0 00 - 0 20 mg/dL    Alkaline Phosphatase 63 34 - 104 U/L    AST 16 13 - 39 U/L    ALT 19 7 - 52 U/L    Total Protein 7 3 6 4 - 8 4 g/dL    Albumin 4 3 3 5 - 5 0 g/dL   TSH, 3rd generation with Free T4 reflex    Collection Time: 05/27/23  5:08 PM   Result Value Ref Range    TSH 3RD GENERATON 1 495 0 450 - 4 500 uIU/mL   Magnesium    Collection Time: 05/27/23  5:08 PM   Result Value Ref Range    Magnesium 1 8 (L) 1 9 - 2 7 mg/dL   HS Troponin 0hr (reflex protocol)    Collection Time: 05/27/23  5:08 PM   Result Value Ref Range    hs TnI 0hr 3 \"Refer to ACS Flowchart\"- see link ng/L   Basic metabolic panel    Collection Time: 05/27/23  6:19 PM   Result Value Ref Range    Sodium 136 135 - 147 mmol/L    Potassium 3 5 3 5 - 5 3 mmol/L    Chloride 103 96 - 108 mmol/L    CO2 23 21 - 32 mmol/L    ANION GAP 10 4 - 13 mmol/L    BUN 29 (H) 5 - 25 mg/dL    Creatinine 1 36 (H) 0 60 - 1 30 mg/dL    Glucose 119 65 - 140 mg/dL    Calcium 9 1 8 4 - 10 2 mg/dL    eGFR 54 ml/min/1 73sq m       The following portions of the patient's history were reviewed and updated as appropriate: allergies, current medications, past family history, past medical history, past social history, past surgical history and problem list      Review of Systems   Constitutional: Negative for appetite change, chills, diaphoresis, " fatigue, fever and unexpected weight change  HENT: Negative for congestion, hearing loss and rhinorrhea  Eyes: Negative for visual disturbance  Respiratory: Negative for cough, chest tightness, shortness of breath and wheezing  Cardiovascular: Negative for chest pain, palpitations and leg swelling  Gastrointestinal: Negative for abdominal pain and blood in stool  Endocrine: Negative for cold intolerance, heat intolerance, polydipsia and polyuria  Genitourinary: Negative for difficulty urinating, dysuria, frequency and urgency  Musculoskeletal: Negative for arthralgias and myalgias  Skin: Negative for rash  Neurological: Negative for dizziness, weakness, light-headedness and headaches  Hematological: Does not bruise/bleed easily  Psychiatric/Behavioral: Negative for dysphoric mood and sleep disturbance  Objective:      Vitals:    06/07/23 1507   BP: 130/80   Pulse: 94   SpO2: 97%          Physical Exam  Constitutional:       Appearance: He is well-developed  HENT:      Head: Normocephalic and atraumatic  Nose: Nose normal    Eyes:      General: No scleral icterus  Conjunctiva/sclera: Conjunctivae normal       Pupils: Pupils are equal, round, and reactive to light  Neck:      Thyroid: No thyromegaly  Vascular: No JVD  Trachea: No tracheal deviation  Cardiovascular:      Rate and Rhythm: Normal rate and regular rhythm  Heart sounds: Murmur heard  No friction rub  No gallop  Pulmonary:      Effort: Pulmonary effort is normal  No respiratory distress  Breath sounds: Normal breath sounds  No wheezing or rales  Musculoskeletal:         General: No deformity  Cervical back: Normal range of motion and neck supple  Lymphadenopathy:      Cervical: No cervical adenopathy  Skin:     General: Skin is warm and dry  Coloration: Skin is not pale  Findings: No erythema or rash     Neurological:      Mental Status: He is alert and oriented to person, place, and time  Cranial Nerves: No cranial nerve deficit  Psychiatric:         Behavior: Behavior normal          Thought Content:  Thought content normal          Judgment: Judgment normal

## 2023-06-09 NOTE — RESULT ENCOUNTER NOTE
Has follow-up scheduled with Jillian Klein 6/15/2023    Unremarkable ultrasound other than slightly elevated PVR

## 2023-06-15 ENCOUNTER — OFFICE VISIT (OUTPATIENT)
Dept: UROLOGY | Facility: CLINIC | Age: 68
End: 2023-06-15
Payer: COMMERCIAL

## 2023-06-15 VITALS
DIASTOLIC BLOOD PRESSURE: 78 MMHG | HEART RATE: 77 BPM | BODY MASS INDEX: 28.56 KG/M2 | SYSTOLIC BLOOD PRESSURE: 124 MMHG | HEIGHT: 67 IN | OXYGEN SATURATION: 98 % | WEIGHT: 182 LBS

## 2023-06-15 DIAGNOSIS — N40.1 BENIGN LOCALIZED PROSTATIC HYPERPLASIA WITH LOWER URINARY TRACT SYMPTOMS (LUTS): Primary | ICD-10-CM

## 2023-06-15 DIAGNOSIS — E34.9 TESTOSTERONE DEFICIENCY: ICD-10-CM

## 2023-06-15 LAB — POST-VOID RESIDUAL VOLUME, ML POC: 164 ML

## 2023-06-15 PROCEDURE — 51798 US URINE CAPACITY MEASURE: CPT | Performed by: PHYSICIAN ASSISTANT

## 2023-06-15 PROCEDURE — 99213 OFFICE O/P EST LOW 20 MIN: CPT | Performed by: PHYSICIAN ASSISTANT

## 2023-06-15 NOTE — PROGRESS NOTES
UROLOGY PROGRESS NOTE   Patient Identifiers: Gianna Herron (MRN [id number])  Date of Service: 6/15/2023    Subjective:   He has voiding complaints and has been on Jardiance  PSA 0 3  Also has low testosterone  Follow-up labs are pending  He is on 0 8 of tamsulosin  See    Reason for visit: BPH follow up    Objective:     VITALS:    There were no vitals filed for this visit    AUA SYMPTOM SCORE    Flowsheet Row Most Recent Value   AUA SYMPTOM SCORE    How often have you had a sensation of not emptying your bladder completely after you finished urinating? 3 (P)     How often have you had to urinate again less than two hours after you finished urinating? 2 (P)     How often have you found you stopped and started again several times when you urinate? 5 (P)     How often have you found it difficult to postpone urination? 1 (P)     How often have you had a weak urinary stream? 2 (P)     How often have you had to push or strain to begin urination? 1 (P)     How many times did you most typically get up to urinate from the time you went to bed at night until the time you got up in the morning? 5 (P)     Quality of Life: If you were to spend the rest of your life with your urinary condition just the way it is now, how would you feel about that? 3 (P)     AUA SYMPTOM SCORE 19 (P)             LABS:  Lab Results   Component Value Date    HCT 40 7 05/27/2023    HGB 14 5 05/27/2023     05/27/2023    WBC 7 62 05/27/2023   ]    Lab Results   Component Value Date    BUN 21 06/07/2023    CALCIUM 9 6 06/07/2023     (H) 06/07/2023    CO2 28 06/07/2023    CREATININE 1 20 06/07/2023    GLUCOSE 119 (H) 09/21/2017    K 4 4 06/07/2023     09/21/2017   ]        INPATIENT MEDS:    Current Outpatient Medications:   •  Accu-Chek FastClix Lancets MISC, USE TO TEST BLOOD SUGAR AS DIRECTED, Disp: , Rfl:   •  aspirin 81 MG tablet, Take 81 mg by mouth daily, Disp: , Rfl:   •  atorvastatin (LIPITOR) 20 mg tablet, TAKE 1 TABLET BY MOUTH DAILY AT BEDTIME, Disp: 90 tablet, Rfl: 3  •  Blood Glucose Monitoring Suppl (Accu-Chek Guide) w/Device KIT, , Disp: , Rfl:   •  Cholecalciferol (Vitamin D3 Super Strength) 50 MCG (2000 UT) CAPS, 1 po qd, Disp: , Rfl:   •  fluticasone (FLONASE) 50 mcg/act nasal spray, SPRAY 2 SPRAYS INTO EACH NOSTRIL EVERY DAY, Disp: 48 mL, Rfl: 1  •  glucose blood test strip, Use as instructed, Disp: 200 each, Rfl: 0  •  Insulin Pen Needle 33G X 4 MM MISC, Use 3 (three) times a day (Patient not taking: Reported on 6/7/2023), Disp: 100 each, Rfl: 0  •  irbesartan (AVAPRO) 75 mg tablet, Take 1 tablet (75 mg total) by mouth daily, Disp: , Rfl:   •  Jardiance 25 MG TABS, TAKE 1 TABLET BY MOUTH EVERY DAY IN THE MORNING, Disp: 90 tablet, Rfl: 3  •  metFORMIN (GLUCOPHAGE-XR) 500 mg 24 hr tablet, TAKE 4 TABLETS (2,000 MG TOTAL) BY MOUTH DAILY WITH DINNER, Disp: 360 tablet, Rfl: 1  •  metoprolol succinate (TOPROL-XL) 100 mg 24 hr tablet, TAKE 1 TABLET BY MOUTH EVERY DAY, Disp: 90 tablet, Rfl: 0  •  nitroglycerin (NITROSTAT) 0 4 mg SL tablet, Place 1 tablet (0 4 mg total) under the tongue every 5 (five) minutes as needed for chest pain (Patient taking differently: Place 0 4 mg under the tongue every 5 (five) minutes as needed for chest pain Only when needed), Disp: 100 tablet, Rfl: 0  •  semaglutide, 0 25 or 0 5 mg/dose, (Ozempic) 2 mg/1 5 mL injection pen, Inject 0 38 mL (0 5 mg total) under the skin every 7 days, Disp: 2 mL, Rfl: 5  •  tamsulosin (FLOMAX) 0 4 mg, Take 2 capsules (0 8 mg total) by mouth daily with dinner, Disp: 180 capsule, Rfl: 3      Physical Exam:   There were no vitals taken for this visit  GEN: no acute distress    RESP: breathing comfortably with no accessory muscle use    ABD: soft, non-tender, non-distended   INCISION:    EXT: no significant peripheral edema         RADIOLOGY:   RENAL ULTRASOUND   IMPRESSION:     Bilateral simple renal cysts       Moderate post void residual urine volume      Assessment: #1   BPH  #2    Testosterone deficiency    Plan:   -Follow-up in 6 months with labs prior to visit  -  -  -

## 2023-07-16 DIAGNOSIS — I10 HYPERTENSION, UNSPECIFIED TYPE: ICD-10-CM

## 2023-07-17 RX ORDER — METOPROLOL SUCCINATE 100 MG/1
TABLET, EXTENDED RELEASE ORAL
Qty: 90 TABLET | Refills: 0 | Status: SHIPPED | OUTPATIENT
Start: 2023-07-17

## 2023-08-01 ENCOUNTER — OFFICE VISIT (OUTPATIENT)
Dept: URGENT CARE | Facility: CLINIC | Age: 68
End: 2023-08-01
Payer: MEDICARE

## 2023-08-01 VITALS
HEART RATE: 70 BPM | DIASTOLIC BLOOD PRESSURE: 80 MMHG | TEMPERATURE: 98 F | RESPIRATION RATE: 16 BRPM | OXYGEN SATURATION: 98 % | SYSTOLIC BLOOD PRESSURE: 130 MMHG | WEIGHT: 186 LBS | BODY MASS INDEX: 29.13 KG/M2

## 2023-08-01 DIAGNOSIS — L02.412 ABSCESS OF LEFT AXILLA: Primary | ICD-10-CM

## 2023-08-01 PROCEDURE — G0463 HOSPITAL OUTPT CLINIC VISIT: HCPCS | Performed by: PHYSICIAN ASSISTANT

## 2023-08-01 PROCEDURE — 99214 OFFICE O/P EST MOD 30 MIN: CPT | Performed by: PHYSICIAN ASSISTANT

## 2023-08-01 RX ORDER — IRBESARTAN 150 MG/1
150 TABLET ORAL DAILY
COMMUNITY
Start: 2023-06-28

## 2023-08-01 RX ORDER — SULFAMETHOXAZOLE AND TRIMETHOPRIM 800; 160 MG/1; MG/1
1 TABLET ORAL EVERY 12 HOURS SCHEDULED
Qty: 14 TABLET | Refills: 0 | Status: SHIPPED | OUTPATIENT
Start: 2023-08-01 | End: 2023-08-08

## 2023-08-01 NOTE — PROGRESS NOTES
[facility]        NAME: Kyra Foy is a 74 y.o. male  : 1955    MRN: [id number]  DATE: 2023  TIME: 1:12 PM      Assessment and Plan     Abscess of left axilla [L02.412]  1. Abscess of left axilla  sulfamethoxazole-trimethoprim (BACTRIM DS) 800-160 mg per tablet            Patient Instructions   There are no Patient Instructions on file for this visit. Follow up with PCP in 3-5 days. Go to ER if symptoms worsen. Chief Complaint     Chief Complaint   Patient presents with   • Blister     Had for awhile, Last week it started getting itchy and irritated, painful and oozing. Antibiotic ointment           History of Present Illness     Patient presents with a boil under his left armpit x 3 days. He states this became infected about 1 year ago and he took antibiotics which helped. He states he squeezed it at home and foul smelling pus came out. Review of Systems     Review of Systems   Constitutional: Negative for chills, fatigue and fever. HENT: Negative for congestion, ear pain, postnasal drip, rhinorrhea, sinus pressure, sinus pain, sneezing and sore throat. Eyes: Negative for pain and visual disturbance. Respiratory: Negative for cough and shortness of breath. Cardiovascular: Negative for chest pain and palpitations. Gastrointestinal: Negative for abdominal pain, diarrhea, nausea and vomiting. Genitourinary: Negative for dysuria and hematuria. Musculoskeletal: Negative for arthralgias, back pain and myalgias. Skin: Positive for color change. Negative for rash. Neurological: Negative for dizziness, seizures, syncope, numbness and headaches. All other systems reviewed and are negative.         Current Medications       Current Outpatient Medications:   •  Accu-Chek FastClix Lancets MISC, USE TO TEST BLOOD SUGAR AS DIRECTED, Disp: , Rfl:   •  aspirin 81 MG tablet, Take 81 mg by mouth daily, Disp: , Rfl:   •  atorvastatin (LIPITOR) 20 mg tablet, TAKE 1 TABLET BY MOUTH DAILY AT BEDTIME, Disp: 90 tablet, Rfl: 3  •  Blood Glucose Monitoring Suppl (Accu-Chek Guide) w/Device KIT, , Disp: , Rfl:   •  Cholecalciferol (Vitamin D3 Super Strength) 50 MCG (2000 UT) CAPS, 1 po qd, Disp: , Rfl:   •  fluticasone (FLONASE) 50 mcg/act nasal spray, SPRAY 2 SPRAYS INTO EACH NOSTRIL EVERY DAY (Patient taking differently: 1 spray into each nostril as needed), Disp: 48 mL, Rfl: 1  •  glucose blood test strip, Use as instructed, Disp: 200 each, Rfl: 0  •  irbesartan (AVAPRO) 150 mg tablet, Take 150 mg by mouth daily, Disp: , Rfl:   •  irbesartan (AVAPRO) 75 mg tablet, Take 1 tablet (75 mg total) by mouth daily (Patient taking differently: Take 75 mg by mouth daily PT still taking 150 mg tab and cuts in half-), Disp: , Rfl:   •  Jardiance 25 MG TABS, TAKE 1 TABLET BY MOUTH EVERY DAY IN THE MORNING, Disp: 90 tablet, Rfl: 3  •  metFORMIN (GLUCOPHAGE-XR) 500 mg 24 hr tablet, TAKE 4 TABLETS (2,000 MG TOTAL) BY MOUTH DAILY WITH DINNER, Disp: 360 tablet, Rfl: 1  •  metoprolol succinate (TOPROL-XL) 100 mg 24 hr tablet, TAKE 1 TABLET BY MOUTH EVERY DAY, Disp: 90 tablet, Rfl: 0  •  semaglutide, 0.25 or 0.5 mg/dose, (Ozempic) 2 mg/1.5 mL injection pen, Inject 0.38 mL (0.5 mg total) under the skin every 7 days, Disp: 2 mL, Rfl: 5  •  sulfamethoxazole-trimethoprim (BACTRIM DS) 800-160 mg per tablet, Take 1 tablet by mouth every 12 (twelve) hours for 7 days, Disp: 14 tablet, Rfl: 0  •  tamsulosin (FLOMAX) 0.4 mg, Take 2 capsules (0.8 mg total) by mouth daily with dinner, Disp: 180 capsule, Rfl: 3  •  Insulin Pen Needle 33G X 4 MM MISC, Use 3 (three) times a day (Patient not taking: Reported on 6/7/2023), Disp: 100 each, Rfl: 0  •  nitroglycerin (NITROSTAT) 0.4 mg SL tablet, Place 1 tablet (0.4 mg total) under the tongue every 5 (five) minutes as needed for chest pain (Patient not taking: Reported on 8/1/2023), Disp: 100 tablet, Rfl: 0    Current Allergies     Allergies as of 08/01/2023   • (No Known Allergies)              The following portions of the patient's history were reviewed and updated as appropriate: allergies, current medications, past family history, past medical history, past social history, past surgical history, and problem list.     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   • BOWEL RESECTION  2008    Secondary to diverticulitis   • CARDIAC CATHETERIZATION N/A 04/07/2022    Procedure: Cardiac catheterization;  Surgeon: Lena Gustafson MD;  Location: [address] [place] [address] CATH LAB; Service: Cardiology   • CARDIAC CATHETERIZATION N/A 04/07/2022    Procedure: Cardiac Coronary Angiogram;  Surgeon: Lena Gustafson MD;  Location: [address] [place] [address] CATH LAB; Service: Cardiology   • COLON SURGERY  2007   • COLONOSCOPY     • CORONARY ARTERY BYPASS GRAFT     • HAND SURGERY      triple bypass   • HERNIA REPAIR     • MALIGNANT SKIN LESION EXCISION  1963    Abdominal wall surgical resection of melanoma   • HI COLONOSCOPY FLX DX W/COLLJ SPEC WHEN PFRMD N/A 12/11/2018    Procedure: COLONOSCOPY;  Surgeon: Erika Fajardo MD;  Location: [facility];   Service: Gastroenterology   • SKIN BIOPSY         Family History   Problem Relation Age of Onset   • Emphysema Maternal Grandfather    • Coronary artery disease Mother    • Hyperlipidemia Mother    • Hypertension Mother    • Diabetes type II Mother    • Arthritis Mother    • Diabetes type II Father    • Basal cell carcinoma Father    • Hypertension Father    • Heart disease Father • Diabetes Father    • Hypertension Sister          Medications have been verified. Objective     /80   Pulse 70   Temp 98 °F (36.7 °C)   Resp 16   Wt 84.4 kg (186 lb)   SpO2 98%   BMI 29.13 kg/m²   No LMP for male patient. Physical Exam     Physical Exam  Vitals and nursing note reviewed. Constitutional:       Appearance: Normal appearance. He is normal weight. Cardiovascular:      Rate and Rhythm: Normal rate. Pulmonary:      Effort: Pulmonary effort is normal.   Skin:     General: Skin is warm and dry. Findings: Abscess present. Comments: Small grape-sized abscess with two white-heads in left axilla. Erythematous, painful, mobile, and semi-soft. Neurological:      General: No focal deficit present. Mental Status: He is alert and oriented to person, place, and time.    Psychiatric:         Mood and Affect: Mood normal.         Behavior: Behavior normal.

## 2023-08-21 ENCOUNTER — TELEPHONE (OUTPATIENT)
Age: 68
End: 2023-08-21

## 2023-08-24 ENCOUNTER — APPOINTMENT (OUTPATIENT)
Dept: LAB | Facility: HOSPITAL | Age: 68
End: 2023-08-24
Attending: INTERNAL MEDICINE
Payer: COMMERCIAL

## 2023-08-24 DIAGNOSIS — E55.9 VITAMIN D DEFICIENCY: ICD-10-CM

## 2023-08-24 DIAGNOSIS — R79.89 LOW TESTOSTERONE: ICD-10-CM

## 2023-08-24 DIAGNOSIS — E11.8 TYPE 2 DIABETES MELLITUS WITH COMPLICATION, WITHOUT LONG-TERM CURRENT USE OF INSULIN (HCC): ICD-10-CM

## 2023-08-24 LAB
25(OH)D3 SERPL-MCNC: 41.6 NG/ML (ref 30–100)
ALBUMIN SERPL BCP-MCNC: 4.6 G/DL (ref 3.5–5)
ALP SERPL-CCNC: 68 U/L (ref 34–104)
ALT SERPL W P-5'-P-CCNC: 15 U/L (ref 7–52)
ANION GAP SERPL CALCULATED.3IONS-SCNC: 6 MMOL/L
AST SERPL W P-5'-P-CCNC: 16 U/L (ref 13–39)
BASOPHILS # BLD AUTO: 0.07 THOUSANDS/ÂΜL (ref 0–0.1)
BASOPHILS NFR BLD AUTO: 1 % (ref 0–1)
BILIRUB SERPL-MCNC: 0.88 MG/DL (ref 0.2–1)
BUN SERPL-MCNC: 22 MG/DL (ref 5–25)
CALCIUM SERPL-MCNC: 10.2 MG/DL (ref 8.4–10.2)
CHLORIDE SERPL-SCNC: 101 MMOL/L (ref 96–108)
CHOLEST SERPL-MCNC: 115 MG/DL
CO2 SERPL-SCNC: 27 MMOL/L (ref 21–32)
CREAT SERPL-MCNC: 0.88 MG/DL (ref 0.6–1.3)
CREAT UR-MCNC: 25.8 MG/DL
EOSINOPHIL # BLD AUTO: 0.18 THOUSAND/ÂΜL (ref 0–0.61)
EOSINOPHIL NFR BLD AUTO: 3 % (ref 0–6)
ERYTHROCYTE [DISTWIDTH] IN BLOOD BY AUTOMATED COUNT: 12.7 % (ref 11.6–15.1)
EST. AVERAGE GLUCOSE BLD GHB EST-MCNC: 157 MG/DL
FSH SERPL-ACNC: 8.9 MIU/ML
GFR SERPL CREATININE-BSD FRML MDRD: 90 ML/MIN/1.73SQ M
GLUCOSE P FAST SERPL-MCNC: 111 MG/DL (ref 65–99)
HBA1C MFR BLD: 7.1 %
HCT VFR BLD AUTO: 44.8 % (ref 36.5–49.3)
HDLC SERPL-MCNC: 36 MG/DL
HGB BLD-MCNC: 15.6 G/DL (ref 12–17)
IMM GRANULOCYTES # BLD AUTO: 0.01 THOUSAND/UL (ref 0–0.2)
IMM GRANULOCYTES NFR BLD AUTO: 0 % (ref 0–2)
LDLC SERPL CALC-MCNC: 52 MG/DL (ref 0–100)
LH SERPL-ACNC: 5 MIU/ML
LYMPHOCYTES # BLD AUTO: 2.08 THOUSANDS/ÂΜL (ref 0.6–4.47)
LYMPHOCYTES NFR BLD AUTO: 33 % (ref 14–44)
MCH RBC QN AUTO: 29.1 PG (ref 26.8–34.3)
MCHC RBC AUTO-ENTMCNC: 34.8 G/DL (ref 31.4–37.4)
MCV RBC AUTO: 83 FL (ref 82–98)
MICROALBUMIN UR-MCNC: 18.9 MG/L
MICROALBUMIN/CREAT 24H UR: 73 MG/G CREATININE (ref 0–30)
MONOCYTES # BLD AUTO: 0.47 THOUSAND/ÂΜL (ref 0.17–1.22)
MONOCYTES NFR BLD AUTO: 7 % (ref 4–12)
NEUTROPHILS # BLD AUTO: 3.54 THOUSANDS/ÂΜL (ref 1.85–7.62)
NEUTS SEG NFR BLD AUTO: 56 % (ref 43–75)
NONHDLC SERPL-MCNC: 79 MG/DL
NRBC BLD AUTO-RTO: 0 /100 WBCS
PLATELET # BLD AUTO: 184 THOUSANDS/UL (ref 149–390)
PMV BLD AUTO: 9.7 FL (ref 8.9–12.7)
POTASSIUM SERPL-SCNC: 4.6 MMOL/L (ref 3.5–5.3)
PROLACTIN SERPL-MCNC: 4.73 NG/ML
PROT SERPL-MCNC: 7.7 G/DL (ref 6.4–8.4)
RBC # BLD AUTO: 5.37 MILLION/UL (ref 3.88–5.62)
SODIUM SERPL-SCNC: 134 MMOL/L (ref 135–147)
TRIGL SERPL-MCNC: 134 MG/DL
TSH SERPL DL<=0.05 MIU/L-ACNC: 2.05 UIU/ML (ref 0.45–4.5)
WBC # BLD AUTO: 6.35 THOUSAND/UL (ref 4.31–10.16)

## 2023-08-24 PROCEDURE — 84146 ASSAY OF PROLACTIN: CPT

## 2023-08-24 PROCEDURE — 82043 UR ALBUMIN QUANTITATIVE: CPT

## 2023-08-24 PROCEDURE — 36415 COLL VENOUS BLD VENIPUNCTURE: CPT

## 2023-08-24 PROCEDURE — 80061 LIPID PANEL: CPT

## 2023-08-24 PROCEDURE — 83001 ASSAY OF GONADOTROPIN (FSH): CPT

## 2023-08-24 PROCEDURE — 80053 COMPREHEN METABOLIC PANEL: CPT

## 2023-08-24 PROCEDURE — 83036 HEMOGLOBIN GLYCOSYLATED A1C: CPT

## 2023-08-24 PROCEDURE — 82570 ASSAY OF URINE CREATININE: CPT

## 2023-08-24 PROCEDURE — 82306 VITAMIN D 25 HYDROXY: CPT

## 2023-08-24 PROCEDURE — 83002 ASSAY OF GONADOTROPIN (LH): CPT

## 2023-08-24 PROCEDURE — 84443 ASSAY THYROID STIM HORMONE: CPT

## 2023-08-24 PROCEDURE — 85025 COMPLETE CBC W/AUTO DIFF WBC: CPT

## 2023-08-25 ENCOUNTER — HOSPITAL ENCOUNTER (OUTPATIENT)
Dept: NON INVASIVE DIAGNOSTICS | Facility: CLINIC | Age: 68
Discharge: HOME | End: 2023-08-25
Payer: COMMERCIAL

## 2023-08-25 VITALS
HEIGHT: 67 IN | DIASTOLIC BLOOD PRESSURE: 80 MMHG | SYSTOLIC BLOOD PRESSURE: 130 MMHG | BODY MASS INDEX: 29.19 KG/M2 | HEART RATE: 70 BPM | WEIGHT: 186 LBS

## 2023-08-25 DIAGNOSIS — I35.0 NONRHEUMATIC AORTIC VALVE STENOSIS: ICD-10-CM

## 2023-08-25 LAB
AORTIC ROOT: 3.1 CM
APICAL FOUR CHAMBER EJECTION FRACTION: 51 %
ASCENDING AORTA: 3.3 CM
AV PEAK GRADIENT: 12 MMHG
E WAVE DECELERATION TIME: 279 MS
FRACTIONAL SHORTENING: 34 % (ref 28–44)
INTERVENTRICULAR SEPTUM IN DIASTOLE (PARASTERNAL SHORT AXIS VIEW): 0.7 CM
INTERVENTRICULAR SEPTUM: 0.7 CM (ref 0.6–1.1)
LAAS-AP2: 20.6 CM2
LAAS-AP4: 15.2 CM2
LEFT ATRIUM SIZE: 3.8 CM
LEFT ATRIUM VOLUME (MOD BIPLANE): 42 ML
LEFT INTERNAL DIMENSION IN SYSTOLE: 2.9 CM (ref 2.1–4)
LEFT VENTRICULAR INTERNAL DIMENSION IN DIASTOLE: 4.4 CM (ref 3.5–6)
LEFT VENTRICULAR POSTERIOR WALL IN END DIASTOLE: 0.9 CM
LEFT VENTRICULAR STROKE VOLUME: 53 ML
LVSV (TEICH): 53 ML
MV E'TISSUE VEL-SEP: 5 CM/S
MV PEAK A VEL: 0.88 M/S
MV PEAK E VEL: 96 CM/S
MV STENOSIS PRESSURE HALF TIME: 81 MS
MV VALVE AREA P 1/2 METHOD: 2.72 CM2
RIGHT ATRIUM AREA SYSTOLE A4C: 15.1 CM2
RIGHT VENTRICLE ID DIMENSION: 3.1 CM
SL CV LEFT ATRIUM LENGTH A2C: 6.2 CM
SL CV LV EF: 55
SL CV PED ECHO LEFT VENTRICLE DIASTOLIC VOLUME (MOD BIPLANE) 2D: 86 ML
SL CV PED ECHO LEFT VENTRICLE SYSTOLIC VOLUME (MOD BIPLANE) 2D: 33 ML
TRICUSPID ANNULAR PLANE SYSTOLIC EXCURSION: 1.7 CM

## 2023-08-25 PROCEDURE — 93306 TTE W/DOPPLER COMPLETE: CPT

## 2023-08-25 PROCEDURE — 93306 TTE W/DOPPLER COMPLETE: CPT | Performed by: INTERNAL MEDICINE

## 2023-08-31 ENCOUNTER — OFFICE VISIT (OUTPATIENT)
Age: 68
End: 2023-08-31
Payer: MEDICARE

## 2023-08-31 VITALS
BODY MASS INDEX: 28.41 KG/M2 | WEIGHT: 181 LBS | OXYGEN SATURATION: 99 % | SYSTOLIC BLOOD PRESSURE: 130 MMHG | DIASTOLIC BLOOD PRESSURE: 92 MMHG | HEART RATE: 70 BPM | HEIGHT: 67 IN

## 2023-08-31 DIAGNOSIS — I10 ESSENTIAL HYPERTENSION: Chronic | ICD-10-CM

## 2023-08-31 DIAGNOSIS — I25.10 CORONARY ARTERY DISEASE INVOLVING NATIVE HEART WITHOUT ANGINA PECTORIS, UNSPECIFIED VESSEL OR LESION TYPE: ICD-10-CM

## 2023-08-31 DIAGNOSIS — I51.89 DIASTOLIC DYSFUNCTION: ICD-10-CM

## 2023-08-31 DIAGNOSIS — E78.2 MIXED HYPERLIPIDEMIA: ICD-10-CM

## 2023-08-31 DIAGNOSIS — E11.8 TYPE 2 DIABETES MELLITUS WITH COMPLICATION, WITHOUT LONG-TERM CURRENT USE OF INSULIN (HCC): Primary | ICD-10-CM

## 2023-08-31 DIAGNOSIS — Z95.1 HX OF CABG: ICD-10-CM

## 2023-08-31 DIAGNOSIS — R06.09 EXERTIONAL DYSPNEA: ICD-10-CM

## 2023-08-31 PROBLEM — E66.9 OBESITY (BMI 30-39.9): Status: RESOLVED | Noted: 2018-03-16 | Resolved: 2023-08-31

## 2023-08-31 PROCEDURE — 99214 OFFICE O/P EST MOD 30 MIN: CPT | Performed by: INTERNAL MEDICINE

## 2023-08-31 RX ORDER — IRBESARTAN 75 MG/1
75 TABLET ORAL DAILY
Qty: 90 TABLET | Refills: 3 | Status: SHIPPED | OUTPATIENT
Start: 2023-08-31

## 2023-08-31 NOTE — PATIENT INSTRUCTIONS
Lab data reviewed in detail and compared to prior    Type 2 diabetes mellitus is under excellent control with A1c down to 7.1. Keep up the excellent work, no change in medication. Continue with weight loss efforts. Get back to regular aerobic exercise as discussed. Exertional dyspnea with known coronary artery disease with cardiac catheterization showing moderate RCA disease April 2022. We will proceed with stress echo prior to embarking upon exercise regimen. Consider cardiac rehab to reintroduce aerobic exercise. Hypertension stable on present regimen    Hyperlipidemia-LDL at goal on statin. HDL remains low.   Should increase with increased aerobic exercise as above    BPH stable with tamsulosin

## 2023-08-31 NOTE — PROGRESS NOTES
Assessment/Plan:    Diagnoses and all orders for this visit:    Type 2 diabetes mellitus with complication, without long-term current use of insulin ([facility])  -     CBC and differential; Future  -     Comprehensive metabolic panel; Future  -     Lipid panel; Future  -     Hemoglobin A1C; Future    Essential hypertension  -     irbesartan (AVAPRO) 75 mg tablet; Take 1 tablet (75 mg total) by mouth daily    Coronary artery disease involving native heart without angina pectoris, unspecified vessel or lesion type  -     Echo stress test, exercise; Future    Mixed hyperlipidemia    Hx of CABG  -     Echo stress test, exercise; Future    Diastolic dysfunction    Exertional dyspnea  -     Echo stress test, exercise; Future              Patient Instructions   Lab data reviewed in detail and compared to prior    Type 2 diabetes mellitus is under excellent control with A1c down to 7.1. Keep up the excellent work, no change in medication. Continue with weight loss efforts. Get back to regular aerobic exercise as discussed. Exertional dyspnea with known coronary artery disease with cardiac catheterization showing moderate RCA disease April 2022. We will proceed with stress echo prior to embarking upon exercise regimen. Consider cardiac rehab to reintroduce aerobic exercise. Hypertension stable on present regimen    Hyperlipidemia-LDL at goal on statin. HDL remains low. Should increase with increased aerobic exercise as above    BPH stable with tamsulosin      Subjective:      Patient ID: Marcial Amezcua is a 74 y.o. male    Follow-up multiple problems and review labs    Feeling generally well, multiple recent cruises, keeping active, but no regular exercise. 20,000 steps per day on vacation. Bought stat bike, but nervous d/t frost climbing stairs recently. Lost 15 lbs w/ dietary modification. Type 2 diabetes mellitus-tolerating metformin, ozempic and Jardiance. Not testing sugars regularly.   Not eating much breakfast or lunch, and notes feeling hypoglycemic by 3 pm, then overeats, but now snacking on protein bars, which has helped. Hypertension/Hyperlipidemia-taking medication as directed, home bp's 130/80 since cutting irbesartan, no lightheaded. CAD s/p CABG '17-stable w/o cp, but notes frost as above. Cath w/ patent grafts 4/22 but multiple 70-90% RCA lesions that weren't intervened upon. Allergy/asthma-bad allergies right now, needs renewal on flonase    BPH-fairly stable on flomax 0.8 mg, still w/ some sx, but f/b urology and not interested in more aggressive w/u.       .          Current Outpatient Medications:   •  Accu-Chek FastClix Lancets MISC, USE TO TEST BLOOD SUGAR AS DIRECTED, Disp: , Rfl:   •  aspirin 81 MG tablet, Take 81 mg by mouth daily, Disp: , Rfl:   •  atorvastatin (LIPITOR) 20 mg tablet, TAKE 1 TABLET BY MOUTH DAILY AT BEDTIME, Disp: 90 tablet, Rfl: 3  •  Blood Glucose Monitoring Suppl (Accu-Chek Guide) w/Device KIT, , Disp: , Rfl:   •  Cholecalciferol (Vitamin D3 Super Strength) 50 MCG (2000 UT) CAPS, 1 po qd, Disp: , Rfl:   •  fluticasone (FLONASE) 50 mcg/act nasal spray, SPRAY 2 SPRAYS INTO EACH NOSTRIL EVERY DAY (Patient taking differently: 1 spray into each nostril as needed), Disp: 48 mL, Rfl: 1  •  glucose blood test strip, Use as instructed, Disp: 200 each, Rfl: 0  •  irbesartan (AVAPRO) 75 mg tablet, Take 1 tablet (75 mg total) by mouth daily, Disp: 90 tablet, Rfl: 3  •  Jardiance 25 MG TABS, TAKE 1 TABLET BY MOUTH EVERY DAY IN THE MORNING, Disp: 90 tablet, Rfl: 3  •  metFORMIN (GLUCOPHAGE-XR) 500 mg 24 hr tablet, TAKE 4 TABLETS (2,000 MG TOTAL) BY MOUTH DAILY WITH DINNER, Disp: 360 tablet, Rfl: 1  •  metoprolol succinate (TOPROL-XL) 100 mg 24 hr tablet, TAKE 1 TABLET BY MOUTH EVERY DAY, Disp: 90 tablet, Rfl: 0  •  semaglutide, 0.25 or 0.5 mg/dose, (Ozempic) 2 mg/1.5 mL injection pen, Inject 0.38 mL (0.5 mg total) under the skin every 7 days, Disp: 2 mL, Rfl: 5  • tamsulosin (FLOMAX) 0.4 mg, Take 2 capsules (0.8 mg total) by mouth daily with dinner, Disp: 180 capsule, Rfl: 3  •  Insulin Pen Needle 33G X 4 MM MISC, Use 3 (three) times a day (Patient not taking: Reported on 6/7/2023), Disp: 100 each, Rfl: 0  •  nitroglycerin (NITROSTAT) 0.4 mg SL tablet, Place 1 tablet (0.4 mg total) under the tongue every 5 (five) minutes as needed for chest pain (Patient not taking: Reported on 8/1/2023), Disp: 100 tablet, Rfl: 0    Recent Results (from the past 1008 hour(s))   CBC and differential    Collection Time: 08/24/23  9:34 AM   Result Value Ref Range    WBC 6.35 4.31 - 10.16 Thousand/uL    RBC 5.37 3.88 - 5.62 Million/uL    Hemoglobin 15.6 12.0 - 17.0 g/dL    Hematocrit 44.8 36.5 - 49.3 %    MCV 83 82 - 98 fL    MCH 29.1 26.8 - 34.3 pg    MCHC 34.8 31.4 - 37.4 g/dL    RDW 12.7 11.6 - 15.1 %    MPV 9.7 8.9 - 12.7 fL    Platelets 295 825 - 597 Thousands/uL    nRBC 0 /100 WBCs    Neutrophils Relative 56 43 - 75 %    Immat GRANS % 0 0 - 2 %    Lymphocytes Relative 33 14 - 44 %    Monocytes Relative 7 4 - 12 %    Eosinophils Relative 3 0 - 6 %    Basophils Relative 1 0 - 1 %    Neutrophils Absolute 3.54 1.85 - 7.62 Thousands/µL    Immature Grans Absolute 0.01 0.00 - 0.20 Thousand/uL    Lymphocytes Absolute 2.08 0.60 - 4.47 Thousands/µL    Monocytes Absolute 0.47 0.17 - 1.22 Thousand/µL    Eosinophils Absolute 0.18 0.00 - 0.61 Thousand/µL    Basophils Absolute 0.07 0.00 - 0.10 Thousands/µL   Comprehensive metabolic panel    Collection Time: 08/24/23  9:34 AM   Result Value Ref Range    Sodium 134 (L) 135 - 147 mmol/L    Potassium 4.6 3.5 - 5.3 mmol/L    Chloride 101 96 - 108 mmol/L    CO2 27 21 - 32 mmol/L    ANION GAP 6 mmol/L    BUN 22 5 - 25 mg/dL    Creatinine 0.88 0.60 - 1.30 mg/dL    Glucose, Fasting 111 (H) 65 - 99 mg/dL    Calcium 10.2 8.4 - 10.2 mg/dL    AST 16 13 - 39 U/L    ALT 15 7 - 52 U/L    Alkaline Phosphatase 68 34 - 104 U/L    Total Protein 7.7 6.4 - 8.4 g/dL    Albumin 4.6 3.5 - 5.0 g/dL    Total Bilirubin 0.88 0.20 - 1.00 mg/dL    eGFR 90 ml/min/1.73sq m   Lipid panel    Collection Time: 08/24/23  9:34 AM   Result Value Ref Range    Cholesterol 115 See Comment mg/dL    Triglycerides 134 See Comment mg/dL    HDL, Direct 36 (L) >=40 mg/dL    LDL Calculated 52 0 - 100 mg/dL    Non-HDL-Chol (CHOL-HDL) 79 mg/dl   Hemoglobin A1C    Collection Time: 08/24/23  9:34 AM   Result Value Ref Range    Hemoglobin A1C 7.1 (H) Normal 4.0-5.6%; PreDiabetic 5.7-6.4%;  Diabetic >=6.5%; Glycemic control for adults with diabetes <7.0% %     mg/dl   TSH, 3rd generation with Free T4 reflex    Collection Time: 08/24/23  9:34 AM   Result Value Ref Range    TSH 3RD GENERATON 2.049 0.450 - 4.500 uIU/mL   Microalbumin / creatinine urine ratio    Collection Time: 08/24/23  9:34 AM   Result Value Ref Range    Creatinine, Ur 25.8 Reference range not established. mg/dL    Albumin,U,Random 18.9 <20.0 mg/L    Albumin Creat Ratio 73 (H) 0 - 30 mg/g creatinine   Vitamin D 25 hydroxy    Collection Time: 08/24/23  9:34 AM   Result Value Ref Range    Vit D, 25-Hydroxy 41.6 30.0 - 768.0 ng/mL   Follicle stimulating hormone    Collection Time: 08/24/23  9:34 AM   Result Value Ref Range    FSH 8.9 1.3 - 19.3 mIU/mL   Luteinizing hormone    Collection Time: 08/24/23  9:34 AM   Result Value Ref Range    LH 5.0 1.2 - 8.6 mIU/mL   Prolactin    Collection Time: 08/24/23  9:34 AM   Result Value Ref Range    Prolactin 4.73 2.64 - 13.13 ng/mL   Echo complete w/ contrast if indicated    Collection Time: 08/25/23 11:15 AM   Result Value Ref Range    LA size 3.8 cm    LVPWd 0.90 cm    Left Atrium Area-systolic Apical Two Chamber 20.6 cm2    Left Atrium Area-systolic Four Chamber 04.7 cm2    MV E' Tissue Velocity Septal 5 cm/s    Tricuspid annular plane systolic excursion 0.90 cm    IVSd 8.92 cm    LV DIASTOLIC VOLUME (MOD BIPLANE) 2D 86 mL    LEFT VENTRICLE SYSTOLIC VOLUME (MOD BIPLANE) 2D 33 mL    Left ventricular stroke volume (2D) 53.00 mL    A4C EF 51 %    LA length (A2C) 6.20 cm    LVIDd 4.40 cm    IVS 0.7 cm    LVIDS 2.90 cm    FS 34 28 - 44 %    LA volume (BP) 42 mL    Asc Ao 3.3 cm    Ao root 3.10 cm    RVID d 3.1 cm    MV valve area p 1/2 method 2.72 cm2    E wave deceleration time 279 ms    AV peak gradient 12 mmHg    MV Peak E Tito 96 cm/s    MV Peak A Tito 0.88 m/s    RAA A4C 15.1 cm2    MV stenosis pressure 1/2 time 81 ms    LVSV, 2D 53 mL    LV EF 55        The following portions of the patient's history were reviewed and updated as appropriate: allergies, current medications, past family history, past medical history, past social history, past surgical history and problem list.     Review of Systems   Constitutional: Negative for appetite change, chills, diaphoresis, fatigue, fever and unexpected weight change. HENT: Negative for congestion, hearing loss and rhinorrhea. Eyes: Negative for visual disturbance. Respiratory: Negative for cough, chest tightness, shortness of breath and wheezing. Cardiovascular: Negative for chest pain, palpitations and leg swelling. Gastrointestinal: Negative for abdominal pain and blood in stool. Endocrine: Negative for cold intolerance, heat intolerance, polydipsia and polyuria. Genitourinary: Negative for difficulty urinating, dysuria, frequency and urgency. Musculoskeletal: Negative for arthralgias and myalgias. Skin: Negative for rash. Neurological: Negative for dizziness, weakness, light-headedness and headaches. Hematological: Does not bruise/bleed easily. Psychiatric/Behavioral: Negative for dysphoric mood and sleep disturbance. Objective:      Vitals:    08/31/23 1010   BP: 130/92   Pulse: 70   SpO2: 99%          Physical Exam  Constitutional:       Appearance: He is well-developed. HENT:      Head: Normocephalic and atraumatic. Nose: Nose normal.   Eyes:      General: No scleral icterus.      Conjunctiva/sclera: Conjunctivae normal. Pupils: Pupils are equal, round, and reactive to light. Neck:      Thyroid: No thyromegaly. Vascular: No JVD. Trachea: No tracheal deviation. Cardiovascular:      Rate and Rhythm: Normal rate and regular rhythm. Heart sounds: No murmur heard. No friction rub. No gallop. Pulmonary:      Effort: Pulmonary effort is normal. No respiratory distress. Breath sounds: Normal breath sounds. No wheezing or rales. Musculoskeletal:         General: No deformity. Cervical back: Normal range of motion and neck supple. Lymphadenopathy:      Cervical: No cervical adenopathy. Skin:     General: Skin is warm and dry. Coloration: Skin is not pale. Findings: No erythema or rash. Neurological:      Mental Status: He is alert and oriented to person, place, and time. Cranial Nerves: No cranial nerve deficit. Psychiatric:         Behavior: Behavior normal.         Thought Content:  Thought content normal.         Judgment: Judgment normal.

## 2023-09-26 DIAGNOSIS — E11.8 TYPE 2 DIABETES MELLITUS WITH COMPLICATION, WITHOUT LONG-TERM CURRENT USE OF INSULIN (HCC): ICD-10-CM

## 2023-09-26 DIAGNOSIS — E11.9 TYPE 2 DIABETES MELLITUS WITHOUT COMPLICATION, WITHOUT LONG-TERM CURRENT USE OF INSULIN (HCC): ICD-10-CM

## 2023-09-26 RX ORDER — EMPAGLIFLOZIN 25 MG/1
TABLET, FILM COATED ORAL
Qty: 90 TABLET | Refills: 3 | Status: SHIPPED | OUTPATIENT
Start: 2023-09-26

## 2023-09-26 RX ORDER — METFORMIN HYDROCHLORIDE 500 MG/1
2000 TABLET, EXTENDED RELEASE ORAL
Qty: 360 TABLET | Refills: 1 | Status: SHIPPED | OUTPATIENT
Start: 2023-09-26

## 2023-10-12 DIAGNOSIS — I10 HYPERTENSION, UNSPECIFIED TYPE: ICD-10-CM

## 2023-10-12 RX ORDER — METOPROLOL SUCCINATE 100 MG/1
TABLET, EXTENDED RELEASE ORAL
Qty: 90 TABLET | Refills: 0 | Status: SHIPPED | OUTPATIENT
Start: 2023-10-12

## 2023-10-13 ENCOUNTER — IMMUNIZATIONS (OUTPATIENT)
Age: 68
End: 2023-10-13
Payer: MEDICARE

## 2023-10-13 DIAGNOSIS — Z23 ENCOUNTER FOR IMMUNIZATION: Primary | ICD-10-CM

## 2023-10-13 PROCEDURE — 90662 IIV NO PRSV INCREASED AG IM: CPT

## 2023-10-13 PROCEDURE — G0008 ADMIN INFLUENZA VIRUS VAC: HCPCS

## 2023-10-14 DIAGNOSIS — E11.8 TYPE 2 DIABETES MELLITUS WITH COMPLICATION, WITHOUT LONG-TERM CURRENT USE OF INSULIN (HCC): ICD-10-CM

## 2023-10-14 RX ORDER — SEMAGLUTIDE 0.68 MG/ML
INJECTION, SOLUTION SUBCUTANEOUS
Qty: 3 ML | Refills: 5 | Status: SHIPPED | OUTPATIENT
Start: 2023-10-14

## 2023-11-17 ENCOUNTER — PREP FOR PROCEDURE (OUTPATIENT)
Age: 68
End: 2023-11-17

## 2023-11-17 ENCOUNTER — TELEPHONE (OUTPATIENT)
Age: 68
End: 2023-11-17

## 2023-11-17 DIAGNOSIS — Z12.11 SCREENING FOR COLON CANCER: Primary | ICD-10-CM

## 2023-11-17 NOTE — TELEPHONE ENCOUNTER
Scheduled date of colonoscopy (as of today):12/21/2023  Physician performing colonoscopy:Dr. Lashawn Mack  Location of colonoscopy:[facility]  Bowel prep reviewed with patient:ZEE/MEREDITH  Instructions reviewed with patient by:CB/Sent via [employer]  Clearances: n/a

## 2023-11-17 NOTE — TELEPHONE ENCOUNTER
11/17/23  Screened by: Clint Almanzar    Referring Provider Dr. Katie Thomas    Pre- Screening: Colon cancer screening    There is no height or weight on file to calculate BMI.28.35  Has patient been referred for a routine screening Colonoscopy? yes  Is the patient between 43-73 years old? yes      Previous Colonoscopy yes   If yes:    Date:     Facility:     Reason:       SCHEDULING STAFF: If the patient is between 39yrs-51yrs, please advise patient to confirm benefits/coverage with their insurance company for a routine screening colonoscopy, some insurance carriers will only cover at [address] [place] [address] or older. If the patient is over 66years old, please schedule an office visit. Does the patient want to see a Gastroenterologist prior to their procedure OR are they having any GI symptoms? no    Has the patient been hospitalized or had abdominal surgery in the past 6 months? no    Does the patient use supplemental oxygen? no    Does the patient take Coumadin, Lovenox, Plavix, Elliquis, Xarelto, or other blood thinning medication? no    Has the patient had a stroke, cardiac event, or stent placed in the past year? no    SCHEDULING STAFF: If patient answers NO to above questions, then schedule procedure. If patient answers YES to above questions, then schedule office appointment. If patient is between 45yrs - 49yrs, please advise patient that we will have to confirm benefits & coverage with their insurance company for a routine screening colonoscopy.

## 2023-11-29 ENCOUNTER — HOSPITAL ENCOUNTER (OUTPATIENT)
Dept: NON INVASIVE DIAGNOSTICS | Facility: CLINIC | Age: 68
Discharge: HOME | End: 2023-11-29
Payer: MEDICARE

## 2023-11-29 VITALS
SYSTOLIC BLOOD PRESSURE: 138 MMHG | OXYGEN SATURATION: 95 % | DIASTOLIC BLOOD PRESSURE: 92 MMHG | WEIGHT: 181 LBS | BODY MASS INDEX: 28.41 KG/M2 | HEART RATE: 75 BPM | HEIGHT: 67 IN

## 2023-11-29 DIAGNOSIS — I25.10 CORONARY ARTERY DISEASE INVOLVING NATIVE HEART WITHOUT ANGINA PECTORIS, UNSPECIFIED VESSEL OR LESION TYPE: ICD-10-CM

## 2023-11-29 DIAGNOSIS — Z95.1 HX OF CABG: ICD-10-CM

## 2023-11-29 DIAGNOSIS — R06.09 EXERTIONAL DYSPNEA: ICD-10-CM

## 2023-11-29 LAB
MAX HR PERCENT: 103 %
MAX HR: 160 BPM
RATE PRESSURE PRODUCT: NORMAL
SL CV STRESS RECOVERY BP: NORMAL MMHG
SL CV STRESS RECOVERY HR: 97 BPM
SL CV STRESS RECOVERY O2 SAT: 97 %
SL CV STRESS STAGE REACHED: 3
STRESS ANGINA INDEX: 0
STRESS BASELINE BP: NORMAL MMHG
STRESS BASELINE HR: 75 BPM
STRESS O2 SAT REST: 95 %
STRESS PEAK HR: 160 BPM
STRESS POST ESTIMATED WORKLOAD: 10.1 METS
STRESS POST EXERCISE DUR MIN: 9 MIN
STRESS POST O2 SAT PEAK: 96 %
STRESS POST PEAK BP: 200 MMHG

## 2023-11-29 PROCEDURE — 93350 STRESS TTE ONLY: CPT | Performed by: INTERNAL MEDICINE

## 2023-11-29 PROCEDURE — 93350 STRESS TTE ONLY: CPT

## 2023-11-30 LAB
MAX HR PERCENT: 85 %
MAX HR: 160 BPM
STRESS BASELINE BP: NORMAL MMHG
STRESS BASELINE HR: 82 BPM
STRESS POST ESTIMATED WORKLOAD: 10.1 METS
STRESS POST PEAK HR: 160 BPM
STRESS POST PEAK SYSTOLIC BP: 200 MMHG

## 2023-12-21 ENCOUNTER — HOSPITAL ENCOUNTER (OUTPATIENT)
Dept: GASTROENTEROLOGY | Facility: HOSPITAL | Age: 68
Setting detail: OUTPATIENT SURGERY
End: 2023-12-21
Attending: INTERNAL MEDICINE
Payer: COMMERCIAL

## 2023-12-21 ENCOUNTER — ANESTHESIA EVENT (OUTPATIENT)
Dept: GASTROENTEROLOGY | Facility: HOSPITAL | Age: 68
End: 2023-12-21

## 2023-12-21 ENCOUNTER — ANESTHESIA (OUTPATIENT)
Dept: GASTROENTEROLOGY | Facility: HOSPITAL | Age: 68
End: 2023-12-21

## 2023-12-21 VITALS
BODY MASS INDEX: 28.1 KG/M2 | SYSTOLIC BLOOD PRESSURE: 172 MMHG | HEIGHT: 67 IN | RESPIRATION RATE: 16 BRPM | WEIGHT: 179.01 LBS | OXYGEN SATURATION: 96 % | TEMPERATURE: 97.4 F | HEART RATE: 87 BPM | DIASTOLIC BLOOD PRESSURE: 107 MMHG

## 2023-12-21 DIAGNOSIS — Z12.11 SCREENING FOR COLON CANCER: ICD-10-CM

## 2023-12-21 LAB — GLUCOSE SERPL-MCNC: 145 MG/DL (ref 65–140)

## 2023-12-21 PROCEDURE — 88305 TISSUE EXAM BY PATHOLOGIST: CPT | Performed by: PATHOLOGY

## 2023-12-21 PROCEDURE — 82948 REAGENT STRIP/BLOOD GLUCOSE: CPT

## 2023-12-21 PROCEDURE — 45385 COLONOSCOPY W/LESION REMOVAL: CPT | Performed by: INTERNAL MEDICINE

## 2023-12-21 RX ORDER — PROPOFOL 10 MG/ML
INJECTION, EMULSION INTRAVENOUS AS NEEDED
Status: DISCONTINUED | OUTPATIENT
Start: 2023-12-21 | End: 2023-12-21

## 2023-12-21 RX ORDER — LIDOCAINE HYDROCHLORIDE 20 MG/ML
INJECTION, SOLUTION EPIDURAL; INFILTRATION; INTRACAUDAL; PERINEURAL AS NEEDED
Status: DISCONTINUED | OUTPATIENT
Start: 2023-12-21 | End: 2023-12-21

## 2023-12-21 RX ORDER — SODIUM CHLORIDE, SODIUM LACTATE, POTASSIUM CHLORIDE, CALCIUM CHLORIDE 600; 310; 30; 20 MG/100ML; MG/100ML; MG/100ML; MG/100ML
INJECTION, SOLUTION INTRAVENOUS CONTINUOUS PRN
Status: DISCONTINUED | OUTPATIENT
Start: 2023-12-21 | End: 2023-12-21

## 2023-12-21 RX ADMIN — SODIUM CHLORIDE, SODIUM LACTATE, POTASSIUM CHLORIDE, AND CALCIUM CHLORIDE: .6; .31; .03; .02 INJECTION, SOLUTION INTRAVENOUS at 08:28

## 2023-12-21 RX ADMIN — LIDOCAINE HYDROCHLORIDE 100 MG: 20 INJECTION, SOLUTION EPIDURAL; INFILTRATION; INTRACAUDAL; PERINEURAL at 08:21

## 2023-12-21 RX ADMIN — PROPOFOL 120 MG: 10 INJECTION, EMULSION INTRAVENOUS at 08:21

## 2023-12-21 RX ADMIN — PROPOFOL 20 MG: 10 INJECTION, EMULSION INTRAVENOUS at 08:23

## 2023-12-21 RX ADMIN — PROPOFOL 20 MG: 10 INJECTION, EMULSION INTRAVENOUS at 08:25

## 2023-12-21 RX ADMIN — SODIUM CHLORIDE, SODIUM LACTATE, POTASSIUM CHLORIDE, AND CALCIUM CHLORIDE: .6; .31; .03; .02 INJECTION, SOLUTION INTRAVENOUS at 08:15

## 2023-12-21 NOTE — ANESTHESIA POSTPROCEDURE EVALUATION
Post-Op Assessment Note    CV Status:  Stable    Pain management: adequate       Mental Status:  Alert and awake   Hydration Status:  Euvolemic   PONV Controlled:  Controlled   Airway Patency:  Patent     Post Op Vitals Reviewed: Yes      Staff: Anesthesiologist               BP   133/60   Temp   98   Pulse  79   Resp   12   SpO2   100

## 2023-12-21 NOTE — ANESTHESIA PREPROCEDURE EVALUATION
Procedure:  COLONOSCOPY    Last dose of ozempic 12/10  Last dose of beta blocker 4 days ago  Relevant Problems   CARDIO   (+) Coronary artery disease involving native heart without angina pectoris      Left Ventricle: Left ventricular cavity size is normal. Wall thickness is normal. The left ventricular ejection fraction is 55% by visual estimation.. Systolic function is normal. Wall motion is normal. Diastolic function is moderately abnormal, consistent with grade II (pseudonormal) relaxation.  Left atrial filling pressure is elevated.    (+) Essential hypertension   (+) Mixed hyperlipidemia      ENDO   (+) Type 2 diabetes mellitus (HCC)      There is 100% chronic total occlusion of mid LAD, OM1 and OM2. There is diffuse small vessel CAD involving small to medium sized RCA, and LPDA. Patent LIMA to LAD and SVG to OM1 and OM2.   Conclusions:  A restrictive ventilatory defect of extra-parenchymal type with normal diffusing capacity is present.  Obesity, chest wall disorders, neuro-muscular disorders and pleural disorders are to be considered.  Physical Exam    Airway    Mallampati score: III  TM Distance: >3 FB  Neck ROM: full     Dental   No notable dental hx     Cardiovascular  Rhythm: regular    Pulmonary   Breath sounds clear to auscultation    Other Findings      1.  Resting EKG shows left anterior hemiblock and probable left ventricular hypertrophy  2.  No chest discomfort with exercise and EKG portion of stress test is negative  3.  Fair exercise tolerance-10.1 METS  4.  No chest discomfort with exercise  5.  Resting echocardiogram shows normal wall motion and there is normal augmentation with exercise  6.  Negative stress echocardiogram  Anesthesia Plan  ASA Score- 2     Anesthesia Type- IV sedation with anesthesia with ASA Monitors.         Additional Monitors:     Airway Plan:            Plan Factors-    Chart reviewed.   Existing labs reviewed. Patient summary reviewed.    Patient is not a current smoker.               Induction- intravenous.    Postoperative Plan-     Informed Consent- Anesthetic plan and risks discussed with patient.  I personally reviewed this patient with the CRNA. Discussed and agreed on the Anesthesia Plan with the CRNA..

## 2023-12-21 NOTE — H&P
History and Physical - [facility]  Adis Jerry 68 y.o. male MRN: [id number]                  HPI: Adis Jerry is a 68 y.o. year old male who presents for colonoscopy for history of colon polyps.  Last colonoscopy 5 years ago      REVIEW OF SYSTEMS: Per the HPI, and otherwise unremarkable.    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    BOWEL RESECTION  2008    Secondary to diverticulitis    CARDIAC CATHETERIZATION N/A 04/07/2022    Procedure: Cardiac catheterization;  Surgeon: Gisselle Lange MD;  Location: [facility];  Service: Cardiology    CARDIAC CATHETERIZATION N/A 04/07/2022    Procedure: Cardiac Coronary Angiogram;  Surgeon: Gisselle Lange MD;  Location: [facility];  Service: Cardiology    COLON SURGERY  2007    COLONOSCOPY      CORONARY ARTERY BYPASS GRAFT      HAND SURGERY      triple bypass    HERNIA REPAIR      MALIGNANT SKIN LESION EXCISION  1963    Abdominal wall surgical resection of melanoma    FL COLONOSCOPY FLX DX W/COLLJ SPEC WHEN PFRMD N/A 12/11/2018    Procedure: COLONOSCOPY;  Surgeon: Bertram Umanzor MD;  Location: [facility];  Service: Gastroenterology    SKIN BIOPSY       Social History   Social History     Substance and Sexual Activity   Alcohol Use Not Currently     Social History     Substance and Sexual Activity   Drug Use No     Social History     Tobacco Use   Smoking Status Never    Passive exposure: Never   Smokeless Tobacco  "Never     Family History   Problem Relation Age of Onset    Emphysema Maternal Grandfather     Coronary artery disease Mother     Hyperlipidemia Mother     Hypertension Mother     Diabetes type II Mother     Arthritis Mother     Diabetes type II Father     Basal cell carcinoma Father     Hypertension Father     Heart disease Father     Diabetes Father     Hypertension Sister        Meds/Allergies     (Not in a hospital admission)      No Known Allergies    Objective     Blood pressure (!) 166/102, pulse 96, temperature (!) 97.4 °F (36.3 °C), temperature source Temporal, resp. rate 21, height 5' 7\" (1.702 m), weight 81.2 kg (179 lb 0.2 oz), SpO2 96%.      PHYSICAL EXAM    Gen: NAD  CV: RRR  CHEST: Clear  ABD: soft, NT/ND  EXT: no edema  Neuro: AAO      ASSESSMENT/PLAN:  This is a 68 y.o. year old male here for history of colon polyps    PLAN:   Procedure: Colonoscopy          "

## 2023-12-26 PROCEDURE — 88305 TISSUE EXAM BY PATHOLOGIST: CPT | Performed by: PATHOLOGY

## 2024-01-04 DIAGNOSIS — E78.2 MIXED HYPERLIPIDEMIA: ICD-10-CM

## 2024-01-04 RX ORDER — ATORVASTATIN CALCIUM 20 MG/1
20 TABLET, FILM COATED ORAL
Qty: 90 TABLET | Refills: 3 | Status: SHIPPED | OUTPATIENT
Start: 2024-01-04

## 2024-01-06 DIAGNOSIS — I10 HYPERTENSION, UNSPECIFIED TYPE: ICD-10-CM

## 2024-01-06 RX ORDER — METOPROLOL SUCCINATE 100 MG/1
TABLET, EXTENDED RELEASE ORAL
Qty: 90 TABLET | Refills: 0 | Status: SHIPPED | OUTPATIENT
Start: 2024-01-06

## 2024-02-15 DIAGNOSIS — E11.9 TYPE 2 DIABETES MELLITUS WITHOUT COMPLICATION, WITHOUT LONG-TERM CURRENT USE OF INSULIN (HCC): ICD-10-CM

## 2024-02-15 DIAGNOSIS — N40.0 BENIGN PROSTATIC HYPERPLASIA, UNSPECIFIED WHETHER LOWER URINARY TRACT SYMPTOMS PRESENT: ICD-10-CM

## 2024-02-15 RX ORDER — METFORMIN HYDROCHLORIDE 500 MG/1
2000 TABLET, EXTENDED RELEASE ORAL
Qty: 360 TABLET | Refills: 1 | Status: SHIPPED | OUTPATIENT
Start: 2024-02-15

## 2024-02-15 RX ORDER — TAMSULOSIN HYDROCHLORIDE 0.4 MG/1
0.8 CAPSULE ORAL
Qty: 180 CAPSULE | Refills: 3 | Status: SHIPPED | OUTPATIENT
Start: 2024-02-15

## 2024-02-21 ENCOUNTER — APPOINTMENT (OUTPATIENT)
Dept: LAB | Facility: CLINIC | Age: 69
End: 2024-02-21
Payer: MEDICARE

## 2024-02-21 DIAGNOSIS — E34.9 TESTOSTERONE DEFICIENCY: ICD-10-CM

## 2024-02-21 DIAGNOSIS — E11.8 TYPE 2 DIABETES MELLITUS WITH COMPLICATION, WITHOUT LONG-TERM CURRENT USE OF INSULIN (HCC): ICD-10-CM

## 2024-02-21 DIAGNOSIS — R79.89 LOW TESTOSTERONE: ICD-10-CM

## 2024-02-21 PROBLEM — Z12.11 SPECIAL SCREENING FOR MALIGNANT NEOPLASMS, COLON: Status: RESOLVED | Noted: 2018-11-28 | Resolved: 2024-02-21

## 2024-02-21 LAB
ALBUMIN SERPL BCP-MCNC: 4.3 G/DL (ref 3.5–5)
ALP SERPL-CCNC: 78 U/L (ref 34–104)
ALT SERPL W P-5'-P-CCNC: 15 U/L (ref 7–52)
ANION GAP SERPL CALCULATED.3IONS-SCNC: 12 MMOL/L
AST SERPL W P-5'-P-CCNC: 16 U/L (ref 13–39)
BASOPHILS # BLD AUTO: 0.07 THOUSANDS/ÂΜL (ref 0–0.1)
BASOPHILS NFR BLD AUTO: 1 % (ref 0–1)
BILIRUB SERPL-MCNC: 0.7 MG/DL (ref 0.2–1)
BUN SERPL-MCNC: 19 MG/DL (ref 5–25)
CALCIUM SERPL-MCNC: 9.8 MG/DL (ref 8.4–10.2)
CHLORIDE SERPL-SCNC: 98 MMOL/L (ref 96–108)
CHOLEST SERPL-MCNC: 101 MG/DL
CO2 SERPL-SCNC: 28 MMOL/L (ref 21–32)
CREAT SERPL-MCNC: 0.76 MG/DL (ref 0.6–1.3)
EOSINOPHIL # BLD AUTO: 0.14 THOUSAND/ÂΜL (ref 0–0.61)
EOSINOPHIL NFR BLD AUTO: 2 % (ref 0–6)
ERYTHROCYTE [DISTWIDTH] IN BLOOD BY AUTOMATED COUNT: 12.6 % (ref 11.6–15.1)
EST. AVERAGE GLUCOSE BLD GHB EST-MCNC: 169 MG/DL
FSH SERPL-ACNC: 8.6 MIU/ML
GFR SERPL CREATININE-BSD FRML MDRD: 95 ML/MIN/1.73SQ M
GLUCOSE P FAST SERPL-MCNC: 129 MG/DL (ref 65–99)
HBA1C MFR BLD: 7.5 %
HCT VFR BLD AUTO: 46.6 % (ref 36.5–49.3)
HDLC SERPL-MCNC: 34 MG/DL
HGB BLD-MCNC: 15.9 G/DL (ref 12–17)
IMM GRANULOCYTES # BLD AUTO: 0.02 THOUSAND/UL (ref 0–0.2)
IMM GRANULOCYTES NFR BLD AUTO: 0 % (ref 0–2)
LDLC SERPL CALC-MCNC: 39 MG/DL (ref 0–100)
LH SERPL-ACNC: 4.5 MIU/ML
LYMPHOCYTES # BLD AUTO: 1.56 THOUSANDS/ÂΜL (ref 0.6–4.47)
LYMPHOCYTES NFR BLD AUTO: 27 % (ref 14–44)
MCH RBC QN AUTO: 29.2 PG (ref 26.8–34.3)
MCHC RBC AUTO-ENTMCNC: 34.1 G/DL (ref 31.4–37.4)
MCV RBC AUTO: 86 FL (ref 82–98)
MONOCYTES # BLD AUTO: 0.53 THOUSAND/ÂΜL (ref 0.17–1.22)
MONOCYTES NFR BLD AUTO: 9 % (ref 4–12)
NEUTROPHILS # BLD AUTO: 3.55 THOUSANDS/ÂΜL (ref 1.85–7.62)
NEUTS SEG NFR BLD AUTO: 61 % (ref 43–75)
NONHDLC SERPL-MCNC: 67 MG/DL
NRBC BLD AUTO-RTO: 0 /100 WBCS
PLATELET # BLD AUTO: 166 THOUSANDS/UL (ref 149–390)
PMV BLD AUTO: 10.3 FL (ref 8.9–12.7)
POTASSIUM SERPL-SCNC: 4.7 MMOL/L (ref 3.5–5.3)
PROT SERPL-MCNC: 7 G/DL (ref 6.4–8.4)
RBC # BLD AUTO: 5.44 MILLION/UL (ref 3.88–5.62)
SODIUM SERPL-SCNC: 138 MMOL/L (ref 135–147)
TRIGL SERPL-MCNC: 138 MG/DL
WBC # BLD AUTO: 5.87 THOUSAND/UL (ref 4.31–10.16)

## 2024-02-21 PROCEDURE — 84403 ASSAY OF TOTAL TESTOSTERONE: CPT

## 2024-02-21 PROCEDURE — 85025 COMPLETE CBC W/AUTO DIFF WBC: CPT

## 2024-02-21 PROCEDURE — 80053 COMPREHEN METABOLIC PANEL: CPT

## 2024-02-21 PROCEDURE — 83002 ASSAY OF GONADOTROPIN (LH): CPT

## 2024-02-21 PROCEDURE — 36415 COLL VENOUS BLD VENIPUNCTURE: CPT

## 2024-02-21 PROCEDURE — 83036 HEMOGLOBIN GLYCOSYLATED A1C: CPT

## 2024-02-21 PROCEDURE — 84402 ASSAY OF FREE TESTOSTERONE: CPT

## 2024-02-21 PROCEDURE — 80061 LIPID PANEL: CPT

## 2024-02-21 PROCEDURE — 83001 ASSAY OF GONADOTROPIN (FSH): CPT

## 2024-02-22 LAB
TESTOST FREE SERPL-MCNC: 9.3 PG/ML (ref 6.6–18.1)
TESTOST SERPL-MCNC: 369 NG/DL (ref 264–916)

## 2024-02-29 ENCOUNTER — OFFICE VISIT (OUTPATIENT)
Age: 69
End: 2024-02-29
Payer: MEDICARE

## 2024-02-29 VITALS
OXYGEN SATURATION: 98 % | DIASTOLIC BLOOD PRESSURE: 100 MMHG | WEIGHT: 176 LBS | HEIGHT: 67 IN | HEART RATE: 85 BPM | SYSTOLIC BLOOD PRESSURE: 152 MMHG | BODY MASS INDEX: 27.62 KG/M2

## 2024-02-29 DIAGNOSIS — I25.10 STENOSIS OF RIGHT CORONARY ARTERY: ICD-10-CM

## 2024-02-29 DIAGNOSIS — E11.8 TYPE 2 DIABETES MELLITUS WITH COMPLICATION, WITHOUT LONG-TERM CURRENT USE OF INSULIN (HCC): ICD-10-CM

## 2024-02-29 DIAGNOSIS — Z12.5 SCREENING FOR PROSTATE CANCER: Primary | ICD-10-CM

## 2024-02-29 DIAGNOSIS — I25.10 CORONARY ARTERY DISEASE INVOLVING NATIVE HEART WITHOUT ANGINA PECTORIS, UNSPECIFIED VESSEL OR LESION TYPE: ICD-10-CM

## 2024-02-29 DIAGNOSIS — G57.61 MORTON'S NEUROMA OF RIGHT FOOT: ICD-10-CM

## 2024-02-29 DIAGNOSIS — E78.2 MIXED HYPERLIPIDEMIA: ICD-10-CM

## 2024-02-29 DIAGNOSIS — F41.9 ANXIETY: ICD-10-CM

## 2024-02-29 DIAGNOSIS — I10 ESSENTIAL HYPERTENSION: Chronic | ICD-10-CM

## 2024-02-29 PROBLEM — E11.9 TYPE 2 DIABETES MELLITUS (HCC): Chronic | Status: RESOLVED | Noted: 2017-01-18 | Resolved: 2024-02-29

## 2024-02-29 PROCEDURE — 99214 OFFICE O/P EST MOD 30 MIN: CPT | Performed by: INTERNAL MEDICINE

## 2024-02-29 RX ORDER — ESCITALOPRAM OXALATE 10 MG/1
10 TABLET ORAL DAILY
Qty: 30 TABLET | Refills: 5 | Status: SHIPPED | OUTPATIENT
Start: 2024-02-29 | End: 2024-08-27

## 2024-02-29 RX ORDER — IRBESARTAN 150 MG/1
150 TABLET ORAL DAILY
Qty: 90 TABLET | Refills: 3 | Status: SHIPPED | OUTPATIENT
Start: 2024-02-29

## 2024-02-29 NOTE — PROGRESS NOTES
Assessment/Plan:    Diagnoses and all orders for this visit:    Screening for prostate cancer  -     PSA, Total Screen; Future    Type 2 diabetes mellitus with complication, without long-term current use of insulin (HCC)  -     semaglutide, 1 mg/dose, (Ozempic, 1 MG/DOSE,) 4 mg/3 mL injection pen; Inject 0.75 mL (1 mg total) under the skin every 7 days  -     CBC and differential; Future  -     Comprehensive metabolic panel; Future  -     Lipid panel; Future  -     Hemoglobin A1C; Future  -     TSH, 3rd generation with Free T4 reflex; Future  -     PSA, Total Screen; Future    Essential hypertension  -     irbesartan (AVAPRO) 150 mg tablet; Take 1 tablet (150 mg total) by mouth daily    Anxiety  -     escitalopram (LEXAPRO) 10 mg tablet; Take 1 tablet (10 mg total) by mouth daily    Stenosis of right coronary artery    Coronary artery disease involving native heart without angina pectoris, unspecified vessel or lesion type    Page's neuroma of right foot    Mixed hyperlipidemia              Patient Instructions   Lab data reviewed in detail and compared to prior    Type 2 diabetes mellitus is acceptable however suboptimal with A1c 7.5.  Increase Ozempic from 0.5 to 1 mg weekly.  Continue Jardiance and metformin.    Hypertension-suboptimally controlled.  Increase irbesartan back to 150 mg.  Monitor home blood pressures over the next month prior to follow-up    Hyperlipidemia under excellent control on present regimen    Headaches, anxiety and insomnia-trial of Lexapro start 5 mg daily for 4 days then increase to 10 mg and follow-up in 1 month    Coronary artery disease status post CABG-stable without angina    Toe numbness likely related to Page's neuroma.  Monitor for any other concerning symptoms.    1 month office follow-up, labs ordered for 6-month follow-up    Subjective:      Patient ID: Adis Jerry is a 68 y.o. male    Follow-up multiple problems and review labs    Feeling generally well, stressed  w/ caring for 9 y/o granddgtr w/ ODD.      Waking w/ racing thoughts in addition to MHA.      Notes R knee achy pain, worse sitting and in bent position.  Not standing and walking.      Notes growth on R ear, has derm appt next mo.     Notes numbness in L 3rd toe, has h/o clement's neuroma, but now w/o pain.     Type 2 diabetes mellitus-tolerating metformin, ozempic 0.5 mg and Jardiance.  Not testing sugars regularly.  Not eating much breakfast or lunch, and notes feeling hypoglycemic by 3 pm, then overeats, but now snacking on protein bars, which has helped.      Hypertension/Hyperlipidemia-taking medication as directed, no recent home bp's except 150/105 w/ headache.       CAD s/p CABG '17-stable w/o cp, but notes frost as above.  Cath w/ patent grafts 4/22 but multiple 70-90% RCA lesions that weren't intervened upon.    Allergy/asthma-bad allergies right now, needs renewal on flonase    BPH-fairly stable on flomax 0.8 mg, still w/ some sx, but f/b urology and not interested in more aggressive w/u.      .            Current Outpatient Medications:     Accu-Chek FastClix Lancets MISC, USE TO TEST BLOOD SUGAR AS DIRECTED, Disp: , Rfl:     aspirin 81 MG tablet, Take 81 mg by mouth daily, Disp: , Rfl:     atorvastatin (LIPITOR) 20 mg tablet, Take 1 tablet (20 mg total) by mouth daily at bedtime, Disp: 90 tablet, Rfl: 3    Blood Glucose Monitoring Suppl (Accu-Chek Guide) w/Device KIT, , Disp: , Rfl:     Cholecalciferol (Vitamin D3 Super Strength) 50 MCG (2000 UT) CAPS, 1 po qd, Disp: , Rfl:     escitalopram (LEXAPRO) 10 mg tablet, Take 1 tablet (10 mg total) by mouth daily, Disp: 30 tablet, Rfl: 5    fluticasone (FLONASE) 50 mcg/act nasal spray, SPRAY 2 SPRAYS INTO EACH NOSTRIL EVERY DAY (Patient taking differently: 1 spray into each nostril as needed), Disp: 48 mL, Rfl: 1    glucose blood test strip, Use as instructed, Disp: 200 each, Rfl: 0    irbesartan (AVAPRO) 150 mg tablet, Take 1 tablet (150 mg total) by mouth  daily, Disp: 90 tablet, Rfl: 3    Jardiance 25 MG TABS, TAKE 1 TABLET BY MOUTH EVERY DAY IN THE MORNING, Disp: 90 tablet, Rfl: 3    metFORMIN (GLUCOPHAGE-XR) 500 mg 24 hr tablet, TAKE 4 TABLETS (2,000 MG TOTAL) BY MOUTH DAILY WITH DINNER, Disp: 360 tablet, Rfl: 1    metoprolol succinate (TOPROL-XL) 100 mg 24 hr tablet, TAKE 1 TABLET BY MOUTH EVERY DAY, Disp: 90 tablet, Rfl: 0    nitroglycerin (NITROSTAT) 0.4 mg SL tablet, Place 1 tablet (0.4 mg total) under the tongue every 5 (five) minutes as needed for chest pain, Disp: 100 tablet, Rfl: 0    semaglutide, 1 mg/dose, (Ozempic, 1 MG/DOSE,) 4 mg/3 mL injection pen, Inject 0.75 mL (1 mg total) under the skin every 7 days, Disp: 3 mL, Rfl: 3    tamsulosin (FLOMAX) 0.4 mg, TAKE 2 CAPSULES BY MOUTH DAILY WITH DINNER.., Disp: 180 capsule, Rfl: 3    Insulin Pen Needle 33G X 4 MM MISC, Use 3 (three) times a day (Patient not taking: Reported on 6/7/2023), Disp: 100 each, Rfl: 0    Recent Results (from the past 1008 hour(s))   Testosterone, free, total    Collection Time: 02/21/24  7:55 AM   Result Value Ref Range    Testosterone, Free 9.3 6.6 - 18.1 pg/mL    TESTOSTERONE TOTAL 369 264 - 916 ng/dL   Luteinizing hormone    Collection Time: 02/21/24  7:55 AM   Result Value Ref Range    LH 4.5 1.2 - 8.6 mIU/mL   Follicle stimulating hormone    Collection Time: 02/21/24  7:55 AM   Result Value Ref Range    FSH 8.6 1.3 - 19.3 mIU/mL   CBC and differential    Collection Time: 02/21/24  7:55 AM   Result Value Ref Range    WBC 5.87 4.31 - 10.16 Thousand/uL    RBC 5.44 3.88 - 5.62 Million/uL    Hemoglobin 15.9 12.0 - 17.0 g/dL    Hematocrit 46.6 36.5 - 49.3 %    MCV 86 82 - 98 fL    MCH 29.2 26.8 - 34.3 pg    MCHC 34.1 31.4 - 37.4 g/dL    RDW 12.6 11.6 - 15.1 %    MPV 10.3 8.9 - 12.7 fL    Platelets 166 149 - 390 Thousands/uL    nRBC 0 /100 WBCs    Neutrophils Relative 61 43 - 75 %    Immat GRANS % 0 0 - 2 %    Lymphocytes Relative 27 14 - 44 %    Monocytes Relative 9 4 - 12 %     Eosinophils Relative 2 0 - 6 %    Basophils Relative 1 0 - 1 %    Neutrophils Absolute 3.55 1.85 - 7.62 Thousands/µL    Immature Grans Absolute 0.02 0.00 - 0.20 Thousand/uL    Lymphocytes Absolute 1.56 0.60 - 4.47 Thousands/µL    Monocytes Absolute 0.53 0.17 - 1.22 Thousand/µL    Eosinophils Absolute 0.14 0.00 - 0.61 Thousand/µL    Basophils Absolute 0.07 0.00 - 0.10 Thousands/µL   Comprehensive metabolic panel    Collection Time: 02/21/24  7:55 AM   Result Value Ref Range    Sodium 138 135 - 147 mmol/L    Potassium 4.7 3.5 - 5.3 mmol/L    Chloride 98 96 - 108 mmol/L    CO2 28 21 - 32 mmol/L    ANION GAP 12 mmol/L    BUN 19 5 - 25 mg/dL    Creatinine 0.76 0.60 - 1.30 mg/dL    Glucose, Fasting 129 (H) 65 - 99 mg/dL    Calcium 9.8 8.4 - 10.2 mg/dL    AST 16 13 - 39 U/L    ALT 15 7 - 52 U/L    Alkaline Phosphatase 78 34 - 104 U/L    Total Protein 7.0 6.4 - 8.4 g/dL    Albumin 4.3 3.5 - 5.0 g/dL    Total Bilirubin 0.70 0.20 - 1.00 mg/dL    eGFR 95 ml/min/1.73sq m   Lipid panel    Collection Time: 02/21/24  7:55 AM   Result Value Ref Range    Cholesterol 101 See Comment mg/dL    Triglycerides 138 See Comment mg/dL    HDL, Direct 34 (L) >=40 mg/dL    LDL Calculated 39 0 - 100 mg/dL    Non-HDL-Chol (CHOL-HDL) 67 mg/dl   Hemoglobin A1C    Collection Time: 02/21/24  7:55 AM   Result Value Ref Range    Hemoglobin A1C 7.5 (H) Normal 4.0-5.6%; PreDiabetic 5.7-6.4%; Diabetic >=6.5%; Glycemic control for adults with diabetes <7.0% %     mg/dl       The following portions of the patient's history were reviewed and updated as appropriate: allergies, current medications, past family history, past medical history, past social history, past surgical history and problem list.     Review of Systems   Constitutional:  Negative for appetite change, chills, diaphoresis, fatigue, fever and unexpected weight change.   HENT:  Negative for congestion, hearing loss and rhinorrhea.    Eyes:  Negative for visual disturbance.   Respiratory:   Negative for cough, chest tightness, shortness of breath and wheezing.    Cardiovascular:  Negative for chest pain, palpitations and leg swelling.   Gastrointestinal:  Negative for abdominal pain and blood in stool.   Endocrine: Negative for cold intolerance, heat intolerance, polydipsia and polyuria.   Genitourinary:  Negative for difficulty urinating, dysuria, frequency and urgency.   Musculoskeletal:  Negative for arthralgias and myalgias.   Skin:  Negative for rash.   Neurological:  Negative for dizziness, weakness, light-headedness and headaches.   Hematological:  Does not bruise/bleed easily.   Psychiatric/Behavioral:  Negative for dysphoric mood and sleep disturbance.          Objective:      Vitals:    02/29/24 1102   BP: 152/100   Pulse: 85   SpO2: 98%          Physical Exam  Constitutional:       Appearance: He is well-developed.   HENT:      Head: Normocephalic and atraumatic.      Nose: Nose normal.   Eyes:      General: No scleral icterus.     Conjunctiva/sclera: Conjunctivae normal.      Pupils: Pupils are equal, round, and reactive to light.   Neck:      Thyroid: No thyromegaly.      Vascular: No JVD.      Trachea: No tracheal deviation.   Cardiovascular:      Rate and Rhythm: Normal rate and regular rhythm.      Heart sounds: No murmur heard.     No friction rub. No gallop.   Pulmonary:      Effort: Pulmonary effort is normal. No respiratory distress.      Breath sounds: Normal breath sounds. No wheezing or rales.   Musculoskeletal:         General: No deformity.      Cervical back: Normal range of motion and neck supple.   Lymphadenopathy:      Cervical: No cervical adenopathy.   Skin:     General: Skin is warm and dry.      Coloration: Skin is not pale.      Findings: No erythema or rash.   Neurological:      Mental Status: He is alert and oriented to person, place, and time.      Cranial Nerves: No cranial nerve deficit.   Psychiatric:         Behavior: Behavior normal. Behavior is cooperative.          Thought Content: Thought content normal.         Judgment: Judgment normal.

## 2024-02-29 NOTE — PATIENT INSTRUCTIONS
Lab data reviewed in detail and compared to prior    Type 2 diabetes mellitus is acceptable however suboptimal with A1c 7.5.  Increase Ozempic from 0.5 to 1 mg weekly.  Continue Jardiance and metformin.    Hypertension-suboptimally controlled.  Increase irbesartan back to 150 mg.  Monitor home blood pressures over the next month prior to follow-up    Hyperlipidemia under excellent control on present regimen    Headaches, anxiety and insomnia-trial of Lexapro start 5 mg daily for 4 days then increase to 10 mg and follow-up in 1 month    Coronary artery disease status post CABG-stable without angina    Toe numbness likely related to Page's neuroma.  Monitor for any other concerning symptoms.    1 month office follow-up, labs ordered for 6-month follow-up

## 2024-03-14 DIAGNOSIS — E11.8 TYPE 2 DIABETES MELLITUS WITH COMPLICATION, WITHOUT LONG-TERM CURRENT USE OF INSULIN (HCC): ICD-10-CM

## 2024-03-18 ENCOUNTER — TELEPHONE (OUTPATIENT)
Age: 69
End: 2024-03-18

## 2024-03-18 NOTE — TELEPHONE ENCOUNTER
"----- Message from Oralia Aggarwal sent at 3/18/2024  8:46 AM EDT -----  Regarding: FW: Knee   Contact: [phone]    ----- Message -----  From: Adis Jerry \"Rene\"  Sent: 3/15/2024   9:38 PM EDT  To: [facility]  Subject: Knee                                             Sorry I did not see this until too late. I would like to schedule for Monday then?    "

## 2024-03-19 ENCOUNTER — TELEPHONE (OUTPATIENT)
Age: 69
End: 2024-03-19

## 2024-03-20 ENCOUNTER — OFFICE VISIT (OUTPATIENT)
Age: 69
End: 2024-03-20
Payer: MEDICARE

## 2024-03-20 VITALS
OXYGEN SATURATION: 98 % | HEIGHT: 67 IN | HEART RATE: 80 BPM | SYSTOLIC BLOOD PRESSURE: 130 MMHG | BODY MASS INDEX: 28.09 KG/M2 | RESPIRATION RATE: 16 BRPM | WEIGHT: 179 LBS | DIASTOLIC BLOOD PRESSURE: 84 MMHG

## 2024-03-20 DIAGNOSIS — M25.561 CHRONIC PAIN OF RIGHT KNEE: Primary | ICD-10-CM

## 2024-03-20 DIAGNOSIS — G89.29 CHRONIC PAIN OF RIGHT KNEE: Primary | ICD-10-CM

## 2024-03-20 PROCEDURE — 99213 OFFICE O/P EST LOW 20 MIN: CPT

## 2024-03-20 PROCEDURE — G2211 COMPLEX E/M VISIT ADD ON: HCPCS

## 2024-03-20 NOTE — PROGRESS NOTES
INTERNAL MEDICINE FOLLOW-UP VISIT  [facility] - [facility] INTERNAL MEDICINE [address]    NAME: Adis Jerry  AGE: 68 y.o. SEX: male  : 1955     DATE: 3/20/2024     Assessment and Plan:   1. Chronic pain of right knee  Chronic aching is most likely related to osteoarthritis.  Acute pain was most likely related to a muscle strain.  Will obtain knee x-ray.  Discussed the importance of muscle strengthening, continuing to exercise, weight loss, Tylenol or diclofenac gel, heat or ice.  If pain worsens or persist, may benefit from physical therapy.        No follow-ups on file.       Chief Complaint:     Chief Complaint   Patient presents with    Knee Pain     Right knee.       History of Present Illness:   Patient is a 68-year-old male that presents today for right knee pain.  He notes this has been going on for a while.  He denies any specific injuries.  Describes it as an ache.  It is worse with bending, squatting, or going seated to standing position.  He notes a history of being active, playing basketball.  He believes it is related to arthritis.    He also notes last Monday, he had a different type of right knee pain.  He could barely get out of bed.  He denies any specific injury.  He struggled to walk for the next days.  The pain was located on the medial aspect of his right knee.  It was extremely sharp with any pivoting movement.  He denies any weakness, or his leg giving out.  He used Advil a few times which helped.  He notes it is now much better than it was last week.  He denies any redness, bruising, open cuts, fevers, chills, or swelling.    The following portions of the patient's history were reviewed and updated as appropriate: allergies, current medications, past family history, past medical history, past social history, past surgical history and problem list.     Review of Systems:     Review of Systems   Constitutional:  Negative for chills and fever.    Musculoskeletal:  Positive for arthralgias and myalgias. Negative for gait problem and joint swelling.   Skin:  Negative for rash.   Neurological:  Negative for weakness and numbness.        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        Current Medications:     Current Outpatient Medications:     Accu-Chek FastClix Lancets MISC, USE TO TEST BLOOD SUGAR AS DIRECTED, Disp: , Rfl:     aspirin 81 MG tablet, Take 81 mg by mouth daily, Disp: , Rfl:     atorvastatin (LIPITOR) 20 mg tablet, Take 1 tablet (20 mg total) by mouth daily at bedtime, Disp: 90 tablet, Rfl: 3    Blood Glucose Monitoring Suppl (Accu-Chek Guide) w/Device KIT, , Disp: , Rfl:     Cholecalciferol (Vitamin D3 Super Strength) 50 MCG (2000 UT) CAPS, 1 po qd, Disp: , Rfl:     fluticasone (FLONASE) 50 mcg/act nasal spray, SPRAY 2 SPRAYS INTO EACH NOSTRIL EVERY DAY (Patient taking differently: 1 spray into each nostril as needed), Disp: 48 mL, Rfl: 1    glucose blood test strip, Use as instructed, Disp: 200 each, Rfl: 0    irbesartan (AVAPRO) 150 mg tablet, Take 1 tablet (150 mg total) by mouth daily, Disp: 90 tablet, Rfl: 3    Jardiance 25 MG TABS, TAKE 1 TABLET BY MOUTH EVERY DAY IN THE MORNING, Disp: 90 tablet, Rfl: 3    metFORMIN (GLUCOPHAGE-XR) 500 mg 24 hr tablet, TAKE 4 TABLETS (2,000 MG TOTAL) BY MOUTH DAILY WITH DINNER, Disp: 360 tablet, Rfl: 1    metoprolol succinate (TOPROL-XL) 100 mg 24 hr tablet, TAKE 1 TABLET BY MOUTH EVERY DAY, Disp: 90 tablet, Rfl: 0    nitroglycerin (NITROSTAT) 0.4 mg SL tablet, Place 1 tablet (0.4 mg total) under the tongue every 5 (five)  "minutes as needed for chest pain, Disp: 100 tablet, Rfl: 0    semaglutide, 1 mg/dose, (Ozempic, 1 MG/DOSE,) 4 mg/3 mL injection pen, Inject 0.75 mL (1 mg total) under the skin every 7 days, Disp: 3 mL, Rfl: 3    tamsulosin (FLOMAX) 0.4 mg, TAKE 2 CAPSULES BY MOUTH DAILY WITH DINNER.., Disp: 180 capsule, Rfl: 3    escitalopram (LEXAPRO) 10 mg tablet, Take 1 tablet (10 mg total) by mouth daily (Patient not taking: Reported on 3/20/2024), Disp: 30 tablet, Rfl: 5    Insulin Pen Needle 33G X 4 MM MISC, Use 3 (three) times a day (Patient not taking: Reported on 6/7/2023), Disp: 100 each, Rfl: 0     Allergies:   No Known Allergies     Physical Exam:     /84 (BP Location: Left arm, Patient Position: Sitting, Cuff Size: Standard)   Pulse 80   Resp 16   Ht 5' 7\" (1.702 m)   Wt 81.2 kg (179 lb)   SpO2 98%   BMI 28.04 kg/m²     Physical Exam  Vitals and nursing note reviewed.   Constitutional:       General: He is awake.      Appearance: Normal appearance. He is well-developed, well-groomed and overweight.   HENT:      Head: Normocephalic and atraumatic.      Right Ear: Hearing and external ear normal.      Left Ear: Hearing and external ear normal.      Nose: Nose normal.      Mouth/Throat:      Lips: Pink.      Mouth: Mucous membranes are moist.   Eyes:      General: Lids are normal. Vision grossly intact. Gaze aligned appropriately.      Conjunctiva/sclera: Conjunctivae normal.   Neck:      Vascular: No carotid bruit.      Trachea: Trachea and phonation normal.   Cardiovascular:      Rate and Rhythm: Normal rate and regular rhythm.      Heart sounds: Normal heart sounds, S1 normal and S2 normal. No murmur heard.     No friction rub. No gallop.   Pulmonary:      Effort: Pulmonary effort is normal.      Breath sounds: Normal breath sounds and air entry. No decreased breath sounds, wheezing, rhonchi or rales.   Abdominal:      General: Abdomen is protuberant.   Musculoskeletal:      Cervical back: Neck supple.      " Right lower leg: Tenderness (Medial aspect) present. No deformity, lacerations or bony tenderness. No edema.      Left lower leg: Normal. No edema.      Comments: Normal gait.  No pain with flexion or extension of right knee.   Skin:     General: Skin is warm.      Capillary Refill: Capillary refill takes less than 2 seconds.   Neurological:      Mental Status: He is alert.   Psychiatric:         Attention and Perception: Attention and perception normal.         Mood and Affect: Mood and affect normal.         Speech: Speech normal.         Behavior: Behavior normal. Behavior is cooperative.         Thought Content: Thought content normal.         Cognition and Memory: Cognition and memory normal.         Judgment: Judgment normal.           Data:     Laboratory Results: I have personally reviewed the pertinent laboratory results/reports   Radiology/Other Diagnostic Testing Results: I have personally reviewed pertinent reports.      Helen Ragsdale PA-C  [facility] INTERNAL MEDICINE [address]

## 2024-03-28 ENCOUNTER — OFFICE VISIT (OUTPATIENT)
Age: 69
End: 2024-03-28
Payer: MEDICARE

## 2024-03-28 VITALS — WEIGHT: 180.6 LBS | BODY MASS INDEX: 28.35 KG/M2 | HEIGHT: 67 IN | TEMPERATURE: 98.1 F

## 2024-03-28 DIAGNOSIS — L82.1 SEBORRHEIC KERATOSIS: ICD-10-CM

## 2024-03-28 DIAGNOSIS — Z13.89 SCREENING FOR SKIN CONDITION: Primary | ICD-10-CM

## 2024-03-28 DIAGNOSIS — D18.01 CHERRY ANGIOMA: ICD-10-CM

## 2024-03-28 DIAGNOSIS — D22.9 MULTIPLE NEVI: ICD-10-CM

## 2024-03-28 DIAGNOSIS — Z85.828 HISTORY OF SKIN CANCER: ICD-10-CM

## 2024-03-28 DIAGNOSIS — Z85.820 HISTORY OF MELANOMA: ICD-10-CM

## 2024-03-28 PROCEDURE — 99213 OFFICE O/P EST LOW 20 MIN: CPT | Performed by: DERMATOLOGY

## 2024-03-28 NOTE — PROGRESS NOTES
"[facility] Note     Patient Name: Adis Jerry  Encounter Date: March 28, 2024     Have you been cared for by a [facility] Dermatologist in the last 3 years and, if so, which description applies to you?    Yes.  I have been here within the last 3 years, and my medical history has NOT changed since that time.  I am MALE/not capable of bearing children.    REVIEW OF SYSTEMS:  Have you recently had or currently have any of the following? No changes in my recent health.   PAST MEDICAL HISTORY:  Have you personally ever had or currently have any of the following?  If \"YES,\" then please provide more detail. No changes in my medical history.   HISTORY OF IMMUNOSUPPRESSION: Do you have a history of any of the following:  Systemic Immunosuppression such as Diabetes, Biologic or Immunotherapy, Chemotherapy, Organ Transplantation, Bone Marrow Transplantation?  No     Answering \"YES\" requires the addition of the dotphrase \"IMMUNOSUPPRESSED\" as the first diagnosis of the patient's visit.   FAMILY HISTORY:  Any \"first degree relatives\" (parent, brother, sister, or child) with the following?    No changes in my family's known health.   PATIENT EXPERIENCE:    Do you want the Dermatologist to perform a COMPLETE skin exam today including a clinical examination under the \"bra and underwear\" areas?  Yes  If necessary, do we have your permission to call and leave a detailed message on your Preferred Phone number that includes your specific medical information?  Yes      No Known Allergies   Current Outpatient Medications:     Accu-Chek FastClix Lancets MISC, USE TO TEST BLOOD SUGAR AS DIRECTED, Disp: , Rfl:     aspirin 81 MG tablet, Take 81 mg by mouth daily, Disp: , Rfl:     atorvastatin (LIPITOR) 20 mg tablet, Take 1 tablet (20 mg total) by mouth daily at bedtime, Disp: 90 tablet, Rfl: 3    Blood Glucose Monitoring Suppl (Accu-Chek Guide) w/Device KIT, , Disp: , Rfl:     Cholecalciferol (Vitamin D3 Super " Strength) 50 MCG (2000 UT) CAPS, 1 po qd, Disp: , Rfl:     fluticasone (FLONASE) 50 mcg/act nasal spray, SPRAY 2 SPRAYS INTO EACH NOSTRIL EVERY DAY (Patient taking differently: 1 spray into each nostril as needed), Disp: 48 mL, Rfl: 1    glucose blood test strip, Use as instructed, Disp: 200 each, Rfl: 0    irbesartan (AVAPRO) 150 mg tablet, Take 1 tablet (150 mg total) by mouth daily, Disp: 90 tablet, Rfl: 3    Jardiance 25 MG TABS, TAKE 1 TABLET BY MOUTH EVERY DAY IN THE MORNING, Disp: 90 tablet, Rfl: 3    metFORMIN (GLUCOPHAGE-XR) 500 mg 24 hr tablet, TAKE 4 TABLETS (2,000 MG TOTAL) BY MOUTH DAILY WITH DINNER, Disp: 360 tablet, Rfl: 1    metoprolol succinate (TOPROL-XL) 100 mg 24 hr tablet, TAKE 1 TABLET BY MOUTH EVERY DAY, Disp: 90 tablet, Rfl: 0    nitroglycerin (NITROSTAT) 0.4 mg SL tablet, Place 1 tablet (0.4 mg total) under the tongue every 5 (five) minutes as needed for chest pain, Disp: 100 tablet, Rfl: 0    semaglutide, 1 mg/dose, (Ozempic, 1 MG/DOSE,) 4 mg/3 mL injection pen, Inject 0.75 mL (1 mg total) under the skin every 7 days, Disp: 3 mL, Rfl: 3    tamsulosin (FLOMAX) 0.4 mg, TAKE 2 CAPSULES BY MOUTH DAILY WITH DINNER.., Disp: 180 capsule, Rfl: 3          Whom besides the patient is providing clinical information about today's encounter?   NO ADDITIONAL HISTORIAN (patient alone provided history)    Physical Exam and Assessment/Plan by Diagnosis:    Chief complaint: Patient is a 69 y/o male present for a routine skin exam with history of Melanoma as a child, Sarcoma 14yrs ago, and Aks'. Pt has several spots of concern for today.    HISTORY OF MELANOMA    Physical Exam:  Anatomic Location Affected:  Abdomen - 60yrs ago  Morphological Description of Scar:  well healed  Year Treated: 1963  TNM Classification: records not available  Suspected Recurrence: no  Regional adenopathy: no    Additional History of Present Condition:  Melanoma excised in 1963    Assessment and Plan:  Based on a thorough  "discussion of this condition and the management approach to it (including a comprehensive discussion of the known risks, side effects and potential benefits of treatment), the patient (family) agrees to implement the following specific plan:  Monitor for change    MELANOCYTIC NEVI (\"Moles\")    Physical Exam:  Anatomic Location Affected: Mostly on sun-exposed areas of the trunk and extremities  Morphological Description:  Scattered, 1-4mm round to ovoid, symmetrical-appearing, even bordered, skin colored to dark brown macules/papules, mostly in sun-exposed areas  Pertinent Positives:  Pertinent Negatives:    Additional History of Present Condition:  present on exam    Assessment and Plan:  Based on a thorough discussion of this condition and the management approach to it (including a comprehensive discussion of the known risks, side effects and potential benefits of treatment), the patient (family) agrees to implement the following specific plan:  Provided handout with information regarding the ABCDE's of moles   Recommend routine skin exams every year   Sun avoidance, protective clothing (known as UPF clothing), and the use of at least SPF 30 sunscreens is advised. Sunscreen should be reapplied every two hours when outside.     SEBORRHEIC KERATOSIS; NON-INFLAMED    Physical Exam:  Anatomic Location Affected:  scattered across trunk, extremities, face  Morphological Description:  Flat and raised, waxy, smooth to warty textured, yellow to brownish-grey to dark brown to blackish, discrete, \"stuck-on\" appearing papules.  Pertinent Positives:  Pertinent Negatives:    Additional History of Present Condition:  Patient reports new bumps on the skin.  Denies itch, burn, pain, bleeding or ulceration.  Present constantly; nothing seems to make it worse or better.  No prior treatment.      Assessment and Plan:  Based on a thorough discussion of this condition and the management approach to it (including a comprehensive " "discussion of the known risks, side effects and potential benefits of treatment), the patient (family) agrees to implement the following specific plan:  Reassured benign    ANGIOMA (\"CHERRY ANGIOMA\")    Physical Exam:  Anatomic Location: scattered across sun exposed areas of the trunk and extremities   Morphologic Description: Firm red to reddish-blue discrete papules  Pertinent Positives:  Pertinent Negatives:    Additional History of Present Condition:  Present on exam.     Assessment and Plan:  Reassured benign      Scribe Attestation      I,:  Saniya Abad am acting as a scribe while in the presence of the attending physician.:       I,:  Saeid Mendoza MD personally performed the services described in this documentation    as scribed in my presence.:             "

## 2024-03-28 NOTE — PATIENT INSTRUCTIONS
ACTINIC KERATOSIS    Actinic keratoses are very common on sites repeatedly exposed to the sun, especially the backs of the hands and the face, most often affecting the ears, nose, cheeks, upper lip, vermilion of the lower lip, temples, forehead and balding scalp. In severely chronically sun-damaged individuals, they may also be found on the upper trunk, upper and lower limbs, and dorsum of feet.    We discussed the theoretical premalignant (“pre-cancerous”) nature and etiology of these growths.  We discussed the prevailing notion that actinic keratoses are a reflection of abnormal skin cell development due to DNA damage by short wavelength UVB.  They are more likely to appear if the immune function is poor, due to aging, recent sun exposure, predisposing disease or certain drugs.    We discussed that the main concern is that actinic keratoses may predispose to squamous cell carcinoma. It is rare for a solitary actinic keratosis to evolve to squamous cell carcinoma (SCC), but the risk of SCC occurring at some stage in a patient with more than 10 actinic keratoses is thought to be about 10 to 15%. A tender, thickened, ulcerated or enlarging actinic keratosis is suspicious of SCC.    Actinic keratoses may be prevented by strict sun protection. If already present, keratoses may improve with a very high sun protection factor (50+) broad-spectrum sunscreen applied at least daily to affected areas, year-round.  We recommend that UPF-rated clothing and hats and sunglasses be worn whenever possible and that a sunscreen-moisturizer combination product such as Neutrogena Daily Defense be applied at least three times a day.    We performed a thorough discussion of treatment options and specific risk/benefits/alternatives including but not limited to medical “field” treatment with medications such as the following:    Topical “field area” medications such as 5-fluorouracil or Aldara (specifically, the trouble with long-term  compliance, blistering and local skin reaction versus the convenience of at-home therapy and that field therapy “gets what is not yet seen”).    Cryotherapy (specifically, local pain, scarring, dyspigmentation, blistering, possible superinfection, and treats “only what we see” versus directed treatment today).    Photodynamic therapy (specifically, local pain, scarring, dyspigmentation, blistering, possible superinfection, need to schedule for a later date, and time spent in the office versus field therapy that “gets what is not yet seen”).      BASAL CELL CARCINOMA    What is basal cell carcinoma?  Basal cell carcinoma (BCC) is a common, locally invasive, keratinocytic, or non-melanoma, skin cancer. It is also known as rodent ulcer and basalioma. Patients with BCC often develop multiple primary tumours over time.    Who gets basal cell carcinoma?  Risk factors for BCC include:  Age and sex: BCCs are particularly prevalent in elderly males. However, they also affect females and younger adults   Previous BCC or other form of skin cancer (squamous cell carcinoma, melanoma)   Sun damage (photoaging, actinic keratoses)   Repeated prior episodes of sunburn   Fair skin, blue eyes and blond or red hair--note; BCC can also affect darker skin types   Previous cutaneous injury, thermal burn, disease (eg cutaneous lupus, sebaceous naevus)   Inherited syndromes: BCC is a particular problem for families with basal cell naevus syndrome (Gorlin syndrome), Yzpqv-Zzwfé-Hhrcsdnv syndrome, Rombo syndrome, Oley syndrome and xeroderma pigmentosum   Other risk factors include ionising radiation, exposure to arsenic, and immune suppression due to disease or medicines    What causes basal cell carcinoma?  The cause of BCC is multifactorial.  Most often, there are DNA mutations in the patched (PTCH) tumour suppressor gene, part of hedgehog signaling pathway   These may be triggered by exposure to ultraviolet radiation   Various spontaneous  and inherited gene defects predispose to BCC    What are the clinical features of basal cell carcinoma?  BCC is a locally invasive skin tumour. The main characteristics are:  Slowly growing plaque or nodule   Skin coloured, pink or pigmented   Varies in size from a few millimetres to several centimetres in diameter   Spontaneous bleeding or ulceration  BCC is very rarely a threat to life. A tiny proportion of BCCs grow rapidly, invade deeply, and/or metastasise to local lymph nodes.    Types of basal cell carcinoma  There are several distinct clinical types of BCC, and over 20 histological growth patterns of BCC.  Nodular BCC  Most common type of facial BCC   Shiny or pearly nodule with a smooth surface   May have central depression or ulceration, so its edges appear rolled   Blood vessels cross its surface   Cystic variant is soft, with jelly-like contents   Micronodular, microcystic and infiltrative types are potentially aggressive subtypes   Also known as nodulocystic carcinoma  Superficial BCC  Most common type in younger adults   Most common type on upper trunk and shoulders   Slightly scaly, irregular plaque   Thin, translucent rolled border   Multiple microerosions  Morphoeaform BCC  Usually found in mid-facial sites   Waxy, scar-like plaque with indistinct borders   Wide and deep subclinical extension   May infiltrate cutaneous nerves (perineural spread)   Also known as morpheic, morphoeiform or sclerosing BCC  Basosquamous carcinoma  Mixed basal cell carcinoma (BCC) and squamous cell carcinoma (SCC)   Infiltrative growth pattern   Potentially more aggressive than other forms of BCC   Also known as basisquamous carcinoma and mixed basal-squamous cell carcinoma       Complications of basal cell carcinoma    Recurrent BCC  Recurrence of BCC after initial treatment is not uncommon. Characteristics of recurrent BCC often include:  Incomplete excision or narrow margins at primary excision   Morphoeic,  micronodular, and infiltrative subtypes   Location on head and neck    Advanced BCC  Advanced BCCs are large, often neglected tumours.  They may be several centimetres in diameter   They may be deeply infiltrating into tissues below the skin   They are difficult or impossible to treat surgically    Metastatic BCC  Very rare   Primary tumour is often large, neglected or recurrent, located on head and neck, with aggressive subtype   May have had multiple prior treatments   May arise in site exposed to ionising radiation   Can be fatal    How is basal cell carcinoma diagnosed?  BCC is diagnosed clinically by the presence of a slowly enlarging skin lesion with typical appearance. The diagnosis and  by a diagnostic biopsy or following excision.  Some typical superficial BCCs on trunk and limbs are clinically diagnosed and have non-surgical treatment without histology.    What is the treatment for primary basal cell carcinoma?  The treatment for a BCC depends on its type, size and location, the number to be treated, patient factors, and the preference or expertise of the doctor. Most BCCs are treated surgically. Long-term follow-up is recommended to check for new lesions and recurrence; the latter may be unnecessary if histology has reported wide clear margins.    Excision biopsy  Excision means the lesion is cut out and the skin stitched up.  Most appropriate treatment for nodular, infiltrative and morphoeic BCCs   Should include 3 to 5 mm margin of normal skin around the tumour   Very large lesions may require flap or skin graft to repair the defect   Pathologist will report deep and lateral margins   Further surgery is recommended for lesions that are incompletely excised    Mohs micrographically controlled excision  Mohs micrographically controlled surgery involves examining carefully marked excised tissue under the microscope, layer by layer, to ensure complete excision.  Very high cure rates achieved by trained Mohs  surgeons   Used in high-risk areas of the face around eyes, lips and nose   Suitable for ill-defined, morphoeic, infiltrative and recurrent subtypes   Large defects are repaired by flap or skin graft    Superficial skin surgery  Superficial skin surgery comprises shave, curettage, and electrocautery. It is a rapid technique using local anaesthesia and does not require sutures.  Suitable for small, well-defined nodular or superficial BCCs   Lesions are usually located on trunk or limbs   Wound is left open to heal by secondary intention   Moist wound dressings lead to healing within a few weeks   Eventual scar quality variable    Cryotherapy  Cryotherapy is the treatment of a superficial skin lesion by freezing it, usually with liquid nitrogen.  Suitable for small superficial BCCs on covered areas of trunk and limbs   Best avoided for BCCs on head and neck, and distal to knees   Double freeze-thaw technique   Results in a blister that crusts over and heals within several weeks.   Leaves permanent white marlyn    Photodynamic therapy  Photodynamic therapy (PDT) refers to a technique in which BCC is treated with a photosensitising chemical, and exposed to light several hours later.  Topical photosensitisers include aminolevulinic acid lotion and methyl aminolevulinate cream   Suitable for low-risk small, superficial BCCs   Best avoided if tumour in site at high risk of recurrence   Results in inflammatory reaction, maximal 3-4 days after procedure   Treatment repeated 7 days after initial treatment   Excellent cosmetic results    Imiquimod cream  Imiquimod is an immune response modifier.  Best used for superficial BCCs less than 2 cm diameter   Applied three to five times each week, for 6-16 weeks   Results in a variable inflammatory reaction, maximal at three weeks   Minimal scarring is usual    Fluorouracil cream  5-Fluorouracil cream is a topical cytotoxic agent.  Used to treat small superficial basal cell carcinomas    Requires prolonged course, eg twice daily for 6-12 weeks   Causes inflammatory reaction   Has high recurrence rates    Radiotherapy  Radiotherapy or X-ray treatment can be used to treat primary BCCs or as adjunctive treatment if margins are incomplete.  Mainly used if surgery is not suitable   Best avoided in young patients and in genetic conditions predisposing to skin cancer   Best cosmetic results achieved using multiple fractions   Typically, patient attends once-weekly for several weeks   Causes inflammatory reaction followed by scar   Risk of radiodermatitis, late recurrence, and new tumours    What is the treatment for advanced or metastatic basal cell carcinoma?  Locally advanced primary, recurrent or metastatic BCC requires multidisciplinary consultation. Often a combination of treatments is used.  Surgery   Radiotherapy   Targeted therapy  Targeted therapy refers to the hedgehog signalling pathway inhibitors, vismodegib and sonidegib. These drugs have some important risks and side effects.    How can basal cell carcinoma be prevented?  The most important way to prevent BCC is to avoid sunburn. This is especially important in childhood and early life. Fair skinned individuals and those with a personal or family history of BCC should protect their skin from sun exposure daily, year-round and lifelong.  Stay indoors or under the shade in the middle of the day   Wear covering clothing   Apply high protection factor SPF50+ broad-spectrum sunscreens generously to exposed skin if outdoors   Avoid indoor tanning (sun beds, solaria)  Oral nicotinamide (vitamin B3) in a dose of 500 mg twice daily may reduce the number and severity of BCCs.    What is the outlook for basal cell carcinoma?  Most BCCs are cured by treatment. Cure is most likely if treatment is undertaken when the lesion is small.  About 50% of people with BCC develop a second one within 3 years of the first. They are also at increased risk of other  skin cancers, especially melanoma. Regular self-skin examinations and long-term annual skin checks by an experienced health professional are recommended.        SQUAMOUS CELL CARCINOMA    What is cutaneous squamous cell carcinoma?  Cutaneous squamous cell carcinoma (SCC) is a common type of keratinocyte or non-melanoma skin cancer. It is derived from cells within the epidermis that make keratin -- the horny protein that makes up skin, hair and nails.  Cutaneous SCC is an invasive disease, referring to cancer cells that have grown beyond the epidermis. SCC can sometimes metastasise and may prove fatal.  Intraepidermal carcinoma (cutaneous SCC in situ) and mucosal SCC are considered elsewhere.    Who gets cutaneous squamous cell carcinoma?  Risk factors for cutaneous SCC include:  Age and sex: SCCs are particularly prevalent in elderly males. However, they also affect females and younger adults.   Previous SCC or another form of skin cancer (basal cell carcinoma, melanoma) are a strong predictor for further skin cancers.   Actinic keratoses   Outdoor occupation or recreation   Smoking   Fair skin, blue eyes and blond or red hair   Previous cutaneous injury, thermal burn, disease (eg cutaneous lupus, epidermolysis bullosa, leg ulcer)   Inherited syndromes: SCC is a particular problem for families with xeroderma pigmentosum and albinism   Other risk factors include ionising radiation, exposure to arsenic, and immune suppression due to disease (eg chronic lymphocytic leukaemia) or medicines. Organ transplant recipients have a massively increased risk of developing SCC.    What causes cutaneous squamous cell carcinoma?  More than 90% of cases of SCC are associated with numerous DNA mutations in multiple somatic genes. Mutations in the p53 tumour suppressor gene are caused by exposure to ultraviolet radiation (UV), especially UVB (known as signature 7). Other signature mutations relate to cigarette smoking, ageing and  immune suppression (eg, to drugs such as azathioprine). Mutations in signalling pathways affect the epidermal growth factor receptor, SANTIAGO, Fyn, and [id number] signalling.   Beta-genus human papillomaviruses (wart virus) are thought to play a role in SCC arising in immune-suppressed populations. ?-HPV and HPV subtypes 5, 8, 17, 20, 24, and 38 have also been associated with an increased risk of cutaneous SCC in immunocompetent individuals.     What are the clinical features of cutaneous squamous cell carcinoma?  Cutaneous SCCs present as enlarging scaly or crusted lumps. They usually arise within pre-existing actinic keratosis or intraepidermal carcinoma.  They grow over weeks to months   They may ulcerate   They are often tender or painful   Located on sun-exposed sites, particularly the face, lips, ears, hands, forearms and lower legs   Size varies from a few millimetres to several centimetres in diameter.    Types of cutaneous squamous cell carcinoma  Distinct clinical types of invasive cutaneous SCC include:  Cutaneous horn -- the horn is due to excessive production of keratin   Keratoacanthoma (KA) -- a rapidly growing keratinising nodule that may resolve without treatment   Carcinoma cuniculatum (‘verrucous carcinoma’), a slow-growing, warty tumour on the sole of the foot.   Multiple eruptive SCC/KA-like lesions arising in syndromes, such as multiple self-healing squamous epitheliomas of Eugene-Smith and Grzybowski syndrome  The pathologist may classify a tumour as well differentiated, moderately well differentiated, poorly differentiated or anaplastic cutaneous SCC. There are other variants.    Classification of squamous cell carcinoma by risk  Cutaneous SCC is classified as low-risk or high-risk, depending on the chance of tumour recurrence and metastasis. Characteristics of high-risk SCC include:  High-risk cutaneous squamous cell carcinoma has the following characteristics:  Diameter greater than or equal to  2 cm   Location on the ear, vermilion of the lip, central face, hands, feet, genitalia   Arising in elderly or immune suppressed patient   Histological thickness greater than 2 mm, poorly differentiated histology, or with the invasion of the subcutaneous tissue, nerves and blood vessels  Metastatic SCC is found in regional lymph nodes (80%), lungs, liver, brain, bones and skin.    Staging cutaneous squamous cell carcinoma  In 2011, the American Joint Committee on Cancer (AJCC) published a new staging systemic for cutaneous SCC for the 7th Edition of the AJCC manual. This evaluates the dimensions of the original primary tumour (T) and its metastases to lymph nodes (N).    Tumour staging for cutaneous SCC  TX: Th Primary tumour cannot be assessed  T0: No evidence of a primary tumour  Tis: Carcinoma in situ  T1: Tumour ? 2cm without high-risk features  T2: Tumour ? 2cm; or; Tumour ? 2 cm with high-risk features  T3: Tumour with the invasion of maxilla, mandible, orbit or temporal bone  T4: Tumour with the invasion of axial or appendicular skeleton or perineural invasion of skull base    Gaby staging for cutaneous SCC  NX: Regional lymph nodes cannot be assessed  N0: No regional lymph node metastasis  N1: Metastasis in one local lymph node ? 3cm  N2: Metastasis in one local lymph node ? 3cm; or; Metastasis in >1 local lymph node ? 6cm  N3: Metastasis in lymph node ? 6cm    How is squamous cell carcinoma diagnosed?  Diagnosis of cutaneous SCC is based on clinical features. The diagnosis and histological subtype are confirmed pathologically by diagnostic biopsy or following excision. See squamous cell carcinoma - pathology.  Patients with high-risk SCC may also undergo staging investigations to determine whether it has spread to lymph nodes or elsewhere. These may include:  Imaging using ultrasound scan, X-rays, CT scans, MRI scans   Lymph node or other tissue biopsies    What is the treatment for cutaneous squamous cell  carcinoma?  Cutaneous SCC is nearly always treated surgically. Most cases are excised with a 3-10 mm margin of normal tissue around a visible tumour. A flap or skin graft may be needed to repair the defect.  Other methods of removal include:  Shave, curettage, and electrocautery for low-risk tumours on trunk and limbs   Aggressive cryotherapy for very small, thin, low-risk tumours   Mohs micrographic surgery for large facial lesions with indistinct margins or recurrent tumours   Radiotherapy for an inoperable tumour, patients unsuitable for surgery, or as adjuvant    What is the treatment for advanced or metastatic squamous cell carcinoma?  Locally advanced primary, recurrent or metastatic SCC requires multidisciplinary consultation. Often a combination of treatments is used.  Surgery   Radiotherapy   Cemiplimab   Experimental targeted therapy using epidermal growth factor receptor inhibitors    How can cutaneous squamous cell carcinoma be prevented?  There is a great deal of evidence to show that very careful sun protection at any time of life reduces the number of SCCs. This is particularly important in ageing, sun-damaged, fair skin; in patients that are immune suppressed; and in those who already have actinic keratoses or previous SCC.  Stay indoors or under the shade in the middle of the day   Wear covering clothing   Apply high protection factor SPF50+ broad-spectrum sunscreens generously to exposed skin if outdoors   Avoid indoor tanning (sun beds, solaria)    Oral nicotinamide (vitamin B3) in a dose of 500 mg twice daily may reduce the number and severity of SCCs in people at high risk.  Patients with multiple squamous cell carcinomas may be prescribed an oral retinoid (acitretin or isotretinoin). These reduce the number of tumours but have some nuisance side effects.    What is the outlook for cutaneous squamous cell carcinoma?  Most SCCs are cured by treatment. A cure is most likely if treatment is  undertaken when the lesion is small. The risk of recurrence or disease-associated death is greater for tumours that are > 20 mm in diameter and/or > 2 mm in thickness at the time of surgical excision.  About 50% of people at high risk of SCC develop a second one within 5 years of the first. They are also at increased risk of other skin cancers, especially melanoma. Regular self-skin examinations and long-term annual skin checks by an experienced health professional are recommended.

## 2024-04-01 ENCOUNTER — OFFICE VISIT (OUTPATIENT)
Age: 69
End: 2024-04-01
Payer: MEDICARE

## 2024-04-01 VITALS
WEIGHT: 180 LBS | HEART RATE: 81 BPM | OXYGEN SATURATION: 98 % | BODY MASS INDEX: 28.25 KG/M2 | HEIGHT: 67 IN | TEMPERATURE: 98 F

## 2024-04-01 DIAGNOSIS — J20.8 VIRAL BRONCHITIS: Primary | ICD-10-CM

## 2024-04-01 LAB
SARS-COV-2 AG UPPER RESP QL IA: NEGATIVE
SL AMB POCT RAPID FLU A: NORMAL
SL AMB POCT RAPID FLU B: NORMAL
VALID CONTROL: NORMAL

## 2024-04-01 PROCEDURE — 87811 SARS-COV-2 COVID19 W/OPTIC: CPT

## 2024-04-01 PROCEDURE — 99213 OFFICE O/P EST LOW 20 MIN: CPT

## 2024-04-01 PROCEDURE — G2211 COMPLEX E/M VISIT ADD ON: HCPCS

## 2024-04-01 PROCEDURE — 87804 INFLUENZA ASSAY W/OPTIC: CPT

## 2024-04-01 RX ORDER — DEXTROMETHORPHAN HYDROBROMIDE AND PROMETHAZINE HYDROCHLORIDE 15; 6.25 MG/5ML; MG/5ML
5 SYRUP ORAL 4 TIMES DAILY PRN
Qty: 473 ML | Refills: 0 | Status: SHIPPED | OUTPATIENT
Start: 2024-04-01

## 2024-04-01 NOTE — PROGRESS NOTES
INTERNAL MEDICINE FOLLOW-UP VISIT  [facility] - [facility] INTERNAL MEDICINE [address]    NAME: Adis Jerry  AGE: 68 y.o. SEX: male  : 1955     DATE: 2024     Assessment and Plan:   1. Viral bronchitis  Flu and COVID-negative in office, most likely viral bronchitis.  Discussed continuing cough drops, warm salt water gargles, increasing fluid intake, and starting Promethazine DM, Coricidin HBP.  If you begin to experience any fevers greater than 103, chills, worsening cough, wheezing, shortness of breath notify the office.  - POCT rapid flu A and B  - POCT Rapid Covid Ag  - promethazine-dextromethorphan (PHENERGAN-DM) 6.25-15 mg/5 mL oral syrup; Take 5 mL by mouth 4 (four) times a day as needed for cough  Dispense: 473 mL; Refill: 0        No follow-ups on file.       Chief Complaint:     Chief Complaint   Patient presents with    Cough     Congestion, fever, cold symptoms about 2 days      History of Present Illness:   Patient is a 68-year-old male that presents today for upper respiratory symptoms.  They started about 2 days ago. He COVID tested at home which was negative.  He denies any sick contacts.  He is not sleeping well due to the cough.  He admits to a lack of appetite, but is drinking a lot of fluids.  He was taking tylenol, advil, mucinex cold and flu with some relief.    The following portions of the patient's history were reviewed and updated as appropriate: allergies, current medications, past family history, past medical history, past social history, past surgical history and problem list.     Review of Systems:     Review of Systems   Constitutional:  Positive for appetite change and fever. Negative for chills and fatigue.   HENT:  Positive for sore throat and voice change. Negative for ear discharge, ear pain, postnasal drip, rhinorrhea, sinus pressure, sinus pain, tinnitus and trouble swallowing.    Eyes:  Negative for pain, discharge and itching.    Respiratory:  Positive for cough (Purulent). Negative for chest tightness, shortness of breath and wheezing.    Cardiovascular:  Negative for chest pain, palpitations and leg swelling.   Gastrointestinal:  Negative for abdominal pain, constipation, diarrhea, nausea and vomiting.   Endocrine: Negative for polydipsia, polyphagia and polyuria.   Genitourinary:  Negative for difficulty urinating, dysuria, frequency, hematuria and urgency.   Musculoskeletal:  Positive for arthralgias and myalgias. Negative for joint swelling.   Skin:  Negative for color change and rash.   Allergic/Immunologic: Negative for environmental allergies.   Neurological:  Negative for dizziness, weakness, light-headedness, numbness and headaches.   Hematological:  Negative for adenopathy.   Psychiatric/Behavioral:  Negative for decreased concentration and sleep disturbance. The patient is not nervous/anxious.         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        Current Medications:     Current Outpatient Medications:     Accu-Chek FastClix Lancets MISC, USE TO TEST BLOOD SUGAR AS DIRECTED, Disp: , Rfl:     aspirin 81 MG tablet, Take 81 mg by mouth daily, Disp: , Rfl:     atorvastatin (LIPITOR) 20 mg tablet, Take 1 tablet (20 mg total) by mouth daily at bedtime, Disp: 90 tablet, Rfl: 3    Blood Glucose Monitoring Suppl (Accu-Chek Guide) w/Device KIT, , Disp: , Rfl:     Cholecalciferol (Vitamin D3 Super Strength) 50 MCG (2000 UT) CAPS, 1 po qd, Disp: , Rfl:     fluticasone (FLONASE) 50 mcg/act nasal spray, SPRAY 2 SPRAYS INTO EACH NOSTRIL EVERY DAY (Patient  "taking differently: 1 spray into each nostril as needed), Disp: 48 mL, Rfl: 1    glucose blood test strip, Use as instructed, Disp: 200 each, Rfl: 0    irbesartan (AVAPRO) 150 mg tablet, Take 1 tablet (150 mg total) by mouth daily, Disp: 90 tablet, Rfl: 3    Jardiance 25 MG TABS, TAKE 1 TABLET BY MOUTH EVERY DAY IN THE MORNING, Disp: 90 tablet, Rfl: 3    metFORMIN (GLUCOPHAGE-XR) 500 mg 24 hr tablet, TAKE 4 TABLETS (2,000 MG TOTAL) BY MOUTH DAILY WITH DINNER, Disp: 360 tablet, Rfl: 1    metoprolol succinate (TOPROL-XL) 100 mg 24 hr tablet, TAKE 1 TABLET BY MOUTH EVERY DAY, Disp: 90 tablet, Rfl: 0    nitroglycerin (NITROSTAT) 0.4 mg SL tablet, Place 1 tablet (0.4 mg total) under the tongue every 5 (five) minutes as needed for chest pain, Disp: 100 tablet, Rfl: 0    semaglutide, 1 mg/dose, (Ozempic, 1 MG/DOSE,) 4 mg/3 mL injection pen, Inject 0.75 mL (1 mg total) under the skin every 7 days, Disp: 3 mL, Rfl: 3    tamsulosin (FLOMAX) 0.4 mg, TAKE 2 CAPSULES BY MOUTH DAILY WITH DINNER.., Disp: 180 capsule, Rfl: 3     Allergies:   No Known Allergies     Physical Exam:     Pulse 81   Temp 98 °F (36.7 °C) (Tympanic)   Ht 5' 7\" (1.702 m)   Wt 81.6 kg (180 lb)   SpO2 98%   BMI 28.19 kg/m²     Physical Exam  Vitals and nursing note reviewed.   Constitutional:       General: He is awake.      Appearance: Normal appearance. He is well-developed, well-groomed and overweight.   HENT:      Head: Normocephalic and atraumatic.      Right Ear: Hearing, tympanic membrane, ear canal and external ear normal.      Left Ear: Hearing, tympanic membrane, ear canal and external ear normal.      Nose: Nose normal.      Mouth/Throat:      Lips: Pink.      Mouth: Mucous membranes are moist.      Pharynx: Uvula midline. Posterior oropharyngeal erythema present.   Eyes:      General: Lids are normal. Vision grossly intact. Gaze aligned appropriately.      Conjunctiva/sclera: Conjunctivae normal.   Neck:      Vascular: No carotid bruit.      " Trachea: Trachea and phonation normal.   Cardiovascular:      Rate and Rhythm: Normal rate and regular rhythm.      Heart sounds: Normal heart sounds, S1 normal and S2 normal. No murmur heard.     No friction rub. No gallop.   Pulmonary:      Effort: Pulmonary effort is normal.      Breath sounds: Normal breath sounds and air entry. No decreased breath sounds, wheezing, rhonchi or rales.   Abdominal:      General: Abdomen is protuberant.   Musculoskeletal:      Cervical back: Neck supple.      Right lower leg: No edema.      Left lower leg: No edema.   Lymphadenopathy:      Cervical: No cervical adenopathy.   Skin:     General: Skin is warm.      Capillary Refill: Capillary refill takes less than 2 seconds.   Neurological:      Mental Status: He is alert.   Psychiatric:         Attention and Perception: Attention and perception normal.         Mood and Affect: Mood and affect normal.         Speech: Speech normal.         Behavior: Behavior normal. Behavior is cooperative.         Thought Content: Thought content normal.         Cognition and Memory: Cognition and memory normal.         Judgment: Judgment normal.           Data:     Laboratory Results: I have personally reviewed the pertinent laboratory results/reports   Radiology/Other Diagnostic Testing Results: I have personally reviewed pertinent reports.      Helen Ragsdale PA-C  [facility] INTERNAL MEDICINE [address]

## 2024-04-04 ENCOUNTER — OFFICE VISIT (OUTPATIENT)
Dept: URGENT CARE | Facility: CLINIC | Age: 69
End: 2024-04-04
Payer: COMMERCIAL

## 2024-04-04 ENCOUNTER — APPOINTMENT (OUTPATIENT)
Dept: RADIOLOGY | Facility: CLINIC | Age: 69
End: 2024-04-04
Payer: COMMERCIAL

## 2024-04-04 VITALS
HEART RATE: 90 BPM | OXYGEN SATURATION: 96 % | WEIGHT: 180 LBS | TEMPERATURE: 100 F | DIASTOLIC BLOOD PRESSURE: 92 MMHG | RESPIRATION RATE: 18 BRPM | SYSTOLIC BLOOD PRESSURE: 182 MMHG | BODY MASS INDEX: 28.19 KG/M2

## 2024-04-04 DIAGNOSIS — J18.9 PNEUMONIA DUE TO INFECTIOUS ORGANISM, UNSPECIFIED LATERALITY, UNSPECIFIED PART OF LUNG: Primary | ICD-10-CM

## 2024-04-04 DIAGNOSIS — R09.89 CHEST CONGESTION: ICD-10-CM

## 2024-04-04 PROCEDURE — 99214 OFFICE O/P EST MOD 30 MIN: CPT | Performed by: PHYSICIAN ASSISTANT

## 2024-04-04 PROCEDURE — 71046 X-RAY EXAM CHEST 2 VIEWS: CPT

## 2024-04-04 RX ORDER — AZITHROMYCIN 250 MG/1
TABLET, FILM COATED ORAL
Qty: 6 TABLET | Refills: 0 | Status: SHIPPED | OUTPATIENT
Start: 2024-04-04 | End: 2024-04-08

## 2024-04-04 NOTE — PROGRESS NOTES
[facility]        NAME: Adis Jerry is a 68 y.o. male  : 1955    MRN: [id number]  DATE: 2024  TIME: 7:42 PM      Assessment and Plan     Pneumonia due to infectious organism, unspecified laterality, unspecified part of lung [J18.9]  1. Pneumonia due to infectious organism, unspecified laterality, unspecified part of lung  XR chest pa & lateral    azithromycin (ZITHROMAX) 250 mg tablet        Note:   CXR negative for acute cardiopulmonary disease   Will treat with antibiotics based on clinical picture consistent with pneumonia   Told patient that if there is no improvement or any worsening in the next 24-48 hours, he should go to the ER.     Patient Instructions   There are no Patient Instructions on file for this visit.     Follow up with PCP in 3-5 days.  Go to ER if symptoms worsen.    Chief Complaint     Chief Complaint   Patient presents with    Cold Like Symptoms     Pt c/o fevers shallow breathing cough  chest congestion sore throatbodyaches that are getting worse went to [facility] on Monday and said was bronchitis said he got cough syrup now has headaches and light sensitive. Has been taking motrin and about an hour ago took it last, took cholosiden cold and flu         History of Present Illness     Patient presents with cough, shallow breathing, fever, chest congestion, sore throat, and body aches x 1 week. He started with headache x 2 days. He was seen at his PCP on 2024 and given phenergan DM for viral bronchitis. Rapid covid and flu in office were negative. Patient has also been taking Motrin for headache with mild relief.         Review of Systems     Review of Systems   Constitutional:  Positive for chills and fever. Negative for fatigue.   HENT:  Positive for sore throat. Negative for congestion, ear pain, postnasal drip, rhinorrhea, sinus pressure, sinus pain and sneezing.    Eyes:  Negative for pain and visual disturbance.   Respiratory:  Positive for  cough and chest tightness. Negative for shortness of breath.    Cardiovascular:  Negative for chest pain and palpitations.   Gastrointestinal:  Negative for abdominal pain, diarrhea, nausea and vomiting.   Genitourinary:  Negative for dysuria and hematuria.   Musculoskeletal:  Positive for myalgias. Negative for arthralgias and back pain.   Skin:  Negative for rash.   Neurological:  Positive for headaches. Negative for dizziness, seizures, syncope and numbness.   All other systems reviewed and are negative.        Current Medications       Current Outpatient Medications:     Accu-Chek FastClix Lancets MISC, USE TO TEST BLOOD SUGAR AS DIRECTED, Disp: , Rfl:     aspirin 81 MG tablet, Take 81 mg by mouth daily, Disp: , Rfl:     atorvastatin (LIPITOR) 20 mg tablet, Take 1 tablet (20 mg total) by mouth daily at bedtime, Disp: 90 tablet, Rfl: 3    azithromycin (ZITHROMAX) 250 mg tablet, Take 2 tablets today then 1 tablet daily x 4 days, Disp: 6 tablet, Rfl: 0    Blood Glucose Monitoring Suppl (Accu-Chek Guide) w/Device KIT, , Disp: , Rfl:     Cholecalciferol (Vitamin D3 Super Strength) 50 MCG (2000 UT) CAPS, 1 po qd, Disp: , Rfl:     fluticasone (FLONASE) 50 mcg/act nasal spray, SPRAY 2 SPRAYS INTO EACH NOSTRIL EVERY DAY (Patient taking differently: 1 spray into each nostril as needed), Disp: 48 mL, Rfl: 1    glucose blood test strip, Use as instructed, Disp: 200 each, Rfl: 0    irbesartan (AVAPRO) 150 mg tablet, Take 1 tablet (150 mg total) by mouth daily, Disp: 90 tablet, Rfl: 3    Jardiance 25 MG TABS, TAKE 1 TABLET BY MOUTH EVERY DAY IN THE MORNING, Disp: 90 tablet, Rfl: 3    metFORMIN (GLUCOPHAGE-XR) 500 mg 24 hr tablet, TAKE 4 TABLETS (2,000 MG TOTAL) BY MOUTH DAILY WITH DINNER, Disp: 360 tablet, Rfl: 1    metoprolol succinate (TOPROL-XL) 100 mg 24 hr tablet, TAKE 1 TABLET BY MOUTH EVERY DAY, Disp: 90 tablet, Rfl: 0    nitroglycerin (NITROSTAT) 0.4 mg SL tablet, Place 1 tablet (0.4 mg total) under the tongue every 5  (five) minutes as needed for chest pain, Disp: 100 tablet, Rfl: 0    promethazine-dextromethorphan (PHENERGAN-DM) 6.25-15 mg/5 mL oral syrup, Take 5 mL by mouth 4 (four) times a day as needed for cough, Disp: 473 mL, Rfl: 0    semaglutide, 1 mg/dose, (Ozempic, 1 MG/DOSE,) 4 mg/3 mL injection pen, Inject 0.75 mL (1 mg total) under the skin every 7 days, Disp: 3 mL, Rfl: 3    tamsulosin (FLOMAX) 0.4 mg, TAKE 2 CAPSULES BY MOUTH DAILY WITH DINNER.., Disp: 180 capsule, Rfl: 3    Current Allergies     Allergies as of 04/04/2024    (No Known Allergies)              The following portions of the patient's history were reviewed and updated as appropriate: allergies, current medications, past family history, past medical history, past social history, past surgical history, and problem list.     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    BOWEL RESECTION  2008    Secondary to diverticulitis    CARDIAC CATHETERIZATION N/A 04/07/2022    Procedure: Cardiac catheterization;  Surgeon: Gisselle Lange MD;  Location: [facility];  Service: Cardiology    CARDIAC CATHETERIZATION N/A 04/07/2022    Procedure: Cardiac Coronary Angiogram;  Surgeon: Gisselle Lange MD;  Location: [facility];  Service: Cardiology    COLON SURGERY  2007    COLONOSCOPY      CORONARY ARTERY BYPASS GRAFT      HAND SURGERY      triple bypass    HERNIA REPAIR      MALIGNANT SKIN LESION EXCISION  1963    Abdominal wall surgical resection of melanoma    [facility]  COLONOSCOPY FLX DX W/COLLJ SPEC WHEN PFRMD N/A 12/11/2018    Procedure: COLONOSCOPY;  Surgeon: Bertram Umanzor MD;  Location: [facility];  Service: Gastroenterology    SKIN BIOPSY         Family History   Problem Relation Age of Onset    Emphysema Maternal Grandfather     Coronary artery disease Mother     Hyperlipidemia Mother     Hypertension Mother     Diabetes type II Mother     Arthritis Mother     Diabetes type II Father     Basal cell carcinoma Father     Hypertension Father     Heart disease Father     Diabetes Father     Hypertension Sister          Medications have been verified.        Objective     BP (!) 182/92   Pulse 90   Temp 100 °F (37.8 °C) (Tympanic)   Resp 18   Wt 81.6 kg (180 lb)   SpO2 96%   BMI 28.19 kg/m²   No LMP for male patient.         Physical Exam     Physical Exam  Vitals and nursing note reviewed. Exam conducted with a chaperone present (wife).   Constitutional:       Appearance: Normal appearance. He is normal weight.   HENT:      Head: Normocephalic and atraumatic.      Nose: Nose normal.      Mouth/Throat:      Mouth: Mucous membranes are moist.      Pharynx: Oropharynx is clear.   Cardiovascular:      Rate and Rhythm: Normal rate and regular rhythm.      Heart sounds: Normal heart sounds.   Pulmonary:      Effort: Pulmonary effort is normal.      Breath sounds: Rhonchi present.      Comments: Minimal rhonchi in all lung fields. Clears with cough.   Skin:     General: Skin is warm and dry.   Neurological:      General: No focal deficit present.      Mental Status: He is alert and oriented to person, place, and time.   Psychiatric:         Mood and Affect: Mood normal.         Behavior: Behavior normal.

## 2024-05-10 ENCOUNTER — HOSPITAL ENCOUNTER (OUTPATIENT)
Dept: RADIOLOGY | Facility: HOSPITAL | Age: 69
End: 2024-05-10
Payer: MEDICARE

## 2024-05-10 ENCOUNTER — TELEPHONE (OUTPATIENT)
Age: 69
End: 2024-05-10

## 2024-05-10 ENCOUNTER — NURSE TRIAGE (OUTPATIENT)
Age: 69
End: 2024-05-10

## 2024-05-10 DIAGNOSIS — M25.561 CHRONIC PAIN OF RIGHT KNEE: ICD-10-CM

## 2024-05-10 DIAGNOSIS — G89.29 CHRONIC PAIN OF RIGHT KNEE: ICD-10-CM

## 2024-05-10 PROCEDURE — 73562 X-RAY EXAM OF KNEE 3: CPT

## 2024-05-10 NOTE — TELEPHONE ENCOUNTER
Patient was on a trip to [place].  On 5/1 he went unconscious twice.  An ambulance came to the scene and said it was due to dehydration. He has not had an incident since.

## 2024-05-10 NOTE — TELEPHONE ENCOUNTER
"Pt would like PCP to know that he did pass out twice while on vacation in [place], states it was after being out in the sun all day playing golf, not drinking much water. Was assessed by EMS, glucose was 280 (he had just eaten a meal). Pt states he passed out again while being evaluated. Told it was dehydration, to rest and drink fluids. Pt felt fine for the rest of vacation and has felt fine since. Pt states he feels his sugars have been okay lately, doesn't check every day. Pt advised to seek higher level of care with any syncope or similar symptoms. Pt appreciates any PCP recommendations as no appts available until 2-3 weeks from now.     Pt initially called in to report R knee pain, chronic, but worse today. Describes as a dull ache, 1/10 at rest, 7/10 when walking up stairs. Pain is in same location as it was during previous exam on 3/20/24, but pt did also have recent vacation and long flights to and from [place]. OV not available until 5/23. Advised to try calling next week for same day appt. Pt agreeable. Discussed OTC medications and treatment for pain. Pt agreeable. Advised to seek higher level of care for any edema, swelling, increased pain or change in pain. Xray not obtained from previous visit, advised to obtain xray previously recommended. Please advise any other recommendations in the interim.     Reason for Disposition   MODERATE pain (e.g., symptoms interfere with work or school, limping) and present > 3 days    Answer Assessment - Initial Assessment Questions  1. LOCATION and RADIATION: \"Where is the pain located?\"       R knee, medial   2. QUALITY: \"What does the pain feel like?\"  (e.g., sharp, dull, aching, burning)      dull  3. SEVERITY: \"How bad is the pain?\" \"What does it keep you from doing?\"   (Scale 1-10; or mild, moderate, severe)    -  MILD (1-3): doesn't interfere with normal activities     -  MODERATE (4-7): interferes with normal activities (e.g., work or school) or awakens from sleep, " "limping     -  SEVERE (8-10): excruciating pain, unable to do any normal activities, unable to walk      1 when sitting, 7/10 with weight bearing  4. ONSET: \"When did the pain start?\" \"Does it come and go, or is it there all the time?\"      Ongoing, resolved and then came back again    6. SETTING: \"Has there been any recent work, exercise or other activity that involved that part of the body?\"       denies  7. AGGRAVATING FACTORS: \"What makes the knee pain worse?\" (e.g., walking, climbing stairs, running)      Walking up stairs  8. ASSOCIATED SYMPTOMS: \"Is there any swelling or redness of the knee?\"      denies  9. OTHER SYMPTOMS: \"Do you have any other symptoms?\" (e.g., chest pain, difficulty breathing, fever, calf pain)      denies  10. PREGNANCY: \"Is there any chance you are pregnant?\" \"When was your last menstrual period?\"        N/a    Protocols used: Knee Pain-ADULT-[place]    "

## 2024-05-13 ENCOUNTER — OFFICE VISIT (OUTPATIENT)
Age: 69
End: 2024-05-13
Payer: MEDICARE

## 2024-05-13 VITALS
HEART RATE: 88 BPM | SYSTOLIC BLOOD PRESSURE: 138 MMHG | BODY MASS INDEX: 28.41 KG/M2 | OXYGEN SATURATION: 98 % | WEIGHT: 181 LBS | HEIGHT: 67 IN | DIASTOLIC BLOOD PRESSURE: 92 MMHG

## 2024-05-13 DIAGNOSIS — R07.89 BURNING CHEST PAIN: ICD-10-CM

## 2024-05-13 DIAGNOSIS — R55 VASOVAGAL SYNCOPE: Primary | ICD-10-CM

## 2024-05-13 DIAGNOSIS — M25.461 EFFUSION OF RIGHT KNEE: ICD-10-CM

## 2024-05-13 PROCEDURE — 99214 OFFICE O/P EST MOD 30 MIN: CPT | Performed by: INTERNAL MEDICINE

## 2024-05-13 PROCEDURE — 93000 ELECTROCARDIOGRAM COMPLETE: CPT | Performed by: INTERNAL MEDICINE

## 2024-05-13 NOTE — PROGRESS NOTES
Assessment/Plan:    Diagnoses and all orders for this visit:    Vasovagal syncope  -     Holter monitor; Future  -     XR chest pa & lateral; Future  -     POCT ECG    Burning chest pain  -     XR chest pa & lateral; Future    Effusion of right knee              Patient Instructions   Syncope-likely vasovagal related to volume depletion coupled with cigar and alcohol.  Notably he is not a habitual cigar smoker.  Echo from August 2023 reviewed and unremarkable.  Stress test from November 2023 reviewed and unremarkable.  Will check Holter monitor and EKG today to assure no abnormal rhythms.    Burning chest pain-normal lung exam, not dyspneic.  Will check chest x-ray    Knee pain-improving, contact me if you need physical therapy or orthopedic referral.    Subjective:      Patient ID: Adis Jerry is a 68 y.o. male    Acute patient visit    Patient had 2 syncopal events 13 days ago while vacationing in [place].  He admits he was not hydrated well and had golfed all day and then was at an event where he smoked a cigar and then had a shot of whiskey.  Subsequently he felt woozy and then blacked out.  He injured his right knee at some point in the events.  EMS was activated and he had a second syncopal event a little later on.  There was some abnormalities noted on his cardiac monitor but consistent with prior EKGs from my chart.  He was noted to have blood pressure 90/60 with EMS.  He had been compliant with his medications.  He had no subsequent syncopal events for the remainder of his trip.    He has noted some burning chest discomfort with inspiration but no shortness of breath.  He equates this to the pain he used to get doing cross-country running but this is not brought on by exertion.    Right knee pain and swelling has occurred during the syncopal event but is improving with over-the-counter NSAIDs.  He did x-ray yesterday showing moderate suprapatellar effusion.        Current Outpatient Medications:   •   Accu-Chek FastClix Lancets MISC, USE TO TEST BLOOD SUGAR AS DIRECTED, Disp: , Rfl:   •  aspirin 81 MG tablet, Take 81 mg by mouth daily, Disp: , Rfl:   •  atorvastatin (LIPITOR) 20 mg tablet, Take 1 tablet (20 mg total) by mouth daily at bedtime, Disp: 90 tablet, Rfl: 3  •  Blood Glucose Monitoring Suppl (Accu-Chek Guide) w/Device KIT, , Disp: , Rfl:   •  Cholecalciferol (Vitamin D3 Super Strength) 50 MCG (2000 UT) CAPS, 1 po qd, Disp: , Rfl:   •  fluticasone (FLONASE) 50 mcg/act nasal spray, SPRAY 2 SPRAYS INTO EACH NOSTRIL EVERY DAY (Patient taking differently: 1 spray into each nostril as needed), Disp: 48 mL, Rfl: 1  •  glucose blood test strip, Use as instructed, Disp: 200 each, Rfl: 0  •  irbesartan (AVAPRO) 150 mg tablet, Take 1 tablet (150 mg total) by mouth daily, Disp: 90 tablet, Rfl: 3  •  Jardiance 25 MG TABS, TAKE 1 TABLET BY MOUTH EVERY DAY IN THE MORNING, Disp: 90 tablet, Rfl: 3  •  metFORMIN (GLUCOPHAGE-XR) 500 mg 24 hr tablet, TAKE 4 TABLETS (2,000 MG TOTAL) BY MOUTH DAILY WITH DINNER, Disp: 360 tablet, Rfl: 1  •  metoprolol succinate (TOPROL-XL) 100 mg 24 hr tablet, TAKE 1 TABLET BY MOUTH EVERY DAY, Disp: 90 tablet, Rfl: 0  •  nitroglycerin (NITROSTAT) 0.4 mg SL tablet, Place 1 tablet (0.4 mg total) under the tongue every 5 (five) minutes as needed for chest pain, Disp: 100 tablet, Rfl: 0  •  semaglutide, 1 mg/dose, (Ozempic, 1 MG/DOSE,) 4 mg/3 mL injection pen, Inject 0.75 mL (1 mg total) under the skin every 7 days, Disp: 3 mL, Rfl: 3  •  tamsulosin (FLOMAX) 0.4 mg, TAKE 2 CAPSULES BY MOUTH DAILY WITH DINNER.., Disp: 180 capsule, Rfl: 3  •  promethazine-dextromethorphan (PHENERGAN-DM) 6.25-15 mg/5 mL oral syrup, Take 5 mL by mouth 4 (four) times a day as needed for cough, Disp: 473 mL, Rfl: 0    Recent Results (from the past 1008 hour(s))   POCT rapid flu A and B    Collection Time: 04/01/24  4:08 PM   Result Value Ref Range    RAPID FLU A neg     RAPID FLU B neg    POCT Rapid Covid Ag     Collection Time: 04/01/24  4:09 PM   Result Value Ref Range    POCT SARS-CoV-2 Ag Negative Negative    VALID CONTROL Valid        The following portions of the patient's history were reviewed and updated as appropriate: allergies, current medications, past family history, past medical history, past social history, past surgical history and problem list.     Review of Systems   Constitutional:  Negative for appetite change, chills, diaphoresis, fatigue, fever and unexpected weight change.   HENT:  Negative for congestion, hearing loss and rhinorrhea.    Eyes:  Negative for visual disturbance.   Respiratory:  Negative for cough, chest tightness, shortness of breath and wheezing.    Cardiovascular:  Positive for chest pain. Negative for palpitations and leg swelling.   Gastrointestinal:  Negative for abdominal pain and blood in stool.   Endocrine: Negative for cold intolerance, heat intolerance, polydipsia and polyuria.   Genitourinary:  Negative for difficulty urinating, dysuria, frequency and urgency.   Musculoskeletal:  Negative for arthralgias and myalgias.   Skin:  Negative for rash.   Neurological:  Negative for dizziness, weakness, light-headedness and headaches.   Hematological:  Does not bruise/bleed easily.   Psychiatric/Behavioral:  Negative for dysphoric mood and sleep disturbance.          Objective:      Vitals:    05/13/24 1154   BP: 138/92   Pulse: 88   SpO2: 98%          Physical Exam  Constitutional:       Appearance: He is well-developed.   HENT:      Head: Normocephalic and atraumatic.      Nose: Nose normal.   Eyes:      General: No scleral icterus.     Conjunctiva/sclera: Conjunctivae normal.      Pupils: Pupils are equal, round, and reactive to light.   Neck:      Thyroid: No thyromegaly.      Vascular: No JVD.      Trachea: No tracheal deviation.   Cardiovascular:      Rate and Rhythm: Normal rate and regular rhythm.      Heart sounds: No murmur heard.     No friction rub. No gallop.    Pulmonary:      Effort: Pulmonary effort is normal. No respiratory distress.      Breath sounds: Normal breath sounds. No wheezing or rales.   Musculoskeletal:         General: No deformity.      Cervical back: Normal range of motion and neck supple.   Lymphadenopathy:      Cervical: No cervical adenopathy.   Skin:     General: Skin is warm and dry.      Coloration: Skin is not pale.      Findings: No erythema or rash.   Neurological:      Mental Status: He is alert and oriented to person, place, and time.      Cranial Nerves: No cranial nerve deficit.   Psychiatric:         Behavior: Behavior normal.         Thought Content: Thought content normal.         Judgment: Judgment normal.

## 2024-05-13 NOTE — PATIENT INSTRUCTIONS
Syncope-likely vasovagal related to volume depletion coupled with cigar and alcohol.  Notably he is not a habitual cigar smoker.  Echo from August 2023 reviewed and unremarkable.  Stress test from November 2023 reviewed and unremarkable.  Will check Holter monitor and EKG today to assure no abnormal rhythms.    Burning chest pain-normal lung exam, not dyspneic.  Will check chest x-ray    Knee pain-improving, contact me if you need physical therapy or orthopedic referral.

## 2024-05-15 ENCOUNTER — HOSPITAL ENCOUNTER (OUTPATIENT)
Dept: RADIOLOGY | Facility: HOSPITAL | Age: 69
Discharge: HOME | End: 2024-05-15
Payer: MEDICARE

## 2024-05-15 DIAGNOSIS — R55 VASOVAGAL SYNCOPE: ICD-10-CM

## 2024-05-15 DIAGNOSIS — R07.89 BURNING CHEST PAIN: ICD-10-CM

## 2024-05-15 PROCEDURE — 71046 X-RAY EXAM CHEST 2 VIEWS: CPT

## 2024-05-23 ENCOUNTER — OFFICE VISIT (OUTPATIENT)
Age: 69
End: 2024-05-23
Payer: MEDICARE

## 2024-05-23 VITALS
DIASTOLIC BLOOD PRESSURE: 100 MMHG | HEART RATE: 80 BPM | WEIGHT: 178 LBS | OXYGEN SATURATION: 98 % | SYSTOLIC BLOOD PRESSURE: 150 MMHG | HEIGHT: 67 IN | BODY MASS INDEX: 27.94 KG/M2 | RESPIRATION RATE: 16 BRPM

## 2024-05-23 DIAGNOSIS — G89.29 CHRONIC PAIN OF RIGHT KNEE: ICD-10-CM

## 2024-05-23 DIAGNOSIS — G89.29 CHRONIC PAIN OF BOTH SHOULDERS: ICD-10-CM

## 2024-05-23 DIAGNOSIS — M25.561 CHRONIC PAIN OF RIGHT KNEE: ICD-10-CM

## 2024-05-23 DIAGNOSIS — M25.511 CHRONIC PAIN OF BOTH SHOULDERS: ICD-10-CM

## 2024-05-23 DIAGNOSIS — I10 ESSENTIAL HYPERTENSION: Primary | Chronic | ICD-10-CM

## 2024-05-23 DIAGNOSIS — M25.512 CHRONIC PAIN OF BOTH SHOULDERS: ICD-10-CM

## 2024-05-23 DIAGNOSIS — R55 SYNCOPE, UNSPECIFIED SYNCOPE TYPE: ICD-10-CM

## 2024-05-23 PROCEDURE — 99214 OFFICE O/P EST MOD 30 MIN: CPT

## 2024-05-23 PROCEDURE — G2211 COMPLEX E/M VISIT ADD ON: HCPCS

## 2024-05-23 NOTE — PROGRESS NOTES
INTERNAL MEDICINE FOLLOW-UP VISIT  [facility] - [facility] INTERNAL MEDICINE [address]    NAME: Adis Jerry  AGE: 68 y.o. SEX: male  : 1955     DATE: 2024     Assessment and Plan:   1. Essential hypertension  BP in office is 126/84, on recheck it was 150/100. He has been checking it a few times a day at home and it has been running around 150s/90-100s.  He is currently on irbesartan 150 mg daily and metoprolol 100 mg daily.  He denies any visual changes, headache, dizziness, shortness of breath, chest pain, nausea, or vomiting.  Discussed increasing his irbesartan to 225 mg daily, and checking blood pressure once daily 2 hours after taking morning medications.  Send me your blood pressure readings in 1 week.  Limit salt intake to less than 2000 mg daily.    2. Chronic pain of right knee  Recent right knee x-ray showed a moderate suprapatellar effusion, but no acute osseous abnormality.  He would like a referral to physical therapy at this time.    3. Syncope, unspecified syncope type  Most likely vasovagal.  Recent echo, echo stress test, and EKG were stable.  He is scheduled to have a Holter monitor placed.    4.  Chronic pain of both shoulders  Physical therapy referral placed.        No follow-ups on file.       Chief Complaint:     Chief Complaint   Patient presents with    Follow-up     Check bp, knee issues.      History of Present Illness:   Patient is a 68-year-old male who presents today for a follow-up.    The following portions of the patient's history were reviewed and updated as appropriate: allergies, current medications, past family history, past medical history, past social history, past surgical history and problem list.     Review of Systems:     Review of Systems   Constitutional:  Negative for chills, fatigue and fever.   HENT:  Negative for ear discharge, ear pain, postnasal drip, rhinorrhea, sinus pressure, sinus pain, sore throat, tinnitus and trouble  swallowing.    Eyes:  Negative for pain, discharge and itching.   Respiratory:  Negative for cough, shortness of breath and wheezing.    Cardiovascular:  Negative for chest pain, palpitations and leg swelling.   Gastrointestinal:  Negative for abdominal pain, constipation, diarrhea, nausea and vomiting.   Endocrine: Negative for polydipsia, polyphagia and polyuria.   Genitourinary:  Negative for difficulty urinating, frequency, hematuria and urgency.   Musculoskeletal:  Positive for arthralgias and myalgias. Negative for joint swelling.   Skin:  Negative for color change.   Allergic/Immunologic: Negative for environmental allergies.   Neurological:  Negative for dizziness, weakness, light-headedness, numbness and headaches.   Hematological:  Negative for adenopathy.   Psychiatric/Behavioral:  Negative for decreased concentration and sleep disturbance. The patient is not nervous/anxious.         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        Current Medications:     Current Outpatient Medications:     Accu-Chek FastClix Lancets MISC, USE TO TEST BLOOD SUGAR AS DIRECTED, Disp: , Rfl:     aspirin 81 MG tablet, Take 81 mg by mouth daily, Disp: , Rfl:     atorvastatin (LIPITOR) 20 mg tablet, Take 1 tablet (20 mg total) by mouth daily at bedtime, Disp: 90 tablet, Rfl: 3    Blood Glucose Monitoring Suppl (Accu-Chek Guide) w/Device KIT, , Disp: , Rfl:     Cholecalciferol (Vitamin D3 Super Strength) 50 MCG (2000 UT) CAPS, 1 po qd, Disp: , Rfl:     fluticasone (FLONASE) 50 mcg/act nasal spray, SPRAY 2 SPRAYS INTO EACH NOSTRIL EVERY DAY  "(Patient taking differently: 1 spray into each nostril as needed), Disp: 48 mL, Rfl: 1    glucose blood test strip, Use as instructed, Disp: 200 each, Rfl: 0    irbesartan (AVAPRO) 150 mg tablet, Take 1 tablet (150 mg total) by mouth daily, Disp: 90 tablet, Rfl: 3    Jardiance 25 MG TABS, TAKE 1 TABLET BY MOUTH EVERY DAY IN THE MORNING, Disp: 90 tablet, Rfl: 3    metFORMIN (GLUCOPHAGE-XR) 500 mg 24 hr tablet, TAKE 4 TABLETS (2,000 MG TOTAL) BY MOUTH DAILY WITH DINNER, Disp: 360 tablet, Rfl: 1    metoprolol succinate (TOPROL-XL) 100 mg 24 hr tablet, TAKE 1 TABLET BY MOUTH EVERY DAY, Disp: 90 tablet, Rfl: 0    nitroglycerin (NITROSTAT) 0.4 mg SL tablet, Place 1 tablet (0.4 mg total) under the tongue every 5 (five) minutes as needed for chest pain, Disp: 100 tablet, Rfl: 0    semaglutide, 1 mg/dose, (Ozempic, 1 MG/DOSE,) 4 mg/3 mL injection pen, Inject 0.75 mL (1 mg total) under the skin every 7 days, Disp: 3 mL, Rfl: 3    tamsulosin (FLOMAX) 0.4 mg, TAKE 2 CAPSULES BY MOUTH DAILY WITH DINNER.., Disp: 180 capsule, Rfl: 3    promethazine-dextromethorphan (PHENERGAN-DM) 6.25-15 mg/5 mL oral syrup, Take 5 mL by mouth 4 (four) times a day as needed for cough, Disp: 473 mL, Rfl: 0     Allergies:   No Known Allergies     Physical Exam:     /84 (BP Location: Left arm, Patient Position: Sitting, Cuff Size: Standard)   Pulse 80   Resp 16   Ht 5' 7\" (1.702 m)   Wt 80.7 kg (178 lb)   SpO2 98%   BMI 27.88 kg/m²     Physical Exam  Vitals and nursing note reviewed.   Constitutional:       General: He is awake.      Appearance: Normal appearance. He is well-developed, well-groomed and overweight.   HENT:      Head: Normocephalic and atraumatic.      Right Ear: Hearing and external ear normal.      Left Ear: Hearing and external ear normal.      Nose: Nose normal.      Mouth/Throat:      Lips: Pink.      Mouth: Mucous membranes are moist.   Eyes:      General: Lids are normal. Vision grossly intact. Gaze aligned " appropriately.      Conjunctiva/sclera: Conjunctivae normal.   Neck:      Vascular: No carotid bruit.      Trachea: Trachea and phonation normal.   Cardiovascular:      Rate and Rhythm: Normal rate and regular rhythm.      Heart sounds: Normal heart sounds, S1 normal and S2 normal. No murmur heard.     No friction rub. No gallop.   Pulmonary:      Effort: Pulmonary effort is normal.      Breath sounds: Normal breath sounds and air entry. No decreased breath sounds, wheezing, rhonchi or rales.   Abdominal:      General: Abdomen is protuberant.   Musculoskeletal:      Cervical back: Neck supple.      Right lower leg: No edema.      Left lower leg: No edema.   Skin:     General: Skin is warm.      Capillary Refill: Capillary refill takes less than 2 seconds.   Neurological:      Mental Status: He is alert.   Psychiatric:         Attention and Perception: Attention and perception normal.         Mood and Affect: Mood and affect normal.         Speech: Speech normal.         Behavior: Behavior normal. Behavior is cooperative.         Thought Content: Thought content normal.         Cognition and Memory: Cognition and memory normal.         Judgment: Judgment normal.           Data:     Laboratory Results: I have personally reviewed the pertinent laboratory results/reports   Radiology/Other Diagnostic Testing Results: I have personally reviewed pertinent reports.      Helen Ragsdale PA-C  [facility] INTERNAL MEDICINE [address]

## 2024-06-07 LAB
LEFT EYE DIABETIC RETINOPATHY: NORMAL
RIGHT EYE DIABETIC RETINOPATHY: NORMAL

## 2024-06-14 ENCOUNTER — OFFICE VISIT (OUTPATIENT)
Dept: OBGYN CLINIC | Facility: CLINIC | Age: 69
End: 2024-06-14
Payer: MEDICARE

## 2024-06-14 VITALS
WEIGHT: 178 LBS | HEIGHT: 67 IN | SYSTOLIC BLOOD PRESSURE: 121 MMHG | BODY MASS INDEX: 27.94 KG/M2 | DIASTOLIC BLOOD PRESSURE: 83 MMHG | HEART RATE: 82 BPM

## 2024-06-14 DIAGNOSIS — M17.11 PRIMARY OSTEOARTHRITIS OF RIGHT KNEE: Primary | ICD-10-CM

## 2024-06-14 DIAGNOSIS — M25.561 CHRONIC PAIN OF RIGHT KNEE: ICD-10-CM

## 2024-06-14 DIAGNOSIS — M25.461 EFFUSION OF RIGHT KNEE: ICD-10-CM

## 2024-06-14 DIAGNOSIS — G89.29 CHRONIC PAIN OF RIGHT KNEE: ICD-10-CM

## 2024-06-14 PROCEDURE — 99204 OFFICE O/P NEW MOD 45 MIN: CPT | Performed by: ORTHOPAEDIC SURGERY

## 2024-06-14 PROCEDURE — 20610 DRAIN/INJ JOINT/BURSA W/O US: CPT | Performed by: ORTHOPAEDIC SURGERY

## 2024-06-14 RX ORDER — LIDOCAINE HYDROCHLORIDE 10 MG/ML
2 INJECTION, SOLUTION INFILTRATION; PERINEURAL
Status: COMPLETED | OUTPATIENT
Start: 2024-06-14 | End: 2024-06-14

## 2024-06-14 RX ORDER — BUPIVACAINE HYDROCHLORIDE 2.5 MG/ML
2 INJECTION, SOLUTION INFILTRATION; PERINEURAL
Status: COMPLETED | OUTPATIENT
Start: 2024-06-14 | End: 2024-06-14

## 2024-06-14 RX ORDER — METHYLPREDNISOLONE ACETATE 40 MG/ML
2 INJECTION, SUSPENSION INTRA-ARTICULAR; INTRALESIONAL; INTRAMUSCULAR; SOFT TISSUE
Status: COMPLETED | OUTPATIENT
Start: 2024-06-14 | End: 2024-06-14

## 2024-06-14 RX ADMIN — BUPIVACAINE HYDROCHLORIDE 2 ML: 2.5 INJECTION, SOLUTION INFILTRATION; PERINEURAL at 08:30

## 2024-06-14 RX ADMIN — METHYLPREDNISOLONE ACETATE 2 ML: 40 INJECTION, SUSPENSION INTRA-ARTICULAR; INTRALESIONAL; INTRAMUSCULAR; SOFT TISSUE at 08:30

## 2024-06-14 RX ADMIN — LIDOCAINE HYDROCHLORIDE 2 ML: 10 INJECTION, SOLUTION INFILTRATION; PERINEURAL at 08:30

## 2024-06-14 NOTE — PROGRESS NOTES
Patient Name:  Adis Jerry  MRN:  [id number]    Assessment & Plan     1. Primary osteoarthritis of right knee  -     Ambulatory Referral to Physical Therapy; Future  -     Large joint arthrocentesis: R knee  2. Chronic pain of right knee  -     Ambulatory referral to Orthopedic Surgery  -     Ambulatory Referral to Physical Therapy; Future  -     Large joint arthrocentesis: R knee  3. Effusion of right knee  -     Large joint arthrocentesis: R knee      Mild right knee osteoarthritis, suspected degenerative medial meniscus tear  X-rays reviewed in office today with patient  Treatment options were discussed including right knee corticosteroid injection, physical therapy vs obtaining MRI study  OTC analgesics, Voltaren gel as needed for symptom management  The patient was offered an aspiration and corticosteroid injection for their right knee. They tolerated the procedure well.  The patient was educated they may have some irritation in the next few days and should rest, ice, elevate and perform gentle range of motion exercises. They were advised the medicine should begin to work in a few days time.    They were informed their blood sugar levels can temporarily elevate and should reach out to their PCP if this becomes an issue.   The patient was informed corticosteroid injections can be repeated at the earliest every 3 months.   I will see the patient back in approximately 3 months for repeat evaluation. We will consider further injection therapy vs MRI is symptoms persist or worsen.     Chief Complaint     Right knee pain    History of the Present Illness     Adis Jerry is a 68 y.o. male with Right knee pain for several months. He had stiffness and soreness after sitting for long periods of time. A few months ago, he woke up with significant right knee pain and could not walk. A lot of the pain has subsided, but the ache is still there. He has sharp pain while twisting. He denies mechanical locking. Pain  "is managed with naproxen.    Review of Systems     Review of Systems   Constitutional:  Negative for chills and fever.   HENT:  Negative for ear pain and sore throat.    Eyes:  Negative for pain and visual disturbance.   Respiratory:  Negative for cough and shortness of breath.    Cardiovascular:  Negative for chest pain and palpitations.   Gastrointestinal:  Negative for abdominal pain and vomiting.   Genitourinary:  Negative for dysuria and hematuria.   Musculoskeletal:  Negative for arthralgias and back pain.   Skin:  Negative for color change and rash.   Neurological:  Negative for seizures and syncope.   All other systems reviewed and are negative.      Physical Exam     /83   Pulse 82   Ht 5' 7\" (1.702 m)   Wt 80.7 kg (178 lb)   BMI 27.88 kg/m²     Right Knee  Range of motion from 0 to 130.    There is no crepitus with range of motion.   There is small effusion.    There is mild tenderness over the medial joint line.    There is 5/5 quadriceps strength and equal tone.    The patient is able to perform a straight leg raise.      negative patellar grind test.  Anterior drawer tests is negative  negative Lachman Test.   Posterior drawer test is   negative   Varus stress testing reveals no instability at 0 and 30 degrees   Valgus stress testing reveals no instability at 0 and 30 degrees  Kalyani's testing is positive    The patient is neurovascular intact distally.      Eyes:  Anicteric sclerae.  Neck:  Supple.  Lungs:  Normal respiratory effort.  Cardiovascular:  Capillary refill is less than 2 seconds.  Skin:  Intact without erythema.  Neurologic:  Sensation grossly intact to light touch.  Psychiatric:  Mood and affect are appropriate.    Data Review     I have personally reviewed pertinent films in PACS, and my interpretation follows:    X-rays taken 5/10/2024 of Right knee independently reviewed and demonstrate minimal medial joint space narrowing. 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    BOWEL RESECTION  2008    Secondary to diverticulitis    CARDIAC CATHETERIZATION N/A 04/07/2022    Procedure: Cardiac catheterization;  Surgeon: Gisselle Lange MD;  Location: [facility];  Service: Cardiology    CARDIAC CATHETERIZATION N/A 04/07/2022    Procedure: Cardiac Coronary Angiogram;  Surgeon: Gisselle Lange MD;  Location: [facility];  Service: Cardiology    COLON SURGERY  2007    COLONOSCOPY      CORONARY ARTERY BYPASS GRAFT      HAND SURGERY      triple bypass    HERNIA REPAIR      MALIGNANT SKIN LESION EXCISION  1963    Abdominal wall surgical resection of melanoma    UT COLONOSCOPY FLX DX W/COLLJ SPEC WHEN PFRMD N/A 12/11/2018    Procedure: COLONOSCOPY;  Surgeon: Bertram Umanzor MD;  Location: [facility];  Service: Gastroenterology    SKIN BIOPSY         No Known Allergies    Current Outpatient Medications on File Prior to Visit   Medication Sig Dispense Refill    Accu-Chek FastClix Lancets MISC USE TO TEST BLOOD SUGAR AS DIRECTED      aspirin 81 MG tablet Take 81 mg by mouth daily      atorvastatin (LIPITOR) 20 mg tablet Take 1 tablet (20 mg total) by mouth daily at bedtime 90 tablet 3    Blood Glucose Monitoring Suppl (Accu-Chek Guide) w/Device KIT       Cholecalciferol (Vitamin D3 Super Strength) 50 MCG (2000 UT) CAPS 1 po qd      fluticasone (FLONASE) 50 mcg/act nasal spray SPRAY 2 SPRAYS INTO EACH NOSTRIL EVERY DAY (Patient taking differently: 1 spray into each  nostril as needed) 48 mL 1    glucose blood test strip Use as instructed 200 each 0    irbesartan (AVAPRO) 150 mg tablet Take 1 tablet (150 mg total) by mouth daily 90 tablet 3    Jardiance 25 MG TABS TAKE 1 TABLET BY MOUTH EVERY DAY IN THE MORNING 90 tablet 3    metFORMIN (GLUCOPHAGE-XR) 500 mg 24 hr tablet TAKE 4 TABLETS (2,000 MG TOTAL) BY MOUTH DAILY WITH DINNER 360 tablet 1    metoprolol succinate (TOPROL-XL) 100 mg 24 hr tablet TAKE 1 TABLET BY MOUTH EVERY DAY 90 tablet 0    nitroglycerin (NITROSTAT) 0.4 mg SL tablet Place 1 tablet (0.4 mg total) under the tongue every 5 (five) minutes as needed for chest pain 100 tablet 0    semaglutide, 1 mg/dose, (Ozempic, 1 MG/DOSE,) 4 mg/3 mL injection pen Inject 0.75 mL (1 mg total) under the skin every 7 days 3 mL 3    tamsulosin (FLOMAX) 0.4 mg TAKE 2 CAPSULES BY MOUTH DAILY WITH DINNER.. 180 capsule 3    promethazine-dextromethorphan (PHENERGAN-DM) 6.25-15 mg/5 mL oral syrup Take 5 mL by mouth 4 (four) times a day as needed for cough 473 mL 0     No current facility-administered medications on file prior to visit.       Social History     Tobacco Use    Smoking status: Never     Passive exposure: Never    Smokeless tobacco: Never   Vaping Use    Vaping status: Never Used   Substance Use Topics    Alcohol use: Not Currently    Drug use: No       Family History   Problem Relation Age of Onset    Emphysema Maternal Grandfather     Coronary artery disease Mother     Hyperlipidemia Mother     Hypertension Mother     Diabetes type II Mother     Arthritis Mother     Diabetes type II Father     Basal cell carcinoma Father     Hypertension Father     Heart disease Father     Diabetes Father     Hypertension Sister              Procedures Performed     Large joint arthrocentesis: R knee  Universal Protocol:  Consent: Verbal consent obtained.  Risks and benefits: risks, benefits and alternatives were discussed  Consent given by: patient  Time out: Immediately prior to procedure  "a \"time out\" was called to verify the correct patient, procedure, equipment, support staff and site/side marked as required.  Patient understanding: patient states understanding of the procedure being performed  Site marked: the operative site was marked  Patient identity confirmed: verbally with patient  Supporting Documentation  Indications: pain   Procedure Details  Location: knee - R knee  Preparation: Patient was prepped and draped in the usual sterile fashion  Needle size: 22 G  Ultrasound guidance: no  Approach: anterolateral  Medications administered: 2 mL bupivacaine 0.25 %; 2 mL methylPREDNISolone acetate 40 mg/mL; 2 mL lidocaine 1 %    Aspirate amount: 16 mL  Aspirate: clear, serous and yellow  Patient tolerance: patient tolerated the procedure well with no immediate complications  Dressing:  Sterile dressing applied              Deepti Valle  Scribe Attestation      I,:  Deepti Valle am acting as a scribe while in the presence of the attending physician.:       I,:  Pierre Agarwal DO personally performed the services described in this documentation    as scribed in my presence.:             "

## 2024-06-17 ENCOUNTER — TELEPHONE (OUTPATIENT)
Dept: OBGYN CLINIC | Facility: CLINIC | Age: 69
End: 2024-06-17

## 2024-06-18 ENCOUNTER — HOSPITAL ENCOUNTER (OUTPATIENT)
Dept: NON INVASIVE DIAGNOSTICS | Facility: HOSPITAL | Age: 69
Discharge: HOME | End: 2024-06-18
Attending: INTERNAL MEDICINE
Payer: MEDICARE

## 2024-06-18 DIAGNOSIS — R55 VASOVAGAL SYNCOPE: ICD-10-CM

## 2024-06-18 PROCEDURE — 93225 XTRNL ECG REC<48 HRS REC: CPT

## 2024-06-18 PROCEDURE — 93226 XTRNL ECG REC<48 HR SCAN A/R: CPT

## 2024-06-20 ENCOUNTER — EVALUATION (OUTPATIENT)
Dept: PHYSICAL THERAPY | Facility: CLINIC | Age: 69
End: 2024-06-20
Payer: MEDICARE

## 2024-06-20 DIAGNOSIS — M25.561 CHRONIC PAIN OF RIGHT KNEE: ICD-10-CM

## 2024-06-20 DIAGNOSIS — M17.11 PRIMARY OSTEOARTHRITIS OF RIGHT KNEE: ICD-10-CM

## 2024-06-20 DIAGNOSIS — G89.29 CHRONIC PAIN OF RIGHT KNEE: ICD-10-CM

## 2024-06-20 PROCEDURE — 97110 THERAPEUTIC EXERCISES: CPT

## 2024-06-20 PROCEDURE — 97161 PT EVAL LOW COMPLEX 20 MIN: CPT

## 2024-06-20 NOTE — PROGRESS NOTES
PT Evaluation     Today's date: 2024  Patient name: Adis Jerry  : 1955  MRN: [id number]  Referring provider: Pierre Agarwal DO  Dx:   Encounter Diagnosis     ICD-10-CM    1. Chronic pain of right knee  M25.561 Ambulatory Referral to Physical Therapy    G89.29       2. Primary osteoarthritis of right knee  M17.11 Ambulatory Referral to Physical Therapy          Start Time: 1715  Stop Time: 1800  Total time in clinic (min): 45 minutes    Assessment  Impairments: abnormal or restricted ROM, impaired physical strength, pain with function and weight-bearing intolerance    Assessment details: Patient is a 68 y.o. male who presents to physical therapy with c/o right knee pain. Patient presents to evaluation with pain, decreased range of motion, decreased strength, and decreased tolerance to activity. Patient demonstrates good tolerance to treatment and was provided with a written copy of their initial home exercise program focusing on strengthening and stretching and was encouraged to perform daily per tolerance. I discussed risks, benefits, and alternatives to treatment, and answered all patient questions to patient satisfaction. Patient presents with baseline FOTO score of 61 indicating limited tolerance/ability to complete ADLs. Patient is an appropriate candidate for skilled PT and would benefit from skilled PT services to address the aforementioned impairments, achieve goals, maximize function, and improve quality of life. Pt is in agreement with this plan.    Patient Education: activity modifications as needed, pacing of activities, importance of HEP compliance, PT prognosis/POC      Goals  ST weeks  Pt will demonstrate good understanding and compliance with HEP  Pt will decrease pain to 1/10       LT weeks  Pt will improve FOTO score to > or = to 72 to indicate improved functional abilities   Pt will decrease pain to 0/10   Pt will increase knee strength to 5/5 for improved  "tolerance/independence with ADLs  Pt will be able to make turns without pain in the knee.      Plan  Patient would benefit from: skilled physical therapy and PT eval  Planned modality interventions: cryotherapy, TENS and thermotherapy: hydrocollator packs    Planned therapy interventions: balance, manual therapy, therapeutic exercise, therapeutic training, therapeutic activities, stretching, strengthening, flexibility, functional ROM exercises, graded activity, graded exercise, home exercise program and graded motor    Frequency: 2x week  Plan of Care beginning date: 2024  Plan of Care expiration date: 8/15/2024        Subjective Evaluation    History of Present Illness  Mechanism of injury: Pt is a 67 y/o male who presents to therapy with c/o of right knee pain. He reports about 2-3 months ago he woke up one morning and could not out weight on his leg. He notes it did get a little better, but would ache like a toothache. He had an XRAY taken, that showed no arthritis. When he saw the orthopedic they removed fluid from the knee. Pain has decreased since the fluid was removed. He has difficulty getting up from a squat. He notes antiinflammatories were helping with pain at night.     Pain  Current pain ratin  At best pain ratin  At worst pain ratin  Quality: dull ache and sharp          Objective     General Comments:      Knee Comments  R AROM: 0-130    R strength  4/5    SLS:   R-     R hip strength  Flex 4/5 Abd 4/5 Ext 4-/5     Varus stress test (-)  Valgus stress test (-)  90-90 hamstring (+)  Ely's (+)  Kalyani's (-)    Patellar mobility: WNL             Diagnosis: R knee pain    Precautions: hx triple bypass surgery   POC Expires: 8/15/24   Re-evaluation Date: 24   FOTO Scores/Date: Goal -72; -   Visit Count 1/10       Manuals                                Ther Ex         Hamstring stretch 3x30\"/HEP        Modified Milind test 3x30\"/HEP        SLR 2x10/HEP        S/l abd " "2x10/HEP        Bridges  2x10x5\"/HEP                                        Neuro Re-Ed                                                               Ther Act                                                                                 Modalities                                            "

## 2024-06-21 PROCEDURE — 93227 XTRNL ECG REC<48 HR R&I: CPT | Performed by: INTERNAL MEDICINE

## 2024-06-27 DIAGNOSIS — I10 HYPERTENSION, UNSPECIFIED TYPE: ICD-10-CM

## 2024-06-27 RX ORDER — METOPROLOL SUCCINATE 100 MG/1
TABLET, EXTENDED RELEASE ORAL
Qty: 90 TABLET | Refills: 0 | Status: SHIPPED | OUTPATIENT
Start: 2024-06-27

## 2024-07-02 ENCOUNTER — OFFICE VISIT (OUTPATIENT)
Dept: PHYSICAL THERAPY | Facility: CLINIC | Age: 69
End: 2024-07-02
Payer: MEDICARE

## 2024-07-02 DIAGNOSIS — G89.29 CHRONIC PAIN OF RIGHT KNEE: Primary | ICD-10-CM

## 2024-07-02 DIAGNOSIS — M17.11 PRIMARY OSTEOARTHRITIS OF RIGHT KNEE: ICD-10-CM

## 2024-07-02 DIAGNOSIS — M25.561 CHRONIC PAIN OF RIGHT KNEE: Primary | ICD-10-CM

## 2024-07-02 PROCEDURE — 97110 THERAPEUTIC EXERCISES: CPT

## 2024-07-02 PROCEDURE — 97530 THERAPEUTIC ACTIVITIES: CPT

## 2024-07-02 PROCEDURE — 97112 NEUROMUSCULAR REEDUCATION: CPT

## 2024-07-02 NOTE — PROGRESS NOTES
"Daily Note     Today's date: 2024  Patient name: Adis Jerry  : 1955  MRN: [id number]  Referring provider: Pierre Agarwal DO  Dx:   Encounter Diagnosis     ICD-10-CM    1. Chronic pain of right knee  M25.561     G89.29       2. Primary osteoarthritis of right knee  M17.11                      Subjective: pt reports while on vacation he bend over to  something and \"twinged\" his knee which was sore for a day or two.       Objective: See treatment diary below      Assessment: Tolerated treatment well. Patient would benefit from continued PT. Progressed POC as listed below w/ good tolerance.       Plan: Continue per plan of care.      Diagnosis: R knee pain    Precautions: hx triple bypass surgery   POC Expires: 8/15/24   Re-evaluation Date: 24   FOTO Scores/Date: ; -   Visit Count 1/10 2/10      Manuals                               Ther Ex       Hamstring stretch 3x30\"/HEP  3x30\"      Modified Milind test 3x30\"/HEP  3x30\"      SLR 2x10/HEP  2x10      S/l abd 2x10/HEP  2x10      Bridges  2x10x5\"/HEP  Gtb 2x10x5\"      Rec. bike  L1 6 min                              Neuro Re-Ed        SLS  5-10\" 5x      Clamshells  Gtb 2x10                                             Ther Act             Step ups fwd  6\" 2x10      Step ups lateral                                                            Modalities                                            "

## 2024-07-05 ENCOUNTER — APPOINTMENT (OUTPATIENT)
Dept: PHYSICAL THERAPY | Facility: CLINIC | Age: 69
End: 2024-07-05
Payer: MEDICARE

## 2024-07-07 DIAGNOSIS — E11.8 TYPE 2 DIABETES MELLITUS WITH COMPLICATION, WITHOUT LONG-TERM CURRENT USE OF INSULIN (HCC): ICD-10-CM

## 2024-07-07 NOTE — TELEPHONE ENCOUNTER
PT RX request    Reason for call:   [x] Refill   [] Prior Auth  [] Other:     Office:   [x] PCP/Provider - Penelope Matthews  [] Specialty/Provider -     Medication: Ozempic    Dose/Frequency: 1 mg Q 7 Days    Quantity: 3 ml    Pharmacy: [place] [place] [address]    Does the patient have enough for 3 days?   [] Yes   [] No - Send as HP to POD unknown PT RX request

## 2024-07-08 ENCOUNTER — OFFICE VISIT (OUTPATIENT)
Dept: PHYSICAL THERAPY | Facility: CLINIC | Age: 69
End: 2024-07-08
Payer: MEDICARE

## 2024-07-08 DIAGNOSIS — G89.29 CHRONIC PAIN OF RIGHT KNEE: Primary | ICD-10-CM

## 2024-07-08 DIAGNOSIS — M17.11 PRIMARY OSTEOARTHRITIS OF RIGHT KNEE: ICD-10-CM

## 2024-07-08 DIAGNOSIS — M25.561 CHRONIC PAIN OF RIGHT KNEE: Primary | ICD-10-CM

## 2024-07-08 PROCEDURE — 97530 THERAPEUTIC ACTIVITIES: CPT

## 2024-07-08 PROCEDURE — 97112 NEUROMUSCULAR REEDUCATION: CPT

## 2024-07-08 PROCEDURE — 97110 THERAPEUTIC EXERCISES: CPT

## 2024-07-08 NOTE — PROGRESS NOTES
"Daily Note     Today's date: 2024  Patient name: Adis Jerry  : 1955  MRN: [id number]  Referring provider: Pierre Agarwal DO  Dx:   Encounter Diagnosis     ICD-10-CM    1. Chronic pain of right knee  M25.561     G89.29       2. Primary osteoarthritis of right knee  M17.11                      Subjective: pt reports he didn't have any significant pain or difficulties post previous session.       Objective: See treatment diary below      Assessment: Tolerated treatment well. Patient would benefit from continued PT. Progressed functional strengthening w/ good tolerance.       Plan: Continue per plan of care.      Diagnosis: R knee pain    Precautions: hx triple bypass surgery   POC Expires: 8/15/24   Re-evaluation Date: 24   FOTO Scores/Date: ;    Visit Count 1/10 2/10 3/10     Manuals                              Ther Ex      Hamstring stretch 3x30\"/HEP  3x30\" 3x30\"     Modified Milind test 3x30\"/HEP  3x30\" 3x30\"     SLR 2x10/HEP  2x10 2x10     S/l abd 2x10/HEP  2x10 2x10     Bridges  2x10x5\"/HEP  Gtb 2x10x5\" GTB 2x10x5\"     Rec. bike  L1 6 min L1 6 min                             Neuro Re-Ed       SLS  5-10\" 5x 10\" 5x     Clamshells  Gtb 2x10 GTB 2x10x5\"     Side stepping   GTB 4 laps                                    Ther Act           Step ups fwd  6\" 2x10 6\" 2x10     Step ups lateral   6\" 2x10     STS Low mat   2x10                                                 Modalities                                              "

## 2024-07-11 ENCOUNTER — OFFICE VISIT (OUTPATIENT)
Dept: PHYSICAL THERAPY | Facility: CLINIC | Age: 69
End: 2024-07-11
Payer: MEDICARE

## 2024-07-11 DIAGNOSIS — M17.11 PRIMARY OSTEOARTHRITIS OF RIGHT KNEE: ICD-10-CM

## 2024-07-11 DIAGNOSIS — M25.561 CHRONIC PAIN OF RIGHT KNEE: Primary | ICD-10-CM

## 2024-07-11 DIAGNOSIS — G89.29 CHRONIC PAIN OF RIGHT KNEE: Primary | ICD-10-CM

## 2024-07-11 PROCEDURE — 97530 THERAPEUTIC ACTIVITIES: CPT

## 2024-07-11 PROCEDURE — 97110 THERAPEUTIC EXERCISES: CPT

## 2024-07-11 PROCEDURE — 97112 NEUROMUSCULAR REEDUCATION: CPT

## 2024-07-11 NOTE — PROGRESS NOTES
"Daily Note     Today's date: 2024  Patient name: Adis Jerry  : 1955  MRN: [id number]  Referring provider: Pierre Agarwal DO  Dx:   Encounter Diagnosis     ICD-10-CM    1. Chronic pain of right knee  M25.561     G89.29       2. Primary osteoarthritis of right knee  M17.11           Start Time: 0930  Stop Time: 1015  Total time in clinic (min): 45 minutes    Subjective: pt reports yesterday he planted and twisted on the knee and it was painful. He notes it is feeling a bit better this morning.       Objective: See treatment diary below      Assessment: No increase in symptoms during the session. Progressed strengthening this session with no incidents. Tolerated treatment well. Patient would benefit from continued PT      Plan: Continue per plan of care.      Diagnosis: R knee pain    Precautions: hx triple bypass surgery   POC Expires: 8/15/24   Re-evaluation Date: 24   FOTO Scores/Date: ;    Visit Count 1/10 2/10 3/10 4/10    Manuals                             Ther Ex      Hamstring stretch 3x30\"/HEP  3x30\" 3x30\" 3x30\"     Modified Milind test 3x30\"/HEP  3x30\" 3x30\" 3x30\"     SLR 2x10/HEP  2x10 2x10 2x10 1#    S/l abd 2x10/HEP  2x10 2x10 2x10 1#    Bridges  2x10x5\"/HEP  Gtb 2x10x5\" GTB 2x10x5\" GTB 2x10x5\"     Rec. bike  L1 6 min L1 6 min L1 6 min                             Neuro Re-Ed       SLS  5-10\" 5x 10\" 5x 10\" 5x     Clamshells  Gtb 2x10 GTB 2x10x5\" Gtb 2x10x5\"    Side stepping   GTB 4 laps GTB 4 laps                                    Ther Act         Step ups fwd  6\" 2x10 6\" 2x10 6\" 2x10     Step ups lateral   6\" 2x10 6\" 2x10     STS Low mat   2x10 2x10                                                 Modalities                                                "

## 2024-07-15 ENCOUNTER — OFFICE VISIT (OUTPATIENT)
Dept: PHYSICAL THERAPY | Facility: CLINIC | Age: 69
End: 2024-07-15
Payer: MEDICARE

## 2024-07-15 DIAGNOSIS — M25.561 CHRONIC PAIN OF RIGHT KNEE: Primary | ICD-10-CM

## 2024-07-15 DIAGNOSIS — M17.11 PRIMARY OSTEOARTHRITIS OF RIGHT KNEE: ICD-10-CM

## 2024-07-15 DIAGNOSIS — G89.29 CHRONIC PAIN OF RIGHT KNEE: Primary | ICD-10-CM

## 2024-07-15 PROCEDURE — 97110 THERAPEUTIC EXERCISES: CPT

## 2024-07-15 PROCEDURE — 97112 NEUROMUSCULAR REEDUCATION: CPT

## 2024-07-15 PROCEDURE — 97530 THERAPEUTIC ACTIVITIES: CPT

## 2024-07-15 NOTE — PROGRESS NOTES
"Daily Note     Today's date: 7/15/2024  Patient name: Adis Jerry  : 1955  MRN: [id number]  Referring provider: Pierre Agarwal DO  Dx: No diagnosis found.               Subjective: pt reports he is sore today. He was on his feet a lot yesterday at [facility].       Objective: See treatment diary below      Assessment: progressed proximal hip strengthening this session. Added in leg press this session to further LE strengthening. Able to complete leg press with good form after cueing provided. Tolerated treatment well. Patient would benefit from continued PT      Plan: Continue per plan of care.      Diagnosis: R knee pain    Precautions: hx triple bypass surgery   POC Expires: 8/15/24   Re-evaluation Date: 24   FOTO Scores/Date: ;    Visit Count 1/10 2/10 3/10 4/10 5/10   Manuals 6/20 7/2 7/8 7/11 7/15                           Ther Ex 6/20  7/2 7/8  7/15   Hamstring stretch 3x30\"/HEP  3x30\" 3x30\" 3x30\"  3x30\"   Modified Milind test 3x30\"/HEP  3x30\" 3x30\" 3x30\"  3x30\"    SLR 2x10/HEP  2x10 2x10 2x10 1# 2x10 1#   S/l abd 2x10/HEP  2x10 2x10 2x10 1# 2x10 1#    Bridges  2x10x5\"/HEP  Gtb 2x10x5\" GTB 2x10x5\" GTB 2x10x5\"  BTB 2x10x5\"   Rec. bike  L1 6 min L1 6 min L1 6 min  L1 x 6 min    Leg press     35# x10, 45# x10 SL                     Neuro Re-Ed  7/2 7/8 7/11  7/15   SLS  5-10\" 5x 10\" 5x 10\" 5x  10\" 5x    Clamshells  Gtb 2x10 GTB 2x10x5\" Gtb 2x10x5\" BTB 2x10x3\"   Side stepping   GTB 4 laps GTB 4 laps  nv                                  Ther Act    7/2  7/8  7/11  7/15   Step ups fwd  6\" 2x10 6\" 2x10 6\" 2x10  6\" 2x10   Step ups lateral   6\" 2x10 6\" 2x10  6\" 2x10    STS Low mat   2x10 2x10  2x10                                                Modalities                                                  "

## 2024-07-18 ENCOUNTER — EVALUATION (OUTPATIENT)
Dept: PHYSICAL THERAPY | Facility: CLINIC | Age: 69
End: 2024-07-18
Payer: MEDICARE

## 2024-07-18 DIAGNOSIS — M17.11 PRIMARY OSTEOARTHRITIS OF RIGHT KNEE: ICD-10-CM

## 2024-07-18 DIAGNOSIS — G89.29 CHRONIC PAIN OF RIGHT KNEE: Primary | ICD-10-CM

## 2024-07-18 DIAGNOSIS — M25.561 CHRONIC PAIN OF RIGHT KNEE: Primary | ICD-10-CM

## 2024-07-18 PROCEDURE — 97110 THERAPEUTIC EXERCISES: CPT

## 2024-07-18 NOTE — PROGRESS NOTES
PT Re-Evaluation     Today's date: 2024  Patient name: Adis Jerry  : 1955  MRN: [id number]  Referring provider: Pierre Agarwal DO  Dx:   Encounter Diagnosis     ICD-10-CM    1. Chronic pain of right knee  M25.561     G89.29       2. Primary osteoarthritis of right knee  M17.11             Start Time: 930  Stop Time: 1008  Total time in clinic (min): 38 minutes    Assessment  Impairments: abnormal or restricted ROM, impaired physical strength, pain with function and weight-bearing intolerance    Assessment details: Patient is a 69 y/o  who presents to therapy with complaints of right knee pain and has completed 6 visits to date with improved FOTO score of 66 since IE. Patient demonstrates subjective/objective improvement since starting PT such as improved strength, improved balance, and improved activity tolerance. Patient continues to present with deficits that include decreased strength decreased lateral stability in the knee, and decreased tolerance to squatting. Patient will benefit from continued skilled PT intervention to address the aforementioned impairments, achieve goals, maximize function, and improve quality of life. Pt is in agreement with this plan.     Goals  ST weeks  Pt will demonstrate good understanding and compliance with HEP --progressing  Pt will decrease pain to 1/10  --progressing     LT weeks  Pt will improve FOTO score to > or = to 72 to indicate improved functional abilities --progressing  Pt will decrease pain to 0/10 --progressing  Pt will increase knee strength to 5/5 for improved tolerance/independence with ADLs --progressing  Pt will be able to make turns without pain in the knee. --progressing      Plan  Patient would benefit from: skilled physical therapy and PT eval  Planned modality interventions: cryotherapy, TENS and thermotherapy: hydrocollator packs    Planned therapy interventions: balance, manual therapy, therapeutic exercise, therapeutic  "training, therapeutic activities, stretching, strengthening, flexibility, functional ROM exercises, graded activity, graded exercise, home exercise program and graded motor    Frequency: 2x week  Plan of Care beginning date: 2024  Plan of Care expiration date: 2024        Subjective Evaluation    History of Present Illness  Mechanism of injury: RE (24):  Pt is a 69 y/o male who presents to therapy with c/o of right knee pain. He notes he feels his knee is a bit stronger, but he still gets \"tweaks\" in the knee when turning. He notes this does not stop him. He notes the tweaking frost occur less often since starting therapy.He notes he is now able to push himself up a little bit easier, but still has trouble with it. He notes trouble with kneeling at [employer] when he has to push himself back up. He did have soreness after last session when progressions were made. 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     IE ( 24):  Pt is a 69 y/o male who presents to therapy with c/o of right knee pain. He reports about 2-3 months ago he woke up one morning and could not out weight on his leg. He notes it did get a little better, but would ache like a toothache. He had an XRAY taken, that showed no arthritis. When he saw the orthopedic they removed fluid from the knee. Pain has decreased since the fluid was removed. He has difficulty getting up from a squat. He notes antiinflammatories were helping with pain at night.     Pain  Current pain ratin  At best pain ratin  At worst pain ratin  Quality: dull ache and sharp          Objective     General Comments:      Knee Comments  R AROM: 0-130    R strength  4+/5    SLS:   R- 15 seconds, UE tapping and moderate ankle strategy, slight knee flexion.     R hip strength  Flex 4+/5 Abd 4+/5 Ext 4/5     Varus stress test (-)  Valgus stress test (-)  90-90 " "hamstring (+)  Ely's (+)  Kalyani's (-)    Patellar mobility: WNL      Flowsheet Rows      Flowsheet Row Most Recent Value   PT/OT G-Codes    Current Score 61   Projected Score 72               Diagnosis: R knee pain    Precautions: hx triple bypass surgery   POC Expires: 9/12/24   Re-evaluation Date: 8/15/24   FOTO Scores/Date: Goal -72; 6/20-61   Visit Count 1/10 2/10 3/10 4/10 5/10   Manuals 7/18  7/2 7/8 7/11 7/15                           Ther Ex 7/18 7/2 7/8  7/15   Hamstring stretch  3x30\" 3x30\" 3x30\"  3x30\"   Modified Milind test  3x30\" 3x30\" 3x30\"  3x30\"    SLR  2x10 2x10 2x10 1# 2x10 1#   S/l abd  2x10 2x10 2x10 1# 2x10 1#    Bridges   Gtb 2x10x5\" GTB 2x10x5\" GTB 2x10x5\"  BTB 2x10x5\"   Rec. bike L1 x 6 min  L1 6 min L1 6 min L1 6 min  L1 x 6 min    Leg press     35# x10, 45# x10 SL      Updated FOTO/measurements                Neuro Re-Ed  7/2 7/8 7/11  7/15   SLS  5-10\" 5x 10\" 5x 10\" 5x  10\" 5x    Clamshells  Gtb 2x10 GTB 2x10x5\" Gtb 2x10x5\" BTB 2x10x3\"   Side stepping   GTB 4 laps GTB 4 laps  nv                                 Ther Act   7/2  7/8  7/11  7/15   Step ups fwd  6\" 2x10 6\" 2x10 6\" 2x10  6\" 2x10   Step ups lateral   6\" 2x10 6\" 2x10  6\" 2x10    STS Low mat   2x10 2x10  2x10                                               Modalities                                            "

## 2024-08-12 ENCOUNTER — EVALUATION (OUTPATIENT)
Dept: PHYSICAL THERAPY | Facility: CLINIC | Age: 69
End: 2024-08-12
Payer: MEDICARE

## 2024-08-12 DIAGNOSIS — M17.11 PRIMARY OSTEOARTHRITIS OF RIGHT KNEE: ICD-10-CM

## 2024-08-12 DIAGNOSIS — G89.29 CHRONIC PAIN OF RIGHT KNEE: Primary | ICD-10-CM

## 2024-08-12 DIAGNOSIS — M25.561 CHRONIC PAIN OF RIGHT KNEE: Primary | ICD-10-CM

## 2024-08-12 PROCEDURE — 97112 NEUROMUSCULAR REEDUCATION: CPT

## 2024-08-12 PROCEDURE — 97110 THERAPEUTIC EXERCISES: CPT

## 2024-08-12 NOTE — PROGRESS NOTES
PT Re-Evaluation     Today's date: 2024  Patient name: Adis Jerry  : 1955  MRN: [id number]  Referring provider: Pierre Agarwal DO  Dx:   Encounter Diagnosis     ICD-10-CM    1. Chronic pain of right knee  M25.561     G89.29       2. Primary osteoarthritis of right knee  M17.11               Start Time: 930  Stop Time: 1002  Total time in clinic (min): 32 minutes    Assessment  Impairments: abnormal or restricted ROM, impaired physical strength and pain with function    Assessment details: Patient is a 67 y/o male who presents to therapy with complaints of right knee pain and has completed 7 visits to date. Patient returns to therapy after being on vacation for a month. Objective measurements are unchanged since last re-evaluation. Patient continues to present with deficits that include pain with twisting, prolonged walking/standing, and other ADLs. Patient will benefit from continued skilled PT intervention to address the aforementioned impairments, achieve goals, maximize function, and improve quality of life. Pt is in agreement with this plan.       Goals  ST weeks  Pt will demonstrate good understanding and compliance with HEP --progressing  Pt will decrease pain to 1/10  --progressing     LT weeks  Pt will improve FOTO score to > or = to 72 to indicate improved functional abilities --progressing  Pt will decrease pain to 0/10 --progressing  Pt will increase knee strength to 5/5 for improved tolerance/independence with ADLs --progressing  Pt will be able to make turns without pain in the knee. --progressing      Plan  Patient would benefit from: skilled physical therapy and PT eval  Planned modality interventions: cryotherapy, TENS and thermotherapy: hydrocollator packs    Planned therapy interventions: balance, manual therapy, therapeutic exercise, therapeutic training, therapeutic activities, stretching, strengthening, flexibility, functional ROM exercises, graded activity,  "graded exercise, home exercise program and graded motor    Frequency: 2x week  Plan of Care beginning date: 2024  Plan of Care expiration date: 2024        Subjective Evaluation    History of Present Illness  Mechanism of injury: RE (24):  Pt returns to therapy after a month lapse in care due to being away on vacation. He was walking a lot on uneven surfaces and hills while he was gone. He noticed about 3 days into the trip the knee was bothering him. He still has the most issues with twisting movements. The day he left for the trip with packing he was twisting a lot and that's when the knee bothered him the most.    RE (24):  Pt is a 69 y/o male who presents to therapy with c/o of right knee pain. He notes he feels his knee is a bit stronger, but he still gets \"tweaks\" in the knee when turning. He notes this does not stop him. He notes the tweaking frost occur less often since starting therapy.He notes he is now able to push himself up a little bit easier, but still has trouble with it. He notes trouble with kneeling at [employer] when he has to push himself back up. He did have soreness after last session when progressions were made. 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     IE ( 24):  Pt is a 69 y/o male who presents to therapy with c/o of right knee pain. He reports about 2-3 months ago he woke up one morning and could not out weight on his leg. He notes it did get a little better, but would ache like a toothache. He had an XRAY taken, that showed no arthritis. When he saw the orthopedic they removed fluid from the knee. Pain has decreased since the fluid was removed. He has difficulty getting up from a squat. He notes antiinflammatories were helping with pain at night.     Pain  Current pain ratin  At best pain ratin  At worst pain ratin  Quality: dull " "ache          Objective     General Comments:      Knee Comments  R AROM: 0-130    R strength  4+/5    SLS:   R- 15 seconds, UE tapping and moderate ankle strategy, slight knee flexion.     R hip strength  Flex 4+/5 Abd 4+/5 Ext 4/5     Varus stress test (-)  Valgus stress test (-)  90-90 hamstring (+)  Ely's (+)  Kalyani's (-)    Patellar mobility: WNL               Diagnosis: R knee pain    Precautions: hx triple bypass surgery   POC Expires: 9/12/24   Re-evaluation Date: 9/12/24   FOTO Scores/Date: Goal -72; 6/20-61   Visit Count 1/10 1/10 3/10 4/10 5/10   Manuals 7/18  8/12 7/8 7/11 7/15                           Ther Ex 7/18 8/12 7/8  7/15   Hamstring stretch   3x30\" 3x30\"  3x30\"   Modified Milind test   3x30\" 3x30\"  3x30\"    SLR  2x10 1# 2x10 2x10 1# 2x10 1#   S/l abd  2x10 1#  2x10 2x10 1# 2x10 1#    Bridges   BTB 2x10x5\"  GTB 2x10x5\" GTB 2x10x5\"  BTB 2x10x5\"   Rec. bike L1 x 6 min  L1 x 6 min  L1 6 min L1 6 min  L1 x 6 min    Leg press  45# 2x10 SL    35# x10, 45# x10 SL      Updated FOTO/measurements  Updated measurements              Neuro Re-Ed  8/12 7/8 7/11  7/15   SLS  10\" 5x  10\" 5x 10\" 5x  10\" 5x    Clamshells  BTB 2x10x3\" GTB 2x10x5\" Gtb 2x10x5\" BTB 2x10x3\"   Side stepping  Gtb x 3 laps  GTB 4 laps GTB 4 laps  nv                                Ther Act    7/8  7/11  7/15   Step ups fwd   6\" 2x10 6\" 2x10  6\" 2x10   Step ups lateral   6\" 2x10 6\" 2x10  6\" 2x10    STS Low mat   2x10 2x10  2x10                                              Modalities                                            "

## 2024-08-15 ENCOUNTER — APPOINTMENT (OUTPATIENT)
Dept: PHYSICAL THERAPY | Facility: CLINIC | Age: 69
End: 2024-08-15
Payer: MEDICARE

## 2024-08-26 ENCOUNTER — OFFICE VISIT (OUTPATIENT)
Dept: PHYSICAL THERAPY | Facility: CLINIC | Age: 69
End: 2024-08-26
Payer: MEDICARE

## 2024-08-26 DIAGNOSIS — M17.11 PRIMARY OSTEOARTHRITIS OF RIGHT KNEE: Primary | ICD-10-CM

## 2024-08-26 DIAGNOSIS — M25.561 CHRONIC PAIN OF RIGHT KNEE: ICD-10-CM

## 2024-08-26 DIAGNOSIS — G89.29 CHRONIC PAIN OF RIGHT KNEE: ICD-10-CM

## 2024-08-26 PROCEDURE — 97110 THERAPEUTIC EXERCISES: CPT

## 2024-08-26 PROCEDURE — 97112 NEUROMUSCULAR REEDUCATION: CPT

## 2024-08-26 NOTE — PROGRESS NOTES
"Daily Note     Today's date: 2024  Patient name: Adis Jerry  : 1955  MRN: [id number]  Referring provider: Pierre Agarwal DO  Dx:   Encounter Diagnosis     ICD-10-CM    1. Primary osteoarthritis of right knee  M17.11       2. Chronic pain of right knee  M25.561     G89.29           Start Time: 930          Subjective: pt reports some medial knee pain that he can feel with kicking a soccer ball and moved something with his leg. He has knee pain upon arrival to this session rated 2-3/10.       Objective: See treatment diary below      Assessment: Added in standing 3 way hip this session with cueing for correct form. Progressed weight on leg press with minimal symptoms irritation. Tolerated treatment well. Patient would benefit from continued PT      Plan: Continue per plan of care.      Diagnosis: R knee pain    Precautions: hx triple bypass surgery   POC Expires: 24   Re-evaluation Date: 24   FOTO Scores/Date: ;    Visit Count 1/10 1/10 2/10 4/10 5/10   Manuals 7/18  8/12 8/26 7/11 7/15                           Ther Ex 7/18 8/12 8/26  7/15   Hamstring stretch    3x30\"  3x30\"   Modified Milnid test    3x30\"  3x30\"    SLR  2x10 1# 2x10 1# 2x10 1# 2x10 1#   S/l abd  2x10 1#  2x10 1# 2x10 1# 2x10 1#    Bridges   BTB 2x10x5\"  BTB 2x10x5\"  GTB 2x10x5\"  BTB 2x10x5\"   Standing 3 way hip    10x ea gtb      Rec. bike L1 x 6 min  L1 x 6 min  L1 x 6 min  L1 6 min  L1 x 6 min    Leg press  45# 2x10 SL  55# 2x10 SL  35# x10, 45# x10 SL      Updated FOTO/measurements  Updated measurements              Neuro Re-Ed  8/12 8/26 7/11  7/15   SLS  10\" 5x  10\" 5x  10\" 5x  10\" 5x    Clamshells  BTB 2x10x3\" BTB 2x10x3\"  Gtb 2x10x5\" BTB 2x10x3\"   Side stepping  Gtb x 3 laps  Gtb x 3 laps  GTB 4 laps  nv                               Ther Act     7/11  7/15   Step ups fwd   6\" 2x10 6\" 2x10  6\" 2x10   Step ups lateral   6\"2x10  6\" 2x10  6\" 2x10    STS Low mat    2x10  2x10                      "                       Modalities

## 2024-08-28 ENCOUNTER — OFFICE VISIT (OUTPATIENT)
Dept: PHYSICAL THERAPY | Facility: CLINIC | Age: 69
End: 2024-08-28
Payer: MEDICARE

## 2024-08-28 DIAGNOSIS — M17.11 PRIMARY OSTEOARTHRITIS OF RIGHT KNEE: Primary | ICD-10-CM

## 2024-08-28 DIAGNOSIS — M25.561 CHRONIC PAIN OF RIGHT KNEE: ICD-10-CM

## 2024-08-28 DIAGNOSIS — G89.29 CHRONIC PAIN OF RIGHT KNEE: ICD-10-CM

## 2024-08-28 PROCEDURE — 97530 THERAPEUTIC ACTIVITIES: CPT

## 2024-08-28 PROCEDURE — 97112 NEUROMUSCULAR REEDUCATION: CPT

## 2024-08-28 PROCEDURE — 97110 THERAPEUTIC EXERCISES: CPT

## 2024-08-28 NOTE — PROGRESS NOTES
"Daily Note     Today's date: 2024  Patient name: Adis Jerry  : 1955  MRN: [id number]  Referring provider: Pierre Agarwal DO  Dx:   Encounter Diagnosis     ICD-10-CM    1. Primary osteoarthritis of right knee  M17.11       2. Chronic pain of right knee  M25.561     G89.29           Start Time: 1152  Stop Time: 1233  Total time in clinic (min): 41 minutes    Subjective: patient reports incident yesterday stepping in hole in his yard.   Reports pain that improved throughout day.  Denies any buckling or instability.        Objective: See treatment diary below      Assessment:  patient was able to complete therapy without incident or reported increase to baseline R knee pain.  Demonstrates good control with both open and closed chain strengthening along with SLS.  Was able to progress with introduction of load during sit to stand, load with leg press, and reaching out of COG.    Plan: Continue per plan of care.  Progress treatment as tolerated.       Diagnosis: R knee pain    Precautions: hx triple bypass surgery   POC Expires: 24   Re-evaluation Date: 24   FOTO Scores/Date:  -72;    Visit Count 1/10 1/10 2/10 3/10 5/10   Manuals 7/18  8/12 8/26 8/28 7/15                           Ther Ex 7/18 8/12 8/26 8/28 7/15   Hamstring stretch     3x30\"   Modified Milind test     3x30\"    SLR  2x10 1# 2x10 1# 1# 2X10  2x10 1#   S/l abd  2x10 1#  2x10 1# 1# 2X10  2x10 1#    Bridges   BTB 2x10x5\"  BTB 2x10x5\"  BTB  5\" 2x10 (SL NV) BTB 2x10x5\"   Standing 3 way hip    10x ea gtb      Rec. bike L1 x 6 min  L1 x 6 min  L1 x 6 min  L1 x 6 mins L1 x 6 min    Leg press  45# 2x10 SL  55# 2x10 SL SL 65# 2x10  35# x10, 45# x10 SL      Updated FOTO/measurements  Updated measurements              Neuro Re-Ed  8/12 8/26 8/28 7/15   SLS  10\" 5x  10\" 5x  15\"x4 10\" 5x    Clamshells  BTB 2x10x3\" BTB 2x10x3\"  BTB 3\" 2x10  BTB 2x10x3\"   Side stepping  Gtb x 3 laps  Gtb x 3 laps  BTB x 4 laps  nv   SLS Cone " "Reaching    3 cones to high mat x 3                       Ther Act    8/28  7/15   Step ups fwd   6\" 2x10 6\" 2x10 6\" 2x10   Step ups lateral   6\"2x10  6\"2x10  6\" 2x10    STS Low mat    Low mat x10/ 20# 2x10  2x10                                          Modalities                                           "

## 2024-08-29 ENCOUNTER — APPOINTMENT (OUTPATIENT)
Dept: PHYSICAL THERAPY | Facility: CLINIC | Age: 69
End: 2024-08-29
Payer: MEDICARE

## 2024-09-03 ENCOUNTER — OFFICE VISIT (OUTPATIENT)
Dept: PHYSICAL THERAPY | Facility: CLINIC | Age: 69
End: 2024-09-03
Payer: COMMERCIAL

## 2024-09-03 DIAGNOSIS — G89.29 CHRONIC PAIN OF RIGHT KNEE: ICD-10-CM

## 2024-09-03 DIAGNOSIS — M25.561 CHRONIC PAIN OF RIGHT KNEE: ICD-10-CM

## 2024-09-03 DIAGNOSIS — M17.11 PRIMARY OSTEOARTHRITIS OF RIGHT KNEE: Primary | ICD-10-CM

## 2024-09-03 PROCEDURE — 97110 THERAPEUTIC EXERCISES: CPT

## 2024-09-03 PROCEDURE — 97530 THERAPEUTIC ACTIVITIES: CPT

## 2024-09-03 PROCEDURE — 97112 NEUROMUSCULAR REEDUCATION: CPT

## 2024-09-03 NOTE — PROGRESS NOTES
"Daily Note     Today's date: 9/3/2024  Patient name: Adis Jerry  : 1955  MRN: [id number]  Referring provider: Pierre Agarwal DO  Dx:   Encounter Diagnosis     ICD-10-CM    1. Primary osteoarthritis of right knee  M17.11       2. Chronic pain of right knee  M25.561     G89.29           Start Time: 924  Stop Time: 1011  Total time in clinic (min): 47 minutes    Subjective: patient reports normal soreness post last therapy session.   Denies any new incidents or complaints      Objective: See treatment diary below      Assessment:  patient continues to demonstrate improving control and strength being able to progress and requiring less assist.  Performed bridges with SL only without compromise.  Was able to increase hold times with SLS sand perform reaching out of COG with out assist.  Was able to progress in step height with both fwd and lat step ups requiring min UE assist due to eccentric control     Plan: Continue per plan of care.  Progress treatment as tolerated.       Diagnosis: R knee pain    Precautions: hx triple bypass surgery   POC Expires: 24   Re-evaluation Date: 24   FOTO Scores/Date: Goal -72;    Visit Count 1/10 1/10 2/10 3/10 410   Manuals 7/18  8/12 8/26 8/28 9/3                           Ther Ex 7/18 8/12 8/26 8/28 9/3   Hamstring stretch        Modified Milind test        SLR  2x10 1# 2x10 1# 1# 2X10  1# 2x10    S/l abd  2x10 1#  2x10 1# 1# 2X10  1# 2x10    Bridges   BTB 2x10x5\"  BTB 2x10x5\"  BTB  5\" 2x10 (SL NV) SL 5\" 2x10    Standing 3 way hip    10x ea gtb      Rec. bike L1 x 6 min  L1 x 6 min  L1 x 6 min  L1 x 6 mins L2 x 6 mins    Leg press  45# 2x10 SL  55# 2x10 SL SL 65# 2x10  SL 65# 2x10     Updated FOTO/measurements  Updated measurements              Neuro Re-Ed  8/12 8/26 8/28 9/3   SLS  10\" 5x  10\" 5x  15\"x4 15\"-20\"x 4    Clamshells  BTB 2x10x3\" BTB 2x10x3\"  BTB 3\" 2x10  DC   Side stepping  Gtb x 3 laps  Gtb x 3 laps  BTB x 4 laps  BTB x 4 laps  " "  SLS Cone Reaching    3 cones to high mat x 3 3 cones to high mat x 3                      Ther Act    8/28 9/3   Step ups fwd   6\" 2x10 6\" 2x10 8\"2x10    Step ups lateral   6\"2x10  6\"2x10  8\"2x10    STS Low mat    Low mat x10/ 20# 2x10  Low mat 20# 2x10    Lunges      NV                               Modalities                                            "

## 2024-09-05 ENCOUNTER — OFFICE VISIT (OUTPATIENT)
Dept: PHYSICAL THERAPY | Facility: CLINIC | Age: 69
End: 2024-09-05
Payer: COMMERCIAL

## 2024-09-05 DIAGNOSIS — M17.11 PRIMARY OSTEOARTHRITIS OF RIGHT KNEE: Primary | ICD-10-CM

## 2024-09-05 DIAGNOSIS — G89.29 CHRONIC PAIN OF RIGHT KNEE: ICD-10-CM

## 2024-09-05 DIAGNOSIS — M25.561 CHRONIC PAIN OF RIGHT KNEE: ICD-10-CM

## 2024-09-05 PROCEDURE — 97112 NEUROMUSCULAR REEDUCATION: CPT

## 2024-09-05 PROCEDURE — 97110 THERAPEUTIC EXERCISES: CPT

## 2024-09-05 PROCEDURE — 97530 THERAPEUTIC ACTIVITIES: CPT

## 2024-09-05 NOTE — PROGRESS NOTES
"Daily Note     Today's date: 2024  Patient name: Adis Jerry  : 1955  MRN: [id number]  Referring provider: Pierre Agarwal DO  Dx:   Encounter Diagnosis     ICD-10-CM    1. Primary osteoarthritis of right knee  M17.11       2. Chronic pain of right knee  M25.561     G89.29           Start Time: 0935  Stop Time: 1015  Total time in clinic (min): 40 minutes    Subjective: pt offers no new complaints       Objective: See treatment diary below      Assessment: progressed eight with proximal hip strengthening. Progressed closed chain strengthening with addition of lunges. Cueing provided for correct mechanics. Tolerated treatment well. Patient would benefit from continued PT      Plan: Continue per plan of care.      Diagnosis: R knee pain    Precautions: hx triple bypass surgery   POC Expires: 24   Re-evaluation Date: 24   FOTO Scores/Date: 72;    Visit Count 5/10 1/10 2/10 3/10 4/10   Manuals 9/5 8/12 8/26 8/28 9/3                           Ther Ex 9/5 8/12 8/26 8/28 9/3   SLR 2# 2x10 2x10 1# 2x10 1# 1# 2X10  1# 2x10    S/l abd 2# 2x10  2x10 1#  2x10 1# 1# 2X10  1# 2x10    Bridges  SL 5\" 2x10  BTB 2x10x5\"  BTB 2x10x5\"  BTB  5\" 2x10 (SL NV) SL 5\" 2x10    Standing 3 way hip    10x ea gtb      Rec. bike L2 x  6 min L1 x 6 min  L1 x 6 min  L1 x 6 mins L2 x 6 mins    Leg press SL 65# 2x10 45# 2x10 SL  55# 2x10 SL SL 65# 2x10  SL 65# 2x10      Updated measurements              Neuro Re-Ed   9/3   SLS 15-20\" 4x  10\" 5x  10\" 5x  15\"x4 15\"-20\"x 4    Clamshells  BTB 2x10x3\" BTB 2x10x3\"  BTB 3\" 2x10  DC   Side stepping Btb x 4laps  Gtb x 3 laps  Gtb x 3 laps  BTB x 4 laps  BTB x 4 laps    SLS Cone Reaching 3 cones to high mat 3x    3 cones to high mat x 3 3 cones to high mat x 3                      Ther Act     9/3   Step ups fwd 8\" 2x10  6\" 2x10 6\" 2x10 8\"2x10    Step ups lateral 8\" 2x10  6\"2x10  6\"2x10  8\"2x10    STS Low mat Low mat 20# 2x10   Low mat x10/ 20# 2x10 "  Low mat 20# 2x10    Lunges  10x ea     NV                              Modalities

## 2024-09-09 ENCOUNTER — OFFICE VISIT (OUTPATIENT)
Dept: PHYSICAL THERAPY | Facility: CLINIC | Age: 69
End: 2024-09-09
Payer: COMMERCIAL

## 2024-09-09 DIAGNOSIS — G89.29 CHRONIC PAIN OF RIGHT KNEE: ICD-10-CM

## 2024-09-09 DIAGNOSIS — M17.11 PRIMARY OSTEOARTHRITIS OF RIGHT KNEE: Primary | ICD-10-CM

## 2024-09-09 DIAGNOSIS — M25.561 CHRONIC PAIN OF RIGHT KNEE: ICD-10-CM

## 2024-09-09 PROCEDURE — 97112 NEUROMUSCULAR REEDUCATION: CPT

## 2024-09-09 PROCEDURE — 97110 THERAPEUTIC EXERCISES: CPT

## 2024-09-09 PROCEDURE — 97530 THERAPEUTIC ACTIVITIES: CPT

## 2024-09-09 NOTE — PROGRESS NOTES
"Daily Note     Today's date: 2024  Patient name: Adis Jerry  : 1955  MRN: [id number]  Referring provider: Pierre Agarwal DO  Dx:   Encounter Diagnosis     ICD-10-CM    1. Primary osteoarthritis of right knee  M17.11       2. Chronic pain of right knee  M25.561     G89.29           Start Time: 930  Stop Time: 1021  Total time in clinic (min): 51 minutes    Subjective:   patient reports overall improvement.  Reports normal fatigue soreness post therapy session.   Denies any new incidents or complaints      Objective: See treatment diary below      Assessment:  patient was able to progress in therapy further challenging R LE endurance, stability/balance, and functional squatting and lunging.  Demonstrates fair to good control without presence of pain.  Cueing on mechanics of progressions made and exercises introduced    Plan: Continue per plan of care.  Progress treatment as tolerated.       Diagnosis: R knee pain    Precautions: hx triple bypass surgery   POC Expires: 24   Re-evaluation Date: 24   FOTO Scores/Date: ;    Visit Count 5/10 6/10 2/10 3/10 4/10   Manuals 9/5 9/9 8/26 8/28 9/3                           Ther Ex 9/5 9/9 8/26 8/28 9/3   SLR 2# 2x10 2# 3x10 2x10 1# 1# 2X10  1# 2x10    S/l abd 2# 2x10  2# 3x10 2x10 1# 1# 2X10  1# 2x10    Bridges  SL 5\" 2x10  SL 5\" 2x10  BTB 2x10x5\"  BTB  5\" 2x10 (SL NV) SL 5\" 2x10    Standing 3 way hip    10x ea gtb      Rec. bike L2 x  6 min L2 x 6 mins L1 x 6 min  L1 x 6 mins L2 x 6 mins    Leg press SL 65# 2x10 SL 65# 2x10  55# 2x10 SL SL 65# 2x10  SL 65# 2x10                    Neuro Re-Ed  9/9 8/26 8/28 9/3   SLS 15-20\" 4x  5# KB pass x10  10\" 5x  15\"x4 15\"-20\"x 4    Clamshells   BTB 2x10x3\"  BTB 3\" 2x10  DC   Side stepping Btb x 4laps   Gtb x 3 laps  BTB x 4 laps  BTB x 4 laps    SLS Cone Reaching 3 cones to high mat 3x  3 cones to high mat x 3  3 cones to high mat x 3 3 cones to high mat x 3    Cable Walk Out  Lateral 13# " "x 4 ea        3 way Tramp Throw  3# x10 ea        Ther Act  9/9  8/28 9/3   Step ups fwd 8\" 2x10 8\"2x10  6\" 2x10 6\" 2x10 8\"2x10    Step ups lateral 8\" 2x10 8\"2x10  6\"2x10  6\"2x10  8\"2x10    STS Low mat Low mat 20# 2x10 To 18\" 20# 2x10   Low mat x10/ 20# 2x10  Low mat 20# 2x10    Lunges  10x ea  Static 2x10 ea   NV                             Modalities                                             "

## 2024-09-12 ENCOUNTER — EVALUATION (OUTPATIENT)
Dept: PHYSICAL THERAPY | Facility: CLINIC | Age: 69
End: 2024-09-12
Payer: COMMERCIAL

## 2024-09-12 DIAGNOSIS — M25.561 CHRONIC PAIN OF RIGHT KNEE: ICD-10-CM

## 2024-09-12 DIAGNOSIS — G89.29 CHRONIC PAIN OF RIGHT KNEE: ICD-10-CM

## 2024-09-12 DIAGNOSIS — M17.11 PRIMARY OSTEOARTHRITIS OF RIGHT KNEE: Primary | ICD-10-CM

## 2024-09-12 PROCEDURE — 97110 THERAPEUTIC EXERCISES: CPT

## 2024-09-12 NOTE — PROGRESS NOTES
PT Re-Evaluation     Collection of subjective/objective data performed by Adis Sanchez PTA; Assessment, POC, and Goal attainment performed by Dominguez Dumont DPT      Today's date: 2024  Patient name: Adis Jerry  : 1955  MRN: [id number]  Referring provider: Pierre Agarwal DO  Dx:   Encounter Diagnosis     ICD-10-CM    1. Primary osteoarthritis of right knee  M17.11       2. Chronic pain of right knee  M25.561     G89.29               Start Time: 930  Stop Time: 1004  Total time in clinic (min): 34 minutes    Assessment  Impairments: abnormal or restricted ROM, impaired physical strength and pain with function    Assessment details: Patient is a 68 y.o. male with c/o right knee pain and has completed 13 visits to date with improved FOTO score of 61 to 75 since IE. Patient demonstrates notable subjective/objective improvement since enrolling in PT to include improving pain, mobility, strength, and stability. Patient continues to exhibit lingering deficits to include pain, decreased proximal hip strength, poor squatting mechanics, and decreased closed chain strength/stability that continues to impact tolerance/ability to perform ADLs and recreational activities. Patient will benefit from continued skilled PT intervention to address the aforementioned impairments, achieve goals, maximize function, and improve quality of life. Pt is in agreement with this plan.           Goals  ST weeks  Pt will demonstrate good understanding and compliance with HEP --progressing  Pt will decrease pain to 1/10  --progressing     LT weeks  Pt will improve FOTO score to > or = to 72 to indicate improved functional abilities --MET  Pt will decrease pain to 0/10 --progressing  Pt will increase knee strength to 5/5 for improved tolerance/independence with ADLs --Mostly met  Pt will be able to make turns without pain in the knee. --progressing      Plan  Patient would benefit from: skilled physical  "therapy and PT eval  Planned modality interventions: cryotherapy, TENS and thermotherapy: hydrocollator packs    Planned therapy interventions: balance, manual therapy, therapeutic exercise, therapeutic training, therapeutic activities, stretching, strengthening, flexibility, functional ROM exercises, graded activity, graded exercise, home exercise program and graded motor    Frequency: 2x week  Duration in weeks: 4  Plan of Care beginning date: 9/12/2024  Plan of Care expiration date: 10/10/2024  Treatment plan discussed with: patient  Plan details: Cont to tx per POC        Subjective Evaluation    History of Present Illness  Mechanism of injury: RE (9/12/24)  Pt has completed 12 PT visits to date reports overall improvement.  Reports 75% feeling stronger with less incidents of pain.  Reports \"pinch\" about 1x per week.  Reports with pivoting.  Reports no issue or difficulty with squatting and stepping.  Reports being able to play golf.  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          RE (8/12/24):  Pt returns to therapy after a month lapse in care due to being away on vacation. He was walking a lot on uneven surfaces and hills while he was gone. He noticed about 3 days into the trip the knee was bothering him. He still has the most issues with twisting movements. The day he left for the trip with packing he was twisting a lot and that's when the knee bothered him the most.    RE (7/18/24):  Pt is a 69 y/o male who presents to therapy with c/o of right knee pain. He notes he feels his knee is a bit stronger, but he still gets \"tweaks\" in the knee when turning. He notes this does not stop him. He notes the tweaking frost occur less often since starting therapy.He notes he is now able to push himself up a little bit easier, but still has trouble with it. He notes trouble with kneeling at [employer] when he has to push himself back up. He did have soreness " "after last session when progressions were made. He notes last week he [place]. 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     IE ( 24):  Pt is a 67 y/o male who presents to therapy with c/o of right knee pain. He reports about 2-3 months ago he woke up one morning and could not out weight on his leg. He notes it did get a little better, but would ache like a toothache. He had an XRAY taken, that showed no arthritis. When he saw the orthopedic they removed fluid from the knee. Pain has decreased since the fluid was removed. He has difficulty getting up from a squat. He notes antiinflammatories were helping with pain at night.     Pain  Current pain ratin  At best pain ratin  At worst pain ratin  Quality: sharp          Objective     General Comments:      Knee Comments  R AROM: 0-140*    R strength Hip MMT:  Flex  5-/5   Abd  5/5   Ext 4+/5    R Knee MMT  Flex 5/5 Ext 5/5    SLS:   R- Compliant Surface 20 seconds min ankle strategy with femoral rotation,         Non Compliant Surface 14 seconds min to mod ankle strategy    DBL Squat: Good Depth, Fwd trunk lean in greater ROM with increased fwd knee translation over toes.    Step Assessment:  Fair to good Concentric and Eccentric control    FOTO: 75        Diagnosis: R knee pain    Precautions: hx triple bypass surgery   POC Expires: 10/10/24   Re-evaluation Date: 10/10/24   FOTO Scores/Date: Goal -72; -, -   Visit Count 5/10 6/10 1/10 3/10 4/10   Manuals 9/5 9/9 9/12 8/28 9/3                           Ther Ex 9/5 9/9 9/12 8/28 9/3   SLR 2# 2x10 2# 3x10  1# 2X10  1# 2x10    S/l abd 2# 2x10  2# 3x10  1# 2X10  1# 2x10    Bridges  SL 5\" 2x10  SL 5\" 2x10   BTB  5\" 2x10 (SL NV) SL 5\" 2x10    Standing 3 way hip         Rec. bike L2 x  6 min L2 x 6 mins  L1 x 6 mins L2 x 6 mins    Leg press SL 65# 2x10 SL 65# 2x10   SL 65# 2x10  SL 65# 2x10            Pt Ed   FOTO Updated " "subjective objective data collected for Re     Neuro Re-Ed  9/9 8/28 9/3   SLS 15-20\" 4x  5# KB pass x10   15\"x4 15\"-20\"x 4    Clamshells    BTB 3\" 2x10  DC   Side stepping Btb x 4laps    BTB x 4 laps  BTB x 4 laps    SLS Cone Reaching 3 cones to high mat 3x  3 cones to high mat x 3  3 cones to high mat x 3 3 cones to high mat x 3    Cable Walk Out  Lateral 13# x 4 ea        3 way Tramp Throw  3# x10 ea        Ther Act  9/9  8/28 9/3   Step ups fwd 8\" 2x10 8\"2x10   6\" 2x10 8\"2x10    Step ups lateral 8\" 2x10 8\"2x10   6\"2x10  8\"2x10    STS Low mat Low mat 20# 2x10 To 18\" 20# 2x10   Low mat x10/ 20# 2x10  Low mat 20# 2x10    Lunges  10x ea  Static 2x10 ea   NV                             Modalities                                     "

## 2024-09-16 ENCOUNTER — OFFICE VISIT (OUTPATIENT)
Dept: OBGYN CLINIC | Facility: CLINIC | Age: 69
End: 2024-09-16
Payer: COMMERCIAL

## 2024-09-16 VITALS
BODY MASS INDEX: 28.09 KG/M2 | HEIGHT: 67 IN | HEART RATE: 79 BPM | WEIGHT: 179 LBS | SYSTOLIC BLOOD PRESSURE: 133 MMHG | DIASTOLIC BLOOD PRESSURE: 88 MMHG

## 2024-09-16 DIAGNOSIS — M25.561 CHRONIC PAIN OF RIGHT KNEE: Primary | ICD-10-CM

## 2024-09-16 DIAGNOSIS — G89.29 CHRONIC PAIN OF RIGHT KNEE: Primary | ICD-10-CM

## 2024-09-16 DIAGNOSIS — M17.11 PRIMARY OSTEOARTHRITIS OF RIGHT KNEE: ICD-10-CM

## 2024-09-16 PROCEDURE — 99213 OFFICE O/P EST LOW 20 MIN: CPT | Performed by: ORTHOPAEDIC SURGERY

## 2024-09-16 NOTE — PROGRESS NOTES
"Patient Name:  Adis Jerry  MRN:  [id number]    Assessment & Plan     1. Chronic pain of right knee  2. 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   Continued treatment options were discussed including home exercises, repeat corticosteroid injection, visco, PRP, obtaining MRI study  Patient overall improved and would like to hold off on repeat injection at this time as he has been doing well  He will call the office if he would like to pursue MRI study or receive injection for worsening pain  Transition to home exercise plan  The patient will follow up as needed      History of the Present Illness   Adis Jerry is a 68 y.o. male with Right knee osteoarthritis, suspected degenerative medial meniscus tear. The patient is doing well today. He continues physical therapy and notes improvements in symptoms with his exercises. He feels stronger but still has aches. He had CSI on 6/14/2024. He still have sharp pain with twisting motions or kicking a soccer ball to granddaughter, although this is improving. Patient admits 70% or more improvement since his last visit. He denies mechanical locking.        Review of Systems     Review of Systems   Constitutional:  Negative for chills and fever.   HENT:  Negative for ear pain and sore throat.    Eyes:  Negative for pain and visual disturbance.   Respiratory:  Negative for cough and shortness of breath.    Cardiovascular:  Negative for chest pain and palpitations.   Gastrointestinal:  Negative for abdominal pain and vomiting.   Genitourinary:  Negative for dysuria and hematuria.   Musculoskeletal:  Negative for arthralgias and back pain.   Skin:  Negative for color change and rash.   Neurological:  Negative for seizures and syncope.   All other systems reviewed and are negative.      Physical Exam     /88   Pulse 79   Ht 5' 7\" (1.702 m)   Wt 81.2 kg (179 lb)   BMI 28.04 kg/m²     Right Knee  Range " of motion from 0 to 135 with mild discomfort.    There is no effusion.    There is mild tenderness over the medial joint line.    The patient is able to perform a straight leg raise.    Kalyani's negative  Varus stress testing reveals no instability at 0 and 30 degrees   Valgus stress testing reveals no instability at 0 and 30 degrees  The patient is neurovascular intact distally.      Data Review     I have personally reviewed pertinent films in PACS, and my interpretation follows.    X-rays taken 5/10/2024 of Right knee independently reviewed and demonstrate minimal medial joint space narrowing. No acute fracture or dislocation   Social History     Tobacco Use    Smoking status: Never     Passive exposure: Never    Smokeless tobacco: Never   Vaping Use    Vaping status: Never Used   Substance Use Topics    Alcohol use: Not Currently    Drug use: No           Procedures  None.    Deepti Valle   Scribe Attestation      I,:  Deepti Valle am acting as a scribe while in the presence of the attending physician.:       I,:  Pierre Agarwal, DO personally performed the services described in this documentation    as scribed in my presence.:

## 2024-09-17 ENCOUNTER — APPOINTMENT (OUTPATIENT)
Dept: PHYSICAL THERAPY | Facility: CLINIC | Age: 69
End: 2024-09-17
Payer: COMMERCIAL

## 2024-09-20 ENCOUNTER — APPOINTMENT (OUTPATIENT)
Dept: PHYSICAL THERAPY | Facility: CLINIC | Age: 69
End: 2024-09-20
Payer: COMMERCIAL

## 2024-09-22 DIAGNOSIS — E11.9 TYPE 2 DIABETES MELLITUS WITHOUT COMPLICATION, WITHOUT LONG-TERM CURRENT USE OF INSULIN (HCC): ICD-10-CM

## 2024-09-23 RX ORDER — METFORMIN HCL 500 MG
2000 TABLET, EXTENDED RELEASE 24 HR ORAL
Qty: 360 TABLET | Refills: 1 | Status: SHIPPED | OUTPATIENT
Start: 2024-09-23

## 2024-09-24 ENCOUNTER — APPOINTMENT (OUTPATIENT)
Dept: PHYSICAL THERAPY | Facility: CLINIC | Age: 69
End: 2024-09-24
Payer: COMMERCIAL

## 2024-09-26 ENCOUNTER — APPOINTMENT (OUTPATIENT)
Dept: PHYSICAL THERAPY | Facility: CLINIC | Age: 69
End: 2024-09-26
Payer: COMMERCIAL

## 2024-10-03 DIAGNOSIS — I10 HYPERTENSION, UNSPECIFIED TYPE: ICD-10-CM

## 2024-10-03 RX ORDER — METOPROLOL SUCCINATE 100 MG/1
100 TABLET, EXTENDED RELEASE ORAL DAILY
Qty: 90 TABLET | Refills: 1 | Status: SHIPPED | OUTPATIENT
Start: 2024-10-03

## 2024-10-15 ENCOUNTER — APPOINTMENT (OUTPATIENT)
Dept: LAB | Facility: HOSPITAL | Age: 69
End: 2024-10-15
Payer: COMMERCIAL

## 2024-10-15 DIAGNOSIS — Z12.5 SCREENING FOR PROSTATE CANCER: ICD-10-CM

## 2024-10-15 DIAGNOSIS — E11.8 TYPE 2 DIABETES MELLITUS WITH COMPLICATION, WITHOUT LONG-TERM CURRENT USE OF INSULIN (HCC): ICD-10-CM

## 2024-10-15 LAB
ALBUMIN SERPL BCG-MCNC: 4.4 G/DL (ref 3.5–5)
ALP SERPL-CCNC: 77 U/L (ref 34–104)
ALT SERPL W P-5'-P-CCNC: 10 U/L (ref 7–52)
ANION GAP SERPL CALCULATED.3IONS-SCNC: 8 MMOL/L (ref 4–13)
AST SERPL W P-5'-P-CCNC: 11 U/L (ref 13–39)
BASOPHILS # BLD AUTO: 0.05 THOUSANDS/ΜL (ref 0–0.1)
BASOPHILS NFR BLD AUTO: 1 % (ref 0–1)
BILIRUB SERPL-MCNC: 0.63 MG/DL (ref 0.2–1)
BUN SERPL-MCNC: 10 MG/DL (ref 5–25)
CALCIUM SERPL-MCNC: 9.7 MG/DL (ref 8.4–10.2)
CHLORIDE SERPL-SCNC: 102 MMOL/L (ref 96–108)
CHOLEST SERPL-MCNC: 108 MG/DL
CO2 SERPL-SCNC: 27 MMOL/L (ref 21–32)
CREAT SERPL-MCNC: 0.78 MG/DL (ref 0.6–1.3)
EOSINOPHIL # BLD AUTO: 0.25 THOUSAND/ΜL (ref 0–0.61)
EOSINOPHIL NFR BLD AUTO: 4 % (ref 0–6)
ERYTHROCYTE [DISTWIDTH] IN BLOOD BY AUTOMATED COUNT: 12.7 % (ref 11.6–15.1)
EST. AVERAGE GLUCOSE BLD GHB EST-MCNC: 151 MG/DL
GFR SERPL CREATININE-BSD FRML MDRD: 94 ML/MIN/1.73SQ M
GLUCOSE P FAST SERPL-MCNC: 130 MG/DL (ref 65–99)
HBA1C MFR BLD: 6.9 %
HCT VFR BLD AUTO: 43.6 % (ref 36.5–49.3)
HDLC SERPL-MCNC: 38 MG/DL
HGB BLD-MCNC: 14.8 G/DL (ref 12–17)
IMM GRANULOCYTES # BLD AUTO: 0.02 THOUSAND/UL (ref 0–0.2)
IMM GRANULOCYTES NFR BLD AUTO: 0 % (ref 0–2)
LDLC SERPL CALC-MCNC: 47 MG/DL (ref 0–100)
LYMPHOCYTES # BLD AUTO: 1.91 THOUSANDS/ΜL (ref 0.6–4.47)
LYMPHOCYTES NFR BLD AUTO: 31 % (ref 14–44)
MCH RBC QN AUTO: 29.1 PG (ref 26.8–34.3)
MCHC RBC AUTO-ENTMCNC: 33.9 G/DL (ref 31.4–37.4)
MCV RBC AUTO: 86 FL (ref 82–98)
MONOCYTES # BLD AUTO: 0.5 THOUSAND/ΜL (ref 0.17–1.22)
MONOCYTES NFR BLD AUTO: 8 % (ref 4–12)
NEUTROPHILS # BLD AUTO: 3.38 THOUSANDS/ΜL (ref 1.85–7.62)
NEUTS SEG NFR BLD AUTO: 56 % (ref 43–75)
NONHDLC SERPL-MCNC: 70 MG/DL
NRBC BLD AUTO-RTO: 0 /100 WBCS
PLATELET # BLD AUTO: 192 THOUSANDS/UL (ref 149–390)
PMV BLD AUTO: 9.9 FL (ref 8.9–12.7)
POTASSIUM SERPL-SCNC: 4.4 MMOL/L (ref 3.5–5.3)
PROT SERPL-MCNC: 7.2 G/DL (ref 6.4–8.4)
PSA SERPL-MCNC: 0.32 NG/ML (ref 0–4)
RBC # BLD AUTO: 5.08 MILLION/UL (ref 3.88–5.62)
SODIUM SERPL-SCNC: 137 MMOL/L (ref 135–147)
TRIGL SERPL-MCNC: 115 MG/DL
TSH SERPL DL<=0.05 MIU/L-ACNC: 2.78 UIU/ML (ref 0.45–4.5)
WBC # BLD AUTO: 6.11 THOUSAND/UL (ref 4.31–10.16)

## 2024-10-15 PROCEDURE — G0103 PSA SCREENING: HCPCS

## 2024-10-15 PROCEDURE — 85025 COMPLETE CBC W/AUTO DIFF WBC: CPT

## 2024-10-15 PROCEDURE — 36415 COLL VENOUS BLD VENIPUNCTURE: CPT

## 2024-10-15 PROCEDURE — 80053 COMPREHEN METABOLIC PANEL: CPT

## 2024-10-15 PROCEDURE — 80061 LIPID PANEL: CPT

## 2024-10-15 PROCEDURE — 84443 ASSAY THYROID STIM HORMONE: CPT

## 2024-10-15 PROCEDURE — 83036 HEMOGLOBIN GLYCOSYLATED A1C: CPT

## 2024-10-22 ENCOUNTER — OFFICE VISIT (OUTPATIENT)
Age: 69
End: 2024-10-22
Payer: COMMERCIAL

## 2024-10-22 VITALS
BODY MASS INDEX: 27 KG/M2 | OXYGEN SATURATION: 98 % | DIASTOLIC BLOOD PRESSURE: 90 MMHG | HEIGHT: 67 IN | HEART RATE: 68 BPM | RESPIRATION RATE: 16 BRPM | SYSTOLIC BLOOD PRESSURE: 130 MMHG | WEIGHT: 172 LBS

## 2024-10-22 DIAGNOSIS — E11.8 TYPE 2 DIABETES MELLITUS WITH COMPLICATION, WITHOUT LONG-TERM CURRENT USE OF INSULIN (HCC): ICD-10-CM

## 2024-10-22 DIAGNOSIS — I10 ESSENTIAL HYPERTENSION: Primary | Chronic | ICD-10-CM

## 2024-10-22 DIAGNOSIS — I25.10 CORONARY ARTERY DISEASE INVOLVING NATIVE HEART WITHOUT ANGINA PECTORIS, UNSPECIFIED VESSEL OR LESION TYPE: ICD-10-CM

## 2024-10-22 DIAGNOSIS — E78.2 MIXED HYPERLIPIDEMIA: ICD-10-CM

## 2024-10-22 PROCEDURE — 99214 OFFICE O/P EST MOD 30 MIN: CPT | Performed by: INTERNAL MEDICINE

## 2024-10-22 NOTE — PATIENT INSTRUCTIONS
Dear Adis Jerry,     Elisa Medication Adjustments -   None  Testing Required  -   Check your blood pressure at home and let us know.  Other Instructions -   Please take all your medications regularly as directed.  Please get RSV vaccine at pharmacy.      Sincerely,   Marino Rahman MD

## 2024-10-22 NOTE — ASSESSMENT & PLAN NOTE
Known CABG 3 vessel in 2017.  Recommend continuing risk factor reduction.  A1c trending in the right direction.

## 2024-10-22 NOTE — ASSESSMENT & PLAN NOTE
Elevated on exam today at 156/88.  Recommended he take BP at home to get baseline.  Would consider adding HCTZ or increasing irbesartan if found to be uncontrolled.

## 2024-10-22 NOTE — PROGRESS NOTES
Ambulatory Visit  Name: Adis Jerry      : 1955      MRN: [id number]  Encounter Provider: Jose Antonio Gonzales MD  Encounter Date: 10/22/2024   Encounter department: [facility] INTERNAL MEDICINE [facility] [address]    Assessment & Plan  Essential hypertension  Elevated on exam today at 156/88.  Recommended he take BP at home to get baseline.  Would consider adding HCTZ or increasing irbesartan if found to be uncontrolled.  Coronary artery disease involving native heart without angina pectoris, unspecified vessel or lesion type  Known CABG 3 vessel in 2017.  Recommend continuing risk factor reduction.  A1c trending in the right direction.  Type 2 diabetes mellitus with complication, without long-term current use of insulin ([facility])    Lab Results   Component Value Date    HGBA1C 6.9 (H) 10/15/2024     Continue jardiance, ozempic, and metformin.  Has had good response to ozempic with weight loss.  Would continue his current medical regimen.   Mixed hyperlipidemia  Continue statin therapy.  Lipid panel showed LDL 47.     History of Present Illness     68M with history of CABG 3v in .  PMHx of HTN and DM2  BP at home not being checked regularly.  DM2 improving with A1c trending in the right direction.  Otherwise no new symptoms.    Diabetes  He has type 2 diabetes mellitus. No MedicAlert identification noted. The initial diagnosis of diabetes was made 12 years ago. Pertinent negatives for hypoglycemia include no confusion, dizziness, headaches, hunger, mood changes, nervousness/anxiousness, pallor, seizures, sleepiness, speech difficulty, sweats or tremors. Associated symptoms include fatigue and weight loss. Pertinent negatives for diabetes include no blurred vision, no chest pain, no foot paresthesias, no foot ulcerations, no polydipsia, no polyphagia, no polyuria, no visual change and no weakness. Pertinent negatives for hypoglycemia complications include no blackouts, no hospitalization, no nocturnal  hypoglycemia, no required assistance and no required glucagon injection. Symptoms are improving.       History obtained from : patient  Review of Systems   Constitutional:  Positive for fatigue and weight loss.   Eyes:  Negative for blurred vision.   Cardiovascular:  Negative for chest pain.   Endocrine: Negative for polydipsia, polyphagia and polyuria.   Skin:  Negative for pallor.   Neurological:  Negative for dizziness, tremors, seizures, speech difficulty, weakness and headaches.   Psychiatric/Behavioral:  Negative for confusion. The patient is not nervous/anxious.      Current Outpatient Medications on File Prior to Visit   Medication Sig Dispense Refill    Accu-Chek FastClix Lancets MISC USE TO TEST BLOOD SUGAR AS DIRECTED      aspirin 81 MG tablet Take 81 mg by mouth daily      atorvastatin (LIPITOR) 20 mg tablet Take 1 tablet (20 mg total) by mouth daily at bedtime 90 tablet 3    Blood Glucose Monitoring Suppl (Accu-Chek Guide) w/Device KIT       Cholecalciferol (Vitamin D3 Super Strength) 50 MCG (2000 UT) CAPS 1 po qd      fluticasone (FLONASE) 50 mcg/act nasal spray SPRAY 2 SPRAYS INTO EACH NOSTRIL EVERY DAY (Patient taking differently: 1 spray into each nostril as needed) 48 mL 1    glucose blood test strip Use as instructed 200 each 0    irbesartan (AVAPRO) 150 mg tablet Take 1 tablet (150 mg total) by mouth daily 90 tablet 3    Jardiance 25 MG TABS TAKE 1 TABLET BY MOUTH EVERY DAY IN THE MORNING 90 tablet 3    metFORMIN (GLUCOPHAGE-XR) 500 mg 24 hr tablet Take 4 tablets (2,000 mg total) by mouth daily with dinner 360 tablet 1    metoprolol succinate (TOPROL-XL) 100 mg 24 hr tablet Take 1 tablet (100 mg total) by mouth daily 90 tablet 1    nitroglycerin (NITROSTAT) 0.4 mg SL tablet Place 1 tablet (0.4 mg total) under the tongue every 5 (five) minutes as needed for chest pain 100 tablet 0    semaglutide, 1 mg/dose, (Ozempic, 1 MG/DOSE,) 4 mg/3 mL injection pen Inject 0.75 mL (1 mg total) under the skin  "every 7 days 3 mL 3    tamsulosin (FLOMAX) 0.4 mg TAKE 2 CAPSULES BY MOUTH DAILY WITH DINNER.. 180 capsule 3     No current facility-administered medications on file prior to visit.          Objective     /90 (BP Location: Left arm, Patient Position: Sitting, Cuff Size: Standard)   Pulse 68   Resp 16   Ht 5' 7\" (1.702 m)   Wt 78 kg (172 lb)   SpO2 98%   BMI 26.94 kg/m²     Physical Exam  Vitals and nursing note reviewed.   Constitutional:       General: He is not in acute distress.     Appearance: He is well-developed.   HENT:      Head: Normocephalic and atraumatic.   Eyes:      Conjunctiva/sclera: Conjunctivae normal.   Cardiovascular:      Rate and Rhythm: Normal rate and regular rhythm.      Heart sounds: No murmur heard.  Pulmonary:      Effort: Pulmonary effort is normal. No respiratory distress.      Breath sounds: Normal breath sounds.   Abdominal:      Palpations: Abdomen is soft.      Tenderness: There is no abdominal tenderness.   Musculoskeletal:         General: No swelling.      Cervical back: Neck supple.   Skin:     General: Skin is warm and dry.      Capillary Refill: Capillary refill takes less than 2 seconds.   Neurological:      Mental Status: He is alert.   Psychiatric:         Mood and Affect: Mood normal.       Administrative Statements   I have spent a total time of 20 minutes in caring for this patient on the day of the visit/encounter including Diagnostic results, Risks and benefits of tx options, Instructions for management, and Risk factor reductions.  "

## 2024-10-22 NOTE — ASSESSMENT & PLAN NOTE
Lab Results   Component Value Date    HGBA1C 6.9 (H) 10/15/2024     Continue jardiance, ozempic, and metformin.  Has had good response to ozempic with weight loss.  Would continue his current medical regimen.

## 2024-11-05 DIAGNOSIS — E11.8 TYPE 2 DIABETES MELLITUS WITH COMPLICATION, WITHOUT LONG-TERM CURRENT USE OF INSULIN (HCC): ICD-10-CM

## 2024-11-06 RX ORDER — SEMAGLUTIDE 1.34 MG/ML
INJECTION, SOLUTION SUBCUTANEOUS
Qty: 3 ML | Refills: 5 | Status: SHIPPED | OUTPATIENT
Start: 2024-11-06

## 2024-12-09 DIAGNOSIS — E11.8 TYPE 2 DIABETES MELLITUS WITH COMPLICATION, WITHOUT LONG-TERM CURRENT USE OF INSULIN (HCC): ICD-10-CM

## 2024-12-10 ENCOUNTER — OFFICE VISIT (OUTPATIENT)
Age: 69
End: 2024-12-10
Payer: MEDICARE

## 2024-12-10 VITALS
HEIGHT: 67 IN | OXYGEN SATURATION: 98 % | HEART RATE: 86 BPM | SYSTOLIC BLOOD PRESSURE: 120 MMHG | DIASTOLIC BLOOD PRESSURE: 82 MMHG | WEIGHT: 172 LBS | RESPIRATION RATE: 16 BRPM | BODY MASS INDEX: 27 KG/M2

## 2024-12-10 DIAGNOSIS — M79.621 AXILLARY PAIN, RIGHT: Primary | ICD-10-CM

## 2024-12-10 PROCEDURE — 99213 OFFICE O/P EST LOW 20 MIN: CPT

## 2024-12-10 PROCEDURE — G2211 COMPLEX E/M VISIT ADD ON: HCPCS

## 2024-12-10 NOTE — PROGRESS NOTES
"Name: Adis Jerry      : 1955      MRN: [id number]  Encounter Provider: Helen Ragsdale PA-C  Encounter Date: 12/10/2024   Encounter department: [facility] INTERNAL MEDICINE [facility] [address]  :  Assessment & Plan  Axillary pain, right  Due to history of sarcoma or RUE, will obtain US of axillae. No palpable lymph nodes noted on exam. Likely muscular as the pain is reproducible on exam. Recommended continuing with advil as needed for pain and applying a heating pad to the area 20 mins TID. Notify the office of any worsening pain.    Orders:    US axilla; Future          Depression Screening and Follow-up Plan: Patient was screened for depression during today's encounter. They screened negative with a PHQ-2 score of 0.      History of Present Illness     Patient is a 68 yo male that presents today for right axillary pain. This started 5 days ago. He denies any inciting injury. He feels like it's swollen. He denies any fevers, chills, unintentional weight loss, night sweats, warmth to touch, or erythema. He describes the pain as sharp sometimes, but mostly sensitive to touch. He denies any rash. He feels like it's getting better today. He has been using advil with some relief.       Review of Systems   Constitutional:  Negative for chills, diaphoresis, fever and unexpected weight change.   Musculoskeletal:  Positive for arthralgias.   Skin:  Negative for rash.       Objective   /82 (BP Location: Left arm)   Pulse 86   Resp 16   Ht 5' 7\" (1.702 m)   Wt 78 kg (172 lb)   SpO2 98%   BMI 26.94 kg/m²      Physical Exam  Vitals and nursing note reviewed.   Constitutional:       General: He is awake.      Appearance: Normal appearance. He is well-developed, well-groomed and overweight.   HENT:      Head: Normocephalic and atraumatic.      Right Ear: Hearing and external ear normal.      Left Ear: Hearing and external ear normal.      Nose: Nose normal.      Mouth/Throat:      Lips: Pink.      " Mouth: Mucous membranes are moist.   Eyes:      General: Lids are normal. Vision grossly intact. Gaze aligned appropriately.      Conjunctiva/sclera: Conjunctivae normal.   Neck:      Vascular: No carotid bruit.      Trachea: Trachea and phonation normal.   Pulmonary:      Effort: Pulmonary effort is normal.   Chest:      Chest wall: No tenderness.      Comments: Tenderness to palpation of R axillary region. No palpable lymph nodes present.   Musculoskeletal:      Cervical back: Neck supple.   Lymphadenopathy:      Upper Body:      Right upper body: No axillary adenopathy.   Skin:     General: Skin is warm.      Capillary Refill: Capillary refill takes less than 2 seconds.   Neurological:      Mental Status: He is alert.   Psychiatric:         Attention and Perception: Attention and perception normal.         Mood and Affect: Mood and affect normal.         Speech: Speech normal.         Behavior: Behavior normal. Behavior is cooperative.         Thought Content: Thought content normal.         Cognition and Memory: Cognition and memory normal.         Judgment: Judgment normal.

## 2024-12-23 ENCOUNTER — HOSPITAL ENCOUNTER (OUTPATIENT)
Dept: ULTRASOUND IMAGING | Facility: HOSPITAL | Age: 69
Discharge: HOME | End: 2024-12-23
Payer: COMMERCIAL

## 2024-12-23 DIAGNOSIS — M79.621 AXILLARY PAIN, RIGHT: ICD-10-CM

## 2024-12-23 PROCEDURE — 76882 US LMTD JT/FCL EVL NVASC XTR: CPT

## 2024-12-28 DIAGNOSIS — E78.2 MIXED HYPERLIPIDEMIA: ICD-10-CM

## 2024-12-28 DIAGNOSIS — I10 ESSENTIAL HYPERTENSION: Chronic | ICD-10-CM

## 2024-12-29 RX ORDER — IRBESARTAN 150 MG/1
150 TABLET ORAL DAILY
Qty: 90 TABLET | Refills: 3 | Status: SHIPPED | OUTPATIENT
Start: 2024-12-29

## 2024-12-29 RX ORDER — ATORVASTATIN CALCIUM 20 MG/1
20 TABLET, FILM COATED ORAL
Qty: 90 TABLET | Refills: 3 | Status: SHIPPED | OUTPATIENT
Start: 2024-12-29

## 2025-01-02 ENCOUNTER — RESULTS FOLLOW-UP (OUTPATIENT)
Age: 70
End: 2025-01-02

## 2025-01-30 DIAGNOSIS — T78.40XA ALLERGY, INITIAL ENCOUNTER: ICD-10-CM

## 2025-01-30 RX ORDER — FLUTICASONE PROPIONATE 50 MCG
2 SPRAY, SUSPENSION (ML) NASAL DAILY
Qty: 48 ML | Refills: 5 | Status: SHIPPED | OUTPATIENT
Start: 2025-01-30

## 2025-02-17 ENCOUNTER — OFFICE VISIT (OUTPATIENT)
Age: 70
End: 2025-02-17
Payer: MEDICARE

## 2025-02-17 VITALS
OXYGEN SATURATION: 96 % | DIASTOLIC BLOOD PRESSURE: 78 MMHG | HEART RATE: 80 BPM | WEIGHT: 180 LBS | BODY MASS INDEX: 28.25 KG/M2 | SYSTOLIC BLOOD PRESSURE: 128 MMHG | HEIGHT: 67 IN

## 2025-02-17 DIAGNOSIS — E11.8 TYPE 2 DIABETES MELLITUS WITH UNSPECIFIED COMPLICATIONS (HCC): ICD-10-CM

## 2025-02-17 DIAGNOSIS — J02.9 SORE THROAT: Primary | ICD-10-CM

## 2025-02-17 PROCEDURE — 99213 OFFICE O/P EST LOW 20 MIN: CPT

## 2025-02-17 PROCEDURE — G2211 COMPLEX E/M VISIT ADD ON: HCPCS

## 2025-02-17 PROCEDURE — 87880 STREP A ASSAY W/OPTIC: CPT

## 2025-02-17 NOTE — PROGRESS NOTES
"Name: Adis Jerry      : 1955      MRN: [id number]  Encounter Provider: Kathleen Oconnor PA-C  Encounter Date: 2025   Encounter department: [facility] INTERNAL MEDICINE [address]  :  Assessment & Plan  Sore throat  Rapid strep checked in office today-was negative.  Vocal change may be due to laryngitis-recommended supportive treatment/measures.  Diarrhea appears transient-not suspicious for norovirus/other infectious etiologies.  Discussed that if symptoms do not improve by next week-advised he reach out for reevaluation.  Orders:    POCT rapid ANTIGEN strepA    Type 2 diabetes mellitus with unspecified complications (HCC)    Lab Results   Component Value Date    HGBA1C 6.9 (H) 10/15/2024                   History of Present Illness   Patient is a 69 year old male presenting for acute visit.  Has had body aches x 2 days, upset stomach x 2 days, hoarse voice, sore throat x 2 days  -has been using flonase  -has hoarse voice on and off for the past month or so.    -taken coricidin cold/flu with some mild relief      Review of Systems   Constitutional:  Negative for appetite change, chills, fatigue and fever.   HENT:  Positive for sore throat and voice change. Negative for congestion, postnasal drip, rhinorrhea, sinus pressure and sinus pain.    Respiratory:  Positive for cough. Negative for shortness of breath.    Gastrointestinal:  Positive for abdominal pain and diarrhea (diarrhea yesterday-3 episodes). Negative for nausea and vomiting.   Musculoskeletal:  Positive for arthralgias and myalgias.   Neurological:  Positive for headaches.       Objective   /78   Pulse 80   Ht 5' 7\" (1.702 m)   Wt 81.6 kg (180 lb)   SpO2 96%   BMI 28.19 kg/m²      Physical Exam  Vitals and nursing note reviewed.   Constitutional:       General: He is awake.      Appearance: Normal appearance. He is well-developed, well-groomed and overweight. He is not ill-appearing or toxic-appearing.   HENT: "      Head: Normocephalic and atraumatic.      Right Ear: Hearing and external ear normal.      Left Ear: Hearing and external ear normal.      Nose: Nose normal.      Mouth/Throat:      Lips: Pink.      Mouth: Mucous membranes are moist.   Eyes:      General: Lids are normal. Vision grossly intact. Gaze aligned appropriately.      Conjunctiva/sclera: Conjunctivae normal.   Neck:      Vascular: No carotid bruit.      Trachea: Trachea and phonation normal.      Comments: Thyroid nonpalpable  Cardiovascular:      Rate and Rhythm: Normal rate and regular rhythm.      Heart sounds: Normal heart sounds, S1 normal and S2 normal. No murmur heard.     No friction rub. No gallop.   Pulmonary:      Effort: Pulmonary effort is normal.      Breath sounds: Normal breath sounds and air entry. No decreased breath sounds, wheezing, rhonchi or rales.   Abdominal:      General: Abdomen is protuberant.      Palpations: Abdomen is soft.   Musculoskeletal:         General: Normal range of motion.      Cervical back: Neck supple. No tenderness.      Right lower leg: No edema.      Left lower leg: No edema.   Lymphadenopathy:      Cervical: No cervical adenopathy.   Skin:     General: Skin is warm.      Capillary Refill: Capillary refill takes less than 2 seconds.   Neurological:      Mental Status: He is alert.   Psychiatric:         Attention and Perception: Attention and perception normal.         Mood and Affect: Mood and affect normal.         Speech: Speech normal.         Behavior: Behavior normal. Behavior is cooperative.         Thought Content: Thought content normal.         Cognition and Memory: Cognition and memory normal.         Judgment: Judgment normal.

## 2025-02-18 ENCOUNTER — OFFICE VISIT (OUTPATIENT)
Dept: URGENT CARE | Facility: CLINIC | Age: 70
End: 2025-02-18
Payer: COMMERCIAL

## 2025-02-18 VITALS
HEART RATE: 80 BPM | RESPIRATION RATE: 18 BRPM | BODY MASS INDEX: 28.19 KG/M2 | WEIGHT: 180 LBS | OXYGEN SATURATION: 97 % | DIASTOLIC BLOOD PRESSURE: 88 MMHG | TEMPERATURE: 97.3 F | SYSTOLIC BLOOD PRESSURE: 138 MMHG

## 2025-02-18 DIAGNOSIS — J02.9 ACUTE PHARYNGITIS, UNSPECIFIED ETIOLOGY: Primary | ICD-10-CM

## 2025-02-18 DIAGNOSIS — J02.9 SORE THROAT: ICD-10-CM

## 2025-02-18 LAB — S PYO AG THROAT QL: NEGATIVE

## 2025-02-18 PROCEDURE — 87070 CULTURE OTHR SPECIMN AEROBIC: CPT | Performed by: PHYSICIAN ASSISTANT

## 2025-02-18 PROCEDURE — S9083 URGENT CARE CENTER GLOBAL: HCPCS | Performed by: PHYSICIAN ASSISTANT

## 2025-02-18 PROCEDURE — G0382 LEV 3 HOSP TYPE B ED VISIT: HCPCS | Performed by: PHYSICIAN ASSISTANT

## 2025-02-18 PROCEDURE — 87880 STREP A ASSAY W/OPTIC: CPT | Performed by: PHYSICIAN ASSISTANT

## 2025-02-18 RX ORDER — PREDNISONE 10 MG/1
TABLET ORAL
Qty: 18 TABLET | Refills: 0 | Status: SHIPPED | OUTPATIENT
Start: 2025-02-18 | End: 2025-02-27

## 2025-02-18 NOTE — PATIENT INSTRUCTIONS
Reviewed negative rapid strep test with the patient.  Will send specimen out for culture and call if antibiotics are indicated based on culture results.    Recommended oral steroid for throat irritation and swelling.    Continue with over-the-counter medication as needed for symptomatic management.      Follow up with PCP in 3-5 days.  Proceed to  ER if symptoms worsen.    If tests are performed, our office will contact you with results only if changes need to made to the care plan discussed with you at the visit. You can review your full results on [facility].

## 2025-02-18 NOTE — PROGRESS NOTES
[facility]        NAME: Adis Jerry is a 69 y.o. male  : 1955    MRN: [id number]  DATE: 2025  TIME: 8:35 AM    Assessment and Plan   Acute pharyngitis, unspecified etiology [J02.9]  1. Acute pharyngitis, unspecified etiology  Throat culture    Throat culture    predniSONE 10 mg tablet      2. Sore throat  POCT rapid ANTIGEN strepA          We running rapid strep based on patient's throat presentation and to also get specimen to send out for throat culture as it was not done yesterday.    Patient Instructions     Patient Instructions   Reviewed negative rapid strep test with the patient.  Will send specimen out for culture and call if antibiotics are indicated based on culture results.    Recommended oral steroid for throat irritation and swelling.    Continue with over-the-counter medication as needed for symptomatic management.      Follow up with PCP in 3-5 days.  Proceed to  ER if symptoms worsen.    If tests are performed, our office will contact you with results only if changes need to made to the care plan discussed with you at the visit. [facility] on [facility]      Chief Complaint     Chief Complaint   Patient presents with    Cold Like Symptoms     Pt here for sore throat drain diarrhea and headache bodyaches that started 2 days ago         History of Present Illness       Patient presents for evaluation of sore throat, diarrhea, headache, and bodyaches that started 2 days ago.  He states his sore throat is gotten significantly worse and it hurts to swallow and it almost feels like his throat is swollen.  He saw his PCP yesterday who did a rapid strep test which was negative.    URI   This is a new problem. Episode onset: 2 days ago. The problem has been gradually worsening. There has been no fever. Associated symptoms include congestion, coughing, diarrhea and a sore throat. He has tried acetaminophen for the symptoms.       Review of  Systems   Review of Systems   Constitutional:  Positive for fatigue.   HENT:  Positive for congestion and sore throat.    Respiratory:  Positive for cough.    Gastrointestinal:  Positive for diarrhea.   Musculoskeletal:  Positive for myalgias.   All other systems reviewed and are negative.        Current Medications       Current Outpatient Medications:     Accu-Chek FastClix Lancets MISC, USE TO TEST BLOOD SUGAR AS DIRECTED, Disp: , Rfl:     aspirin 81 MG tablet, Take 81 mg by mouth daily, Disp: , Rfl:     atorvastatin (LIPITOR) 20 mg tablet, TAKE 1 TABLET BY MOUTH DAILY AT BEDTIME, Disp: 90 tablet, Rfl: 3    Blood Glucose Monitoring Suppl (Accu-Chek Guide) w/Device KIT, , Disp: , Rfl:     Cholecalciferol (Vitamin D3 Super Strength) 50 MCG (2000 UT) CAPS, 1 po qd, Disp: , Rfl:     Empagliflozin (Jardiance) 25 MG TABS, Take 1 tablet (25 mg total) by mouth every morning, Disp: 90 tablet, Rfl: 1    fluticasone (FLONASE) 50 mcg/act nasal spray, 2 sprays into each nostril daily, Disp: 48 mL, Rfl: 5    glucose blood test strip, Use as instructed, Disp: 200 each, Rfl: 0    irbesartan (AVAPRO) 150 mg tablet, TAKE 1 TABLET BY MOUTH EVERY DAY, Disp: 90 tablet, Rfl: 3    metFORMIN (GLUCOPHAGE-XR) 500 mg 24 hr tablet, Take 4 tablets (2,000 mg total) by mouth daily with dinner, Disp: 360 tablet, Rfl: 1    metoprolol succinate (TOPROL-XL) 100 mg 24 hr tablet, Take 1 tablet (100 mg total) by mouth daily, Disp: 90 tablet, Rfl: 1    nitroglycerin (NITROSTAT) 0.4 mg SL tablet, Place 1 tablet (0.4 mg total) under the tongue every 5 (five) minutes as needed for chest pain, Disp: 100 tablet, Rfl: 0    predniSONE 10 mg tablet, Take 3 tablets (30 mg total) by mouth daily for 3 days, THEN 2 tablets (20 mg total) daily for 3 days, THEN 1 tablet (10 mg total) daily for 3 days., Disp: 18 tablet, Rfl: 0    semaglutide, 1 mg/dose, (Ozempic, 1 MG/DOSE,) 4 mg/3 mL injection pen, INJECT 0.75 ML (1 MG TOTAL) UNDER THE SKIN EVERY 7 DAYS, Disp: 3  mL, Rfl: 5    tamsulosin (FLOMAX) 0.4 mg, TAKE 2 CAPSULES BY MOUTH DAILY WITH DINNER.., Disp: 180 capsule, Rfl: 3    Current Allergies     Allergies as of 02/18/2025    (No Known Allergies)            The following portions of the patient's history were reviewed and updated as appropriate: allergies, current medications, past family history, past medical history, past social history, past surgical history and problem list.     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    BOWEL RESECTION  2008    Secondary to diverticulitis    CARDIAC CATHETERIZATION N/A 04/07/2022    Procedure: Cardiac catheterization;  Surgeon: Gisselle Lange MD;  Location: [facility];  Service: Cardiology    CARDIAC CATHETERIZATION N/A 04/07/2022    Procedure: Cardiac Coronary Angiogram;  Surgeon: Gisselle Lange MD;  Location: [facility];  Service: Cardiology    COLON SURGERY  2007    COLONOSCOPY      CORONARY ARTERY BYPASS GRAFT      HAND SURGERY      triple bypass    HERNIA REPAIR      MALIGNANT SKIN LESION EXCISION  1963    Abdominal wall surgical resection of melanoma    NM COLONOSCOPY FLX DX W/COLLJ SPEC WHEN PFRMD N/A 12/11/2018    Procedure: COLONOSCOPY;  Surgeon: Bertram Umanzor MD;  Location: [facility];  Service: Gastroenterology    SKIN BIOPSY         Family History   Problem Relation Age of Onset    Emphysema Maternal Grandfather     Coronary artery disease Mother     Hyperlipidemia Mother     Hypertension Mother     Diabetes type  II Mother     Arthritis Mother     Diabetes type II Father     Basal cell carcinoma Father     Hypertension Father     Heart disease Father     Diabetes Father     Hypertension Sister          Medications have been verified.        Objective   /88   Pulse 80   Temp (!) 97.3 °F (36.3 °C) (Tympanic)   Resp 18   Wt 81.6 kg (180 lb)   SpO2 97%   BMI 28.19 kg/m²        Physical Exam     Physical Exam  Vitals and nursing note reviewed.   Constitutional:       Appearance: Normal appearance.   HENT:      Right Ear: Tympanic membrane, ear canal and external ear normal.      Left Ear: Tympanic membrane, ear canal and external ear normal.      Nose: Nose normal.      Mouth/Throat:      Mouth: Mucous membranes are moist.      Pharynx: Pharyngeal swelling and posterior oropharyngeal erythema present. No oropharyngeal exudate.      Tonsils: No tonsillar abscesses.   Cardiovascular:      Rate and Rhythm: Normal rate and regular rhythm.   Pulmonary:      Effort: Pulmonary effort is normal.      Breath sounds: Normal breath sounds.   Skin:     General: Skin is warm and dry.   Neurological:      General: No focal deficit present.      Mental Status: He is alert and oriented to person, place, and time.   Psychiatric:         Mood and Affect: Mood normal.         Behavior: Behavior normal.

## 2025-02-20 LAB — BACTERIA THROAT CULT: NORMAL

## 2025-03-03 ENCOUNTER — OFFICE VISIT (OUTPATIENT)
Age: 70
End: 2025-03-03
Payer: COMMERCIAL

## 2025-03-03 VITALS
BODY MASS INDEX: 27.94 KG/M2 | DIASTOLIC BLOOD PRESSURE: 74 MMHG | HEART RATE: 87 BPM | OXYGEN SATURATION: 97 % | HEIGHT: 67 IN | SYSTOLIC BLOOD PRESSURE: 128 MMHG | WEIGHT: 178 LBS

## 2025-03-03 DIAGNOSIS — R06.02 SOB (SHORTNESS OF BREATH): Primary | ICD-10-CM

## 2025-03-03 DIAGNOSIS — R49.0 HOARSENESS: ICD-10-CM

## 2025-03-03 PROCEDURE — 99213 OFFICE O/P EST LOW 20 MIN: CPT

## 2025-03-03 NOTE — PROGRESS NOTES
Name: Adis Jerry      : 1955      MRN: [id number]  Encounter Provider: Helen Ragsdale PA-C  Encounter Date: 3/3/2025   Encounter department: [facility] [address]  :  Assessment & Plan  SOB (shortness of breath)  Lung exam normal in office. POCT EKG revealed no changes when compared to 24 EKG. He is currently on aspirin 81 mg and atorvastatin 20 mg daily. Likely related to PND and recent URI. Recommended continuing flonase and starting a daily antihistamine. F/up in 1 week if symptoms continue. Can consider PFT at that time.        Hoarseness  Rapid strep and throat culture negative.  He completed a course of prednisone 10 mg daily.               History of Present Illness   Patient is a 69-year-old male that presents today for SOB that started over 3 months ago. He notes on and off colds since December with associated SOB, cough, and hoarseness. He was most recently treated for laryngitis with prednisone and the dysphagia and pharyngitis cleared right up. He continues to have some PND, voice hoarseness and dry cough. He denies any CP, CP on exertion, wheezing, syncope, pre-syncope. He denies any fevers, chills, unintentional weight loss, night sweats, history of smoking. He is taking flonase which provides relief. He denies a personal history of asthma.     Chest Pain   Associated symptoms include a cough (Dry) and shortness of breath. Pertinent negatives include no abdominal pain, dizziness, fever, headaches, nausea, palpitations or vomiting.     Review of Systems   Constitutional:  Negative for appetite change, chills, fatigue and fever.   HENT:  Positive for postnasal drip and voice change. Negative for ear discharge, ear pain, rhinorrhea, sinus pressure, sinus pain, sore throat and trouble swallowing.    Respiratory:  Positive for cough (Dry), chest tightness and shortness of breath. Negative for wheezing.    Cardiovascular:  Negative for chest pain and  "palpitations.   Gastrointestinal:  Negative for abdominal pain, constipation, diarrhea, nausea and vomiting.   Genitourinary:  Negative for difficulty urinating and dysuria.   Neurological:  Negative for dizziness, syncope, light-headedness and headaches.   Psychiatric/Behavioral:  Negative for sleep disturbance.        Objective   /74   Pulse 87   Ht 5' 7\" (1.702 m)   Wt 80.7 kg (178 lb)   SpO2 97%   BMI 27.88 kg/m²      Physical Exam  Vitals and nursing note reviewed.   Constitutional:       General: He is awake.      Appearance: Normal appearance. He is well-developed, well-groomed and overweight.   HENT:      Head: Normocephalic and atraumatic.      Right Ear: Hearing, tympanic membrane, ear canal and external ear normal.      Left Ear: Hearing, tympanic membrane, ear canal and external ear normal.      Nose: Nose normal.      Mouth/Throat:      Lips: Pink.      Mouth: Mucous membranes are moist.      Pharynx: Uvula midline. Posterior oropharyngeal erythema present.   Eyes:      General: Lids are normal. Vision grossly intact. Gaze aligned appropriately.      Conjunctiva/sclera: Conjunctivae normal.   Neck:      Vascular: No carotid bruit.      Trachea: Trachea and phonation normal.   Cardiovascular:      Rate and Rhythm: Normal rate and regular rhythm.      Heart sounds: Normal heart sounds, S1 normal and S2 normal. No murmur heard.     No friction rub. No gallop.   Pulmonary:      Effort: Pulmonary effort is normal.      Breath sounds: Normal breath sounds and air entry. No decreased breath sounds, wheezing, rhonchi or rales.   Abdominal:      General: Abdomen is protuberant.      Palpations: Abdomen is soft.   Musculoskeletal:      Cervical back: Neck supple.      Right lower leg: No edema.      Left lower leg: No edema.   Skin:     General: Skin is warm.      Capillary Refill: Capillary refill takes less than 2 seconds.   Neurological:      Mental Status: He is alert.   Psychiatric:         " Attention and Perception: Attention and perception normal.         Mood and Affect: Mood and affect normal.         Speech: Speech normal.         Behavior: Behavior normal. Behavior is cooperative.         Thought Content: Thought content normal.         Cognition and Memory: Cognition and memory normal.         Judgment: Judgment normal.

## 2025-03-24 DIAGNOSIS — T78.40XA ALLERGY, INITIAL ENCOUNTER: ICD-10-CM

## 2025-03-25 RX ORDER — FLUTICASONE PROPIONATE 50 MCG
SPRAY, SUSPENSION (ML) NASAL
Qty: 48 ML | Refills: 6 | OUTPATIENT
Start: 2025-03-25

## 2025-03-26 DIAGNOSIS — E11.9 TYPE 2 DIABETES MELLITUS WITHOUT COMPLICATION, WITHOUT LONG-TERM CURRENT USE OF INSULIN (HCC): ICD-10-CM

## 2025-03-26 DIAGNOSIS — I10 HYPERTENSION, UNSPECIFIED TYPE: ICD-10-CM

## 2025-03-26 RX ORDER — METFORMIN HYDROCHLORIDE 500 MG/1
2000 TABLET, EXTENDED RELEASE ORAL
Qty: 360 TABLET | Refills: 1 | Status: SHIPPED | OUTPATIENT
Start: 2025-03-26

## 2025-03-26 RX ORDER — METOPROLOL SUCCINATE 100 MG/1
100 TABLET, EXTENDED RELEASE ORAL DAILY
Qty: 90 TABLET | Refills: 1 | Status: SHIPPED | OUTPATIENT
Start: 2025-03-26

## 2025-03-26 NOTE — TELEPHONE ENCOUNTER
Patient needs to reschedule his AP with , the patient has been scheduled for 05/07/2025 at 2PM with Dr. Gonzales

## 2025-03-27 DIAGNOSIS — N40.0 BENIGN PROSTATIC HYPERPLASIA, UNSPECIFIED WHETHER LOWER URINARY TRACT SYMPTOMS PRESENT: ICD-10-CM

## 2025-03-28 RX ORDER — TAMSULOSIN HYDROCHLORIDE 0.4 MG/1
0.8 CAPSULE ORAL
Qty: 180 CAPSULE | Refills: 0 | Status: SHIPPED | OUTPATIENT
Start: 2025-03-28

## 2025-04-17 ENCOUNTER — APPOINTMENT (OUTPATIENT)
Dept: LAB | Facility: HOSPITAL | Age: 70
End: 2025-04-17
Payer: COMMERCIAL

## 2025-04-17 DIAGNOSIS — E11.8 TYPE 2 DIABETES MELLITUS WITH COMPLICATION, WITHOUT LONG-TERM CURRENT USE OF INSULIN (HCC): ICD-10-CM

## 2025-04-17 LAB
ALBUMIN SERPL BCG-MCNC: 4.7 G/DL (ref 3.5–5)
ALP SERPL-CCNC: 70 U/L (ref 34–104)
ALT SERPL W P-5'-P-CCNC: 17 U/L (ref 7–52)
ANION GAP SERPL CALCULATED.3IONS-SCNC: 8 MMOL/L (ref 4–13)
AST SERPL W P-5'-P-CCNC: 17 U/L (ref 13–39)
BASOPHILS # BLD AUTO: 0.05 THOUSANDS/ÂΜL (ref 0–0.1)
BASOPHILS NFR BLD AUTO: 1 % (ref 0–1)
BILIRUB SERPL-MCNC: 1.09 MG/DL (ref 0.2–1)
BUN SERPL-MCNC: 14 MG/DL (ref 5–25)
CALCIUM SERPL-MCNC: 10.2 MG/DL (ref 8.4–10.2)
CHLORIDE SERPL-SCNC: 99 MMOL/L (ref 96–108)
CHOLEST SERPL-MCNC: 109 MG/DL (ref ?–200)
CO2 SERPL-SCNC: 29 MMOL/L (ref 21–32)
CREAT SERPL-MCNC: 0.81 MG/DL (ref 0.6–1.3)
EOSINOPHIL # BLD AUTO: 0.28 THOUSAND/ÂΜL (ref 0–0.61)
EOSINOPHIL NFR BLD AUTO: 5 % (ref 0–6)
ERYTHROCYTE [DISTWIDTH] IN BLOOD BY AUTOMATED COUNT: 12.6 % (ref 11.6–15.1)
EST. AVERAGE GLUCOSE BLD GHB EST-MCNC: 174 MG/DL
GFR SERPL CREATININE-BSD FRML MDRD: 91 ML/MIN/1.73SQ M
GLUCOSE P FAST SERPL-MCNC: 121 MG/DL (ref 65–99)
HBA1C MFR BLD: 7.7 %
HCT VFR BLD AUTO: 45.6 % (ref 36.5–49.3)
HDLC SERPL-MCNC: 40 MG/DL
HGB BLD-MCNC: 15.7 G/DL (ref 12–17)
IMM GRANULOCYTES # BLD AUTO: 0.02 THOUSAND/UL (ref 0–0.2)
IMM GRANULOCYTES NFR BLD AUTO: 0 % (ref 0–2)
LDLC SERPL CALC-MCNC: 49 MG/DL (ref 0–100)
LYMPHOCYTES # BLD AUTO: 2.06 THOUSANDS/ÂΜL (ref 0.6–4.47)
LYMPHOCYTES NFR BLD AUTO: 33 % (ref 14–44)
MCH RBC QN AUTO: 29.4 PG (ref 26.8–34.3)
MCHC RBC AUTO-ENTMCNC: 34.4 G/DL (ref 31.4–37.4)
MCV RBC AUTO: 85 FL (ref 82–98)
MONOCYTES # BLD AUTO: 0.44 THOUSAND/ÂΜL (ref 0.17–1.22)
MONOCYTES NFR BLD AUTO: 7 % (ref 4–12)
NEUTROPHILS # BLD AUTO: 3.41 THOUSANDS/ÂΜL (ref 1.85–7.62)
NEUTS SEG NFR BLD AUTO: 54 % (ref 43–75)
NRBC BLD AUTO-RTO: 0 /100 WBCS
PLATELET # BLD AUTO: 181 THOUSANDS/UL (ref 149–390)
PMV BLD AUTO: 9.7 FL (ref 8.9–12.7)
POTASSIUM SERPL-SCNC: 4.7 MMOL/L (ref 3.5–5.3)
PROT SERPL-MCNC: 7.4 G/DL (ref 6.4–8.4)
RBC # BLD AUTO: 5.34 MILLION/UL (ref 3.88–5.62)
SODIUM SERPL-SCNC: 136 MMOL/L (ref 135–147)
TRIGL SERPL-MCNC: 101 MG/DL (ref ?–150)
WBC # BLD AUTO: 6.26 THOUSAND/UL (ref 4.31–10.16)

## 2025-04-17 PROCEDURE — 80061 LIPID PANEL: CPT

## 2025-04-17 PROCEDURE — 83036 HEMOGLOBIN GLYCOSYLATED A1C: CPT

## 2025-04-17 PROCEDURE — 80053 COMPREHEN METABOLIC PANEL: CPT

## 2025-04-17 PROCEDURE — 36415 COLL VENOUS BLD VENIPUNCTURE: CPT

## 2025-04-17 PROCEDURE — 85025 COMPLETE CBC W/AUTO DIFF WBC: CPT

## 2025-04-19 DIAGNOSIS — E11.8 TYPE 2 DIABETES MELLITUS WITH COMPLICATION, WITHOUT LONG-TERM CURRENT USE OF INSULIN (HCC): ICD-10-CM

## 2025-04-21 RX ORDER — SEMAGLUTIDE 1.34 MG/ML
INJECTION, SOLUTION SUBCUTANEOUS
Qty: 3 ML | Refills: 5 | Status: SHIPPED | OUTPATIENT
Start: 2025-04-21

## 2025-05-07 ENCOUNTER — OFFICE VISIT (OUTPATIENT)
Age: 70
End: 2025-05-07
Payer: MEDICARE

## 2025-05-07 VITALS
WEIGHT: 176 LBS | DIASTOLIC BLOOD PRESSURE: 70 MMHG | HEIGHT: 67 IN | OXYGEN SATURATION: 99 % | HEART RATE: 76 BPM | SYSTOLIC BLOOD PRESSURE: 126 MMHG | BODY MASS INDEX: 27.62 KG/M2

## 2025-05-07 DIAGNOSIS — Z23 ENCOUNTER FOR IMMUNIZATION: ICD-10-CM

## 2025-05-07 DIAGNOSIS — I10 ESSENTIAL HYPERTENSION: Primary | Chronic | ICD-10-CM

## 2025-05-07 DIAGNOSIS — I25.10 CORONARY ARTERY DISEASE INVOLVING NATIVE HEART WITHOUT ANGINA PECTORIS, UNSPECIFIED VESSEL OR LESION TYPE: ICD-10-CM

## 2025-05-07 DIAGNOSIS — R01.1 MURMUR: ICD-10-CM

## 2025-05-07 DIAGNOSIS — N40.1 BENIGN PROSTATIC HYPERPLASIA WITH URINARY FREQUENCY: ICD-10-CM

## 2025-05-07 DIAGNOSIS — E11.8 TYPE 2 DIABETES MELLITUS WITH COMPLICATION, WITHOUT LONG-TERM CURRENT USE OF INSULIN (HCC): ICD-10-CM

## 2025-05-07 DIAGNOSIS — S60.312A ABRASION OF LEFT THUMB, INITIAL ENCOUNTER: ICD-10-CM

## 2025-05-07 DIAGNOSIS — Z12.5 SCREENING FOR PROSTATE CANCER: ICD-10-CM

## 2025-05-07 DIAGNOSIS — S65.412A LACERATION OF BLOOD VESSEL OF LEFT THUMB, INITIAL ENCOUNTER: ICD-10-CM

## 2025-05-07 DIAGNOSIS — E78.2 MIXED HYPERLIPIDEMIA: ICD-10-CM

## 2025-05-07 DIAGNOSIS — R35.0 BENIGN PROSTATIC HYPERPLASIA WITH URINARY FREQUENCY: ICD-10-CM

## 2025-05-07 DIAGNOSIS — Z00.00 MEDICARE ANNUAL WELLNESS VISIT, SUBSEQUENT: ICD-10-CM

## 2025-05-07 DIAGNOSIS — Z23 NEED FOR TETANUS BOOSTER: ICD-10-CM

## 2025-05-07 PROCEDURE — 90715 TDAP VACCINE 7 YRS/> IM: CPT | Performed by: INTERNAL MEDICINE

## 2025-05-07 PROCEDURE — G0439 PPPS, SUBSEQ VISIT: HCPCS | Performed by: INTERNAL MEDICINE

## 2025-05-07 PROCEDURE — 99214 OFFICE O/P EST MOD 30 MIN: CPT | Performed by: INTERNAL MEDICINE

## 2025-05-07 PROCEDURE — 90471 IMMUNIZATION ADMIN: CPT | Performed by: INTERNAL MEDICINE

## 2025-05-07 NOTE — PROGRESS NOTES
Name: Adis Jerry      : 1955      MRN: [id number]  Encounter Provider: Jose Antonio Gonzales MD  Encounter Date: 2025   Encounter department: [facility]IC[facility] [address]  :  Assessment & Plan  Type 2 diabetes mellitus with complication, without long-term current use of insulin (HCC)  A1c up from 6.9-7.7 despite Ozempic, Jardiance and metformin.  Will increase Ozempic to 2 mg.  Patient encouraged to get more aerobic exercise with weight loss as able  Lab Results   Component Value Date    HGBA1C 7.7 (H) 2025       Orders:    semaglutide, 2 mg/dose, (Ozempic) 8 mg/ mL injection pen; Inject 0.75 mL (2 mg total) under the skin every 7 days    Basic metabolic panel; Future    CBC and differential; Future    Lipid panel; Future    Hemoglobin A1C; Future    Hepatic function panel; Future    PSA, Total Screen; Future    TSH, 3rd generation with Free T4 reflex; Future    Albumin / creatinine urine ratio; Future    Essential hypertension  Blood pressure stable on Avapro       Coronary artery disease involving native heart without angina pectoris, unspecified vessel or lesion type  Status post CABG , stable, continue aggressive risk factor modification       Mixed hyperlipidemia  LDL at goal on atorvastatin       Benign prostatic hyperplasia with urinary frequency  Stable with tamsulosin but still with 2-3 time nocturia       Medicare annual wellness visit, subsequent  Health maintenance is up-to-date, tetanus shot today.  Check with pharmacy regarding RSV and get it before fall if not received.       Screening for prostate cancer    Orders:    PSA, Total Screen; Future    Murmur    Orders:    Echo complete w/ contrast if indicated; Future    Encounter for immunization    Orders:    TDAP VACCINE GREATER THAN OR EQUAL TO 6YO IM    Need for tetanus booster         Laceration of blood vessel of left thumb, initial encounter  Patient cut himself on his left thumb with a knife yesterday it  has been 10 years since he had tetanus shot.  No signs or symptoms of infection, will give Tdap today  Orders:    TDAP VACCINE GREATER THAN OR EQUAL TO 6YO IM    Abrasion of left thumb, initial encounter    Orders:    TDAP VACCINE GREATER THAN OR EQUAL TO 6YO IM       Preventive health issues were discussed with patient, and age appropriate screening tests were ordered as noted in patient's After Visit Summary. Personalized health advice and appropriate referrals for health education or preventive services given if needed, as noted in patient's After Visit Summary.    History of Present Illness     Follow-up multiple problems and review labs    Feeling generally well, stressed w/ caring for 10 y/o granddgtr w/ ODD.        Less activity over winter mos.      Type 2 diabetes mellitus-tolerating metformin, ozempic 1 mg and Jardiance.  Not testing sugars regularly.  Not eating much breakfast or lunch, and notes feeling hypoglycemic by 3 pm, then overeats, but now snacking on protein bars, which has helped.      Hypertension/Hyperlipidemia-taking medication as directed, home bp's fine    CAD s/p CABG '17-stable w/o cp, but notes frost as above.  Cath w/ patent grafts 4/22 but multiple 70-90% RCA lesions that weren't intervened upon.    Allergy/asthma-bad allergies right now, needs renewal on flonase    BPH-fairly stable on flomax 0.8 mg, still w/ some sx, but f/b urology and not interested in more aggressive w/u.      Having some arthritic pains in base of thumb    .         Patient Care Team:  Jose Antonio Gonzales MD as PCP - General  MD Bertram Iqbal MD as Endoscopist  Helen Ragsdale PA-C as Physician Assistant (Internal Medicine)  Kathleen Oconnor PA-C as Physician Assistant (Internal Medicine)    Review of Systems   Constitutional:  Negative for appetite change, chills, diaphoresis, fatigue, fever and unexpected weight change.   HENT:  Negative for congestion, hearing loss and rhinorrhea.     Eyes:  Negative for visual disturbance.   Respiratory:  Negative for cough, chest tightness, shortness of breath and wheezing.    Cardiovascular:  Negative for chest pain, palpitations and leg swelling.   Gastrointestinal:  Negative for abdominal pain and blood in stool.   Endocrine: Negative for cold intolerance, heat intolerance, polydipsia and polyuria.   Genitourinary:  Negative for difficulty urinating, dysuria, frequency and urgency.   Musculoskeletal:  Negative for arthralgias and myalgias.   Skin:  Negative for rash.   Neurological:  Negative for dizziness, weakness, light-headedness and headaches.   Hematological:  Does not bruise/bleed easily.   Psychiatric/Behavioral:  Negative for dysphoric mood and sleep disturbance.      Medical History Reviewed by provider this encounter:  Tobacco  Allergies  Meds  Problems  Med Hx  Surg Hx  Fam Hx       Annual Wellness Visit Questionnaire       Health Risk Assessment:   Patient rates overall health as good. Patient feels that their physical health rating is same. Patient is satisfied with their life. Eyesight was rated as same. Hearing was rated as same. Patient feels that their emotional and mental health rating is same. Patients states they are never, rarely angry. Patient states they are never, rarely unusually tired/fatigued. Pain experienced in the last 7 days has been none. Patient states that he has experienced no weight loss or gain in last 6 months.     Fall Risk Screening:   In the past year, patient has experienced: no history of falling in past year      Home Safety:  Patient does not have trouble with stairs inside or outside of their home. Patient has working smoke alarms and has working carbon monoxide detector. Home safety hazards include: none.     Nutrition:   Current diet is Regular.     Medications:   Patient is not currently taking any over-the-counter supplements. Patient is able to manage medications.     Activities of Daily Living  (ADLs)/Instrumental Activities of Daily Living (IADLs):   Walk and transfer into and out of bed and chair?: Yes  Dress and groom yourself?: Yes    Bathe or shower yourself?: Yes    Feed yourself? Yes  Do your laundry/housekeeping?: Yes  Manage your money, pay your bills and track your expenses?: Yes  Make your own meals?: Yes    Do your own shopping?: Yes    Previous Hospitalizations:   Any hospitalizations or ED visits within the last 12 months?: No      Advance Care Planning:   Living will: Yes    Durable POA for healthcare: Yes    Advanced directive: Yes      Cognitive Screening:   Provider or family/friend/caregiver concerned regarding cognition?: No    Preventive Screenings      Cardiovascular Screening:    General: Screening Not Indicated and History Lipid Disorder      Diabetes Screening:     General: Screening Not Indicated and History Diabetes      Colorectal Cancer Screening:     General: Screening Current      Prostate Cancer Screening:    General: Screening Current      Osteoporosis Screening:    General: Screening Not Indicated      Abdominal Aortic Aneurysm (AAA) Screening:    Risk factors include: age between 65-74 yo        General: Screening Current      Lung Cancer Screening:     General: Screening Not Indicated      Hepatitis C Screening:    General: Screening Current    Immunizations:  - Immunizations due: Tdap    Screening, Brief Intervention, and Referral to Treatment (SBIRT)     Screening      Single Item Drug Screening:  How often have you used an illegal drug (including marijuana) or a prescription medication for non-medical reasons in the past year? never    Single Item Drug Screen Score: 0  Interpretation: Negative screen for possible drug use disorder    Social Drivers of Health     Financial Resource Strain: Low Risk  (3/22/2023)    Overall Financial Resource Strain (CARDIA)     Difficulty of Paying Living Expenses: Not hard at all   Food Insecurity: No Food Insecurity (5/7/2025)     "Hunger Vital Sign     Worried About Running Out of Food in the Last Year: Never true     Ran Out of Food in the Last Year: Never true   Transportation Needs: No Transportation Needs (5/7/2025)    PRAPARE - Transportation     Lack of Transportation (Medical): No     Lack of Transportation (Non-Medical): No   Housing Stability: Unknown (5/7/2025)    Housing Stability Vital Sign     Unable to Pay for Housing in the Last Year: No     Homeless in the Last Year: No   Utilities: Not At Risk (5/7/2025)    [facility] Utilities     Threatened with loss of utilities: No     No results found.    Objective   /70   Pulse 76   Ht 5' 7\" (1.702 m)   Wt 79.8 kg (176 lb)   SpO2 99%   BMI 27.57 kg/m²     Physical Exam  Constitutional:       Appearance: He is well-developed.   HENT:      Head: Normocephalic and atraumatic.      Nose: Nose normal.   Eyes:      General: No scleral icterus.     Conjunctiva/sclera: Conjunctivae normal.      Pupils: Pupils are equal, round, and reactive to light.   Neck:      Thyroid: No thyromegaly.      Vascular: No JVD.      Trachea: No tracheal deviation.   Cardiovascular:      Rate and Rhythm: Normal rate and regular rhythm.      Heart sounds: No murmur heard.     No friction rub. No gallop.   Pulmonary:      Effort: Pulmonary effort is normal. No respiratory distress.      Breath sounds: Normal breath sounds. No wheezing or rales.   Abdominal:      General: Bowel sounds are normal. There is no distension.      Palpations: Abdomen is soft. There is no mass.      Tenderness: There is no abdominal tenderness. There is no guarding or rebound.   Musculoskeletal:         General: No tenderness.      Cervical back: Normal range of motion and neck supple.   Lymphadenopathy:      Cervical: No cervical adenopathy.   Skin:     General: Skin is warm and dry.      Findings: No erythema or rash.      Comments: Small laceration on thumb, refer to photo in media   Neurological:      Mental Status: He is alert and " oriented to person, place, and time.      Cranial Nerves: No cranial nerve deficit.   Psychiatric:         Behavior: Behavior normal.         Thought Content: Thought content normal.         Judgment: Judgment normal.

## 2025-05-07 NOTE — ASSESSMENT & PLAN NOTE
A1c up from 6.9-7.7 despite Ozempic, Jardiance and metformin.  Will increase Ozempic to 2 mg.  Patient encouraged to get more aerobic exercise with weight loss as able  Lab Results   Component Value Date    HGBA1C 7.7 (H) 04/17/2025       Orders:    semaglutide, 2 mg/dose, (Ozempic) 8 mg/ mL injection pen; Inject 0.75 mL (2 mg total) under the skin every 7 days    Basic metabolic panel; Future    CBC and differential; Future    Lipid panel; Future    Hemoglobin A1C; Future    Hepatic function panel; Future    PSA, Total Screen; Future    TSH, 3rd generation with Free T4 reflex; Future    Albumin / creatinine urine ratio; Future

## 2025-05-07 NOTE — PATIENT INSTRUCTIONS
Medicare Preventive Visit Patient Instructions  Thank you for completing your Welcome to Medicare Visit or Medicare Annual Wellness Visit today. Your next wellness visit will be due in one year (5/8/2026).  The screening/preventive services that you may require over the next 5-10 years are detailed below. Some tests may not apply to you based off risk factors and/or age. Screening tests ordered at today's visit but not completed yet may show as past due. Also, please note that scanned in results may not display below.  Preventive Screenings:  Service Recommendations Previous Testing/Comments   Colorectal Cancer Screening  Colonoscopy    Fecal Occult Blood Test (FOBT)/Fecal Immunochemical Test (FIT)  Fecal DNA/Cologuard Test  Flexible Sigmoidoscopy Age: 45-75 years old   Colonoscopy: every 10 years (May be performed more frequently if at higher risk)  OR  FOBT/FIT: every 1 year  OR  Cologuard: every 3 years  OR  Sigmoidoscopy: every 5 years  Screening may be recommended earlier than age 45 if at higher risk for colorectal cancer. Also, an individualized decision between you and your healthcare provider will decide whether screening between the ages of 76-85 would be appropriate. Colonoscopy: 12/21/2023  FOBT/FIT: Not on file  Cologuard: Not on file  Sigmoidoscopy: Not on file    Screening Current     Prostate Cancer Screening Individualized decision between patient and health care provider in men between ages of 55-69   Medicare will cover every 12 months beginning on the day after your 50th birthday PSA: 0.320 ng/mL     Screening Current     Hepatitis C Screening Once for adults born between 1945 and 1965  More frequently in patients at high risk for Hepatitis C Hep C Antibody: 01/21/2017    Screening Current   Diabetes Screening 1-2 times per year if you're at risk for diabetes or have pre-diabetes Fasting glucose: 121 mg/dL (4/17/2025)  A1C: 7.7 % (4/17/2025)  Screening Not Indicated  History Diabetes    Cholesterol Screening Once every 5 years if you don't have a lipid disorder. May order more often based on risk factors. Lipid panel: 04/17/2025  Screening Not Indicated  History Lipid Disorder      Other Preventive Screenings Covered by Medicare:  Abdominal Aortic Aneurysm (AAA) Screening: covered once if your at risk. You're considered to be at risk if you have a family history of AAA or a male between the age of 65-75 who smoking at least 100 cigarettes in your lifetime.  Lung Cancer Screening: covers low dose CT scan once per year if you meet all of the following conditions: (1) Age 55-77; (2) No signs or symptoms of lung cancer; (3) Current smoker or have quit smoking within the last 15 years; (4) You have a tobacco smoking history of at least 20 pack years (packs per day x number of years you smoked); (5) You get a written order from a healthcare provider.  Glaucoma Screening: covered annually if you're considered high risk: (1) You have diabetes OR (2) Family history of glaucoma OR (3)  aged 50 and older OR (4)  American aged 65 and older  Osteoporosis Screening: covered every 2 years if you meet one of the following conditions: (1) Have a vertebral abnormality; (2) On glucocorticoid therapy for more than 3 months; (3) Have primary hyperparathyroidism; (4) On osteoporosis medications and need to assess response to drug therapy.  HIV Screening: covered annually if you're between the age of 15-65. Also covered annually if you are younger than 15 and older than 65 with risk factors for HIV infection. For pregnant patients, it is covered up to 3 times per pregnancy.    Immunizations:  Immunization Recommendations   Influenza Vaccine Annual influenza vaccination during flu season is recommended for all persons aged >= 6 months who do not have contraindications   Pneumococcal Vaccine   * Pneumococcal conjugate vaccine = PCV13 (Prevnar 13), PCV15 (Vaxneuvance), PCV20 (Prevnar 20)  *  Pneumococcal polysaccharide vaccine = PPSV23 (Pneumovax) Adults 19-65 yo with certain risk factors or if 65+ yo  If never received any pneumonia vaccine: recommend Prevnar 20 (PCV20)  Give PCV20 if previously received 1 dose of PCV13 or PPSV23   Hepatitis B Vaccine 3 dose series if at intermediate or high risk (ex: diabetes, end stage renal disease, liver disease)   Respiratory syncytial virus (RSV) Vaccine - COVERED BY MEDICARE PART D  * RSVPreF3 (Arexvy) CDC recommends that adults 60 years of age and older may receive a single dose of RSV vaccine using shared clinical decision-making (SCDM)   Tetanus (Td) Vaccine - COST NOT COVERED BY MEDICARE PART B Following completion of primary series, a booster dose should be given every 10 years to maintain immunity against tetanus. Td may also be given as tetanus wound prophylaxis.   Tdap Vaccine - COST NOT COVERED BY MEDICARE PART B Recommended at least once for all adults. For pregnant patients, recommended with each pregnancy.   Shingles Vaccine (Shingrix) - COST NOT COVERED BY MEDICARE PART B  2 shot series recommended in those 19 years and older who have or will have weakened immune systems or those 50 years and older     Health Maintenance Due:      Topic Date Due   • Colorectal Cancer Screening  12/19/2028   • Hepatitis C Screening  Completed     Immunizations Due:      Topic Date Due   • DTaP,Tdap,and Td Vaccines (3 - Td or Tdap) 03/01/2025   • COVID-19 Vaccine (8 - 2024-25 season) 03/29/2025     Advance Directives   What are advance directives?  Advance directives are legal documents that state your wishes and plans for medical care. These plans are made ahead of time in case you lose your ability to make decisions for yourself. Advance directives can apply to any medical decision, such as the treatments you want, and if you want to donate organs.   What are the types of advance directives?  There are many types of advance directives, and each state has rules about  how to use them. You may choose a combination of any of the following:  Living will:  This is a written record of the treatment you want. You can also choose which treatments you do not want, which to limit, and which to stop at a certain time. This includes surgery, medicine, IV fluid, and tube feedings.   Durable power of  for healthcare (DPAHC):  This is a written record that states who you want to make healthcare choices for you when you are unable to make them for yourself. This person, called a proxy, is usually a family member or a friend. You may choose more than 1 proxy.  Do not resuscitate (DNR) order:  A DNR order is used in case your heart stops beating or you stop breathing. It is a request not to have certain forms of treatment, such as CPR. A DNR order may be included in other types of advance directives.  Medical directive:  This covers the care that you want if you are in a coma, near death, or unable to make decisions for yourself. You can list the treatments you want for each condition. Treatment may include pain medicine, surgery, blood transfusions, dialysis, IV or tube feedings, and a ventilator (breathing machine).  Values history:  This document has questions about your views, beliefs, and how you feel and think about life. This information can help others choose the care that you would choose.  Why are advance directives important?  An advance directive helps you control your care. Although spoken wishes may be used, it is better to have your wishes written down. Spoken wishes can be misunderstood, or not followed. Treatments may be given even if you do not want them. An advance directive may make it easier for your family to make difficult choices about your care.   Weight Management   Why it is important to manage your weight:  Being overweight increases your risk of health conditions such as heart disease, high blood pressure, type 2 diabetes, and certain types of cancer. It can  also increase your risk for osteoarthritis, sleep apnea, and other respiratory problems. Aim for a slow, steady weight loss. Even a small amount of weight loss can lower your risk of health problems.  How to lose weight safely:  A safe and healthy way to lose weight is to eat fewer calories and get regular exercise. You can lose up about 1 pound a week by decreasing the number of calories you eat by 500 calories each day.   Healthy meal plan for weight management:  A healthy meal plan includes a variety of foods, contains fewer calories, and helps you stay healthy. A healthy meal plan includes the following:  Eat whole-grain foods more often.  A healthy meal plan should contain fiber. Fiber is the part of grains, fruits, and vegetables that is not broken down by your body. Whole-grain foods are healthy and provide extra fiber in your diet. Some examples of whole-grain foods are whole-wheat breads and pastas, oatmeal, brown rice, and bulgur.  Eat a variety of vegetables every day.  Include dark, leafy greens such as spinach, kale, genoveva greens, and mustard greens. Eat yellow and orange vegetables such as carrots, sweet potatoes, and winter squash.   Eat a variety of fruits every day.  Choose fresh or canned fruit (canned in its own juice or light syrup) instead of juice. Fruit juice has very little or no fiber.  Eat low-fat dairy foods.  Drink fat-free (skim) milk or 1% milk. Eat fat-free yogurt and low-fat cottage cheese. Try low-fat cheeses such as mozzarella and other reduced-fat cheeses.  Choose meat and other protein foods that are low in fat.  Choose beans or other legumes such as split peas or lentils. Choose fish, skinless poultry (chicken or turkey), or lean cuts of red meat (beef or pork). Before you cook meat or poultry, cut off any visible fat.   Use less fat and oil.  Try baking foods instead of frying them. Add less fat, such as margarine, sour cream, regular salad dressing and mayonnaise to foods.  Eat fewer high-fat foods. Some examples of high-fat foods include french fries, doughnuts, ice cream, and cakes.  Eat fewer sweets.  Limit foods and drinks that are high in sugar. This includes candy, cookies, regular soda, and sweetened drinks.  Exercise:  Exercise at least 30 minutes per day on most days of the week. Some examples of exercise include walking, biking, dancing, and swimming. You can also fit in more physical activity by taking the stairs instead of the elevator or parking farther away from stores. Ask your healthcare provider about the best exercise plan for you.      © Copyright [employer] 2018 Information is for End User's use only and may not be sold, redistributed or otherwise used for commercial purposes. All illustrations and images included in CareNotes® are the copyrighted property of [employer].D[employer]M., Inc[employer] or [employer]

## 2025-06-11 LAB
LEFT EYE DIABETIC RETINOPATHY: NORMAL
RIGHT EYE DIABETIC RETINOPATHY: NORMAL

## 2025-06-19 ENCOUNTER — RESULTS FOLLOW-UP (OUTPATIENT)
Dept: OTHER | Facility: HOSPITAL | Age: 70
End: 2025-06-19

## 2025-06-19 ENCOUNTER — HOSPITAL ENCOUNTER (OUTPATIENT)
Dept: NON INVASIVE DIAGNOSTICS | Facility: CLINIC | Age: 70
Discharge: HOME | End: 2025-06-19
Attending: INTERNAL MEDICINE
Payer: COMMERCIAL

## 2025-06-19 VITALS
SYSTOLIC BLOOD PRESSURE: 126 MMHG | HEART RATE: 77 BPM | BODY MASS INDEX: 27.62 KG/M2 | WEIGHT: 176 LBS | DIASTOLIC BLOOD PRESSURE: 70 MMHG | HEIGHT: 67 IN

## 2025-06-19 DIAGNOSIS — R01.1 MURMUR: ICD-10-CM

## 2025-06-19 LAB
AORTIC ROOT: 3 CM
BSA FOR ECHO PROCEDURE: 1.92 M2
DOP CALC LVOT AREA: 2.83 CM2
DOP CALC LVOT DIAMETER: 1.9 CM
DOP CALC MV VTI: 24.96 CM
E WAVE DECELERATION TIME: 194 MS
E/A RATIO: 0.68
FRACTIONAL SHORTENING: 33 (ref 28–44)
INTERVENTRICULAR SEPTUM IN DIASTOLE (PARASTERNAL SHORT AXIS VIEW): 0.9 CM
INTERVENTRICULAR SEPTUM: 0.9 CM (ref 0.6–1.1)
LAAS-AP2: 23 CM2
LAAS-AP4: 15.3 CM2
LEFT ATRIUM SIZE: 3.2 CM
LEFT ATRIUM VOLUME (MOD BIPLANE): 48 ML
LEFT ATRIUM VOLUME INDEX (MOD BIPLANE): 25 ML/M2
LEFT INTERNAL DIMENSION IN SYSTOLE: 2.9 CM (ref 2.1–4)
LEFT VENTRICULAR INTERNAL DIMENSION IN DIASTOLE: 4.3 CM (ref 3.5–6)
LEFT VENTRICULAR POSTERIOR WALL IN END DIASTOLE: 0.9 CM
LEFT VENTRICULAR STROKE VOLUME: 48 ML
LV EF US.2D.A4C+ESTIMATED: 47 %
LVSV (TEICH): 48 ML
MV E'TISSUE VEL-LAT: 8 CM/S
MV E'TISSUE VEL-SEP: 6 CM/S
MV MEAN GRADIENT: 2 MMHG
MV PEAK A VEL: 1.15 M/S
MV PEAK E VEL: 78 CM/S
MV PEAK GRADIENT: 5 MMHG
MV STENOSIS PRESSURE HALF TIME: 56 MS
MV VALVE AREA P 1/2 METHOD: 3.93
RIGHT ATRIUM AREA SYSTOLE A4C: 17.3 CM2
RIGHT VENTRICLE ID DIMENSION: 3 CM
SL CV LEFT ATRIUM LENGTH A2C: 6.3 CM
SL CV LV EF: 60
SL CV PED ECHO LEFT VENTRICLE DIASTOLIC VOLUME (MOD BIPLANE) 2D: 81 ML
SL CV PED ECHO LEFT VENTRICLE SYSTOLIC VOLUME (MOD BIPLANE) 2D: 33 ML
TRICUSPID ANNULAR PLANE SYSTOLIC EXCURSION: 1.4 CM

## 2025-06-19 PROCEDURE — 93306 TTE W/DOPPLER COMPLETE: CPT | Performed by: INTERNAL MEDICINE

## 2025-06-19 PROCEDURE — 93306 TTE W/DOPPLER COMPLETE: CPT

## 2025-06-22 DIAGNOSIS — E11.8 TYPE 2 DIABETES MELLITUS WITH COMPLICATION, WITHOUT LONG-TERM CURRENT USE OF INSULIN (HCC): ICD-10-CM

## 2025-06-22 DIAGNOSIS — N40.0 BENIGN PROSTATIC HYPERPLASIA, UNSPECIFIED WHETHER LOWER URINARY TRACT SYMPTOMS PRESENT: ICD-10-CM

## 2025-06-22 RX ORDER — EMPAGLIFLOZIN 25 MG/1
25 TABLET, FILM COATED ORAL EVERY MORNING
Qty: 90 TABLET | Refills: 1 | Status: SHIPPED | OUTPATIENT
Start: 2025-06-22

## 2025-06-22 RX ORDER — TAMSULOSIN HYDROCHLORIDE 0.4 MG/1
0.8 CAPSULE ORAL
Qty: 180 CAPSULE | Refills: 0 | Status: SHIPPED | OUTPATIENT
Start: 2025-06-22

## 2025-07-26 ENCOUNTER — APPOINTMENT (OUTPATIENT)
Age: 70
End: 2025-07-26
Attending: PHYSICIAN ASSISTANT
Payer: COMMERCIAL

## 2025-07-26 ENCOUNTER — OFFICE VISIT (OUTPATIENT)
Age: 70
End: 2025-07-26
Payer: MEDICARE

## 2025-07-26 VITALS
WEIGHT: 174.8 LBS | OXYGEN SATURATION: 97 % | SYSTOLIC BLOOD PRESSURE: 140 MMHG | RESPIRATION RATE: 18 BRPM | TEMPERATURE: 97.2 F | DIASTOLIC BLOOD PRESSURE: 97 MMHG | HEART RATE: 84 BPM | BODY MASS INDEX: 27.38 KG/M2

## 2025-07-26 DIAGNOSIS — M25.571 ACUTE RIGHT ANKLE PAIN: Primary | ICD-10-CM

## 2025-07-26 DIAGNOSIS — M25.571 ACUTE RIGHT ANKLE PAIN: ICD-10-CM

## 2025-07-26 PROCEDURE — 73610 X-RAY EXAM OF ANKLE: CPT

## 2025-07-26 PROCEDURE — 99213 OFFICE O/P EST LOW 20 MIN: CPT | Performed by: PHYSICIAN ASSISTANT

## 2025-07-26 PROCEDURE — G0463 HOSPITAL OUTPT CLINIC VISIT: HCPCS | Performed by: PHYSICIAN ASSISTANT

## 2025-07-26 RX ORDER — ACETAMINOPHEN 500 MG
500 TABLET ORAL EVERY 6 HOURS PRN
Start: 2025-07-26

## 2025-07-26 NOTE — PROGRESS NOTES
[facility]  Name: Adis Jerry      : 1955      MRN: [id number]  Encounter Provider: Maikol Edwards PA-C  Encounter Date: 2025   Encounter department: [facility] [place]  :  Assessment & Plan  Acute right ankle pain    Orders:    XR ankle 3+ vw right; Future    Diclofenac Sodium (VOLTAREN) 1 %; Apply 2 g topically 4 (four) times a day    acetaminophen (TYLENOL) 500 mg tablet; Take 1 tablet (500 mg total) by mouth every 6 (six) hours as needed for moderate pain    Preliminary reports reviewed no acute osseous findings.  Official report is pending    Diclofenac 1% gel apply to affected area every 6 hours as needed for pain  Tylenol 500 mg 1 tablet every 6 hours as needed for additional pain relief  RICE    Patient Instructions  Follow up with PCP in 3-5 days.  Proceed to  ER if symptoms worsen.    If tests are performed, our office will contact you with results only if changes need to made to the care plan discussed with you at the visit. [facility]    Chief Complaint:   Chief Complaint   Patient presents with    Leg Pain     Right lower leg pain started 3 days ago. Denies injury.      History of Present Illness   69-year-old male is presented today with complaint of right ankle pain anterior of ankle in the last 48 hours.  Patient admits he and his wife were walking 17,000 steps in the last week in [place].  They flew in last night and patient began to have pain denies having an incident in [place] that involve trauma, injury or denies prior history of surgery.  Pain is localized does not radiate, no alleviating factors worse on upon movement of the ankle.          Review of Systems   Constitutional:  Negative for fever.   Respiratory:  Negative for cough.    Gastrointestinal:  Negative for abdominal pain, nausea and vomiting.   Musculoskeletal:  Positive for arthralgias. Negative for joint swelling.   Skin:  Negative for rash and  wound.   Neurological:  Negative for headaches.     Past Medical History   Past Medical History[1]  Past Surgical History[2]  Family History[3]  he reports that he has never smoked. He has never been exposed to tobacco smoke. He has never used smokeless tobacco. He reports that he does not currently use alcohol. He reports that he does not use drugs.  Current Outpatient Medications   Medication Instructions    Accu-Chek FastClix Lancets MISC USE TO TEST BLOOD SUGAR AS DIRECTED    acetaminophen (TYLENOL) 500 mg, Oral, Every 6 hours PRN    aspirin 81 mg, Daily    atorvastatin (LIPITOR) 20 mg, Oral, Daily at bedtime    Blood Glucose Monitoring Suppl (Accu-Chek Guide) w/Device KIT No dose, route, or frequency recorded.    Cholecalciferol (Vitamin D3 Super Strength) 50 MCG (2000 UT) CAPS 1 po qd    Diclofenac Sodium (VOLTAREN) 2 g, Topical, 4 times daily    fluticasone (FLONASE) 50 mcg/act nasal spray 2 sprays, Nasal, Daily    glucose blood test strip Use as instructed    irbesartan (AVAPRO) 150 mg, Oral, Daily    Jardiance 25 mg, Oral, Every morning    metFORMIN (GLUCOPHAGE-XR) 2,000 mg, Oral, Daily with dinner    metoprolol succinate (TOPROL-XL) 100 mg, Oral, Daily    nitroglycerin (NITROSTAT) 0.4 mg, Sublingual, Every 5 minutes PRN    semaglutide (2 mg/dose) (OZEMPIC) 2 mg, Subcutaneous, Every 7 days    tamsulosin (FLOMAX) 0.8 mg, Oral, Daily with dinner   Allergies[4]     Objective   /97 (BP Location: Right arm)   Pulse 84   Temp (!) 97.2 °F (36.2 °C)   Resp 18   Wt 79.3 kg (174 lb 12.8 oz)   SpO2 97%   BMI 27.38 kg/m²      Physical Exam  Vitals and nursing note reviewed.   Constitutional:       General: He is not in acute distress.     Appearance: Normal appearance. He is normal weight.     Eyes:      General: No scleral icterus.     Extraocular Movements: Extraocular movements intact.      Pupils: Pupils are equal, round, and reactive to light.       Cardiovascular:      Rate and Rhythm: Regular rhythm.  "     Pulses: Normal pulses.   Pulmonary:      Effort: Pulmonary effort is normal. No respiratory distress.     Musculoskeletal:      Cervical back: Normal range of motion and neck supple.      Right ankle: No swelling, deformity, ecchymosis or lacerations. Tenderness present over the ATF ligament and AITF ligament. No CF ligament, posterior TF ligament, base of 5th metatarsal or proximal fibula tenderness. Normal range of motion. Anterior drawer test negative. Normal pulse.      Right Achilles Tendon: Normal.      Left ankle: Normal.        Feet:       Comments: Right ankle: There is mild swelling and tenderness,  no inflammation erythema warmth, discoloration or lesions.  Pulses are 2+ and symmetric.  Skin and nails intact. N/V/I.      Neurological:      General: No focal deficit present.      Mental Status: He is alert and oriented to person, place, and time.      Coordination: Coordination normal.      Gait: Gait normal.     Psychiatric:         Mood and Affect: Mood normal.         Behavior: Behavior normal.         Thought Content: Thought content normal.         Judgment: Judgment normal.         Portions of the record may have been created with voice recognition software.  Occasional wrong word or \"sound a like\" substitutions may have occurred due to the inherent limitations of voice recognition software.  Read the chart carefully and recognize, using context, where substitutions have occurred.         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    BOWEL RESECTION  2008    Secondary to diverticulitis    CARDIAC CATHETERIZATION N/A 04/07/2022    Procedure: Cardiac catheterization;  Surgeon: Gisselle Lange MD;  Location: [facility];  Service: Cardiology    CARDIAC CATHETERIZATION N/A 04/07/2022    Procedure: Cardiac Coronary Angiogram;  Surgeon: Gisselle Lange MD;  Location: [facility];  Service: Cardiology    COLON SURGERY  2007    COLONOSCOPY      CORONARY ARTERY BYPASS GRAFT      HAND SURGERY      triple bypass    HERNIA REPAIR      MALIGNANT SKIN LESION EXCISION  1963    Abdominal wall surgical resection of melanoma    [facility] COLONOSCOPY FLX DX W/COLLJ SPEC WHEN PFRMD N/A 12/11/2018    Procedure: COLONOSCOPY;  Surgeon: Bertram Umanzor MD;  Location: [facility];  Service: Gastroenterology    SKIN BIOPSY     [3]   Family History  Problem Relation Name Age of Onset    Emphysema Maternal Grandfather      Coronary artery disease Mother Meggan     Hyperlipidemia Mother Meggan     Hypertension Mother Meggan     Diabetes type II Mother Meggan     Arthritis Mother Meggan     Diabetes type II Father Goyo     Basal cell carcinoma Father Goyo     Hypertension Father Goyo     Heart disease Father Goyo     Diabetes Father Goyo     Hypertension Sister     [4] No Known Allergies

## 2025-07-26 NOTE — PATIENT INSTRUCTIONS
"Patient Education    Preliminary reports reviewed no acute osseous findings.  Official report is pending    Diclofenac 1% gel apply to affected area every 6 hours as needed for pain  Tylenol 500 mg 1 tablet every 6 hours as needed for additional pain relief  RICE    Patient Instructions  Follow up with PCP in 3-5 days.  Proceed to  ER if symptoms worsen.    If tests are performed, our office will contact you with results only if changes need to made to the care plan discussed with you at the visit. You can review your full results on [facility] MyChart.     Ankle sprain   The Basics   Written by the doctors and editors at [facility]   What happens when a person sprains their ankle? -- When a person sprains their ankle, the ankle joint turns too far in 1 direction.  Inside the ankle are tough bands of tissue called \"ligaments.\" These hold the different bones together. During a sprain, 1 or more of those ligaments stretch too far or even tear (figure 1). This can cause pain and swelling, make the ankle unsteady, and make it hard to put weight on the leg with the injured ankle.  What are the symptoms of an ankle sprain? -- The symptoms can include pain, tenderness, swelling, and bruising at the ankle. Some people with an ankle sprain also find it hard to move the foot in certain directions. Plus, some people cannot put weight on the leg with the injured ankle.  Is there a test for an ankle sprain? -- A doctor or nurse should be able to tell if you have a sprain by doing an exam and learning about what happened to your ankle. They might move your foot in different directions to see what hurts and to check how loose your ankle feels.  In some cases, a doctor might order an X-ray to check for broken bones, but that is not always needed. Some doctors might use an ultrasound to look at the ligaments. Ultrasound is an imaging test that creates pictures of the inside of the body.  Should I see a doctor or nurse? -- See your " "doctor or nurse if:   You cannot put weight on the leg with the injured ankle.   Your ankle looks deformed or crooked.   Your ankle is unstable (for example, it gives out while you are climbing stairs).  It's also best to see a doctor or nurse if you are not sure how serious an injury is.  How is an ankle sprain treated? -- Treatment for a sprained ankle is easy to remember if you think of the word \"PRICE\":   Protect - To avoid making your injury worse, you can wrap it with an elastic bandage (figure 2). Depending on how bad your sprain is, you might also get a brace or splint.   Rest - To rest the ankle, you can use crutches and stay off of your feet. Avoid activities that cause pain.   Ice - Apply a cold gel pack, bag of ice, or bag of frozen vegetables on your ankle every 1 to 2 hours, for 15 minutes each time. Put a thin towel between the ice (or other cold object) and your skin. Use the ice (or other cold object) for at least 6 hours after your injury. Some people find it helpful to ice longer, even up to 2 days after their injury.   Compression - \"Compression\" basically means pressure. You want to have your ankle under slight pressure by having it wrapped in an elastic bandage. This helps reduce swelling and supports the ankle. Your doctor or nurse will show you how to wrap your ankle. Be careful not to wrap it too tight, as this could cut off the blood flow to your foot.   Elevation - \"Elevation\" means keeping your foot raised up above the level of your heart. To do this, you can put your foot on some pillows or blankets while you are lying down, or on a table or chair while you are sitting.  You can also take medicines to relieve pain, such as acetaminophen (sample brand name: Tylenol), ibuprofen (sample brand names: Advil, Motrin), or naproxen (sample brand name: Aleve).  People who have a mild sprain do not usually need to use a splint to keep their foot and ankle still. But people who have a more severe " sprain sometimes do.  In rare cases, doctors suggest surgery to repair a torn ligament caused by an ankle sprain.  Can I do anything else to help my recovery? -- Most people heal more quickly if they do certain exercises. Usually, gentle exercises can be started after a few days, once swelling and pain have improved. The right exercises for you depend on what kind of sprain you have and how serious it is. Ask your doctor which exercises you should do. In some cases, they might recommend working with a physical therapist (exercise expert).  Over time, slowly build up the activities you do with your foot and ankle. It might be easier to do some activities if you wear a brace or splint on your ankle as it heals.  When should I call the doctor? -- Call for advice if:   Your pain or swelling is getting worse.   Your toes are blue or gray, and numb.   Your ankle feels more unstable or wobbly.   You have new or worsening symptoms.  All topics are updated as new evidence becomes available and our peer review process is complete.  This topic retrieved from [employer] on: Feb 26, 2024.  Topic 41018 Version 11.0  Release: 32.2.4 - C32.56  © 2024 [employer] and/or its affiliates. All rights reserved.  figure 1: Ankle sprains     When a person sprains their ankle, the ankle joint turns too far in a particular direction. Inside the ankle are tough bands of tissue called ligaments, which hold the different bones together. During a sprain, 1 or more of those ligaments (shown in white) stretch too far or even tear.  Graphic 25835 Version 2.0  figure 2: How to wrap your ankle with an elastic bandage     To wrap your ankle with an elastic bandage:  Start with the rolled bandage at the top of your foot below your toes. Wrap toward the inside of your ankle.  Loop bandage around your foot and bring it up toward your ankle.  Loop bandage around the back of your ankle and bring it down to the opposite side of your foot.  Loop bandage  under your foot again and repeat the process until the roll is done. Secure.  Graphic 419646 Version 2.0  Consumer Information Use and Disclaimer   Disclaimer: This generalized information is a limited summary of diagnosis, treatment, and/or medication information. It is not meant to be comprehensive and should be used as a tool to help the user understand and/or assess potential diagnostic and treatment options. It does NOT include all information about conditions, treatments, medications, side effects, or risks that may apply to a specific patient. It is not intended to be medical advice or a substitute for the medical advice, diagnosis, or treatment of a health care provider based on the health care provider's examination and assessment of a patient's specific and unique circumstances. Patients must speak with a health care provider for complete information about their health, medical questions, and treatment options, including any risks or benefits regarding use of medications. This information does not endorse any treatments or medications as safe, effective, or approved for treating a specific patient. [employer] and its affiliates disclaim any warranty or liability relating to this information or the use thereof.The use of this information is governed by the Terms of Use, available at https://www.[employer]Ch[employer]wer.com/en/know/clinical-effectiveness-terms. 2024© [employer] and its affiliates and/or licensors. All rights reserved.  Copyright   © 2024 [employer] and/or its affiliates. All rights reserved.

## 2025-07-27 ENCOUNTER — HOSPITAL ENCOUNTER (EMERGENCY)
Facility: HOSPITAL | Age: 70
Discharge: HOME | End: 2025-07-27
Attending: EMERGENCY MEDICINE
Payer: COMMERCIAL

## 2025-07-27 VITALS
TEMPERATURE: 97.9 F | OXYGEN SATURATION: 97 % | WEIGHT: 175.71 LBS | HEART RATE: 83 BPM | BODY MASS INDEX: 27.52 KG/M2 | DIASTOLIC BLOOD PRESSURE: 109 MMHG | RESPIRATION RATE: 17 BRPM | SYSTOLIC BLOOD PRESSURE: 218 MMHG

## 2025-07-27 DIAGNOSIS — M25.571 ACUTE RIGHT ANKLE PAIN: Primary | ICD-10-CM

## 2025-07-27 PROCEDURE — 99283 EMERGENCY DEPT VISIT LOW MDM: CPT

## 2025-07-27 PROCEDURE — 99284 EMERGENCY DEPT VISIT MOD MDM: CPT | Performed by: EMERGENCY MEDICINE

## 2025-07-27 RX ORDER — PREDNISONE 20 MG/1
40 TABLET ORAL DAILY
Qty: 10 TABLET | Refills: 0 | Status: SHIPPED | OUTPATIENT
Start: 2025-07-28 | End: 2025-07-27

## 2025-07-27 RX ORDER — OXYCODONE HYDROCHLORIDE 5 MG/1
5 TABLET ORAL EVERY 6 HOURS PRN
Qty: 15 TABLET | Refills: 0 | Status: SHIPPED | OUTPATIENT
Start: 2025-07-27 | End: 2025-07-27

## 2025-07-27 RX ORDER — PREDNISONE 20 MG/1
40 TABLET ORAL DAILY
Qty: 10 TABLET | Refills: 0 | Status: SHIPPED | OUTPATIENT
Start: 2025-07-28

## 2025-07-27 RX ORDER — OXYCODONE HYDROCHLORIDE 5 MG/1
5 TABLET ORAL EVERY 6 HOURS PRN
Qty: 15 TABLET | Refills: 0 | Status: SHIPPED | OUTPATIENT
Start: 2025-07-27

## 2025-07-28 ENCOUNTER — DOCUMENTATION (OUTPATIENT)
Dept: ADMINISTRATIVE | Facility: OTHER | Age: 70
End: 2025-07-28

## (undated) DEVICE — CATH DIAG 5FR IMPULSE 100CM AR1

## (undated) DEVICE — CATH DIAG 5FR IMPULSE 100CM FL4

## (undated) DEVICE — CATH DIAG 5FR IMPULSE 100CM FR4

## (undated) DEVICE — GLIDESHEATH SLENDER STAINLESS STEEL KIT: Brand: GLIDESHEATH SLENDER

## (undated) DEVICE — CATH DIAG 5FR IMPULSE 110CM PIG

## (undated) DEVICE — PINNACLE R/O II INTRODUCER SHEATH WITH RADIOPAQUE MARKER: Brand: PINNACLE

## (undated) DEVICE — GUIDEWIRE WHOLEY HI TORQUE INTERM MOD J.035 260CM

## (undated) DEVICE — RADIFOCUS OPTITORQUE ANGIOGRAPHIC CATHETER: Brand: OPTITORQUE

## (undated) DEVICE — DGW .035 FC J3MM 260CM TEF: Brand: EMERALD

## (undated) DEVICE — TR BAND RADIAL ARTERY COMPRESSION DEVICE: Brand: TR BAND

## (undated) DEVICE — NEEDLE PERCUTANEOUS 21G X 4CM

## (undated) DEVICE — MICROPUNCTURE INTRODUCER SET SILHOUETTE TRANSITIONLESS PUSH-PLUS DESIGN - STIFFENED CANNULA WITH NITINOL WIRE GUIDE: Brand: MICROPUNCTURE